# Patient Record
Sex: FEMALE | Race: WHITE | NOT HISPANIC OR LATINO | Employment: OTHER | URBAN - METROPOLITAN AREA
[De-identification: names, ages, dates, MRNs, and addresses within clinical notes are randomized per-mention and may not be internally consistent; named-entity substitution may affect disease eponyms.]

---

## 2017-01-11 ENCOUNTER — HOSPITAL ENCOUNTER (OUTPATIENT)
Dept: RADIOLOGY | Facility: HOSPITAL | Age: 77
Discharge: HOME | End: 2017-01-11
Attending: FAMILY MEDICINE
Payer: MEDICARE

## 2017-01-11 ENCOUNTER — OFFICE VISIT (OUTPATIENT)
Dept: INTERNAL MEDICINE | Facility: CLINIC | Age: 77
End: 2017-01-11
Payer: MEDICARE

## 2017-01-11 VITALS
OXYGEN SATURATION: 91 % | SYSTOLIC BLOOD PRESSURE: 148 MMHG | BODY MASS INDEX: 32.26 KG/M2 | HEART RATE: 100 BPM | DIASTOLIC BLOOD PRESSURE: 88 MMHG | HEIGHT: 64 IN | WEIGHT: 188.94 LBS | TEMPERATURE: 101 F

## 2017-01-11 DIAGNOSIS — R05.9 COUGH: ICD-10-CM

## 2017-01-11 DIAGNOSIS — R05.9 COUGH: Primary | ICD-10-CM

## 2017-01-11 DIAGNOSIS — R50.9 FEVER AND CHILLS: ICD-10-CM

## 2017-01-11 PROCEDURE — 71020 XR CHEST PA AND LATERAL: CPT | Mod: 26,,, | Performed by: RADIOLOGY

## 2017-01-11 PROCEDURE — 1157F ADVNC CARE PLAN IN RCRD: CPT | Mod: S$GLB,,, | Performed by: FAMILY MEDICINE

## 2017-01-11 PROCEDURE — 99213 OFFICE O/P EST LOW 20 MIN: CPT | Mod: S$GLB,,, | Performed by: FAMILY MEDICINE

## 2017-01-11 PROCEDURE — 1160F RVW MEDS BY RX/DR IN RCRD: CPT | Mod: S$GLB,,, | Performed by: FAMILY MEDICINE

## 2017-01-11 PROCEDURE — 1159F MED LIST DOCD IN RCRD: CPT | Mod: S$GLB,,, | Performed by: FAMILY MEDICINE

## 2017-01-11 PROCEDURE — 3079F DIAST BP 80-89 MM HG: CPT | Mod: S$GLB,,, | Performed by: FAMILY MEDICINE

## 2017-01-11 PROCEDURE — 3077F SYST BP >= 140 MM HG: CPT | Mod: S$GLB,,, | Performed by: FAMILY MEDICINE

## 2017-01-11 PROCEDURE — 1125F AMNT PAIN NOTED PAIN PRSNT: CPT | Mod: S$GLB,,, | Performed by: FAMILY MEDICINE

## 2017-01-11 PROCEDURE — 99999 PR PBB SHADOW E&M-EST. PATIENT-LVL III: CPT | Mod: PBBFAC,,, | Performed by: FAMILY MEDICINE

## 2017-01-11 PROCEDURE — 71020 XR CHEST PA AND LATERAL: CPT | Mod: TC,PO

## 2017-01-11 RX ORDER — AZITHROMYCIN 250 MG/1
250 TABLET, FILM COATED ORAL DAILY
Qty: 6 TABLET | Refills: 0 | Status: SHIPPED | OUTPATIENT
Start: 2017-01-11 | End: 2017-01-16

## 2017-01-11 RX ORDER — BENZONATATE 100 MG/1
100 CAPSULE ORAL 3 TIMES DAILY PRN
Qty: 30 CAPSULE | Refills: 1 | Status: SHIPPED | OUTPATIENT
Start: 2017-01-11 | End: 2017-03-14 | Stop reason: SDUPTHER

## 2017-01-11 RX ORDER — AMOXICILLIN AND CLAVULANATE POTASSIUM 875; 125 MG/1; MG/1
1 TABLET, FILM COATED ORAL EVERY 12 HOURS
Qty: 14 TABLET | Refills: 0 | Status: SHIPPED | OUTPATIENT
Start: 2017-01-11 | End: 2017-01-18

## 2017-01-11 NOTE — PROGRESS NOTES
Chief Complaint:    Chief Complaint   Patient presents with    Cough     fever/body chills       History of Present Illness:    Patient presents today with a 4-5 day history of fevers chills bodyaches she's also had some congestion cough postnasal drip.  She denies any shortness of breath or wheezing.    ROS:  Review of Systems   Constitutional: Positive for chills and fever. Negative for activity change, fatigue and unexpected weight change.   HENT: Positive for congestion. Negative for ear discharge, ear pain, hearing loss, postnasal drip and rhinorrhea.    Eyes: Negative for pain and visual disturbance.   Respiratory: Positive for cough. Negative for chest tightness and shortness of breath.    Cardiovascular: Negative for chest pain and palpitations.   Gastrointestinal: Negative for abdominal pain, diarrhea and vomiting.   Endocrine: Negative for heat intolerance.   Genitourinary: Negative for dysuria, flank pain, frequency and hematuria.   Musculoskeletal: Negative for back pain, gait problem and neck pain.   Skin: Negative for color change and rash.   Neurological: Negative for dizziness, tremors, seizures, numbness and headaches.   Psychiatric/Behavioral: Negative for agitation, hallucinations, self-injury, sleep disturbance and suicidal ideas. The patient is not nervous/anxious.        Past Medical History   Diagnosis Date    Diabetes mellitus, type 2 2006     BS doesn't check 06/15/2016    GERD (gastroesophageal reflux disease)     Hyperlipidemia     Hypertension     Hypothyroidism        Social History:  Social History     Social History    Marital status:      Spouse name: N/A    Number of children: N/A    Years of education: N/A     Social History Main Topics    Smoking status: Former Smoker     Packs/day: 1.00     Years: 25.00    Smokeless tobacco: None    Alcohol use No    Drug use: None    Sexual activity: Not Asked     Other Topics Concern    None     Social History Narrative  "      Family History:   family history includes COPD in her sister; Cancer (age of onset: 46) in her mother; Diabetes in her father; Heart disease in her father and mother; Hypertension in her father.    Health Maintenance   Topic Date Due    Zoster Vaccine  08/10/2000    Influenza Vaccine  08/01/2016    Foot Exam  02/02/2017    Hemoglobin A1c  05/17/2017    Eye Exam  06/15/2017    Lipid Panel  11/17/2017    DEXA SCAN  05/18/2019    Colonoscopy  08/23/2021    TETANUS VACCINE  02/02/2026    Pneumococcal (65+)  Completed       Physical Exam:    Vital Signs  Temp: (!) 101.1 °F (38.4 °C)  Temp src: Tympanic  Pulse: 100  SpO2: (!) 91 %  BP: (!) 148/88  Pain Score:   8 (body aches)  Pain Loc: Back  Height and Weight  Height: 5' 4" (162.6 cm)  Weight: 85.7 kg (188 lb 15 oz)  BSA (Calculated - sq m): 1.97 sq meters  BMI (Calculated): 32.5  Weight in (lb) to have BMI = 25: 145.3]    Body mass index is 32.43 kg/(m^2).    Physical Exam   Constitutional: She is oriented to person, place, and time. She appears well-developed.   HENT:   Right Ear: External ear normal.   Left Ear: External ear normal.   Mouth/Throat: Oropharynx is clear and moist.   Eyes: Conjunctivae are normal. Pupils are equal, round, and reactive to light.   Neck: Normal range of motion. Neck supple.   Cardiovascular: Normal rate, regular rhythm and normal heart sounds.    No murmur heard.  Pulmonary/Chest: Effort normal and breath sounds normal. No respiratory distress. She has no wheezes. She has no rales. She exhibits no tenderness.   Abdominal: Soft. She exhibits no distension and no mass. There is no tenderness. There is no guarding.   Musculoskeletal: She exhibits no edema or tenderness.   Lymphadenopathy:     She has no cervical adenopathy.   Neurological: She is alert and oriented to person, place, and time. She has normal reflexes.   Skin: Skin is warm and dry.   Psychiatric: She has a normal mood and affect. Her behavior is normal. Judgment " and thought content normal.       Lab Results   Component Value Date    CHOL 136 11/17/2016    CHOL 152 05/02/2016    CHOL 135 01/12/2016    TRIG 235 (H) 11/17/2016    TRIG 271 (H) 05/02/2016    TRIG 299 (H) 01/12/2016    HDL 35 (L) 11/17/2016    HDL 42 05/02/2016    HDL 31 (L) 01/12/2016    TOTALCHOLEST 3.9 11/17/2016    TOTALCHOLEST 3.6 05/02/2016    TOTALCHOLEST 4.4 01/12/2016    NONHDLCHOL 101 11/17/2016    NONHDLCHOL 110 05/02/2016    NONHDLCHOL 104 01/12/2016       Lab Results   Component Value Date    HGBA1C 6.8 (H) 11/17/2016       Assessment:      ICD-10-CM ICD-9-CM   1. Cough R05 786.2   2. Fever and chills R50.9 780.60         Plan:  1.  We will recheck patient's pulse ox was 94 he was not in any respiratory distress.  Chest x-ray did not show any acute changes.  Initially we debated treating patient outpatient versus sending to the hospital, finally she was started on Z-Saqib and Augmentin with instructions to go to the ED should she get any worse.  Orders Placed This Encounter   Procedures    X-Ray Chest PA And Lateral       Current Outpatient Prescriptions   Medication Sig Dispense Refill    allopurinol (ZYLOPRIM) 300 MG tablet Take 1 tablet (300 mg total) by mouth once daily. 90 tablet 3    aspirin (ECOTRIN) 81 MG EC tablet Take 81 mg by mouth once daily.      CMPD diclofenac 2%- amitriptyline 2%- lidocaine 2%- prilocaine 2% in Lipoderm ActiveMax bid 100 g 6    ferrous sulfate 325 mg (65 mg iron) CpSR Take 1 tablet by mouth 2 times daily 2 hours after meal. 60 capsule 6    furosemide (LASIX) 40 MG tablet Take 40 mg by mouth once daily.      gabapentin (NEURONTIN) 100 MG capsule Take 1 capsule (100 mg total) by mouth 3 (three) times daily. 270 capsule 3    glipiZIDE (GLUCOTROL) 5 MG tablet Take 1 tablet (5 mg total) by mouth 2 (two) times daily before meals. 180 tablet 3    glyBURIDE (DIABETA) 5 MG tablet Take 5 mg by mouth once daily.      levothyroxine (SYNTHROID) 75 MCG tablet Take 1 tablet  (75 mcg total) by mouth once daily. 90 tablet 3    losartan (COZAAR) 100 MG tablet Take 1 tablet (100 mg total) by mouth once daily. 90 tablet 3    lovastatin (MEVACOR) 40 MG tablet TAKE 1 TABLET (40 MG TOTAL) BY MOUTH EVERY EVENING. 30 tablet 6    metformin (GLUCOPHAGE) 1000 MG tablet Take 1 tablet (1,000 mg total) by mouth 2 (two) times daily. 180 tablet 3    metoprolol succinate (TOPROL-XL) 50 MG 24 hr tablet Take 50 mg by mouth once daily.      pantoprazole (PROTONIX) 40 MG tablet TAKE 1 TABLET (40 MG TOTAL) BY MOUTH ONCE DAILY. 30 tablet 2    potassium chloride SA (K-DUR,KLOR-CON) 10 MEQ tablet Take once daily 90 tablet 3    pramipexole (MIRAPEX) 0.25 MG tablet Take 1 tablet (0.25 mg total) by mouth every evening. 90 tablet 3    sodium,potassium,mag sulfates (SUPREP BOWEL PREP KIT) 17.5-3.13-1.6 gram SolR Take 1 kit by mouth as directed. 1 Bottle 0    trazodone (DESYREL) 50 MG tablet Take 1 tablet (50 mg total) by mouth every evening. 30 tablet 11    amoxicillin-clavulanate 875-125mg (AUGMENTIN) 875-125 mg per tablet Take 1 tablet by mouth every 12 (twelve) hours. 14 tablet 0    azithromycin (ZITHROMAX Z-SAURABH) 250 MG tablet Take 1 tablet (250 mg total) by mouth once daily. Take 2 tabs on day 1 thereafter one tab daily by mouth for 4 days. 6 tablet 0    benzonatate (TESSALON) 100 MG capsule Take 1 capsule (100 mg total) by mouth 3 (three) times daily as needed for Cough. 30 capsule 1    benzonatate (TESSALON) 100 MG capsule Take 1 capsule (100 mg total) by mouth 3 (three) times daily as needed for Cough. 30 capsule 1    metoprolol tartrate (LOPRESSOR) 50 MG tablet 1 BY MOUTH DAILY  3    penicillin v potassium (VEETID) 500 MG tablet TAKE 4 TABLETS BY MOUTH 1 HOUR PRIOR TO APPT  0    triamcinolone acetonide 0.5% (KENALOG) 0.5 % Crea Apply topically 2 (two) times daily. 1 Tube 1     No current facility-administered medications for this visit.        There are no discontinued medications.    No  Follow-up on file.      Dr Angely Roberts MD    Disclaimer: This note is prepared using voice recognition system and as such is likely to have errors and is not proof read.

## 2017-01-12 ENCOUNTER — CLINICAL SUPPORT (OUTPATIENT)
Dept: INTERNAL MEDICINE | Facility: CLINIC | Age: 77
End: 2017-01-12
Payer: MEDICARE

## 2017-01-12 DIAGNOSIS — J11.1 FLU SYNDROME: Primary | ICD-10-CM

## 2017-01-12 PROCEDURE — 87804 INFLUENZA ASSAY W/OPTIC: CPT | Mod: QW,S$GLB,, | Performed by: FAMILY MEDICINE

## 2017-01-13 LAB
CTP QC/QA: YES
FLUAV AG NPH QL: NEGATIVE
FLUBV AG NPH QL: NEGATIVE

## 2017-02-01 RX ORDER — GABAPENTIN 100 MG/1
CAPSULE ORAL
Qty: 270 CAPSULE | Refills: 3 | Status: SHIPPED | OUTPATIENT
Start: 2017-02-01 | End: 2018-02-07 | Stop reason: SDUPTHER

## 2017-02-08 DIAGNOSIS — G25.81 RESTLESS LEGS SYNDROME (RLS): Chronic | ICD-10-CM

## 2017-02-08 RX ORDER — PRAMIPEXOLE DIHYDROCHLORIDE 0.25 MG/1
TABLET ORAL
Qty: 90 TABLET | Refills: 3 | Status: SHIPPED | OUTPATIENT
Start: 2017-02-08 | End: 2018-11-19

## 2017-03-01 RX ORDER — LOSARTAN POTASSIUM 100 MG/1
TABLET ORAL
Qty: 90 TABLET | Refills: 3 | Status: SHIPPED | OUTPATIENT
Start: 2017-03-01 | End: 2018-01-08

## 2017-03-01 RX ORDER — METFORMIN HYDROCHLORIDE 1000 MG/1
TABLET ORAL
Qty: 180 TABLET | Refills: 3 | Status: SHIPPED | OUTPATIENT
Start: 2017-03-01 | End: 2017-08-30 | Stop reason: SDUPTHER

## 2017-03-01 RX ORDER — ALLOPURINOL 300 MG/1
TABLET ORAL
Qty: 90 TABLET | Refills: 3 | Status: SHIPPED | OUTPATIENT
Start: 2017-03-01 | End: 2017-08-30 | Stop reason: SDUPTHER

## 2017-03-07 ENCOUNTER — LAB VISIT (OUTPATIENT)
Dept: LAB | Facility: HOSPITAL | Age: 77
End: 2017-03-07
Attending: FAMILY MEDICINE
Payer: MEDICARE

## 2017-03-07 DIAGNOSIS — E11.42 DIABETIC POLYNEUROPATHY ASSOCIATED WITH TYPE 2 DIABETES MELLITUS: ICD-10-CM

## 2017-03-07 DIAGNOSIS — I10 ESSENTIAL HYPERTENSION: ICD-10-CM

## 2017-03-07 LAB
ALBUMIN SERPL BCP-MCNC: 4 G/DL
ALP SERPL-CCNC: 63 U/L
ALT SERPL W/O P-5'-P-CCNC: 12 U/L
ANION GAP SERPL CALC-SCNC: 12 MMOL/L
AST SERPL-CCNC: 16 U/L
BASOPHILS # BLD AUTO: 0.04 K/UL
BASOPHILS NFR BLD: 0.6 %
BILIRUB SERPL-MCNC: 0.4 MG/DL
BUN SERPL-MCNC: 18 MG/DL
CALCIUM SERPL-MCNC: 9.7 MG/DL
CHLORIDE SERPL-SCNC: 104 MMOL/L
CHOLEST/HDLC SERPL: 3.5 {RATIO}
CO2 SERPL-SCNC: 27 MMOL/L
CREAT SERPL-MCNC: 1 MG/DL
DIFFERENTIAL METHOD: ABNORMAL
EOSINOPHIL # BLD AUTO: 0.3 K/UL
EOSINOPHIL NFR BLD: 4.3 %
ERYTHROCYTE [DISTWIDTH] IN BLOOD BY AUTOMATED COUNT: 15.5 %
EST. GFR  (AFRICAN AMERICAN): >60 ML/MIN/1.73 M^2
EST. GFR  (NON AFRICAN AMERICAN): 54.9 ML/MIN/1.73 M^2
GLUCOSE SERPL-MCNC: 120 MG/DL
HCT VFR BLD AUTO: 34.8 %
HDL/CHOLESTEROL RATIO: 28.9 %
HDLC SERPL-MCNC: 142 MG/DL
HDLC SERPL-MCNC: 41 MG/DL
HGB BLD-MCNC: 10.6 G/DL
LDLC SERPL CALC-MCNC: 57.8 MG/DL
LYMPHOCYTES # BLD AUTO: 2 K/UL
LYMPHOCYTES NFR BLD: 30.9 %
MCH RBC QN AUTO: 23.8 PG
MCHC RBC AUTO-ENTMCNC: 30.5 %
MCV RBC AUTO: 78 FL
MONOCYTES # BLD AUTO: 0.6 K/UL
MONOCYTES NFR BLD: 9.1 %
NEUTROPHILS # BLD AUTO: 3.6 K/UL
NEUTROPHILS NFR BLD: 54.8 %
NONHDLC SERPL-MCNC: 101 MG/DL
PLATELET # BLD AUTO: 230 K/UL
PMV BLD AUTO: 9.8 FL
POTASSIUM SERPL-SCNC: 3.9 MMOL/L
PROT SERPL-MCNC: 7.2 G/DL
RBC # BLD AUTO: 4.46 M/UL
SODIUM SERPL-SCNC: 143 MMOL/L
TRIGL SERPL-MCNC: 216 MG/DL
WBC # BLD AUTO: 6.48 K/UL

## 2017-03-07 PROCEDURE — 36415 COLL VENOUS BLD VENIPUNCTURE: CPT | Mod: PO

## 2017-03-07 PROCEDURE — 83036 HEMOGLOBIN GLYCOSYLATED A1C: CPT

## 2017-03-07 PROCEDURE — 80053 COMPREHEN METABOLIC PANEL: CPT

## 2017-03-07 PROCEDURE — 80061 LIPID PANEL: CPT

## 2017-03-07 PROCEDURE — 85025 COMPLETE CBC W/AUTO DIFF WBC: CPT

## 2017-03-08 LAB
ESTIMATED AVG GLUCOSE: 137 MG/DL
HBA1C MFR BLD HPLC: 6.4 %

## 2017-03-14 ENCOUNTER — OFFICE VISIT (OUTPATIENT)
Dept: INTERNAL MEDICINE | Facility: CLINIC | Age: 77
End: 2017-03-14
Payer: MEDICARE

## 2017-03-14 VITALS
WEIGHT: 189.13 LBS | HEART RATE: 81 BPM | SYSTOLIC BLOOD PRESSURE: 130 MMHG | DIASTOLIC BLOOD PRESSURE: 62 MMHG | HEIGHT: 64 IN | BODY MASS INDEX: 32.29 KG/M2 | RESPIRATION RATE: 16 BRPM | OXYGEN SATURATION: 94 % | TEMPERATURE: 97 F

## 2017-03-14 DIAGNOSIS — R19.5 OCCULT BLOOD POSITIVE STOOL: ICD-10-CM

## 2017-03-14 DIAGNOSIS — E11.42 DIABETIC POLYNEUROPATHY ASSOCIATED WITH TYPE 2 DIABETES MELLITUS: Primary | ICD-10-CM

## 2017-03-14 DIAGNOSIS — E03.4 HYPOTHYROIDISM DUE TO ACQUIRED ATROPHY OF THYROID: ICD-10-CM

## 2017-03-14 DIAGNOSIS — I10 ESSENTIAL HYPERTENSION: ICD-10-CM

## 2017-03-14 DIAGNOSIS — D50.0 IRON DEFICIENCY ANEMIA DUE TO CHRONIC BLOOD LOSS: ICD-10-CM

## 2017-03-14 PROCEDURE — 1160F RVW MEDS BY RX/DR IN RCRD: CPT | Mod: S$GLB,,, | Performed by: FAMILY MEDICINE

## 2017-03-14 PROCEDURE — 1157F ADVNC CARE PLAN IN RCRD: CPT | Mod: S$GLB,,, | Performed by: FAMILY MEDICINE

## 2017-03-14 PROCEDURE — 1159F MED LIST DOCD IN RCRD: CPT | Mod: S$GLB,,, | Performed by: FAMILY MEDICINE

## 2017-03-14 PROCEDURE — 99499 UNLISTED E&M SERVICE: CPT | Mod: S$PBB,,, | Performed by: FAMILY MEDICINE

## 2017-03-14 PROCEDURE — 1126F AMNT PAIN NOTED NONE PRSNT: CPT | Mod: S$GLB,,, | Performed by: FAMILY MEDICINE

## 2017-03-14 PROCEDURE — 3078F DIAST BP <80 MM HG: CPT | Mod: S$GLB,,, | Performed by: FAMILY MEDICINE

## 2017-03-14 PROCEDURE — 3075F SYST BP GE 130 - 139MM HG: CPT | Mod: S$GLB,,, | Performed by: FAMILY MEDICINE

## 2017-03-14 PROCEDURE — 99214 OFFICE O/P EST MOD 30 MIN: CPT | Mod: S$GLB,,, | Performed by: FAMILY MEDICINE

## 2017-03-14 PROCEDURE — 99999 PR PBB SHADOW E&M-EST. PATIENT-LVL III: CPT | Mod: PBBFAC,,, | Performed by: FAMILY MEDICINE

## 2017-03-14 NOTE — PROGRESS NOTES
Chief Complaint:    Chief Complaint   Patient presents with    Follow-up       History of Present Illness:  She presents today for three-month follow-up.  She's doing okay A1c is down to 6.4 blood pressures normal lipids chemistries are stable.    Deficiency anemia due to chronic blood loss she was sent to gastroenterology had a colonoscopy done she's not taking iron there is a drop in her H&H it appears that she's probably continuing to have chronic blood loss no further workup for appointments were made with gastroenterology.    She does complain of some cold feet especially at night.      ROS:  Review of Systems   Constitutional: Negative for activity change, chills, fatigue, fever and unexpected weight change.   HENT: Negative for congestion, ear discharge, ear pain, hearing loss, postnasal drip and rhinorrhea.    Eyes: Negative for pain and visual disturbance.   Respiratory: Negative for cough, chest tightness and shortness of breath.    Cardiovascular: Negative for chest pain and palpitations.   Gastrointestinal: Negative for abdominal pain, diarrhea and vomiting.   Endocrine: Negative for heat intolerance.   Genitourinary: Negative for dysuria, flank pain, frequency and hematuria.   Musculoskeletal: Negative for back pain, gait problem and neck pain.   Skin: Negative for color change and rash.   Neurological: Negative for dizziness, tremors, seizures, numbness and headaches.   Psychiatric/Behavioral: Negative for agitation, hallucinations, self-injury, sleep disturbance and suicidal ideas. The patient is not nervous/anxious.        Past Medical History:   Diagnosis Date    Diabetes mellitus, type 2 2006    BS doesn't check 06/15/2016    GERD (gastroesophageal reflux disease)     Hyperlipidemia     Hypertension     Hypothyroidism        Social History:  Social History     Social History    Marital status:      Spouse name: N/A    Number of children: N/A    Years of education: N/A     Social  "History Main Topics    Smoking status: Former Smoker     Packs/day: 1.00     Years: 25.00    Smokeless tobacco: None    Alcohol use No    Drug use: None    Sexual activity: Not Asked     Other Topics Concern    None     Social History Narrative       Family History:   family history includes COPD in her sister; Cancer (age of onset: 46) in her mother; Diabetes in her father; Heart disease in her father and mother; Hypertension in her father.    Health Maintenance   Topic Date Due    Zoster Vaccine  08/10/2000    Influenza Vaccine  08/01/2016    Eye Exam  06/15/2017    Hemoglobin A1c  09/07/2017    Lipid Panel  03/07/2018    Foot Exam  03/14/2018    DEXA SCAN  05/18/2019    Colonoscopy  08/23/2021    TETANUS VACCINE  02/02/2026    Pneumococcal (65+)  Completed       Physical Exam:    Vital Signs  Temp: 97 °F (36.1 °C)  Temp src: Tympanic  Pulse: 81  Resp: 16  SpO2: (!) 94 %  BP: 130/62  BP Location: Right arm  BP Method: Manual  Patient Position: Sitting  Pain Score: 0-No pain  Height and Weight  Height: 5' 4" (162.6 cm)  Weight: 85.8 kg (189 lb 2.5 oz)  BSA (Calculated - sq m): 1.97 sq meters  BMI (Calculated): 32.5  Weight in (lb) to have BMI = 25: 145.3]    Body mass index is 32.47 kg/(m^2).    Physical Exam   Constitutional: She is oriented to person, place, and time. She appears well-developed.   HENT:   Mouth/Throat: Oropharynx is clear and moist.   Eyes: Conjunctivae are normal. Pupils are equal, round, and reactive to light.   Neck: Normal range of motion. Neck supple.   Cardiovascular: Normal rate, regular rhythm and normal heart sounds.    No murmur heard.  Pulses:       Dorsalis pedis pulses are 3+ on the right side, and 3+ on the left side.        Posterior tibial pulses are 3+ on the right side, and 3+ on the left side.   Pulmonary/Chest: Effort normal and breath sounds normal. No respiratory distress. She has no wheezes. She has no rales. She exhibits no tenderness.   Abdominal: Soft. " She exhibits no distension and no mass. There is no tenderness. There is no guarding.   Musculoskeletal: She exhibits no edema or tenderness.   Feet:   Right Foot:   Protective Sensation: 10 sites tested. 10 sites sensed.   Left Foot:   Protective Sensation: 10 sites tested. 10 sites sensed.   Lymphadenopathy:     She has no cervical adenopathy.   Neurological: She is alert and oriented to person, place, and time. She has normal reflexes.   Skin: Skin is warm and dry.   Psychiatric: She has a normal mood and affect. Her behavior is normal. Judgment and thought content normal.       Lab Results   Component Value Date    CHOL 142 03/07/2017    CHOL 136 11/17/2016    CHOL 152 05/02/2016    TRIG 216 (H) 03/07/2017    TRIG 235 (H) 11/17/2016    TRIG 271 (H) 05/02/2016    HDL 41 03/07/2017    HDL 35 (L) 11/17/2016    HDL 42 05/02/2016    TOTALCHOLEST 3.5 03/07/2017    TOTALCHOLEST 3.9 11/17/2016    TOTALCHOLEST 3.6 05/02/2016    NONHDLCHOL 101 03/07/2017    NONHDLCHOL 101 11/17/2016    NONHDLCHOL 110 05/02/2016       Lab Results   Component Value Date    HGBA1C 6.4 (H) 03/07/2017       Assessment:      ICD-10-CM ICD-9-CM   1. Diabetic polyneuropathy associated with type 2 diabetes mellitus E11.42 250.60     357.2   2. Hypothyroidism due to acquired atrophy of thyroid E03.4 244.8     246.8   3. Essential hypertension I10 401.9   4. Iron deficiency anemia due to chronic blood loss D50.0 280.0   5. Occult blood positive stool R19.5 792.1         Plan:  1.  Iron deficiency anemia due to chronic blood loss and heme positive stool she will be referred back to gastroenterology for continued workup possibly EGD and/or capsule endoscopy.  We will add ferrous sulfate in the interim.  Side effects discussed with the patient.  2.  Diabetes type 2 stable  3.  Hypothyroid and therapeutic  4.  Hypertension stable  5.  Cold feet, patient does not have much subcutaneous fat on her feet and legs thereby creating the subjective sensation of  coldness although her peripheral pulses are excellent.  Do recommend walking program.  Follow-up 3 months.  Orders Placed This Encounter   Procedures    Hemoglobin A1c    Lipid panel    Comprehensive metabolic panel    CBC auto differential    TSH    Ambulatory referral to Gastroenterology       Current Outpatient Prescriptions   Medication Sig Dispense Refill    allopurinol (ZYLOPRIM) 300 MG tablet TAKE 1 TABLET (300 MG TOTAL) BY MOUTH ONCE DAILY. 90 tablet 3    aspirin (ECOTRIN) 81 MG EC tablet Take 81 mg by mouth once daily.      CMPD diclofenac 2%- amitriptyline 2%- lidocaine 2%- prilocaine 2% in Lipoderm ActiveMax bid 100 g 6    furosemide (LASIX) 40 MG tablet Take 40 mg by mouth once daily.      gabapentin (NEURONTIN) 100 MG capsule TAKE 1 CAPSULE (100 MG TOTAL) BY MOUTH 3 (THREE) TIMES DAILY. 270 capsule 3    glyBURIDE (DIABETA) 5 MG tablet Take 5 mg by mouth once daily.      levothyroxine (SYNTHROID) 75 MCG tablet Take 1 tablet (75 mcg total) by mouth once daily. 90 tablet 3    losartan (COZAAR) 100 MG tablet TAKE 1 TABLET (100 MG TOTAL) BY MOUTH ONCE DAILY. 90 tablet 3    lovastatin (MEVACOR) 40 MG tablet TAKE 1 TABLET (40 MG TOTAL) BY MOUTH EVERY EVENING. 30 tablet 6    metformin (GLUCOPHAGE) 1000 MG tablet TAKE 1 TABLET (1,000 MG TOTAL) BY MOUTH 2 (TWO) TIMES DAILY. 180 tablet 3    metoprolol tartrate (LOPRESSOR) 50 MG tablet 1 BY MOUTH DAILY  3    pantoprazole (PROTONIX) 40 MG tablet TAKE 1 TABLET (40 MG TOTAL) BY MOUTH ONCE DAILY. 30 tablet 2    penicillin v potassium (VEETID) 500 MG tablet TAKE 4 TABLETS BY MOUTH 1 HOUR PRIOR TO APPT  0    potassium chloride SA (K-DUR,KLOR-CON) 10 MEQ tablet Take once daily 90 tablet 3    pramipexole (MIRAPEX) 0.25 MG tablet TAKE 1 TABLET (0.25 MG TOTAL) BY MOUTH EVERY EVENING. 90 tablet 3    trazodone (DESYREL) 50 MG tablet Take 1 tablet (50 mg total) by mouth every evening. 30 tablet 11    ferrous sulfate 325 mg (65 mg iron) CpSR Take 1 tablet  by mouth 2 times daily 2 hours after meal. 60 capsule 6    glipiZIDE (GLUCOTROL) 5 MG tablet Take 1 tablet (5 mg total) by mouth 2 (two) times daily before meals. 180 tablet 3    triamcinolone acetonide 0.5% (KENALOG) 0.5 % Crea Apply topically 2 (two) times daily. 1 Tube 1     No current facility-administered medications for this visit.        Medications Discontinued During This Encounter   Medication Reason    benzonatate (TESSALON) 100 MG capsule Therapy completed    benzonatate (TESSALON) 100 MG capsule Duplicate Order    ferrous sulfate 325 mg (65 mg iron) CpSR Patient no longer taking    metoprolol succinate (TOPROL-XL) 50 MG 24 hr tablet Duplicate Order    sodium,potassium,mag sulfates (SUPREP BOWEL PREP KIT) 17.5-3.13-1.6 gram SolR Therapy completed       Return in about 3 months (around 6/14/2017).      Dr Angely Roberts MD    Disclaimer: This note is prepared using voice recognition system and as such is likely to have errors and is not proof read.

## 2017-03-14 NOTE — MR AVS SNAPSHOT
[place] - Internal Medicine  [address] [facility] [address]  Phone: [phone]                  Jasmine Velásquez   3/14/2017 10:20 AM   Office Visit    Description:  Female : 1940   Provider:  Angely Roberts MD   Department:  [facility]           Reason for Visit     Follow-up           Diagnoses this Visit        Comments    Diabetic polyneuropathy associated with type 2 diabetes mellitus    -  Primary     Hypothyroidism due to acquired atrophy of thyroid         Essential hypertension         Iron deficiency anemia due to chronic blood loss         Occult blood positive stool                To Do List           Future Appointments        Provider Department Dept Phone    2017 9:00 AM LABORATORY, [place] [facility] [phone]    2017 10:00 AM Gerardo Lauren OD [facility] [phone]    2017 10:20 AM Angely Roberts MD [facility] [phone]      Goals (5 Years of Data)     None      Follow-Up and Disposition     Return in about 3 months (around 2017).    Follow-up and Disposition History       These Medications        Disp Refills Start End    ferrous sulfate 325 mg (65 mg iron) CpSR 60 capsule 6 3/14/2017     Take 1 tablet by mouth 2 times daily 2 hours after meal. - Oral    Pharmacy: [facility]/pharmacy #[id number] [facility] [id number] [place] [address] #: [phone]         [facility]     [facility] -  Assistance  Registered nurses in the [facility] provide clinical advisement, health education, appointment booking, and other advisory services.  Call for this free service at [phone].             Medications           Message regarding Medications     Verify the changes and/or additions to your medication regime listed below are the same as discussed with your clinician today.  If any of these changes or additions are incorrect,  please notify your healthcare provider.        STOP taking these medications     benzonatate (TESSALON) 100 MG capsule Take 1 capsule (100 mg total) by mouth 3 (three) times daily as needed for Cough.    benzonatate (TESSALON) 100 MG capsule Take 1 capsule (100 mg total) by mouth 3 (three) times daily as needed for Cough.    metoprolol succinate (TOPROL-XL) 50 MG 24 hr tablet Take 50 mg by mouth once daily.    sodium,potassium,mag sulfates (SUPREP BOWEL PREP KIT) 17.5-3.13-1.6 gram SolR Take 1 kit by mouth as directed.           Verify that the below list of medications is an accurate representation of the medications you are currently taking.  If none reported, the list may be blank. If incorrect, please contact your healthcare provider. Carry this list with you in case of emergency.           Current Medications     allopurinol (ZYLOPRIM) 300 MG tablet TAKE 1 TABLET (300 MG TOTAL) BY MOUTH ONCE DAILY.    aspirin (ECOTRIN) 81 MG EC tablet Take 81 mg by mouth once daily.    CMPD diclofenac 2%- amitriptyline 2%- lidocaine 2%- prilocaine 2% in Lipoderm ActiveMax bid    ferrous sulfate 325 mg (65 mg iron) CpSR Take 1 tablet by mouth 2 times daily 2 hours after meal.    furosemide (LASIX) 40 MG tablet Take 40 mg by mouth once daily.    gabapentin (NEURONTIN) 100 MG capsule TAKE 1 CAPSULE (100 MG TOTAL) BY MOUTH 3 (THREE) TIMES DAILY.    glipiZIDE (GLUCOTROL) 5 MG tablet Take 1 tablet (5 mg total) by mouth 2 (two) times daily before meals.    glyBURIDE (DIABETA) 5 MG tablet Take 5 mg by mouth once daily.    levothyroxine (SYNTHROID) 75 MCG tablet Take 1 tablet (75 mcg total) by mouth once daily.    losartan (COZAAR) 100 MG tablet TAKE 1 TABLET (100 MG TOTAL) BY MOUTH ONCE DAILY.    lovastatin (MEVACOR) 40 MG tablet TAKE 1 TABLET (40 MG TOTAL) BY MOUTH EVERY EVENING.    metformin (GLUCOPHAGE) 1000 MG tablet TAKE 1 TABLET (1,000 MG TOTAL) BY MOUTH 2 (TWO) TIMES DAILY.    metoprolol tartrate (LOPRESSOR) 50 MG tablet 1 BY  "MOUTH DAILY    pantoprazole (PROTONIX) 40 MG tablet TAKE 1 TABLET (40 MG TOTAL) BY MOUTH ONCE DAILY.    penicillin v potassium (VEETID) 500 MG tablet TAKE 4 TABLETS BY MOUTH 1 HOUR PRIOR TO APPT    potassium chloride SA (K-DUR,KLOR-CON) 10 MEQ tablet Take once daily    pramipexole (MIRAPEX) 0.25 MG tablet TAKE 1 TABLET (0.25 MG TOTAL) BY MOUTH EVERY EVENING.    trazodone (DESYREL) 50 MG tablet Take 1 tablet (50 mg total) by mouth every evening.    triamcinolone acetonide 0.5% (KENALOG) 0.5 % Crea Apply topically 2 (two) times daily.           Clinical Reference Information           Your Vitals Were     BP Pulse Temp Resp Height Weight    130/62 (BP Location: Right arm, Patient Position: Sitting, BP Method: Manual) 81 97 °F (36.1 °C) (Tympanic) 16 5' 4" (1.626 m) 85.8 kg (189 lb 2.5 oz)    SpO2 BMI             94% 32.47 kg/m2         Blood Pressure          Most Recent Value    BP  130/62      Allergies as of 3/14/2017     Ace Inhibitors    Codeine      Immunizations Administered on Date of Encounter - 3/14/2017     None      Orders Placed During Today's Visit      Normal Orders This Visit    Ambulatory referral to Gastroenterology     Future Labs/Procedures Expected by Expires    CBC auto differential  6/12/2017 (Approximate) 9/10/2017    Comprehensive metabolic panel  6/14/2017 (Approximate) 6/12/2018    Hemoglobin A1c  6/14/2017 (Approximate) 5/13/2018    Lipid panel  6/14/2017 (Approximate) 5/13/2018    TSH  6/14/2017 (Approximate) 5/13/2018      [facility] Sign-Up     Activating your [facility] account is as easy as 1-2-3!     1) Visit [email or web address], select Sign Up Now, enter this activation code and your date of birth, then select Next.  [email or web address]  Expires: 4/28/2017 11:32 AM      2) Create a username and password to use when you visit [facility] in the future and select a security question in case you lose your password and select Next.    3) Enter your e-mail address and click Sign Up!    Additional " Information  If you have questions, please e-mail [email or web address] or call [phone] to talk to our [facility] staff. Remember, [facility] is NOT to be used for urgent needs. For medical emergencies, dial 911.         Language Assistance Services     ATTENTION: Language assistance services are available, free of charge. Please call [phone].      ATENCIÓN: Si habla shunañol, tiene a guallpa disposición servicios gratuitos de asistencia lingüística. Llame al [phone].     [facility] Ý: N?u b?n nói Ti?ng Vi?t, có các d?ch v? h? tr? ngôn ng? mi?n phí dành cho b?n. G?i s? [phone].         [facility] complies with applicable Federal civil rights laws and does not discriminate on the basis of race, color, national origin, age, disability, or sex.

## 2017-03-28 RX ORDER — PANTOPRAZOLE SODIUM 40 MG/1
TABLET, DELAYED RELEASE ORAL
Qty: 30 TABLET | Refills: 2 | Status: SHIPPED | OUTPATIENT
Start: 2017-03-28 | End: 2017-05-08 | Stop reason: SDUPTHER

## 2017-03-29 ENCOUNTER — TELEPHONE (OUTPATIENT)
Dept: INTERNAL MEDICINE | Facility: CLINIC | Age: 77
End: 2017-03-29

## 2017-03-29 RX ORDER — PANTOPRAZOLE SODIUM 40 MG/1
TABLET, DELAYED RELEASE ORAL
Qty: 30 TABLET | Refills: 1 | Status: SHIPPED | OUTPATIENT
Start: 2017-03-29 | End: 2017-05-08 | Stop reason: SDUPTHER

## 2017-03-29 NOTE — TELEPHONE ENCOUNTER
----- Message from Kayleen Park sent at 3/29/2017 11:46 AM CDT -----  Contact: Daughter  States blood work needs to be faxed to [facility] at [phone], pt can be reached at [phone]///[id number]

## 2017-03-30 RX ORDER — LEVOTHYROXINE SODIUM 75 UG/1
TABLET ORAL
Qty: 90 TABLET | Refills: 3 | Status: SHIPPED | OUTPATIENT
Start: 2017-03-30 | End: 2017-06-29 | Stop reason: SDUPTHER

## 2017-04-03 RX ORDER — GLIPIZIDE 5 MG/1
TABLET ORAL
Qty: 180 TABLET | Refills: 2 | Status: SHIPPED | OUTPATIENT
Start: 2017-04-03 | End: 2018-11-19

## 2017-04-24 RX ORDER — LOVASTATIN 40 MG/1
TABLET ORAL
Qty: 30 TABLET | Refills: 6 | Status: SHIPPED | OUTPATIENT
Start: 2017-04-24 | End: 2020-08-07 | Stop reason: SDUPTHER

## 2017-04-28 ENCOUNTER — INITIAL CONSULT (OUTPATIENT)
Dept: GASTROENTEROLOGY | Facility: CLINIC | Age: 77
End: 2017-04-28
Payer: MEDICARE

## 2017-04-28 VITALS
HEIGHT: 64 IN | SYSTOLIC BLOOD PRESSURE: 110 MMHG | BODY MASS INDEX: 30.75 KG/M2 | HEART RATE: 70 BPM | DIASTOLIC BLOOD PRESSURE: 60 MMHG | WEIGHT: 180.13 LBS

## 2017-04-28 DIAGNOSIS — D50.0 IRON DEFICIENCY ANEMIA DUE TO CHRONIC BLOOD LOSS: Primary | ICD-10-CM

## 2017-04-28 PROCEDURE — 1125F AMNT PAIN NOTED PAIN PRSNT: CPT | Mod: S$GLB,,, | Performed by: INTERNAL MEDICINE

## 2017-04-28 PROCEDURE — 3078F DIAST BP <80 MM HG: CPT | Mod: S$GLB,,, | Performed by: INTERNAL MEDICINE

## 2017-04-28 PROCEDURE — 99999 PR PBB SHADOW E&M-EST. PATIENT-LVL III: CPT | Mod: PBBFAC,,, | Performed by: INTERNAL MEDICINE

## 2017-04-28 PROCEDURE — 1157F ADVNC CARE PLAN IN RCRD: CPT | Mod: 8P,S$GLB,, | Performed by: INTERNAL MEDICINE

## 2017-04-28 PROCEDURE — 99214 OFFICE O/P EST MOD 30 MIN: CPT | Mod: S$GLB,,, | Performed by: INTERNAL MEDICINE

## 2017-04-28 PROCEDURE — 1160F RVW MEDS BY RX/DR IN RCRD: CPT | Mod: S$GLB,,, | Performed by: INTERNAL MEDICINE

## 2017-04-28 PROCEDURE — 3074F SYST BP LT 130 MM HG: CPT | Mod: S$GLB,,, | Performed by: INTERNAL MEDICINE

## 2017-04-28 PROCEDURE — 1159F MED LIST DOCD IN RCRD: CPT | Mod: S$GLB,,, | Performed by: INTERNAL MEDICINE

## 2017-04-28 RX ORDER — POLYETHYLENE GLYCOL 3350, SODIUM SULFATE ANHYDROUS, SODIUM BICARBONATE, SODIUM CHLORIDE, POTASSIUM CHLORIDE 236; 22.74; 6.74; 5.86; 2.97 G/4L; G/4L; G/4L; G/4L; G/4L
4 POWDER, FOR SOLUTION ORAL ONCE
Qty: 4000 ML | Refills: 0 | Status: SHIPPED | OUTPATIENT
Start: 2017-04-28 | End: 2017-04-28

## 2017-04-28 RX ORDER — METHOCARBAMOL 750 MG/1
1 TABLET, FILM COATED ORAL EVERY 8 HOURS PRN
Refills: 3 | COMMUNITY
Start: 2017-03-29 | End: 2017-09-28

## 2017-04-28 NOTE — PROGRESS NOTES
Subjective:    PCP: Angely Roberts MD    Referring physician: Dr. Angely Roberts      Patient ID: Jasmine Velásquez is a 76 y.o. female.    Chief Complaint: Anemia      HPI   Jasmine Velásquez is a 76 y.o. /White female here today for iron deficiency anemia    Anemia  Patient presents for evaluation of anemia. Anemia was found by routine CBC. It has been present for a few months. Associated signs & symptoms: none.  Denies abdominal pain, nausea, vomiting, heartburn, dysphagia, odynophagia, jaundice, hematochezia, melena, hematemesis, constipation or diarrhea.    Had been taking aleve previously but lately she has been taking Tylenol .  Has been on iron for one month.     Last colonoscopy 8/2016 with one small polyp. Repeat was advised in 5 yrs. She developed anemia since  Mother had colon cancer at age 62      Past Medical History:   Diagnosis Date    Diabetes mellitus, type 2 2006    BS doesn't check 06/15/2016    GERD (gastroesophageal reflux disease)     Hyperlipidemia     Hypertension     Hypothyroidism        Past Surgical History:   Procedure Laterality Date    APPENDECTOMY      BACK SURGERY      [facility]ARACT EXTRACTION [facility]     [facility]    COLONOSCOPY N/A 8/23/2016    Procedure: COLONOSCOPY;  Surgeon: Marcel Jacques MD;  Location: [facility];  Service: Endoscopy;  Laterality: N/A;    PIG VALVE      TONSILLECTOMY         Family History   Problem Relation Age of Onset    Cancer Mother 46     colon    Heart disease Mother     Heart disease Father     Hypertension Father     Diabetes Father     COPD Sister        Social History     Social History    Marital status:      Spouse name: N/A    Number of children: N/A    Years of education: N/A     Social History Main Topics    Smoking status: Former Smoker     Packs/day: 1.00     Years: 25.00    Smokeless tobacco: None    Alcohol use No    Drug use: None    Sexual activity: Not Asked      Other Topics Concern    None     Social History Narrative       Review of patient's allergies indicates:   Allergen Reactions    Ace inhibitors Other (See Comments)     COUGH    Codeine        Current Outpatient Prescriptions   Medication Sig Dispense Refill    allopurinol (ZYLOPRIM) 300 MG tablet TAKE 1 TABLET (300 MG TOTAL) BY MOUTH ONCE DAILY. 90 tablet 3    aspirin (ECOTRIN) 81 MG EC tablet Take 81 mg by mouth once daily.      CMPD diclofenac 2%- amitriptyline 2%- lidocaine 2%- prilocaine 2% in Lipoderm ActiveMax bid 100 g 6    ferrous sulfate 325 mg (65 mg iron) CpSR Take 1 tablet by mouth 2 times daily 2 hours after meal. 60 capsule 6    furosemide (LASIX) 40 MG tablet Take 40 mg by mouth once daily.      gabapentin (NEURONTIN) 100 MG capsule TAKE 1 CAPSULE (100 MG TOTAL) BY MOUTH 3 (THREE) TIMES DAILY. 270 capsule 3    glipiZIDE (GLUCOTROL) 5 MG tablet TAKE 1 TABLET (5 MG TOTAL) BY MOUTH 2 (TWO) TIMES DAILY BEFORE MEALS. 180 tablet 2    glyBURIDE (DIABETA) 5 MG tablet Take 5 mg by mouth once daily.      levothyroxine (SYNTHROID) 75 MCG tablet TAKE 1 TABLET (75 MCG TOTAL) BY MOUTH ONCE DAILY. 90 tablet 3    losartan (COZAAR) 100 MG tablet TAKE 1 TABLET (100 MG TOTAL) BY MOUTH ONCE DAILY. 90 tablet 3    lovastatin (MEVACOR) 40 MG tablet TAKE 1 TABLET (40 MG TOTAL) BY MOUTH EVERY EVENING. 30 tablet 6    metformin (GLUCOPHAGE) 1000 MG tablet TAKE 1 TABLET (1,000 MG TOTAL) BY MOUTH 2 (TWO) TIMES DAILY. 180 tablet 3    methocarbamol (ROBAXIN) 750 MG Tab Take 1 tablet by mouth every 8 (eight) hours as needed.  3    metoprolol tartrate (LOPRESSOR) 50 MG tablet 1 BY MOUTH DAILY  3    pantoprazole (PROTONIX) 40 MG tablet TAKE 1 TABLET (40 MG TOTAL) BY MOUTH ONCE DAILY. 30 tablet 2    penicillin v potassium (VEETID) 500 MG tablet TAKE 4 TABLETS BY MOUTH 1 HOUR PRIOR TO APPT  0    potassium chloride SA (K-DUR,KLOR-CON) 10 MEQ tablet Take once daily 90 tablet 3    trazodone (DESYREL) 50 MG tablet  "Take 1 tablet (50 mg total) by mouth every evening. 30 tablet 11    pantoprazole (PROTONIX) 40 MG tablet TAKE 1 TABLET (40 MG TOTAL) BY MOUTH ONCE DAILY. 30 tablet 1    polyethylene glycol (GOLYTELY,NULYTELY) 236-22.74-6.74 -5.86 gram suspension Take 4,000 mLs (4 L total) by mouth once. 4000 mL 0    pramipexole (MIRAPEX) 0.25 MG tablet TAKE 1 TABLET (0.25 MG TOTAL) BY MOUTH EVERY EVENING. 90 tablet 3    triamcinolone acetonide 0.5% (KENALOG) 0.5 % Crea Apply topically 2 (two) times daily. 1 Tube 1     No current facility-administered medications for this visit.        Review of Systems   Constitutional: Negative for activity change, appetite change, chills, diaphoresis, fatigue and fever.   HENT: Negative for congestion, ear pain, facial swelling, mouth sores, nosebleeds, rhinorrhea, sore throat and voice change.    Eyes: Negative for photophobia, pain, discharge, redness and visual disturbance.   Respiratory: Negative for apnea, cough, choking, chest tightness, shortness of breath and wheezing.    Cardiovascular: Negative for chest pain, palpitations and leg swelling.   Gastrointestinal:        As per HPI   Genitourinary: Negative for decreased urine volume, difficulty urinating, dysuria, frequency and hematuria.   Musculoskeletal: Positive for arthralgias and back pain. Negative for myalgias, neck pain and neck stiffness.   Skin: Negative for pallor and rash.   Neurological: Positive for numbness. Negative for tremors, seizures, syncope, weakness and headaches.   Hematological: Negative for adenopathy. Bruises/bleeds easily.   Psychiatric/Behavioral: Negative for behavioral problems, confusion, hallucinations and sleep disturbance. The patient is not nervous/anxious.        Objective:   /60  Pulse 70  Ht 5' 4" (1.626 m)  Wt 81.7 kg (180 lb 1.9 oz)  BMI 30.92 kg/m2  Wt Readings from Last 1 Encounters:   04/28/17 1045 81.7 kg (180 lb 1.9 oz)       Physical Exam   Constitutional: She is oriented to " person, place, and time. She appears well-developed and well-nourished. No distress.   HENT:   Head: Normocephalic and atraumatic.   Nose: Nose normal.   Mouth/Throat: Oropharynx is clear and moist.   Eyes: EOM are normal. Pupils are equal, round, and reactive to light. Right eye exhibits no discharge. Left eye exhibits no discharge. No scleral icterus.   Neck: Normal range of motion. Neck supple. No tracheal deviation present.   Cardiovascular: Normal rate, regular rhythm, normal heart sounds and intact distal pulses.  Exam reveals no gallop and no friction rub.    No murmur heard.  Pulmonary/Chest: Effort normal and breath sounds normal. No stridor. No respiratory distress. She has no wheezes. She has no rales. She exhibits no tenderness.   Abdominal: Soft. Bowel sounds are normal. She exhibits no distension and no mass. There is no tenderness. There is no rebound and no guarding. No hernia.   Musculoskeletal: Normal range of motion. She exhibits no edema or tenderness.   Lymphadenopathy:     She has no cervical adenopathy.   Neurological: She is alert and oriented to person, place, and time. She has normal reflexes.   Skin: Skin is warm and dry. She is not diaphoretic.   Psychiatric: She has a normal mood and affect. Her behavior is normal. Judgment and thought content normal.       Lab Results   Component Value Date    WBC 6.48 03/07/2017    HGB 10.6 (L) 03/07/2017    HCT 34.8 (L) 03/07/2017    MCV 78 (L) 03/07/2017     03/07/2017       Chemistry        Component Value Date/Time     03/07/2017 0824    K 3.9 03/07/2017 0824     03/07/2017 0824    CO2 27 03/07/2017 0824    BUN 18 03/07/2017 0824    CREATININE 1.0 03/07/2017 0824     (H) 03/07/2017 0824        Component Value Date/Time    CALCIUM 9.7 03/07/2017 0824    ALKPHOS 63 03/07/2017 0824    AST 16 03/07/2017 0824    ALT 12 03/07/2017 0824    BILITOT 0.4 03/07/2017 0824          No results found for: APTT  No results found for: INR,  PROTIME    No components found for: [id number]    Lab Results   Component Value Date    TSH 1.901 11/17/2016     Lab Results   Component Value Date    HGBA1C 6.4 (H) 03/07/2017       No results found for: AMYLASE  No results found for: LIPASE    No results found for: HAV, HEPAIGM, HEPBIGM, HEPBCAB, HBEAG, HEPCAB  No results found for: PPD    Lab Results   Component Value Date    OCCULTBLOOD Positive (A) 02/08/2016    OCCULTBLOOD Negative 02/08/2016    OCCULTBLOOD Negative 02/08/2016       Lab Results   Component Value Date    IRON 38 02/02/2016    IRON 38 02/02/2016    TIBC 524 (H) 02/02/2016    FERRITIN 17 (L) 02/02/2016       Assessment:      1. Iron deficiency anemia due to chronic blood loss         Plan:     Iron deficiency anemia due to chronic blood loss  -     Case request GI: COLONOSCOPY, ESOPHAGOGASTRODUODENOSCOPY (EGD)  -     polyethylene glycol (GOLYTELY,NULYTELY) 236-22.74-6.74 -5.86 gram suspension; Take 4,000 mLs (4 L total) by mouth once.  Dispense: 4000 mL; Refill: 0    If negative will obtain capsule endoscopy.  Continue iron supplements    Return if symptoms worsen or fail to improve.      Jayy Rosa MD

## 2017-04-28 NOTE — LETTER
May 2, 2017      Angely Roberts MD  [address] [address] [address]           [facility]  [address] [place] [address]  Phone: [phone]  Fax: [phone]          Patient: Jasmine Velásquez   MR Number: [id number]   YOB: 1940   Date of Visit: 4/28/2017       Dear Dr. Angely Roberts:    Thank you for referring Jasmine Velásquez to me for evaluation. Attached you will find relevant portions of my assessment and plan of care.    If you have questions, please do not hesitate to call me. I look forward to following Jasmine Velásquez along with you.    Sincerely,    Jayy Rosa MD    Enclosure  CC:  No Recipients    If you would like to receive this communication electronically, please contact [email or web address] or [phone] to request more information on [address] Link access.    For providers and/or their staff who would like to refer a patient to [facility], please contact us through our one-stop-shop provider referral line, [facility], at [phone].    If you feel you have received this communication in error or would no longer like to receive these types of communications, please e-mail [email or web address]

## 2017-04-28 NOTE — MR AVS SNAPSHOT
[facility]  [address] [facility] [address]  Phone: [phone]  Fax: [phone]                  Jasmine Velásquez   2017 10:40 AM   Initial consult    Description:  Female : 1940   Provider:  Jayy Rosa MD   Department:  [facility]           Reason for Visit     Anemia           Diagnoses this Visit        Comments    Iron deficiency anemia due to chronic blood loss    -  Primary            To Do List           Future Appointments        Provider Department Dept Phone    2017 9:00 AM LABORATORY, [place] [facility] [phone]    2017 10:00 AM Gerardo Lauren OD [facility] [phone]    2017 10:20 AM Angely Roberts MD [facility] [phone]      Goals (5 Years of Data)     None       These Medications        Disp Refills Start End    polyethylene glycol (GOLYTELY,NULYTELY) 236-22.74-6.74 -5.86 gram suspension 4000 mL 0 2017    Take 4,000 mLs (4 L total) by mouth once. - Oral    Pharmacy: [phone] [address] #: [phone]         [facility]     [facility] Nurse Care Line - 24/7 Assistance  Unless otherwise directed by your provider, please contact [facility], our nurse care line that is available for 24/7 assistance.     Registered nurses in the [facility] provide: appointment scheduling, clinical advisement, health education, and other advisory services.  Call: [phone] (toll free)               Medications           Message regarding Medications     Verify the changes and/or additions to your medication regime listed below are the same as discussed with your clinician today.  If any of these changes or additions are incorrect, please notify your healthcare provider.        START taking these NEW medications        Refills    polyethylene glycol  (GOLYTELY,NULYTELY) 236-22.74-6.74 -5.86 gram suspension 0    Sig: Take 4,000 mLs (4 L total) by mouth once.    Class: Normal    Route: Oral           Verify that the below list of medications is an accurate representation of the medications you are currently taking.  If none reported, the list may be blank. If incorrect, please contact your healthcare provider. Carry this list with you in case of emergency.           Current Medications     allopurinol (ZYLOPRIM) 300 MG tablet TAKE 1 TABLET (300 MG TOTAL) BY MOUTH ONCE DAILY.    aspirin (ECOTRIN) 81 MG EC tablet Take 81 mg by mouth once daily.    CMPD diclofenac 2%- amitriptyline 2%- lidocaine 2%- prilocaine 2% in Lipoderm ActiveMax bid    ferrous sulfate 325 mg (65 mg iron) CpSR Take 1 tablet by mouth 2 times daily 2 hours after meal.    furosemide (LASIX) 40 MG tablet Take 40 mg by mouth once daily.    gabapentin (NEURONTIN) 100 MG capsule TAKE 1 CAPSULE (100 MG TOTAL) BY MOUTH 3 (THREE) TIMES DAILY.    glipiZIDE (GLUCOTROL) 5 MG tablet TAKE 1 TABLET (5 MG TOTAL) BY MOUTH 2 (TWO) TIMES DAILY BEFORE MEALS.    glyBURIDE (DIABETA) 5 MG tablet Take 5 mg by mouth once daily.    levothyroxine (SYNTHROID) 75 MCG tablet TAKE 1 TABLET (75 MCG TOTAL) BY MOUTH ONCE DAILY.    losartan (COZAAR) 100 MG tablet TAKE 1 TABLET (100 MG TOTAL) BY MOUTH ONCE DAILY.    lovastatin (MEVACOR) 40 MG tablet TAKE 1 TABLET (40 MG TOTAL) BY MOUTH EVERY EVENING.    metformin (GLUCOPHAGE) 1000 MG tablet TAKE 1 TABLET (1,000 MG TOTAL) BY MOUTH 2 (TWO) TIMES DAILY.    methocarbamol (ROBAXIN) 750 MG Tab Take 1 tablet by mouth every 8 (eight) hours as needed.    metoprolol tartrate (LOPRESSOR) 50 MG tablet 1 BY MOUTH DAILY    pantoprazole (PROTONIX) 40 MG tablet TAKE 1 TABLET (40 MG TOTAL) BY MOUTH ONCE DAILY.    penicillin v potassium (VEETID) 500 MG tablet TAKE 4 TABLETS BY MOUTH 1 HOUR PRIOR TO APPT    potassium chloride SA (K-DUR,KLOR-CON) 10 MEQ tablet Take once daily    trazodone (DESYREL) 50  "MG tablet Take 1 tablet (50 mg total) by mouth every evening.    pantoprazole (PROTONIX) 40 MG tablet TAKE 1 TABLET (40 MG TOTAL) BY MOUTH ONCE DAILY.    polyethylene glycol (GOLYTELY,NULYTELY) 236-22.74-6.74 -5.86 gram suspension Take 4,000 mLs (4 L total) by mouth once.    pramipexole (MIRAPEX) 0.25 MG tablet TAKE 1 TABLET (0.25 MG TOTAL) BY MOUTH EVERY EVENING.    triamcinolone acetonide 0.5% (KENALOG) 0.5 % Crea Apply topically 2 (two) times daily.           Clinical Reference Information           Your Vitals Were     BP Pulse Height Weight BMI    110/60 70 5' 4" (1.626 m) 81.7 kg (180 lb 1.9 oz) 30.92 kg/m2      Blood Pressure          Most Recent Value    BP  110/60      Allergies as of 4/28/2017     Ace Inhibitors    Codeine      Immunizations Administered on Date of Encounter - 4/28/2017     None      Orders Placed During Today's Visit      Normal Orders This Visit    Case request GI: COLONOSCOPY, ESOPHAGOGASTRODUODENOSCOPY (EGD)       [facility] Sign-Up     Activating your [facility] account is as easy as 1-2-3!     1) Visit [email or web address], select Sign Up Now, enter this activation code and your date of birth, then select Next.  [email or web address]  Expires: 6/12/2017 11:33 AM      2) Create a username and password to use when you visit [facility] in the future and select a security question in case you lose your password and select Next.    3) Enter your e-mail address and click Sign Up!    Additional Information  If you have questions, please e-mail [email or web address] or call [phone] to talk to our [facility] staff. Remember, [facility] is NOT to be used for urgent needs. For medical emergencies, dial 911.         Language Assistance Services     ATTENTION: Language assistance services are available, free of charge. Please call [phone].      ATENCIÓN: Si habla español, tiene a guallpa disposición servicios gratuitos de asistencia lingüística. Llame al [phone].     TAMANNA Ý: N?u b?n nói Ti?ng Vi?t, " có các d?ch v? h? tr? ngôn ng? mi?n phí dành cho b?n. G?i s? [phone].         [facility] complies with applicable Federal civil rights laws and does not discriminate on the basis of race, color, national origin, age, disability, or sex.

## 2017-05-08 RX ORDER — PANTOPRAZOLE SODIUM 40 MG/1
40 TABLET, DELAYED RELEASE ORAL DAILY
Qty: 30 TABLET | Refills: 6 | Status: SHIPPED | OUTPATIENT
Start: 2017-05-08 | End: 2018-01-08 | Stop reason: ALTCHOICE

## 2017-05-18 ENCOUNTER — OFFICE VISIT (OUTPATIENT)
Dept: INTERNAL MEDICINE | Facility: CLINIC | Age: 77
End: 2017-05-18
Payer: MEDICARE

## 2017-05-18 VITALS
DIASTOLIC BLOOD PRESSURE: 80 MMHG | OXYGEN SATURATION: 95 % | BODY MASS INDEX: 31.5 KG/M2 | TEMPERATURE: 97 F | WEIGHT: 184.5 LBS | HEIGHT: 64 IN | SYSTOLIC BLOOD PRESSURE: 130 MMHG | HEART RATE: 79 BPM

## 2017-05-18 DIAGNOSIS — R31.9 HEMATURIA: Primary | ICD-10-CM

## 2017-05-18 LAB
BACTERIA #/AREA URNS AUTO: ABNORMAL /HPF
BILIRUB UR QL STRIP: NEGATIVE
CLARITY UR REFRACT.AUTO: ABNORMAL
COLOR UR AUTO: ABNORMAL
GLUCOSE UR QL STRIP: NEGATIVE
HGB UR QL STRIP: ABNORMAL
HYALINE CASTS UR QL AUTO: 2 /LPF
KETONES UR QL STRIP: NEGATIVE
LEUKOCYTE ESTERASE UR QL STRIP: ABNORMAL
MICROSCOPIC COMMENT: ABNORMAL
NITRITE UR QL STRIP: POSITIVE
PH UR STRIP: 5 [PH] (ref 5–8)
PROT UR QL STRIP: NEGATIVE
RBC #/AREA URNS AUTO: 19 /HPF (ref 0–4)
SP GR UR STRIP: 1.01 (ref 1–1.03)
SQUAMOUS #/AREA URNS AUTO: 1 /HPF
URN SPEC COLLECT METH UR: ABNORMAL
UROBILINOGEN UR STRIP-ACNC: ABNORMAL EU/DL
WBC #/AREA URNS AUTO: >100 /HPF (ref 0–5)
WBC CLUMPS UR QL AUTO: ABNORMAL

## 2017-05-18 PROCEDURE — 99999 PR PBB SHADOW E&M-EST. PATIENT-LVL III: CPT | Mod: PBBFAC,,, | Performed by: FAMILY MEDICINE

## 2017-05-18 PROCEDURE — 1159F MED LIST DOCD IN RCRD: CPT | Mod: S$GLB,,, | Performed by: FAMILY MEDICINE

## 2017-05-18 PROCEDURE — 3075F SYST BP GE 130 - 139MM HG: CPT | Mod: S$GLB,,, | Performed by: FAMILY MEDICINE

## 2017-05-18 PROCEDURE — 99213 OFFICE O/P EST LOW 20 MIN: CPT | Mod: S$GLB,,, | Performed by: FAMILY MEDICINE

## 2017-05-18 PROCEDURE — 87086 URINE CULTURE/COLONY COUNT: CPT

## 2017-05-18 PROCEDURE — 81001 URINALYSIS AUTO W/SCOPE: CPT

## 2017-05-18 PROCEDURE — 1160F RVW MEDS BY RX/DR IN RCRD: CPT | Mod: S$GLB,,, | Performed by: FAMILY MEDICINE

## 2017-05-18 PROCEDURE — 3079F DIAST BP 80-89 MM HG: CPT | Mod: S$GLB,,, | Performed by: FAMILY MEDICINE

## 2017-05-18 PROCEDURE — 1125F AMNT PAIN NOTED PAIN PRSNT: CPT | Mod: S$GLB,,, | Performed by: FAMILY MEDICINE

## 2017-05-18 RX ORDER — PHENAZOPYRIDINE HYDROCHLORIDE 100 MG/1
100 TABLET, FILM COATED ORAL 3 TIMES DAILY PRN
Qty: 10 TABLET | Refills: 0 | Status: SHIPPED | OUTPATIENT
Start: 2017-05-18 | End: 2017-05-28

## 2017-05-18 RX ORDER — SULFAMETHOXAZOLE AND TRIMETHOPRIM 800; 160 MG/1; MG/1
1 TABLET ORAL 2 TIMES DAILY
Qty: 10 TABLET | Refills: 0 | Status: ON HOLD | OUTPATIENT
Start: 2017-05-18 | End: 2017-05-27 | Stop reason: HOSPADM

## 2017-05-18 NOTE — PROGRESS NOTES
"  Chief Complaint:    Chief Complaint   Patient presents with    Hematuria       History of Present Illness:    Presents today complaining of some burning sensation in the urine and did see some blood.  This all happened today no fever no flank pain or any other symptoms.    ROS:  Review of Systems   Genitourinary: Positive for dysuria and hematuria. Negative for enuresis, flank pain, vaginal bleeding, vaginal discharge and vaginal pain.       Past Medical History:   Diagnosis Date    Diabetes mellitus, type 2 2006    BS doesn't check 06/15/2016    GERD (gastroesophageal reflux disease)     Hyperlipidemia     Hypertension     Hypothyroidism        Social History:  Social History     Social History    Marital status:      Spouse name: N/A    Number of children: N/A    Years of education: N/A     Social History Main Topics    Smoking status: Former Smoker     Packs/day: 1.00     Years: 25.00    Smokeless tobacco: None    Alcohol use No    Drug use: None    Sexual activity: Not Asked     Other Topics Concern    None     Social History Narrative    None       Family History:   family history includes COPD in her sister; Cancer (age of onset: 46) in her mother; Diabetes in her father; Heart disease in her father and mother; Hypertension in her father.    Health Maintenance   Topic Date Due    Zoster Vaccine  08/10/2000    Eye Exam  06/15/2017    Influenza Vaccine  08/01/2017    Hemoglobin A1c  09/07/2017    Lipid Panel  03/07/2018    Foot Exam  03/14/2018    DEXA SCAN  05/18/2019    Colonoscopy  08/23/2021    TETANUS VACCINE  02/02/2026    Pneumococcal (65+)  Completed       Physical Exam:    Vital Signs  Temp: 97 °F (36.1 °C)  Temp src: Tympanic  Pulse: 79  SpO2: 95 %  BP: 130/80  BP Location: Left arm  BP Method: Manual  Patient Position: Sitting  Pain Score:   7  Pain Loc: Back  Height and Weight  Height: 5' 4" (162.6 cm)  Weight: 83.7 kg (184 lb 8.4 oz)  BSA (Calculated - sq m): 1.94 " sq meters  BMI (Calculated): 31.7  Weight in (lb) to have BMI = 25: 145.3]    Body mass index is 31.67 kg/(m^2).    Physical Exam   Constitutional: She appears well-developed.   Cardiovascular: Normal rate.    Pulmonary/Chest: Effort normal and breath sounds normal.   Abdominal: Soft.       Lab Results   Component Value Date    CHOL 142 03/07/2017    CHOL 136 11/17/2016    CHOL 152 05/02/2016    TRIG 216 (H) 03/07/2017    TRIG 235 (H) 11/17/2016    TRIG 271 (H) 05/02/2016    HDL 41 03/07/2017    HDL 35 (L) 11/17/2016    HDL 42 05/02/2016    TOTALCHOLEST 3.5 03/07/2017    TOTALCHOLEST 3.9 11/17/2016    TOTALCHOLEST 3.6 05/02/2016    NONHDLCHOL 101 03/07/2017    NONHDLCHOL 101 11/17/2016    NONHDLCHOL 110 05/02/2016       Lab Results   Component Value Date    HGBA1C 6.4 (H) 03/07/2017       Assessment:      ICD-10-CM ICD-9-CM   1. Hematuria R31.9 599.70         Plan:    Check a urinalysis and culture.  Treat with Bactrim and if the hematuria persists will need a full workup.    Orders Placed This Encounter   Procedures    Urine culture    URINALYSIS       Current Outpatient Prescriptions   Medication Sig Dispense Refill    allopurinol (ZYLOPRIM) 300 MG tablet TAKE 1 TABLET (300 MG TOTAL) BY MOUTH ONCE DAILY. 90 tablet 3    aspirin (ECOTRIN) 81 MG EC tablet Take 81 mg by mouth once daily.      CMPD diclofenac 2%- amitriptyline 2%- lidocaine 2%- prilocaine 2% in Lipoderm ActiveMax bid 100 g 6    ferrous sulfate 325 mg (65 mg iron) CpSR Take 1 tablet by mouth 2 times daily 2 hours after meal. 60 capsule 6    furosemide (LASIX) 40 MG tablet Take 40 mg by mouth once daily.      gabapentin (NEURONTIN) 100 MG capsule TAKE 1 CAPSULE (100 MG TOTAL) BY MOUTH 3 (THREE) TIMES DAILY. 270 capsule 3    glipiZIDE (GLUCOTROL) 5 MG tablet TAKE 1 TABLET (5 MG TOTAL) BY MOUTH 2 (TWO) TIMES DAILY BEFORE MEALS. 180 tablet 2    glyBURIDE (DIABETA) 5 MG tablet Take 5 mg by mouth once daily.      levothyroxine (SYNTHROID) 75 MCG  tablet TAKE 1 TABLET (75 MCG TOTAL) BY MOUTH ONCE DAILY. 90 tablet 3    losartan (COZAAR) 100 MG tablet TAKE 1 TABLET (100 MG TOTAL) BY MOUTH ONCE DAILY. 90 tablet 3    lovastatin (MEVACOR) 40 MG tablet TAKE 1 TABLET (40 MG TOTAL) BY MOUTH EVERY EVENING. 30 tablet 6    metformin (GLUCOPHAGE) 1000 MG tablet TAKE 1 TABLET (1,000 MG TOTAL) BY MOUTH 2 (TWO) TIMES DAILY. 180 tablet 3    methocarbamol (ROBAXIN) 750 MG Tab Take 1 tablet by mouth every 8 (eight) hours as needed.  3    metoprolol tartrate (LOPRESSOR) 50 MG tablet 1 BY MOUTH DAILY  3    pantoprazole (PROTONIX) 40 MG tablet Take 1 tablet (40 mg total) by mouth once daily. 30 tablet 6    penicillin v potassium (VEETID) 500 MG tablet TAKE 4 TABLETS BY MOUTH 1 HOUR PRIOR TO APPT  0    potassium chloride SA (K-DUR,KLOR-CON) 10 MEQ tablet Take once daily 90 tablet 3    pramipexole (MIRAPEX) 0.25 MG tablet TAKE 1 TABLET (0.25 MG TOTAL) BY MOUTH EVERY EVENING. 90 tablet 3    trazodone (DESYREL) 50 MG tablet Take 1 tablet (50 mg total) by mouth every evening. 30 tablet 11    phenazopyridine (PYRIDIUM) 100 MG tablet Take 1 tablet (100 mg total) by mouth 3 (three) times daily as needed for Pain. 10 tablet 0    sulfamethoxazole-trimethoprim 800-160mg (BACTRIM DS) 800-160 mg Tab Take 1 tablet by mouth 2 (two) times daily. 10 tablet 0    triamcinolone acetonide 0.5% (KENALOG) 0.5 % Crea Apply topically 2 (two) times daily. 1 Tube 1     No current facility-administered medications for this visit.        There are no discontinued medications.    No Follow-up on file.      Dr Angely Roberts MD    Disclaimer: This note is prepared using voice recognition system and as such is likely to have errors and is not proof read.

## 2017-05-18 NOTE — MR AVS SNAPSHOT
[facility]  [address] [facility] [address]  Phone: [phone]                  Jasmine Velásquez   2017 3:00 PM   Office Visit    Description:  Female : 1940   Provider:  Angely Roberts MD   Department:  Central - Internal Medicine           Reason for Visit     Hematuria           Diagnoses this Visit        Comments    Hematuria    -  Primary            To Do List           Future Appointments        Provider Department Dept Phone    2017 9:00 AM LABORATORY, [place] [facility] [phone]    2017 10:00 AM [facility] Op[facility] [phone]    2017 10:20 AM Angely Roberts MD [facility] [phone]      Your Future Surgeries/Procedures     May 24, 2017   Surgery with Jayy Rosa MD   [facility])    [address]  [facility] [address] [phone]              Goals (5 Years of Data)     None       These Medications        Disp Refills Start End    sulfamethoxazole-trimethoprim 800-160mg (BACTRIM DS) 800-160 mg Tab 10 tablet 0 2017     Take 1 tablet by mouth 2 (two) times daily. - Oral    Ph[facility]: [phone] [id number] [facility] Ph #: [phone]       phenazopyridine (PYRIDIUM) 100 MG tablet 10 tablet 0 2017    Take 1 tablet (100 mg total) by mouth 3 (three) times daily as needed for Pain. - Oral    Ph[facility]: [phone] [facility] [address] [facility] Ph #: [phone]         [facility] On Call Nurse Care Line -  Assistance  Unless otherwise directed by your provider, please contact [facility], our nurse care line that is available for  assistance.     Registered nurses in the [facility] provide: appointment scheduling, clinical advisement, health education, and other advisory services.  Call:  [phone] (toll free)               Medications           Message regarding Medications     Verify the changes and/or additions to your medication regime listed below are the same as discussed with your clinician today.  If any of these changes or additions are incorrect, please notify your healthcare provider.        START taking these NEW medications        Refills    sulfamethoxazole-trimethoprim 800-160mg (BACTRIM DS) 800-160 mg Tab 0    Sig: Take 1 tablet by mouth 2 (two) times daily.    Class: Normal    Route: Oral    phenazopyridine (PYRIDIUM) 100 MG tablet 0    Sig: Take 1 tablet (100 mg total) by mouth 3 (three) times daily as needed for Pain.    Class: Normal    Route: Oral           Verify that the below list of medications is an accurate representation of the medications you are currently taking.  If none reported, the list may be blank. If incorrect, please contact your healthcare provider. Carry this list with you in case of emergency.           Current Medications     allopurinol (ZYLOPRIM) 300 MG tablet TAKE 1 TABLET (300 MG TOTAL) BY MOUTH ONCE DAILY.    aspirin (ECOTRIN) 81 MG EC tablet Take 81 mg by mouth once daily.    CMPD diclofenac 2%- amitriptyline 2%- lidocaine 2%- prilocaine 2% in Lipoderm ActiveMax bid    ferrous sulfate 325 mg (65 mg iron) CpSR Take 1 tablet by mouth 2 times daily 2 hours after meal.    furosemide (LASIX) 40 MG tablet Take 40 mg by mouth once daily.    gabapentin (NEURONTIN) 100 MG capsule TAKE 1 CAPSULE (100 MG TOTAL) BY MOUTH 3 (THREE) TIMES DAILY.    glipiZIDE (GLUCOTROL) 5 MG tablet TAKE 1 TABLET (5 MG TOTAL) BY MOUTH 2 (TWO) TIMES DAILY BEFORE MEALS.    glyBURIDE (DIABETA) 5 MG tablet Take 5 mg by mouth once daily.    levothyroxine (SYNTHROID) 75 MCG tablet TAKE 1 TABLET (75 MCG TOTAL) BY MOUTH ONCE DAILY.    losartan (COZAAR) 100 MG tablet TAKE 1 TABLET (100 MG TOTAL) BY MOUTH ONCE DAILY.    lovastatin (MEVACOR) 40 MG tablet TAKE 1 TABLET (40 MG TOTAL) BY MOUTH  "EVERY EVENING.    metformin (GLUCOPHAGE) 1000 MG tablet TAKE 1 TABLET (1,000 MG TOTAL) BY MOUTH 2 (TWO) TIMES DAILY.    methocarbamol (ROBAXIN) 750 MG Tab Take 1 tablet by mouth every 8 (eight) hours as needed.    metoprolol tartrate (LOPRESSOR) 50 MG tablet 1 BY MOUTH DAILY    pantoprazole (PROTONIX) 40 MG tablet Take 1 tablet (40 mg total) by mouth once daily.    penicillin v potassium (VEETID) 500 MG tablet TAKE 4 TABLETS BY MOUTH 1 HOUR PRIOR TO APPT    potassium chloride SA (K-DUR,KLOR-CON) 10 MEQ tablet Take once daily    pramipexole (MIRAPEX) 0.25 MG tablet TAKE 1 TABLET (0.25 MG TOTAL) BY MOUTH EVERY EVENING.    trazodone (DESYREL) 50 MG tablet Take 1 tablet (50 mg total) by mouth every evening.    phenazopyridine (PYRIDIUM) 100 MG tablet Take 1 tablet (100 mg total) by mouth 3 (three) times daily as needed for Pain.    sulfamethoxazole-trimethoprim 800-160mg (BACTRIM DS) 800-160 mg Tab Take 1 tablet by mouth 2 (two) times daily.    triamcinolone acetonide 0.5% (KENALOG) 0.5 % Crea Apply topically 2 (two) times daily.           Clinical Reference Information           Your Vitals Were     BP Pulse Temp Height Weight SpO2    130/80 (BP Location: Left arm, Patient Position: Sitting, BP Method: Manual) 79 97 °F (36.1 °C) (Tympanic) 5' 4" (1.626 m) 83.7 kg (184 lb 8.4 oz) 95%    BMI                31.67 kg/m2          Blood Pressure          Most Recent Value    BP  130/80      Allergies as of 5/18/2017     Ace Inhibitors    Codeine      Immunizations Administered on Date of Encounter - 5/18/2017     None      Orders Placed During Today's Visit      Normal Orders This Visit    URINALYSIS     Urine culture       [facility] Sign-Up     Activating your [facility] account is as easy as 1-2-3!     1) Visit [email or web address], select Sign Up Now, enter this activation code and your date of birth, then select Next.  [email or web address]  Expires: 6/12/2017 11:33 AM      2) Create a username and password to use when you visit " [facility] in the future and select a security question in case you lose your password and select Next.    3) Enter your e-mail address and click Sign Up!    Additional Information  If you have questions, please e-mail [email or web address] or call [phone] to talk to our [facility] staff. Remember, [facility] is NOT to be used for urgent needs. For medical emergencies, dial 911.         Language Assistance Services     ATTENTION: Language assistance services are available, free of charge. Please call [phone].      ATENCIÓN: Si habla español, tiene a guallpa disposición servicios gratuitos de asistencia lingüística. Llame al [phone].     CHÚ Ý: N?u b?n nói Ti?ng Vi?t, có các d?ch v? h? tr? ngôn ng? mi?n phí dành cho b?n. G?i s? [phone].         [facility] complies with applicable Federal civil rights laws and does not discriminate on the basis of race, color, national origin, age, disability, or sex.

## 2017-05-19 LAB — BACTERIA UR CULT: NORMAL

## 2017-05-20 DIAGNOSIS — R31.9 HEMATURIA: Primary | ICD-10-CM

## 2017-05-24 ENCOUNTER — ANESTHESIA EVENT (OUTPATIENT)
Dept: ENDOSCOPY | Facility: HOSPITAL | Age: 77
DRG: 921 | End: 2017-05-24
Payer: MEDICARE

## 2017-05-24 ENCOUNTER — ANESTHESIA (OUTPATIENT)
Dept: ENDOSCOPY | Facility: HOSPITAL | Age: 77
DRG: 921 | End: 2017-05-24
Payer: MEDICARE

## 2017-05-24 VITALS — RESPIRATION RATE: 12 BRPM

## 2017-05-24 PROBLEM — D50.9 IRON DEFICIENCY ANEMIA: Status: ACTIVE | Noted: 2017-05-24

## 2017-05-24 PROCEDURE — 0D5K8ZZ DESTRUCTION OF ASCENDING COLON, VIA NATURAL OR ARTIFICIAL OPENING ENDOSCOPIC: ICD-10-PCS | Performed by: INTERNAL MEDICINE

## 2017-05-24 RX ORDER — SODIUM CHLORIDE, SODIUM LACTATE, POTASSIUM CHLORIDE, CALCIUM CHLORIDE 600; 310; 30; 20 MG/100ML; MG/100ML; MG/100ML; MG/100ML
INJECTION, SOLUTION INTRAVENOUS CONTINUOUS PRN
Status: DISCONTINUED | OUTPATIENT
Start: 2017-05-24 | End: 2017-05-24

## 2017-05-24 RX ORDER — PROPOFOL 10 MG/ML
INJECTION, EMULSION INTRAVENOUS
Status: DISCONTINUED | OUTPATIENT
Start: 2017-05-24 | End: 2017-05-24

## 2017-05-24 RX ORDER — LIDOCAINE HCL/PF 100 MG/5ML
SYRINGE (ML) INTRAVENOUS
Status: DISCONTINUED | OUTPATIENT
Start: 2017-05-24 | End: 2017-05-24

## 2017-05-24 RX ADMIN — PROPOFOL 30 MG: 10 INJECTION, EMULSION INTRAVENOUS at 01:05

## 2017-05-24 RX ADMIN — LIDOCAINE HYDROCHLORIDE 50 MG: 20 INJECTION, SOLUTION INTRAVENOUS at 01:05

## 2017-05-24 RX ADMIN — SODIUM CHLORIDE, SODIUM LACTATE, POTASSIUM CHLORIDE, AND CALCIUM CHLORIDE: 600; 310; 30; 20 INJECTION, SOLUTION INTRAVENOUS at 01:05

## 2017-05-24 RX ADMIN — PROPOFOL 120 MG: 10 INJECTION, EMULSION INTRAVENOUS at 01:05

## 2017-05-24 NOTE — ANESTHESIA POSTPROCEDURE EVALUATION
"Anesthesia Post Evaluation    Patient: Jasmine Velásquez    Procedure(s) Performed: Procedure(s) (LRB):  COLONOSCOPY (N/A)  ESOPHAGOGASTRODUODENOSCOPY (EGD) (N/A)    Final Anesthesia Type: MAC  Patient location during evaluation: PACU  Patient participation: Yes- Able to Participate  Level of consciousness: awake and alert  Post-procedure vital signs: reviewed and stable  Pain management: adequate  Airway patency: patent  PONV status at discharge: No PONV  Anesthetic complications: no      Cardiovascular status: blood pressure returned to baseline  Respiratory status: unassisted, spontaneous ventilation and room air  Hydration status: euvolemic  Follow-up not needed.        Visit Vitals  BP (!) 105/48 (BP Location: Left arm, Patient Position: Lying, BP Method: Manual)   Pulse 62   Temp 36.6 °C (97.9 °F) (Oral)   Resp 16   Ht 5' 4" (1.626 m)   SpO2 97%   Breastfeeding? No       Pain/Micah Score: Micah Score: 9 (5/24/2017  2:08 PM)      "

## 2017-05-24 NOTE — TRANSFER OF CARE
"Anesthesia Transfer of Care Note    Patient: Jasmine Velásquez    Procedure(s) Performed: Procedure(s) (LRB):  COLONOSCOPY (N/A)  ESOPHAGOGASTRODUODENOSCOPY (EGD) (N/A)    Patient location: PACU    Anesthesia Type: MAC    Transport from OR: Transported from OR on room air with adequate spontaneous ventilation    Post pain: adequate analgesia    Post assessment: no apparent anesthetic complications    Post vital signs: stable    Level of consciousness: sedated    Complications: none    Transfer of care protocol was followed      Last vitals:   Visit Vitals  BP (!) 105/48 (BP Location: Left arm, Patient Position: Lying, BP Method: Manual)   Pulse 62   Temp 36.6 °C (97.9 °F) (Oral)   Resp 16   Ht 5' 4" (1.626 m)   SpO2 97%   Breastfeeding? No     "

## 2017-05-24 NOTE — ANESTHESIA RELEASE NOTE
"Anesthesia Release from PACU Note    Patient: Jasmine Velásquez    Procedure(s) Performed: Procedure(s) (LRB):  COLONOSCOPY (N/A)  ESOPHAGOGASTRODUODENOSCOPY (EGD) (N/A)    Anesthesia type: MAC    Post pain: Adequate analgesia    Post assessment: no apparent anesthetic complications, tolerated procedure well and no evidence of recall    Last Vitals:   Visit Vitals  BP (!) 105/48 (BP Location: Left arm, Patient Position: Lying, BP Method: Manual)   Pulse 62   Temp 36.6 °C (97.9 °F) (Oral)   Resp 16   Ht 5' 4" (1.626 m)   SpO2 97%   Breastfeeding? No       Post vital signs: stable    Level of consciousness: sedated    Nausea/Vomiting: no nausea/no vomiting    Complications: none    Airway Patency: patent    Respiratory: unassisted, spontaneous ventilation, room air    Cardiovascular: stable    Hydration: euvolemic  "

## 2017-05-24 NOTE — ANESTHESIA PREPROCEDURE EVALUATION
05/24/2017  Jasmine Velásquez is a 76 y.o., female.    Anesthesia Evaluation    I have reviewed the Patient Summary Reports.    I have reviewed the Nursing Notes.   I have reviewed the Medications.     Review of Systems  Anesthesia Hx:  No problems with previous Anesthesia    Social:  Former Smoker, No Alcohol Use    Hematology/Oncology:         -- Anemia: --  Cancer in past history:  Oncology Comments: Uterine cancer in the past.     EENT/Dental:   chronic allergic rhinitis   Cardiovascular:   Exercise tolerance: good Hypertension, well controlled hyperlipidemia Valve replacement in 2013.  Cardiologist last seen 6 months ago  With no changes in medication.  Carotid surgery in 2017.   Renal/:  Renal/ Normal     Hepatic/GI:   Bowel Prep. GERD, well controlled 0730 last drink of fluid.   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   Endocrine:   Diabetes, well controlled, type 2 Hypothyroidism    Dermatological:  Skin Normal    Psych:  Psychiatric Normal           Physical Exam  General:  Well nourished, Obesity    Airway/Jaw/Neck:  Airway Findings: Mallampati: III                Anesthesia Plan  Type of Anesthesia, risks & benefits discussed:  Anesthesia Type:  MAC  Patient's Preference:   Intra-op Monitoring Plan:   Intra-op Monitoring Plan Comments:   Post Op Pain Control Plan:   Post Op Pain Control Plan Comments:   Induction:   IV  Beta Blocker:  Patient is not currently on a Beta-Blocker (No further documentation required).       Informed Consent: Patient understands risks and agrees with Anesthesia plan.  Questions answered. Anesthesia consent signed with patient.  ASA Score: 3     Day of Surgery Review of History & Physical: I have interviewed and examined the patient. I have reviewed the patient's H&P dated: 05/24/17.  There are no significant changes.  H&P update referred to the surgeon.         Ready For Surgery From Anesthesia Perspective.

## 2017-05-25 ENCOUNTER — TELEPHONE (OUTPATIENT)
Dept: ENDOSCOPY | Facility: HOSPITAL | Age: 77
End: 2017-05-25

## 2017-05-25 ENCOUNTER — TELEPHONE (OUTPATIENT)
Dept: INTERNAL MEDICINE | Facility: CLINIC | Age: 77
End: 2017-05-25

## 2017-05-25 ENCOUNTER — HOSPITAL ENCOUNTER (INPATIENT)
Facility: HOSPITAL | Age: 77
LOS: 1 days | Discharge: HOME | DRG: 921 | End: 2017-05-27
Attending: EMERGENCY MEDICINE | Admitting: INTERNAL MEDICINE
Payer: MEDICARE

## 2017-05-25 DIAGNOSIS — R10.84 GENERALIZED ABDOMINAL PAIN: ICD-10-CM

## 2017-05-25 DIAGNOSIS — Z95.3 H/O AORTIC VALVE REPLACEMENT WITH PORCINE VALVE: ICD-10-CM

## 2017-05-25 DIAGNOSIS — K52.9 ENTERITIS: ICD-10-CM

## 2017-05-25 DIAGNOSIS — T70.29XA: ICD-10-CM

## 2017-05-25 DIAGNOSIS — E03.4 HYPOTHYROIDISM DUE TO ACQUIRED ATROPHY OF THYROID: ICD-10-CM

## 2017-05-25 DIAGNOSIS — R10.31 ABDOMINAL PAIN, RLQ (RIGHT LOWER QUADRANT): ICD-10-CM

## 2017-05-25 DIAGNOSIS — E11.42 DIABETIC POLYNEUROPATHY ASSOCIATED WITH TYPE 2 DIABETES MELLITUS: ICD-10-CM

## 2017-05-25 DIAGNOSIS — K21.9 GASTROESOPHAGEAL REFLUX DISEASE, ESOPHAGITIS PRESENCE NOT SPECIFIED: ICD-10-CM

## 2017-05-25 DIAGNOSIS — I10 ESSENTIAL HYPERTENSION: Primary | ICD-10-CM

## 2017-05-25 LAB
ALBUMIN SERPL BCP-MCNC: 4 G/DL
ALP SERPL-CCNC: 64 U/L
ALT SERPL W/O P-5'-P-CCNC: 17 U/L
ANION GAP SERPL CALC-SCNC: 12 MMOL/L
AST SERPL-CCNC: 21 U/L
BACTERIA #/AREA URNS HPF: ABNORMAL /HPF
BASOPHILS # BLD AUTO: 0.03 K/UL
BASOPHILS NFR BLD: 0.2 %
BILIRUB SERPL-MCNC: 1 MG/DL
BILIRUB UR QL STRIP: ABNORMAL
BUN SERPL-MCNC: 13 MG/DL
CALCIUM SERPL-MCNC: 10.7 MG/DL
CHLORIDE SERPL-SCNC: 99 MMOL/L
CLARITY UR: CLEAR
CO2 SERPL-SCNC: 27 MMOL/L
COLOR UR: YELLOW
CREAT SERPL-MCNC: 1.4 MG/DL
DIFFERENTIAL METHOD: ABNORMAL
EOSINOPHIL # BLD AUTO: 0 K/UL
EOSINOPHIL NFR BLD: 0.1 %
ERYTHROCYTE [DISTWIDTH] IN BLOOD BY AUTOMATED COUNT: 17.8 %
EST. GFR  (AFRICAN AMERICAN): 42 ML/MIN/1.73 M^2
EST. GFR  (NON AFRICAN AMERICAN): 37 ML/MIN/1.73 M^2
GLUCOSE SERPL-MCNC: 116 MG/DL
GLUCOSE UR QL STRIP: NEGATIVE
HCT VFR BLD AUTO: 41.4 %
HGB BLD-MCNC: 13.7 G/DL
HGB UR QL STRIP: NEGATIVE
KETONES UR QL STRIP: ABNORMAL
LEUKOCYTE ESTERASE UR QL STRIP: ABNORMAL
LIPASE SERPL-CCNC: 43 U/L
LYMPHOCYTES # BLD AUTO: 2.7 K/UL
LYMPHOCYTES NFR BLD: 14.8 %
MCH RBC QN AUTO: 27.8 PG
MCHC RBC AUTO-ENTMCNC: 33.1 %
MCV RBC AUTO: 84 FL
MICROSCOPIC COMMENT: ABNORMAL
MONOCYTES # BLD AUTO: 0.9 K/UL
MONOCYTES NFR BLD: 5.1 %
NEUTROPHILS # BLD AUTO: 14.4 K/UL
NEUTROPHILS NFR BLD: 79.8 %
NITRITE UR QL STRIP: NEGATIVE
PH UR STRIP: 5 [PH] (ref 5–8)
PLATELET # BLD AUTO: 159 K/UL
PMV BLD AUTO: 9.6 FL
POTASSIUM SERPL-SCNC: 3.7 MMOL/L
PROT SERPL-MCNC: 7.7 G/DL
PROT UR QL STRIP: ABNORMAL
RBC # BLD AUTO: 4.92 M/UL
SODIUM SERPL-SCNC: 138 MMOL/L
SP GR UR STRIP: 1.02 (ref 1–1.03)
URN SPEC COLLECT METH UR: ABNORMAL
UROBILINOGEN UR STRIP-ACNC: NEGATIVE EU/DL
WBC # BLD AUTO: 17.98 K/UL
WBC #/AREA URNS HPF: 15 /HPF (ref 0–5)
WBC CLUMPS URNS QL MICRO: ABNORMAL

## 2017-05-25 PROCEDURE — 80053 COMPREHEN METABOLIC PANEL: CPT

## 2017-05-25 PROCEDURE — 81000 URINALYSIS NONAUTO W/SCOPE: CPT

## 2017-05-25 PROCEDURE — 85025 COMPLETE CBC W/AUTO DIFF WBC: CPT

## 2017-05-25 PROCEDURE — 96366 THER/PROPH/DIAG IV INF ADDON: CPT

## 2017-05-25 PROCEDURE — 96375 TX/PRO/DX INJ NEW DRUG ADDON: CPT

## 2017-05-25 PROCEDURE — 99285 EMERGENCY DEPT VISIT HI MDM: CPT | Mod: 25

## 2017-05-25 PROCEDURE — 96365 THER/PROPH/DIAG IV INF INIT: CPT

## 2017-05-25 PROCEDURE — 96361 HYDRATE IV INFUSION ADD-ON: CPT

## 2017-05-25 PROCEDURE — 63600175 PHARM REV CODE 636 W HCPCS: Performed by: EMERGENCY MEDICINE

## 2017-05-25 PROCEDURE — 96367 TX/PROPH/DG ADDL SEQ IV INF: CPT

## 2017-05-25 PROCEDURE — 25000003 PHARM REV CODE 250: Performed by: EMERGENCY MEDICINE

## 2017-05-25 PROCEDURE — 83690 ASSAY OF LIPASE: CPT

## 2017-05-25 PROCEDURE — 87040 BLOOD CULTURE FOR BACTERIA: CPT

## 2017-05-25 RX ORDER — CIPROFLOXACIN 2 MG/ML
400 INJECTION, SOLUTION INTRAVENOUS
Status: DISCONTINUED | OUTPATIENT
Start: 2017-05-26 | End: 2017-05-27 | Stop reason: HOSPADM

## 2017-05-25 RX ORDER — METRONIDAZOLE 500 MG/100ML
500 INJECTION, SOLUTION INTRAVENOUS
Status: DISCONTINUED | OUTPATIENT
Start: 2017-05-26 | End: 2017-05-27 | Stop reason: HOSPADM

## 2017-05-25 RX ORDER — MORPHINE SULFATE 4 MG/ML
4 INJECTION, SOLUTION INTRAMUSCULAR; INTRAVENOUS
Status: COMPLETED | OUTPATIENT
Start: 2017-05-25 | End: 2017-05-25

## 2017-05-25 RX ADMIN — METRONIDAZOLE 500 MG: 500 INJECTION, SOLUTION INTRAVENOUS at 11:05

## 2017-05-25 RX ADMIN — SODIUM CHLORIDE 1000 ML: 0.9 INJECTION, SOLUTION INTRAVENOUS at 09:05

## 2017-05-25 RX ADMIN — MORPHINE SULFATE 4 MG: 4 INJECTION, SOLUTION INTRAMUSCULAR; INTRAVENOUS at 09:05

## 2017-05-25 NOTE — TELEPHONE ENCOUNTER
"During post op phone call pt stated "she had 9/10 pain through the night with 102 fever.  The fever has subsided and pain getting better just abdomen is sore."  Dr Rosa notified.  I called pt back to instruct her that Dr Rosa advises her to go to the emergency room with such severe pain.  Pt states "since she is feeling a little better today she will wait.  If the pain and fever still occurring by tomorrow she will visit the ER."  "

## 2017-05-25 NOTE — TELEPHONE ENCOUNTER
----- Message from Janette Bautista sent at 5/25/2017  8:05 AM CDT -----  Contact: patient  Returning your call. Please call patient ASAP @ [phone]. Thanks, nichole

## 2017-05-26 LAB
POCT GLUCOSE: 114 MG/DL (ref 70–110)
POCT GLUCOSE: 116 MG/DL (ref 70–110)

## 2017-05-26 PROCEDURE — 11000001 HC ACUTE MED/SURG PRIVATE ROOM

## 2017-05-26 PROCEDURE — 99222 1ST HOSP IP/OBS MODERATE 55: CPT | Mod: ,,, | Performed by: INTERNAL MEDICINE

## 2017-05-26 PROCEDURE — 83036 HEMOGLOBIN GLYCOSYLATED A1C: CPT

## 2017-05-26 PROCEDURE — 25000003 PHARM REV CODE 250: Performed by: INTERNAL MEDICINE

## 2017-05-26 PROCEDURE — 25000003 PHARM REV CODE 250: Performed by: EMERGENCY MEDICINE

## 2017-05-26 PROCEDURE — 96366 THER/PROPH/DIAG IV INF ADDON: CPT

## 2017-05-26 PROCEDURE — 96361 HYDRATE IV INFUSION ADD-ON: CPT

## 2017-05-26 PROCEDURE — 63600175 PHARM REV CODE 636 W HCPCS: Performed by: INTERNAL MEDICINE

## 2017-05-26 PROCEDURE — 96375 TX/PRO/DX INJ NEW DRUG ADDON: CPT

## 2017-05-26 PROCEDURE — 63600175 PHARM REV CODE 636 W HCPCS: Performed by: EMERGENCY MEDICINE

## 2017-05-26 PROCEDURE — 96365 THER/PROPH/DIAG IV INF INIT: CPT

## 2017-05-26 PROCEDURE — 96367 TX/PROPH/DG ADDL SEQ IV INF: CPT

## 2017-05-26 RX ORDER — GLUCAGON 1 MG
1 KIT INJECTION
Status: DISCONTINUED | OUTPATIENT
Start: 2017-05-26 | End: 2017-05-27 | Stop reason: HOSPADM

## 2017-05-26 RX ORDER — ONDANSETRON 2 MG/ML
4 INJECTION INTRAMUSCULAR; INTRAVENOUS EVERY 8 HOURS PRN
Status: DISCONTINUED | OUTPATIENT
Start: 2017-05-26 | End: 2017-05-27 | Stop reason: HOSPADM

## 2017-05-26 RX ORDER — TRAZODONE HYDROCHLORIDE 50 MG/1
50 TABLET ORAL NIGHTLY
Status: DISCONTINUED | OUTPATIENT
Start: 2017-05-26 | End: 2017-05-27 | Stop reason: HOSPADM

## 2017-05-26 RX ORDER — HYDRALAZINE HYDROCHLORIDE 20 MG/ML
10 INJECTION INTRAMUSCULAR; INTRAVENOUS EVERY 8 HOURS PRN
Status: DISCONTINUED | OUTPATIENT
Start: 2017-05-26 | End: 2017-05-27 | Stop reason: HOSPADM

## 2017-05-26 RX ORDER — IPRATROPIUM BROMIDE AND ALBUTEROL SULFATE 2.5; .5 MG/3ML; MG/3ML
3 SOLUTION RESPIRATORY (INHALATION) EVERY 4 HOURS PRN
Status: DISCONTINUED | OUTPATIENT
Start: 2017-05-26 | End: 2017-05-27 | Stop reason: HOSPADM

## 2017-05-26 RX ORDER — GUAIFENESIN 100 MG/5ML
200 SOLUTION ORAL EVERY 4 HOURS PRN
Status: DISCONTINUED | OUTPATIENT
Start: 2017-05-26 | End: 2017-05-27 | Stop reason: HOSPADM

## 2017-05-26 RX ORDER — SODIUM CHLORIDE 9 MG/ML
INJECTION, SOLUTION INTRAVENOUS CONTINUOUS
Status: DISCONTINUED | OUTPATIENT
Start: 2017-05-26 | End: 2017-05-27 | Stop reason: HOSPADM

## 2017-05-26 RX ORDER — ACETAMINOPHEN 325 MG/1
650 TABLET ORAL EVERY 6 HOURS PRN
Status: DISCONTINUED | OUTPATIENT
Start: 2017-05-26 | End: 2017-05-27 | Stop reason: HOSPADM

## 2017-05-26 RX ORDER — PANTOPRAZOLE SODIUM 40 MG/1
40 TABLET, DELAYED RELEASE ORAL DAILY
Status: DISCONTINUED | OUTPATIENT
Start: 2017-05-26 | End: 2017-05-27 | Stop reason: HOSPADM

## 2017-05-26 RX ORDER — ALLOPURINOL 300 MG/1
300 TABLET ORAL DAILY
Status: DISCONTINUED | OUTPATIENT
Start: 2017-05-26 | End: 2017-05-27 | Stop reason: HOSPADM

## 2017-05-26 RX ORDER — INSULIN ASPART 100 [IU]/ML
1-10 INJECTION, SOLUTION INTRAVENOUS; SUBCUTANEOUS EVERY 6 HOURS PRN
Status: DISCONTINUED | OUTPATIENT
Start: 2017-05-26 | End: 2017-05-27 | Stop reason: HOSPADM

## 2017-05-26 RX ORDER — TRAZODONE HYDROCHLORIDE 50 MG/1
50 TABLET ORAL NIGHTLY
Status: DISCONTINUED | OUTPATIENT
Start: 2017-05-26 | End: 2017-05-26

## 2017-05-26 RX ORDER — METHOCARBAMOL 750 MG/1
750 TABLET, FILM COATED ORAL EVERY 8 HOURS PRN
Status: DISCONTINUED | OUTPATIENT
Start: 2017-05-26 | End: 2017-05-27 | Stop reason: HOSPADM

## 2017-05-26 RX ORDER — MORPHINE SULFATE 4 MG/ML
4 INJECTION, SOLUTION INTRAMUSCULAR; INTRAVENOUS EVERY 4 HOURS PRN
Status: DISCONTINUED | OUTPATIENT
Start: 2017-05-26 | End: 2017-05-27 | Stop reason: HOSPADM

## 2017-05-26 RX ORDER — DIPHENHYDRAMINE HCL 25 MG
25 CAPSULE ORAL EVERY 6 HOURS PRN
Status: DISCONTINUED | OUTPATIENT
Start: 2017-05-26 | End: 2017-05-27 | Stop reason: HOSPADM

## 2017-05-26 RX ORDER — LOSARTAN POTASSIUM 50 MG/1
100 TABLET ORAL DAILY
Status: DISCONTINUED | OUTPATIENT
Start: 2017-05-26 | End: 2017-05-27 | Stop reason: HOSPADM

## 2017-05-26 RX ORDER — ASPIRIN 81 MG/1
81 TABLET ORAL DAILY
Status: DISCONTINUED | OUTPATIENT
Start: 2017-05-26 | End: 2017-05-27 | Stop reason: HOSPADM

## 2017-05-26 RX ORDER — METOPROLOL TARTRATE 50 MG/1
50 TABLET ORAL 2 TIMES DAILY
Status: DISCONTINUED | OUTPATIENT
Start: 2017-05-26 | End: 2017-05-27 | Stop reason: HOSPADM

## 2017-05-26 RX ORDER — GABAPENTIN 100 MG/1
100 CAPSULE ORAL 3 TIMES DAILY
Status: DISCONTINUED | OUTPATIENT
Start: 2017-05-26 | End: 2017-05-27 | Stop reason: HOSPADM

## 2017-05-26 RX ADMIN — ACETAMINOPHEN 650 MG: 325 TABLET ORAL at 06:05

## 2017-05-26 RX ADMIN — TRAZODONE HYDROCHLORIDE 50 MG: 50 TABLET ORAL at 09:05

## 2017-05-26 RX ADMIN — GABAPENTIN 100 MG: 100 CAPSULE ORAL at 09:05

## 2017-05-26 RX ADMIN — GABAPENTIN 100 MG: 100 CAPSULE ORAL at 06:05

## 2017-05-26 RX ADMIN — METRONIDAZOLE 500 MG: 500 INJECTION, SOLUTION INTRAVENOUS at 05:05

## 2017-05-26 RX ADMIN — METOPROLOL TARTRATE 50 MG: 50 TABLET ORAL at 10:05

## 2017-05-26 RX ADMIN — ASPIRIN 81 MG: 81 TABLET, COATED ORAL at 10:05

## 2017-05-26 RX ADMIN — CIPROFLOXACIN 400 MG: 2 INJECTION, SOLUTION INTRAVENOUS at 12:05

## 2017-05-26 RX ADMIN — SODIUM CHLORIDE: 0.9 INJECTION, SOLUTION INTRAVENOUS at 01:05

## 2017-05-26 RX ADMIN — GABAPENTIN 100 MG: 100 CAPSULE ORAL at 02:05

## 2017-05-26 RX ADMIN — PANTOPRAZOLE SODIUM 40 MG: 40 TABLET, DELAYED RELEASE ORAL at 10:05

## 2017-05-26 RX ADMIN — ALLOPURINOL 300 MG: 300 TABLET ORAL at 10:05

## 2017-05-26 RX ADMIN — METRONIDAZOLE 500 MG: 500 INJECTION, SOLUTION INTRAVENOUS at 09:05

## 2017-05-26 RX ADMIN — ONDANSETRON 4 MG: 2 INJECTION INTRAMUSCULAR; INTRAVENOUS at 02:05

## 2017-05-26 RX ADMIN — MORPHINE SULFATE 4 MG: 4 INJECTION, SOLUTION INTRAMUSCULAR; INTRAVENOUS at 02:05

## 2017-05-26 RX ADMIN — SODIUM CHLORIDE: 0.9 INJECTION, SOLUTION INTRAVENOUS at 10:05

## 2017-05-26 RX ADMIN — LOSARTAN POTASSIUM 100 MG: 50 TABLET, FILM COATED ORAL at 10:05

## 2017-05-26 RX ADMIN — METOPROLOL TARTRATE 50 MG: 50 TABLET ORAL at 09:05

## 2017-05-26 RX ADMIN — CIPROFLOXACIN 400 MG: 2 INJECTION, SOLUTION INTRAVENOUS at 01:05

## 2017-05-26 RX ADMIN — LEVOTHYROXINE SODIUM 75 MCG: 25 TABLET ORAL at 06:05

## 2017-05-26 NOTE — HOSPITAL COURSE
5/26/17 - Patient admitted to hospital s/p procedure for microperforation. Patient with improving symptoms today, on IV abx now afebrile. GI evaluated patient recommending abx and trending of both fever curve and WBC's. 5/27/17- The patients symptoms improved. Pt passing gas and tolerating clear liquids. The patients diet was advanced to a regular diet which the patient tolerated. A repeat CT ABD was performed today which showed improvement. The case was discussed with Dr. Jacques who felt the patient was stable for discharge. The patient was seen and examined today and deemed stable for discharge. The patient is being discharged home on a coarse of cipro and flagyl to complete a total 7 day coarse. The patient will follow up with her PCP and with GI.

## 2017-05-26 NOTE — H&P
"[facility] Hospital Medicine  History & Physical    Patient Name: Jasmine Velásquez  MRN: [id number]  Admission Date: 5/25/2017  Attending Physician: Alexis Amador MD  Primary Care Provider: Angely Roberts MD         Patient information was obtained from patient and ER records.     Subjective:     Principal Problem:Generalized abdominal pain    Chief Complaint:   Chief Complaint   Patient presents with    Abdominal Pain     Pt reports having colonoscopy yx, reports periumbilical pain and fever. Reports taking "two tylenol" PTA. Denies N/V/D        HPI: Ms. Velásquez is 76 yrs old female with hypertension, hyperlipidemia, hypothyroidism, T2DM, iron deficiency anaemia. Had a colonoscopy/ EGD by Dr. 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 CT abd shows cecal micro perforation . Labs show WBC 18,000 with left shift. Admitted for IV abx, received Cipro and Flagyl.    Past Medical History:   Diagnosis Date    Diabetes mellitus, type 2 2006    BS doesn't check 06/15/2016    GERD (gastroesophageal reflux disease)     Hyperlipidemia     Hypertension     Hypothyroidism        Past Surgical History:   Procedure Laterality Date    APPENDECTOMY      BACK SURGERY      CATARACT EXTRACTION Bilateral     [facility]    COLONOSCOPY N/A 8/23/2016    Procedure: COLONOSCOPY;  Surgeon: Marcel Jacques MD;  Location: [facility];  Service: Endoscopy;  Laterality: N/A;    COLONOSCOPY N/A 5/24/2017    Procedure: COLONOSCOPY;  Surgeon: Jayy Rosa MD;  Location: [facility];  Service: Endoscopy;  Laterality: N/A;    PIG VALVE      TONSILLECTOMY         Review of patient's allergies indicates:   Allergen Reactions    Ace inhibitors Other (See Comments)     COUGH    Codeine        No current facility-administered medications on file prior to encounter.      Current Outpatient Prescriptions " on File Prior to Encounter   Medication Sig    allopurinol (ZYLOPRIM) 300 MG tablet TAKE 1 TABLET (300 MG TOTAL) BY MOUTH ONCE DAILY.    aspirin (ECOTRIN) 81 MG EC tablet Take 81 mg by mouth once daily.    CMPD diclofenac 2%- amitriptyline 2%- lidocaine 2%- prilocaine 2% in Lipoderm ActiveMax bid    ferrous sulfate 325 mg (65 mg iron) CpSR Take 1 tablet by mouth 2 times daily 2 hours after meal.    furosemide (LASIX) 40 MG tablet Take 40 mg by mouth once daily.    gabapentin (NEURONTIN) 100 MG capsule TAKE 1 CAPSULE (100 MG TOTAL) BY MOUTH 3 (THREE) TIMES DAILY.    glipiZIDE (GLUCOTROL) 5 MG tablet TAKE 1 TABLET (5 MG TOTAL) BY MOUTH 2 (TWO) TIMES DAILY BEFORE MEALS.    glyBURIDE (DIABETA) 5 MG tablet Take 5 mg by mouth once daily.    levothyroxine (SYNTHROID) 75 MCG tablet TAKE 1 TABLET (75 MCG TOTAL) BY MOUTH ONCE DAILY.    losartan (COZAAR) 100 MG tablet TAKE 1 TABLET (100 MG TOTAL) BY MOUTH ONCE DAILY.    lovastatin (MEVACOR) 40 MG tablet TAKE 1 TABLET (40 MG TOTAL) BY MOUTH EVERY EVENING.    metformin (GLUCOPHAGE) 1000 MG tablet TAKE 1 TABLET (1,000 MG TOTAL) BY MOUTH 2 (TWO) TIMES DAILY.    methocarbamol (ROBAXIN) 750 MG Tab Take 1 tablet by mouth every 8 (eight) hours as needed.    metoprolol tartrate (LOPRESSOR) 50 MG tablet 1 BY MOUTH DAILY    pantoprazole (PROTONIX) 40 MG tablet Take 1 tablet (40 mg total) by mouth once daily.    penicillin v potassium (VEETID) 500 MG tablet TAKE 4 TABLETS BY MOUTH 1 HOUR PRIOR TO APPT    phenazopyridine (PYRIDIUM) 100 MG tablet Take 1 tablet (100 mg total) by mouth 3 (three) times daily as needed for Pain.    potassium chloride SA (K-DUR,KLOR-CON) 10 MEQ tablet Take once daily    pramipexole (MIRAPEX) 0.25 MG tablet TAKE 1 TABLET (0.25 MG TOTAL) BY MOUTH EVERY EVENING.    sulfamethoxazole-trimethoprim 800-160mg (BACTRIM DS) 800-160 mg Tab Take 1 tablet by mouth 2 (two) times daily.    trazodone (DESYREL) 50 MG tablet Take 1 tablet (50 mg total) by  mouth every evening.    triamcinolone acetonide 0.5% (KENALOG) 0.5 % Crea Apply topically 2 (two) times daily.     Family History     Problem Relation (Age of Onset)    COPD Sister    Cancer Mother (46)    Diabetes Father    Heart disease Mother, Father    Hypertension Father        Social History Main Topics    Smoking status: Former Smoker     Packs/day: 1.00     Years: 25.00     Quit date: 5/24/2002    Smokeless tobacco: Not on file    Alcohol use No    Drug use: Unknown    Sexual activity: Not on file     Review of Systems   Constitutional: Positive for fever. Negative for chills, diaphoresis and fatigue.   HENT: Negative.  Negative for congestion, nosebleeds and sinus pressure.    Eyes: Negative.  Negative for photophobia, redness and visual disturbance.   Respiratory: Negative.  Negative for cough, chest tightness, shortness of breath and wheezing.    Cardiovascular: Negative.  Negative for chest pain, palpitations and leg swelling.   Gastrointestinal: Positive for abdominal pain. Negative for diarrhea, nausea and vomiting.   Endocrine: Negative.  Negative for polydipsia, polyphagia and polyuria.   Genitourinary: Negative.  Negative for dysuria, flank pain, frequency and urgency.   Musculoskeletal: Negative.  Negative for back pain, joint swelling and neck stiffness.   Skin: Negative.  Negative for color change, pallor and rash.   Allergic/Immunologic: Negative.  Negative for environmental allergies, food allergies and immunocompromised state.   Neurological: Negative.  Negative for dizziness, syncope, speech difficulty, numbness and headaches.   Hematological: Negative.  Negative for adenopathy. Does not bruise/bleed easily.   Psychiatric/Behavioral: Negative.  Negative for confusion, decreased concentration and hallucinations. The patient is not nervous/anxious.    All other systems reviewed and are negative.    Objective:     Vital Signs (Most Recent):  Temp: 98 °F (36.7 °C) (05/25/17 2003)  Pulse: 80  (05/26/17 0109)  Resp: 16 (05/26/17 0109)  BP: 120/65 (05/26/17 0109)  SpO2: 100 % (05/26/17 0109) Vital Signs (24h Range):  Temp:  [98 °F (36.7 °C)] 98 °F (36.7 °C)  Pulse:  [78-80] 80  Resp:  [16-20] 16  SpO2:  [96 %-100 %] 100 %  BP: (120-126)/(65-70) 120/65     Weight: 79.4 kg (175 lb)  Body mass index is 30.04 kg/m².    Physical Exam   Constitutional: She is oriented to person, place, and time. She appears well-developed and well-nourished. No distress.   HENT:   Head: Normocephalic and atraumatic.   Eyes: Conjunctivae and EOM are normal. No scleral icterus.   Neck: Normal range of motion. Neck supple. No JVD present. No tracheal deviation present. No thyromegaly present.   Cardiovascular: Normal rate, regular rhythm, normal heart sounds and intact distal pulses.    No murmur heard.  Pulmonary/Chest: Effort normal and breath sounds normal. No respiratory distress. She has no wheezes. She has no rales. She exhibits no tenderness.   Abdominal: Soft. Bowel sounds are normal. She exhibits no distension. There is tenderness (lower).   Musculoskeletal: Normal range of motion. She exhibits no edema or tenderness.   Lymphadenopathy:     She has no cervical adenopathy.   Neurological: She is alert and oriented to person, place, and time. No cranial nerve deficit. She exhibits normal muscle tone. Coordination normal.   Skin: Skin is warm and dry. She is not diaphoretic. No erythema.   Psychiatric: She has a normal mood and affect. Her behavior is normal. Judgment and thought content normal.   Nursing note and vitals reviewed.       Significant Labs:   A1C:   Recent Labs  Lab 03/07/17  0824   HGBA1C 6.4*     ABGs: No results for input(s): PH, PCO2, HCO3, POCSATURATED, BE in the last 48 hours.  Bilirubin:   Recent Labs  Lab 05/25/17 2123   BILITOT 1.0     Blood Culture: No results for input(s): LABBLOO in the last 48 hours.  BMP:   Recent Labs  Lab 05/25/17 2123   *      K 3.7   CL 99   CO2 27   BUN 13    CREATININE 1.4   CALCIUM 10.7*     CBC:   Recent Labs  Lab 05/25/17 2123   WBC 17.98*   HGB 13.7   HCT 41.4        CMP:   Recent Labs  Lab 05/25/17 2123      K 3.7   CL 99   CO2 27   *   BUN 13   CREATININE 1.4   CALCIUM 10.7*   PROT 7.7   ALBUMIN 4.0   BILITOT 1.0   ALKPHOS 64   AST 21   ALT 17   ANIONGAP 12   EGFRNONAA 37*     Cardiac Markers: No results for input(s): CKMB, MYOGLOBIN, BNP, TROPISTAT in the last 48 hours.  Coagulation: No results for input(s): INR, APTT in the last 48 hours.    Invalid input(s): PT  Lactic Acid: No results for input(s): LACTATE in the last 48 hours.  Lipase:   Recent Labs  Lab 05/25/17 2123   LIPASE 43     Lipid Panel: No results for input(s): CHOL, HDL, LDLCALC, TRIG, CHOLHDL in the last 48 hours.  Pathology Results  (Last 10 years)               05/25/17 2123 (A) CBC W/ AUTO DIFFERENTIAL (Abnormal) Final result    03/07/17 0824 (A) CBC auto differential (Abnormal) Final result    11/17/16 0700 (A) CBC auto differential (Abnormal) Final result    08/09/16 0813 (A) CBC auto differential (Abnormal) Final result    05/02/16 0808 (A) CBC auto differential (Abnormal) Final result    01/12/16 1557 (A) CBC auto differential (Abnormal) Final result        POCT Glucose:   Recent Labs  Lab 05/24/17  1116   POCTGLUCOSE 90     Prealbumin: No results for input(s): PREALBUMIN in the last 48 hours.  Respiratory Culture: No results for input(s): GSRESP, RESPIRATORYC in the last 48 hours.  Troponin: No results for input(s): TROPONINI in the last 48 hours.  TSH: No results for input(s): TSH in the last 4320 hours.  Urine Culture: No results for input(s): LABURIN in the last 48 hours.  Urine Studies:   Recent Labs  Lab 05/25/17 2123   COLORU Yellow   APPEARANCEUA Clear   PHUR 5.0   SPECGRAV 1.025   PROTEINUA Trace*   GLUCUA Negative   KETONESU Trace*   BILIRUBINUA 1+*   OCCULTUA Negative   NITRITE Negative   UROBILINOGEN Negative   LEUKOCYTESUR 1+*   WBCUA 15*   BACTERIA  Few*     All pertinent labs within the past 24 hours have been reviewed.    Significant Imaging: I have reviewed and interpreted all pertinent imaging results/findings within the past 24 hours.     Imaging Results          CT Abdomen Pelvis  Without Contrast (Final result)  Result time 05/25/17 22:55:11   Procedure changed from CT Abdomen Pelvis With Contrast     Final result by Richar Foley MD (05/25/17 22:55:11)                 Impression:     1.  There is a pattern of streaky inflammatory change around the cecum and along its medial wall there a few tiny bubbles of gas present which may lie within the wall or may represent minute areas of microperforation.  The patient has a history of colonoscopy the day before.  Is there history of biopsy of the cecal region?  No abscess is demonstrated and more superiorly within the abdomen no free air is seen anteriorly.      All CT scans at this facility use dose modulation, iterative reconstruction and/or weight based dosing when appropriate to reduce radiation dose to as low as reasonably achievable.       Electronically signed by: RICHAR FOLEY MD  Date:     05/25/17  Time:    22:55              Narrative:    CT ABDOMEN PELVIS WITHOUT CONTRAST,     Date:  05/25/17 22:35:47    Technique: Standard noncontrast CT scan of the abdomen and pelvis.Sagittal and coronal images were generated.    History:  generalized abd pain.  colonoscopy yesterday,     Findings:  The lung bases are clear.     The liver is enlarged measuring 18.6 cm in length.  No focal abnormality is demonstrated.  The gallbladder is rather distended.  No wall thickening or inflammatory change or stones are demonstrated.  The gallbladder measures 9.3 cm in length.  No dilatation of intrahepatic biliary tree is present.  The spleen, pancreas, and adrenal glands are normal in appearance.    The right kidney appears free of mass, calculus, or obstruction in the right ureter is normal in appearance.  The left  kidney appears free of acute obstruction is a small 0.9 cm in its midpole.  The left ureter is normal in appearance.    Within the pelvis, the uterus contains multiple calcifications but appears normally configured.  The bladder is normal in appearance.  No suspicious mass or fluid collection seen in the pelvis.  There is diverticulosis of the colon.  Fluid levels are seen within the transverse colon and there is a pattern of streaky change around the cecum.    On image 55, series 2 there are multiple, tiny bubbles of gas along the medial wall of the cecum which may be within the cecal wall or external to it.  These lie just above the ileocecal valve.  Streaky change in adjacent fat in this region is present as well.  The appendix is not identified.  The small bowel appears free of obstructive change.  There is a fat filled periumbilical hernia.  It measures 1.2 cm.  The stomach is normal in appearance.  Chronic spurring and degenerative change of the spine is present.  This is greatest at the L5-S1 level.                             X-Ray Abdomen Flat And Erect (Final result)  Result time 05/25/17 21:12:26    Final result by Richar Foley MD (05/25/17 21:12:26)                 Impression:     No acute findings on abdominal series.      Electronically signed by: RICHAR FOLEY MD  Date:     05/25/17  Time:    21:12              Narrative:    Two-view abdominal x-ray, 05/25/17 20:59:14    History: Unspecified Abdominal Pain    Two views of the abdomen.  There is moderate gas and stool present throughout the colon.  No abnormally distended loops of bowel are demonstrated.. No obstruction, ileus or free air.  A scoliosis of the lumbar spine convex towards the left with extensive multilevel chronic spurring.                                Assessment/Plan:     * Generalized abdominal pain    - Secondary to microperforation of asc. Colon S/P EGD/ Colonoscopy.  - Continue IV CIPRO and Flagyl.  - Pain medications as  needed.          Diabetic polyneuropathy associated with type 2 diabetes mellitus    - Resume home medications.  - ISS            Essential hypertension    - Resume home medications          Hypothyroidism due to acquired atrophy of thyroid    - Resume synthroid            VTE Risk Mitigation         Ordered     Medium Risk of VTE  Once      05/26/17 0106     Place sequential compression device  Until discontinued      05/26/17 0106     Place ELIUD hose  Until discontinued      05/26/17 0106     Reason for No Pharmacological VTE Prophylaxis  Once      05/26/17 0106        Alexis Amador MD  Department of Hospital Medicine   [facility]

## 2017-05-26 NOTE — SUBJECTIVE & OBJECTIVE
Past Medical History:   Diagnosis Date    Diabetes mellitus, type 2 2006    BS doesn't check 06/15/2016    GERD (gastroesophageal reflux disease)     Hyperlipidemia     Hypertension     Hypothyroidism        Past Surgical History:   Procedure Laterality Date    APPENDECTOMY      BACK SURGERY      CATARACT EXTRACTION Bilateral     [facility]    COLONOSCOPY N/A 8/23/2016    Procedure: COLONOSCOPY;  Surgeon: Marcel Jacques MD;  Location: [facility] ENDO;  Service: Endoscopy;  Laterality: N/A;    COLONOSCOPY N/A 5/24/2017    Procedure: COLONOSCOPY;  Surgeon: Jayy Rosa MD;  Location: [facility] ENDO;  Service: Endoscopy;  Laterality: N/A;    PIG VALVE      TONSILLECTOMY         Review of patient's allergies indicates:   Allergen Reactions    Ace inhibitors Other (See Comments)     COUGH    Codeine        No current facility-administered medications on file prior to encounter.      Current Outpatient Prescriptions on File Prior to Encounter   Medication Sig    allopurinol (ZYLOPRIM) 300 MG tablet TAKE 1 TABLET (300 MG TOTAL) BY MOUTH ONCE DAILY.    aspirin (ECOTRIN) 81 MG EC tablet Take 81 mg by mouth once daily.    CMPD diclofenac 2%- amitriptyline 2%- lidocaine 2%- prilocaine 2% in Lipoderm ActiveMax bid    ferrous sulfate 325 mg (65 mg iron) CpSR Take 1 tablet by mouth 2 times daily 2 hours after meal.    furosemide (LASIX) 40 MG tablet Take 40 mg by mouth once daily.    gabapentin (NEURONTIN) 100 MG capsule TAKE 1 CAPSULE (100 MG TOTAL) BY MOUTH 3 (THREE) TIMES DAILY.    glipiZIDE (GLUCOTROL) 5 MG tablet TAKE 1 TABLET (5 MG TOTAL) BY MOUTH 2 (TWO) TIMES DAILY BEFORE MEALS.    glyBURIDE (DIABETA) 5 MG tablet Take 5 mg by mouth once daily.    levothyroxine (SYNTHROID) 75 MCG tablet TAKE 1 TABLET (75 MCG TOTAL) BY MOUTH ONCE DAILY.    losartan (COZAAR) 100 MG tablet TAKE 1 TABLET (100 MG TOTAL) BY MOUTH ONCE DAILY.    lovastatin (MEVACOR) 40 MG tablet TAKE 1 TABLET (40 MG TOTAL) BY  MOUTH EVERY EVENING.    metformin (GLUCOPHAGE) 1000 MG tablet TAKE 1 TABLET (1,000 MG TOTAL) BY MOUTH 2 (TWO) TIMES DAILY.    methocarbamol (ROBAXIN) 750 MG Tab Take 1 tablet by mouth every 8 (eight) hours as needed.    metoprolol tartrate (LOPRESSOR) 50 MG tablet 1 BY MOUTH DAILY    pantoprazole (PROTONIX) 40 MG tablet Take 1 tablet (40 mg total) by mouth once daily.    penicillin v potassium (VEETID) 500 MG tablet TAKE 4 TABLETS BY MOUTH 1 HOUR PRIOR TO APPT    phenazopyridine (PYRIDIUM) 100 MG tablet Take 1 tablet (100 mg total) by mouth 3 (three) times daily as needed for Pain.    potassium chloride SA (K-DUR,KLOR-CON) 10 MEQ tablet Take once daily    pramipexole (MIRAPEX) 0.25 MG tablet TAKE 1 TABLET (0.25 MG TOTAL) BY MOUTH EVERY EVENING.    sulfamethoxazole-trimethoprim 800-160mg (BACTRIM DS) 800-160 mg Tab Take 1 tablet by mouth 2 (two) times daily.    trazodone (DESYREL) 50 MG tablet Take 1 tablet (50 mg total) by mouth every evening.    triamcinolone acetonide 0.5% (KENALOG) 0.5 % Crea Apply topically 2 (two) times daily.     Family History     Problem Relation (Age of Onset)    COPD Sister    Cancer Mother (46)    Diabetes Father    Heart disease Mother, Father    Hypertension Father        Social History Main Topics    Smoking status: Former Smoker     Packs/day: 1.00     Years: 25.00     Quit date: 5/24/2002    Smokeless tobacco: Not on file    Alcohol use No    Drug use: Unknown    Sexual activity: Not on file     Review of Systems   Constitutional: Positive for fever. Negative for chills, diaphoresis and fatigue.   HENT: Negative.  Negative for congestion, nosebleeds and sinus pressure.    Eyes: Negative.  Negative for photophobia, redness and visual disturbance.   Respiratory: Negative.  Negative for cough, chest tightness, shortness of breath and wheezing.    Cardiovascular: Negative.  Negative for chest pain, palpitations and leg swelling.   Gastrointestinal: Positive for abdominal  pain. Negative for diarrhea, nausea and vomiting.   Endocrine: Negative.  Negative for polydipsia, polyphagia and polyuria.   Genitourinary: Negative.  Negative for dysuria, flank pain, frequency and urgency.   Musculoskeletal: Negative.  Negative for back pain, joint swelling and neck stiffness.   Skin: Negative.  Negative for color change, pallor and rash.   Allergic/Immunologic: Negative.  Negative for environmental allergies, food allergies and immunocompromised state.   Neurological: Negative.  Negative for dizziness, syncope, speech difficulty, numbness and headaches.   Hematological: Negative.  Negative for adenopathy. Does not bruise/bleed easily.   Psychiatric/Behavioral: Negative.  Negative for confusion, decreased concentration and hallucinations. The patient is not nervous/anxious.    All other systems reviewed and are negative.    Objective:     Vital Signs (Most Recent):  Temp: 98 °F (36.7 °C) (05/25/17 2003)  Pulse: 80 (05/26/17 0109)  Resp: 16 (05/26/17 0109)  BP: 120/65 (05/26/17 0109)  SpO2: 100 % (05/26/17 0109) Vital Signs (24h Range):  Temp:  [98 °F (36.7 °C)] 98 °F (36.7 °C)  Pulse:  [78-80] 80  Resp:  [16-20] 16  SpO2:  [96 %-100 %] 100 %  BP: (120-126)/(65-70) 120/65     Weight: 79.4 kg (175 lb)  Body mass index is 30.04 kg/m².    Physical Exam   Constitutional: She is oriented to person, place, and time. She appears well-developed and well-nourished. No distress.   HENT:   Head: Normocephalic and atraumatic.   Eyes: Conjunctivae and EOM are normal. No scleral icterus.   Neck: Normal range of motion. Neck supple. No JVD present. No tracheal deviation present. No thyromegaly present.   Cardiovascular: Normal rate, regular rhythm, normal heart sounds and intact distal pulses.    No murmur heard.  Pulmonary/Chest: Effort normal and breath sounds normal. No respiratory distress. She has no wheezes. She has no rales. She exhibits no tenderness.   Abdominal: Soft. Bowel sounds are normal. She  exhibits no distension. There is tenderness (lower).   Musculoskeletal: Normal range of motion. She exhibits no edema or tenderness.   Lymphadenopathy:     She has no cervical adenopathy.   Neurological: She is alert and oriented to person, place, and time. No cranial nerve deficit. She exhibits normal muscle tone. Coordination normal.   Skin: Skin is warm and dry. She is not diaphoretic. No erythema.   Psychiatric: She has a normal mood and affect. Her behavior is normal. Judgment and thought content normal.   Nursing note and vitals reviewed.       Significant Labs:   A1C:   Recent Labs  Lab 03/07/17 0824   HGBA1C 6.4*     ABGs: No results for input(s): PH, PCO2, HCO3, POCSATURATED, BE in the last 48 hours.  Bilirubin:   Recent Labs  Lab 05/25/17 2123   BILITOT 1.0     Blood Culture: No results for input(s): LABBLOO in the last 48 hours.  BMP:   Recent Labs  Lab 05/25/17 2123   *      K 3.7   CL 99   CO2 27   BUN 13   CREATININE 1.4   CALCIUM 10.7*     CBC:   Recent Labs  Lab 05/25/17 2123   WBC 17.98*   HGB 13.7   HCT 41.4        CMP:   Recent Labs  Lab 05/25/17 2123      K 3.7   CL 99   CO2 27   *   BUN 13   CREATININE 1.4   CALCIUM 10.7*   PROT 7.7   ALBUMIN 4.0   BILITOT 1.0   ALKPHOS 64   AST 21   ALT 17   ANIONGAP 12   EGFRNONAA 37*     Cardiac Markers: No results for input(s): CKMB, MYOGLOBIN, BNP, TROPISTAT in the last 48 hours.  Coagulation: No results for input(s): INR, APTT in the last 48 hours.    Invalid input(s): PT  Lactic Acid: No results for input(s): LACTATE in the last 48 hours.  Lipase:   Recent Labs  Lab 05/25/17 2123   LIPASE 43     Lipid Panel: No results for input(s): CHOL, HDL, LDLCALC, TRIG, CHOLHDL in the last 48 hours.  Pathology Results  (Last 10 years)               05/25/17 2123 (A) CBC W/ AUTO DIFFERENTIAL (Abnormal) Final result    03/07/17 0824 (A) CBC auto differential (Abnormal) Final result    11/17/16 0700 (A) CBC auto differential  (Abnormal) Final result    08/09/16 0813 (A) CBC auto differential (Abnormal) Final result    05/02/16 0808 (A) CBC auto differential (Abnormal) Final result    01/12/16 1557 (A) CBC auto differential (Abnormal) Final result        POCT Glucose:   Recent Labs  Lab 05/24/17  1116   POCTGLUCOSE 90     Prealbumin: No results for input(s): PREALBUMIN in the last 48 hours.  Respiratory Culture: No results for input(s): GSRESP, RESPIRATORYC in the last 48 hours.  Troponin: No results for input(s): TROPONINI in the last 48 hours.  TSH: No results for input(s): TSH in the last 4320 hours.  Urine Culture: No results for input(s): LABURIN in the last 48 hours.  Urine Studies:   Recent Labs  Lab 05/25/17 2123   COLORU Yellow   APPEARANCEUA Clear   PHUR 5.0   SPECGRAV 1.025   PROTEINUA Trace*   GLUCUA Negative   KETONESU Trace*   BILIRUBINUA 1+*   OCCULTUA Negative   NITRITE Negative   UROBILINOGEN Negative   LEUKOCYTESUR 1+*   WBCUA 15*   BACTERIA Few*     All pertinent labs within the past 24 hours have been reviewed.    Significant Imaging: I have reviewed and interpreted all pertinent imaging results/findings within the past 24 hours.     Imaging Results          CT Abdomen Pelvis  Without Contrast (Final result)  Result time 05/25/17 22:55:11   Procedure changed from CT Abdomen Pelvis With Contrast     Final result by Gera Hdz MD (05/25/17 22:55:11)                 Impression:     1.  There is a pattern of streaky inflammatory change around the cecum and along its medial wall there a few tiny bubbles of gas present which may lie within the wall or may represent minute areas of microperforation.  The patient has a history of colonoscopy the day before.  Is there history of biopsy of the cecal region?  No abscess is demonstrated and more superiorly within the abdomen no free air is seen anteriorly.      All CT scans at this facility use dose modulation, iterative reconstruction and/or weight based dosing when  appropriate to reduce radiation dose to as low as reasonably achievable.       Electronically signed by: RICHAR FOLEY MD  Date:     05/25/17  Time:    22:55              Narrative:    CT ABDOMEN PELVIS WITHOUT CONTRAST,     Date:  05/25/17 22:35:47    Technique: Standard noncontrast CT scan of the abdomen and pelvis.Sagittal and coronal images were generated.    History:  generalized abd pain.  colonoscopy yesterday,     Findings:  The lung bases are clear.     The liver is enlarged measuring 18.6 cm in length.  No focal abnormality is demonstrated.  The gallbladder is rather distended.  No wall thickening or inflammatory change or stones are demonstrated.  The gallbladder measures 9.3 cm in length.  No dilatation of intrahepatic biliary tree is present.  The spleen, pancreas, and adrenal glands are normal in appearance.    The right kidney appears free of mass, calculus, or obstruction in the right ureter is normal in appearance.  The left kidney appears free of acute obstruction is a small 0.9 cm in its midpole.  The left ureter is normal in appearance.    Within the pelvis, the uterus contains multiple calcifications but appears normally configured.  The bladder is normal in appearance.  No suspicious mass or fluid collection seen in the pelvis.  There is diverticulosis of the colon.  Fluid levels are seen within the transverse colon and there is a pattern of streaky change around the cecum.    On image 55, series 2 there are multiple, tiny bubbles of gas along the medial wall of the cecum which may be within the cecal wall or external to it.  These lie just above the ileocecal valve.  Streaky change in adjacent fat in this region is present as well.  The appendix is not identified.  The small bowel appears free of obstructive change.  There is a fat filled periumbilical hernia.  It measures 1.2 cm.  The stomach is normal in appearance.  Chronic spurring and degenerative change of the spine is present.  This is  greatest at the L5-S1 level.                             X-Ray Abdomen Flat And Erect (Final result)  Result time 05/25/17 21:12:26    Final result by Richar Foley MD (05/25/17 21:12:26)                 Impression:     No acute findings on abdominal series.      Electronically signed by: RICHAR FOLEY MD  Date:     05/25/17  Time:    21:12              Narrative:    Two-view abdominal x-ray, 05/25/17 20:59:14    History: Unspecified Abdominal Pain    Two views of the abdomen.  There is moderate gas and stool present throughout the colon.  No abnormally distended loops of bowel are demonstrated.. No obstruction, ileus or free air.  A scoliosis of the lumbar spine convex towards the left with extensive multilevel chronic spurring.

## 2017-05-26 NOTE — ED NOTES
Pt awake, alert and oriented,  at bedside. Side rails up x 1, bed in low position, call bell in reach.

## 2017-05-26 NOTE — ED NOTES
Pt found lying quietly in bed watching TV, alert and oriented x 3, c/o abdominal pain, rates as 3/10, soft, tender to touch, or when pt laughs or moves, normal BS, skin warm and dry, respirations even non-labored, BBS-CTA, denies shortness of breath, noted 02 sat changes from 93 up to 97%. Pt breathing shallow due to abd pain. Pulses, motion and sensation intact all ext. IV 20 ga to left forearm infusing NS per pump at 125 ml/hr. Pt gown, non-skid socks in place. Side rails up x 1, bed in low position, call bell in reach. Pt waiting for bed assignment.

## 2017-05-26 NOTE — SUBJECTIVE & OBJECTIVE
Interval History: Improving. No events Afebrile    Review of Systems   Constitutional: Positive for fatigue. Negative for unexpected weight change.   HENT: Negative.  Negative for trouble swallowing.    Eyes: Negative.    Respiratory: Negative.  Negative for cough, shortness of breath and wheezing.    Cardiovascular: Negative.  Negative for chest pain.   Gastrointestinal: Negative.  Negative for abdominal distention, abdominal pain, blood in stool and vomiting.   Endocrine: Negative.    Genitourinary: Negative.    Musculoskeletal: Negative.    Skin: Negative.    Allergic/Immunologic: Negative.    Neurological: Positive for weakness. Negative for dizziness.   Hematological: Negative.    Psychiatric/Behavioral: Negative.    All other systems reviewed and are negative.    Objective:     Vital Signs (Most Recent):  Temp: 98.7 °F (37.1 °C) (05/26/17 1627)  Pulse: 85 (05/26/17 1627)  Resp: 17 (05/26/17 1627)  BP: 136/70 (05/26/17 1627)  SpO2: 96 % (05/26/17 1627) Vital Signs (24h Range):  Temp:  [97.9 °F (36.6 °C)-101 °F (38.3 °C)] 98.7 °F (37.1 °C)  Pulse:  [76-89] 85  Resp:  [13-20] 17  SpO2:  [95 %-100 %] 96 %  BP: (100-146)/(47-71) 136/70     Weight: 79.4 kg (175 lb)  Body mass index is 30.04 kg/m².    Intake/Output Summary (Last 24 hours) at 05/26/17 1833  Last data filed at 05/26/17 1200   Gross per 24 hour   Intake                0 ml   Output                0 ml   Net                0 ml      Physical Exam   Constitutional: She is oriented to person, place, and time. She appears well-developed and well-nourished.   HENT:   Head: Normocephalic and atraumatic.   Eyes: EOM are normal. Pupils are equal, round, and reactive to light.   Neck: Normal range of motion. Neck supple. No JVD present.   Cardiovascular: Normal rate, regular rhythm, normal heart sounds and intact distal pulses.    No murmur heard.  Pulmonary/Chest: Effort normal and breath sounds normal. No respiratory distress. She has no wheezes.   Abdominal:  Soft. Bowel sounds are normal. She exhibits no distension.   Musculoskeletal: Normal range of motion. She exhibits no edema or tenderness.   Neurological: She is alert and oriented to person, place, and time. She has normal reflexes. No cranial nerve deficit.   Skin: Skin is warm and dry. No erythema.   Psychiatric: She has a normal mood and affect. Her behavior is normal. Judgment and thought content normal.   Nursing note and vitals reviewed.      Significant Labs:   BMP:   Recent Labs  Lab 05/25/17 2123   *      K 3.7   CL 99   CO2 27   BUN 13   CREATININE 1.4   CALCIUM 10.7*     CBC:   Recent Labs  Lab 05/25/17 2123   WBC 17.98*   HGB 13.7   HCT 41.4        CMP:   Recent Labs  Lab 05/25/17 2123      K 3.7   CL 99   CO2 27   *   BUN 13   CREATININE 1.4   CALCIUM 10.7*   PROT 7.7   ALBUMIN 4.0   BILITOT 1.0   ALKPHOS 64   AST 21   ALT 17   ANIONGAP 12   EGFRNONAA 37*     Cardiac Markers: No results for input(s): CKMB, MYOGLOBIN, BNP, TROPISTAT in the last 48 hours.  Lactic Acid: No results for input(s): LACTATE in the last 48 hours.  Urine Studies:   Recent Labs  Lab 05/25/17 2123   COLORU Yellow   APPEARANCEUA Clear   PHUR 5.0   SPECGRAV 1.025   PROTEINUA Trace*   GLUCUA Negative   KETONESU Trace*   BILIRUBINUA 1+*   OCCULTUA Negative   NITRITE Negative   UROBILINOGEN Negative   LEUKOCYTESUR 1+*   WBCUA 15*   BACTERIA Few*     All pertinent labs within the past 24 hours have been reviewed.    Significant Imaging: I have reviewed all pertinent imaging results/findings within the past 24 hours.   Imaging Results          X-Ray Abdomen Flat And Erect (Final result)  Result time 05/26/17 17:15:33    Final result by Gera Hdz MD (05/26/17 17:15:33)                 Impression:     There is a nonspecific pattern some gaseous distention of colon, but no fixed area of obstruction is identified.  Fluid levels within colon and some minimally gas distended loops of small bowel  are present raising concern for ileus.  Clinical correlation is recommended.      Electronically signed by: RICHAR FOLEY MD  Date:     05/26/17  Time:    17:15              Narrative:    Two-view abdominal x-ray, 05/26/17 16:37:40    History:    Abdominal tenderness    Two views of the abdomen.  No free air is demonstrated within the peritoneal cavity.  There is a pattern of some gaseous distention of loops of colon which measure up to 6.1 cm in the right upper quadrant.  There is also a pattern mildly gas-distended loops of small bowel in the midabdomen measuring up to 3.0 cm.  No abnormal mass is demonstrated.  Gas and stool is seen throughout the colon extending to level of rectosigmoid.    Chronic spurring and degenerative change of the lumbar spine is present..                             CT Abdomen Pelvis  Without Contrast (Final result)  Result time 05/25/17 22:55:11   Procedure changed from CT Abdomen Pelvis With Contrast     Final result by Richar Foley MD (05/25/17 22:55:11)                 Impression:     1.  There is a pattern of streaky inflammatory change around the cecum and along its medial wall there a few tiny bubbles of gas present which may lie within the wall or may represent minute areas of microperforation.  The patient has a history of colonoscopy the day before.  Is there history of biopsy of the cecal region?  No abscess is demonstrated and more superiorly within the abdomen no free air is seen anteriorly.      All CT scans at this facility use dose modulation, iterative reconstruction and/or weight based dosing when appropriate to reduce radiation dose to as low as reasonably achievable.       Electronically signed by: RICHAR FOLEY MD  Date:     05/25/17  Time:    22:55              Narrative:    CT ABDOMEN PELVIS WITHOUT CONTRAST,     Date:  05/25/17 22:35:47    Technique: Standard noncontrast CT scan of the abdomen and pelvis.Sagittal and coronal images were  generated.    History:  generalized abd pain.  colonoscopy yesterday,     Findings:  The lung bases are clear.     The liver is enlarged measuring 18.6 cm in length.  No focal abnormality is demonstrated.  The gallbladder is rather distended.  No wall thickening or inflammatory change or stones are demonstrated.  The gallbladder measures 9.3 cm in length.  No dilatation of intrahepatic biliary tree is present.  The spleen, pancreas, and adrenal glands are normal in appearance.    The right kidney appears free of mass, calculus, or obstruction in the right ureter is normal in appearance.  The left kidney appears free of acute obstruction is a small 0.9 cm in its midpole.  The left ureter is normal in appearance.    Within the pelvis, the uterus contains multiple calcifications but appears normally configured.  The bladder is normal in appearance.  No suspicious mass or fluid collection seen in the pelvis.  There is diverticulosis of the colon.  Fluid levels are seen within the transverse colon and there is a pattern of streaky change around the cecum.    On image 55, series 2 there are multiple, tiny bubbles of gas along the medial wall of the cecum which may be within the cecal wall or external to it.  These lie just above the ileocecal valve.  Streaky change in adjacent fat in this region is present as well.  The appendix is not identified.  The small bowel appears free of obstructive change.  There is a fat filled periumbilical hernia.  It measures 1.2 cm.  The stomach is normal in appearance.  Chronic spurring and degenerative change of the spine is present.  This is greatest at the L5-S1 level.                             X-Ray Abdomen Flat And Erect (Final result)  Result time 05/25/17 21:12:26    Final result by Richar Foley MD (05/25/17 21:12:26)                 Impression:     No acute findings on abdominal series.      Electronically signed by: RICHAR FOLEY MD  Date:     05/25/17  Time:    21:12               Narrative:    Two-view abdominal x-ray, 05/25/17 20:59:14    History: Unspecified Abdominal Pain    Two views of the abdomen.  There is moderate gas and stool present throughout the colon.  No abnormally distended loops of bowel are demonstrated.. No obstruction, ileus or free air.  A scoliosis of the lumbar spine convex towards the left with extensive multilevel chronic spurring.

## 2017-05-26 NOTE — ASSESSMENT & PLAN NOTE
- Secondary to microperforation of asc. Colon S/P EGD/ Colonoscopy.  - Continue IV CIPRO and Flagyl.  - Pain medications as needed.  - CLD  - GI on Consult

## 2017-05-26 NOTE — HPI
Patient is a 76 yr old  female who presented to the ER under the advise of Dr. Rosa. She had an EGD and colonoscopy performed yesterday as an outpatient. Endoscopies performed without compromise. Once a home, she developed intense abdominal pain. She was then advised to report to the ER. WBC elevated at 17.98. CT scan Abdomen Pelvis showed a pattern of streaky inflammatory change around the cecum and along its medial wall there a few tiny bubbles of gas present which may lie within the wall or may represent minute areas of microperforation. Patient is feeling much better today than yesterday. She does still complain of abdominal pain around the periumbilical region but is much improved from yesterday. She only experiences the pain when deep breathing or moving around. The expresses wishes to be discharged home. She has not had any BM since bowel prep for colonoscopy. She denies any nausea or vomiting.

## 2017-05-26 NOTE — PLAN OF CARE
Problem: Patient Care Overview  Goal: Plan of Care Review  Outcome: Ongoing (interventions implemented as appropriate)  Pt remain free from falls and injury this shift personal belonging within reach bed lock call light within reach. Denies pain. No nausea or vomit noted. poc reviewed verbalize understanding. Pt on IV fluids and IV antibiotics as ordered. Spouse at bedside. Will continue to monitor. 12 hr chart check complete.

## 2017-05-26 NOTE — HPI
Ms. Velásquez is 76 yrs old female with hypertension, hyperlipidemia, hypothyroidism, T2DM, iron deficiency anaemia. Had a colonoscopy/ EGD by Dr. 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 CT abd shows cecal micro perforation . Labs show WBC 18,000 with left shift. Admitted for IV abx, received Cipro and Flagyl.

## 2017-05-26 NOTE — ASSESSMENT & PLAN NOTE
- Secondary to microperforation of asc. Colon S/P EGD/ Colonoscopy.  - Continue IV CIPRO and Flagyl.  - Pain medications as needed.

## 2017-05-26 NOTE — PROGRESS NOTES
[facility] Hospital Medicine  Progress Note    Patient Name: Jasmine Velásquez  MRN: [id number]  Patient Class: IP- Inpatient   Admission Date: 5/25/2017  Length of Stay: 0 days  Attending Physician: Ronald Luna MD  Primary Care Provider: Angely Roberts MD        Subjective:     Principal Problem:Generalized abdominal pain    HPI:  Ms. Velásquez is 76 yrs old female with hypertension, hyperlipidemia, hypothyroidism, T2DM, iron deficiency anaemia. Had a colonoscopy/ EGD by Dr. 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 CT abd shows cecal micro perforation . Labs show WBC 18,000 with left shift. Admitted for IV abx, received Cipro and Flagyl.    Hospital Course:  5/26/17 - Patient admitted to hospital s/p procedure for microperforation. Patient with improving symptoms today, on IV abx now afebrile. GI evaluated patient recommending abx and trending of both fever curve and WBC's.     Interval History: Improving. No events Afebrile    Review of Systems   Constitutional: Positive for fatigue. Negative for unexpected weight change.   HENT: Negative.  Negative for trouble swallowing.    Eyes: Negative.    Respiratory: Negative.  Negative for cough, shortness of breath and wheezing.    Cardiovascular: Negative.  Negative for chest pain.   Gastrointestinal: Negative.  Negative for abdominal distention, abdominal pain, blood in stool and vomiting.   Endocrine: Negative.    Genitourinary: Negative.    Musculoskeletal: Negative.    Skin: Negative.    Allergic/Immunologic: Negative.    Neurological: Positive for weakness. Negative for dizziness.   Hematological: Negative.    Psychiatric/Behavioral: Negative.    All other systems reviewed and are negative.    Objective:     Vital Signs (Most Recent):  Temp: 98.7 °F (37.1 °C) (05/26/17 1627)  Pulse: 85 (05/26/17 1627)  Resp: 17 (05/26/17 1627)  BP: 136/70  (05/26/17 1627)  SpO2: 96 % (05/26/17 1627) Vital Signs (24h Range):  Temp:  [97.9 °F (36.6 °C)-101 °F (38.3 °C)] 98.7 °F (37.1 °C)  Pulse:  [76-89] 85  Resp:  [13-20] 17  SpO2:  [95 %-100 %] 96 %  BP: (100-146)/(47-71) 136/70     Weight: 79.4 kg (175 lb)  Body mass index is 30.04 kg/m².    Intake/Output Summary (Last 24 hours) at 05/26/17 1833  Last data filed at 05/26/17 1200   Gross per 24 hour   Intake                0 ml   Output                0 ml   Net                0 ml      Physical Exam   Constitutional: She is oriented to person, place, and time. She appears well-developed and well-nourished.   HENT:   Head: Normocephalic and atraumatic.   Eyes: EOM are normal. Pupils are equal, round, and reactive to light.   Neck: Normal range of motion. Neck supple. No JVD present.   Cardiovascular: Normal rate, regular rhythm, normal heart sounds and intact distal pulses.    No murmur heard.  Pulmonary/Chest: Effort normal and breath sounds normal. No respiratory distress. She has no wheezes.   Abdominal: Soft. Bowel sounds are normal. She exhibits no distension.   Musculoskeletal: Normal range of motion. She exhibits no edema or tenderness.   Neurological: She is alert and oriented to person, place, and time. She has normal reflexes. No cranial nerve deficit.   Skin: Skin is warm and dry. No erythema.   Psychiatric: She has a normal mood and affect. Her behavior is normal. Judgment and thought content normal.   Nursing note and vitals reviewed.      Significant Labs:   BMP:   Recent Labs  Lab 05/25/17 2123   *      K 3.7   CL 99   CO2 27   BUN 13   CREATININE 1.4   CALCIUM 10.7*     CBC:   Recent Labs  Lab 05/25/17 2123   WBC 17.98*   HGB 13.7   HCT 41.4        CMP:   Recent Labs  Lab 05/25/17 2123      K 3.7   CL 99   CO2 27   *   BUN 13   CREATININE 1.4   CALCIUM 10.7*   PROT 7.7   ALBUMIN 4.0   BILITOT 1.0   ALKPHOS 64   AST 21   ALT 17   ANIONGAP 12   EGFRNONAA 37*      Cardiac Markers: No results for input(s): CKMB, MYOGLOBIN, BNP, TROPISTAT in the last 48 hours.  Lactic Acid: No results for input(s): LACTATE in the last 48 hours.  Urine Studies:   Recent Labs  Lab 05/25/17 2123   COLORU Yellow   APPEARANCEUA Clear   PHUR 5.0   SPECGRAV 1.025   PROTEINUA Trace*   GLUCUA Negative   KETONESU Trace*   BILIRUBINUA 1+*   OCCULTUA Negative   NITRITE Negative   UROBILINOGEN Negative   LEUKOCYTESUR 1+*   WBCUA 15*   BACTERIA Few*     All pertinent labs within the past 24 hours have been reviewed.    Significant Imaging: I have reviewed all pertinent imaging results/findings within the past 24 hours.   Imaging Results          X-Ray Abdomen Flat And Erect (Final result)  Result time 05/26/17 17:15:33    Final result by Richar Foley MD (05/26/17 17:15:33)                 Impression:     There is a nonspecific pattern some gaseous distention of colon, but no fixed area of obstruction is identified.  Fluid levels within colon and some minimally gas distended loops of small bowel are present raising concern for ileus.  Clinical correlation is recommended.      Electronically signed by: RICHAR FOLEY MD  Date:     05/26/17  Time:    17:15              Narrative:    Two-view abdominal x-ray, 05/26/17 16:37:40    History:    Abdominal tenderness    Two views of the abdomen.  No free air is demonstrated within the peritoneal cavity.  There is a pattern of some gaseous distention of loops of colon which measure up to 6.1 cm in the right upper quadrant.  There is also a pattern mildly gas-distended loops of small bowel in the midabdomen measuring up to 3.0 cm.  No abnormal mass is demonstrated.  Gas and stool is seen throughout the colon extending to level of rectosigmoid.    Chronic spurring and degenerative change of the lumbar spine is present..                             CT Abdomen Pelvis  Without Contrast (Final result)  Result time 05/25/17 22:55:11   Procedure changed from CT  Abdomen Pelvis With Contrast     Final result by Richar Foley MD (05/25/17 22:55:11)                 Impression:     1.  There is a pattern of streaky inflammatory change around the cecum and along its medial wall there a few tiny bubbles of gas present which may lie within the wall or may represent minute areas of microperforation.  The patient has a history of colonoscopy the day before.  Is there history of biopsy of the cecal region?  No abscess is demonstrated and more superiorly within the abdomen no free air is seen anteriorly.      All CT scans at this facility use dose modulation, iterative reconstruction and/or weight based dosing when appropriate to reduce radiation dose to as low as reasonably achievable.       Electronically signed by: RICHAR FOLEY MD  Date:     05/25/17  Time:    22:55              Narrative:    CT ABDOMEN PELVIS WITHOUT CONTRAST,     Date:  05/25/17 22:35:47    Technique: Standard noncontrast CT scan of the abdomen and pelvis.Sagittal and coronal images were generated.    History:  generalized abd pain.  colonoscopy yesterday,     Findings:  The lung bases are clear.     The liver is enlarged measuring 18.6 cm in length.  No focal abnormality is demonstrated.  The gallbladder is rather distended.  No wall thickening or inflammatory change or stones are demonstrated.  The gallbladder measures 9.3 cm in length.  No dilatation of intrahepatic biliary tree is present.  The spleen, pancreas, and adrenal glands are normal in appearance.    The right kidney appears free of mass, calculus, or obstruction in the right ureter is normal in appearance.  The left kidney appears free of acute obstruction is a small 0.9 cm in its midpole.  The left ureter is normal in appearance.    Within the pelvis, the uterus contains multiple calcifications but appears normally configured.  The bladder is normal in appearance.  No suspicious mass or fluid collection seen in the pelvis.  There is  diverticulosis of the colon.  Fluid levels are seen within the transverse colon and there is a pattern of streaky change around the cecum.    On image 55, series 2 there are multiple, tiny bubbles of gas along the medial wall of the cecum which may be within the cecal wall or external to it.  These lie just above the ileocecal valve.  Streaky change in adjacent fat in this region is present as well.  The appendix is not identified.  The small bowel appears free of obstructive change.  There is a fat filled periumbilical hernia.  It measures 1.2 cm.  The stomach is normal in appearance.  Chronic spurring and degenerative change of the spine is present.  This is greatest at the L5-S1 level.                             X-Ray Abdomen Flat And Erect (Final result)  Result time 05/25/17 21:12:26    Final result by Richar Foley MD (05/25/17 21:12:26)                 Impression:     No acute findings on abdominal series.      Electronically signed by: RICHAR FOLEY MD  Date:     05/25/17  Time:    21:12              Narrative:    Two-view abdominal x-ray, 05/25/17 20:59:14    History: Unspecified Abdominal Pain    Two views of the abdomen.  There is moderate gas and stool present throughout the colon.  No abnormally distended loops of bowel are demonstrated.. No obstruction, ileus or free air.  A scoliosis of the lumbar spine convex towards the left with extensive multilevel chronic spurring.                                Assessment/Plan:      Hypothyroidism due to acquired atrophy of thyroid    - Resume synthroid          Essential hypertension    - Resume home medications          Diabetic polyneuropathy associated with type 2 diabetes mellitus    - Resume home medications.  - ISS            * Generalized abdominal pain    - Secondary to microperforation of asc. Colon S/P EGD/ Colonoscopy.  - Continue IV CIPRO and Flagyl.  - Pain medications as needed.  - CLD  - GI on Consult            VTE Risk Mitigation          Ordered     Place sequential compression device  Until discontinued      05/26/17 0213     Medium Risk of VTE  Once      05/26/17 0106     Place sequential compression device  Until discontinued      05/26/17 0106     Place ELIUD hose  Until discontinued      05/26/17 0106     Reason for No Pharmacological VTE Prophylaxis  Once      05/26/17 0106          Ronald Luna MD  Department of Hospital Medicine   [facility]

## 2017-05-26 NOTE — SUBJECTIVE & OBJECTIVE
Past Medical History:   Diagnosis Date    Diabetes mellitus, type 2 2006    BS doesn't check 06/15/2016    GERD (gastroesophageal reflux disease)     Hyperlipidemia     Hypertension     Hypothyroidism        Past Surgical History:   Procedure Laterality Date    APPENDECTOMY      BACK SURGERY      CATARACT EXTRACTION Bilateral     [facility]    COLONOSCOPY N/A 8/23/2016    Procedure: COLONOSCOPY;  Surgeon: Marcel Jacques MD;  Location: [facility] ENDO;  Service: Endoscopy;  Laterality: N/A;    COLONOSCOPY N/A 5/24/2017    Procedure: COLONOSCOPY;  Surgeon: Jayy Rosa MD;  Location: [facility] ENDO;  Service: Endoscopy;  Laterality: N/A;    PIG VALVE      TONSILLECTOMY         Review of patient's allergies indicates:   Allergen Reactions    Ace inhibitors Other (See Comments)     COUGH    Codeine      Family History     Problem Relation (Age of Onset)    COPD Sister    Cancer Mother (46)    Diabetes Father    Heart disease Mother, Father    Hypertension Father        Social History Main Topics    Smoking status: Former Smoker     Packs/day: 1.00     Years: 25.00     Quit date: 5/24/2002    Smokeless tobacco: Not on file    Alcohol use No    Drug use: Unknown    Sexual activity: Not on file     Review of Systems   Constitutional: Negative for activity change and appetite change.   HENT: Negative for sore throat and trouble swallowing.    Eyes: Negative for pain and discharge.   Respiratory: Negative for cough and chest tightness.    Cardiovascular: Negative for chest pain and palpitations.   Gastrointestinal: Positive for abdominal distention and abdominal pain. Negative for anal bleeding, blood in stool, constipation, diarrhea, nausea, rectal pain and vomiting.   Genitourinary: Negative for difficulty urinating, dysuria, frequency and hematuria.   Skin: Negative for color change and rash.   Neurological: Negative for speech difficulty, weakness and headaches.   Psychiatric/Behavioral:  Negative for confusion and sleep disturbance.     Objective:     Vital Signs (Most Recent):  Temp: 98.8 °F (37.1 °C) (05/26/17 1159)  Pulse: 83 (05/26/17 1159)  Resp: 16 (05/26/17 1159)  BP: 132/62 (05/26/17 1159)  SpO2: 95 % (05/26/17 1159) Vital Signs (24h Range):  Temp:  [98 °F (36.7 °C)-101 °F (38.3 °C)] 98.8 °F (37.1 °C)  Pulse:  [76-89] 83  Resp:  [13-20] 16  SpO2:  [95 %-100 %] 95 %  BP: (100-146)/(47-71) 132/62     Weight: 79.4 kg (175 lb) (05/25/17 2003)  Body mass index is 30.04 kg/m².      Intake/Output Summary (Last 24 hours) at 05/26/17 1311  Last data filed at 05/26/17 1200   Gross per 24 hour   Intake                0 ml   Output                0 ml   Net                0 ml       Lines/Drains/Airways     Peripheral Intravenous Line                 Peripheral IV - Single Lumen 05/25/17 2134 Left Forearm less than 1 day                Physical Exam   Constitutional: She is oriented to person, place, and time. She appears well-developed and well-nourished. No distress.   HENT:   Head: Normocephalic.   Eyes: Pupils are equal, round, and reactive to light.   Cardiovascular: Normal rate, regular rhythm and normal heart sounds.    No murmur heard.  Pulmonary/Chest: Effort normal and breath sounds normal. No respiratory distress. She has no wheezes.   Abdominal: Soft. Bowel sounds are normal. She exhibits no distension. There is tenderness in the periumbilical area.   Neurological: She is alert and oriented to person, place, and time. No cranial nerve deficit.   Skin: Skin is dry. No rash noted.   Psychiatric: She has a normal mood and affect. Her behavior is normal. Thought content normal.       Significant Labs:  CBC:   Recent Labs  Lab 05/25/17 2123   WBC 17.98*   HGB 13.7   HCT 41.4        CMP:   Recent Labs  Lab 05/25/17 2123   *   CALCIUM 10.7*   ALBUMIN 4.0   PROT 7.7      K 3.7   CO2 27   CL 99   BUN 13   CREATININE 1.4   ALKPHOS 64   ALT 17   AST 21   BILITOT 1.0        Significant Imaging:  Imaging results within the past 24 hours have been reviewed.

## 2017-05-26 NOTE — ED PROVIDER NOTES
"SCRIBE #1 NOTE: I, Lanny Walls, am scribing for, and in the presence of, Franco Mendoza Jr., MD. I have scribed the entire note.      History      Chief Complaint   Patient presents with    Abdominal Pain     Pt reports having colonoscopy yx, reports periumbilical pain and fever. Reports taking "two tylenol" PTA. Denies N/V/D       Review of patient's allergies indicates:   Allergen Reactions    Ace inhibitors Other (See Comments)     COUGH    Codeine         HPI   HPI    5/25/2017, 9:00 PM   History obtained from the patient      History of Present Illness: Jasmine Velásquez is a 76 y.o. female patient who presents to the Emergency Department for diffuse abd pain which onset gradually yesterday. Pt reports that she had a colonoscopy and upper endoscopy yesterday without any complications. Symptoms are constant and moderate in severity. Pt reports pain to be exacerbated by movement.  Associated sxs include fever, diaphoresis, and palpitations. Patient denies any chills, constipation, hematochezia, dysuria, hematuria, urinary frequency,n/v/d, and all other sxs at this time. Prior Tx includes 2 Tylenol. No further complaints or concerns at this time.         Arrival mode: Personal vehicle      PCP: Angely Roberts MD       Past Medical History:  Past Medical History:   Diagnosis Date    Diabetes mellitus, type 2 2006    BS doesn't check 06/15/2016    GERD (gastroesophageal reflux disease)     Hyperlipidemia     Hypertension     Hypothyroidism        Past Surgical History:  Past Surgical History:   Procedure Laterality Date    APPENDECTOMY      BACK SURGERY      CATARACT EXTRACTION Bilateral     [facility]    COLONOSCOPY N/A 8/23/2016    Procedure: COLONOSCOPY;  Surgeon: Marcel Jacques MD;  Location: [facility];  Service: Endoscopy;  Laterality: N/A;    COLONOSCOPY N/A 5/24/2017    Procedure: COLONOSCOPY;  Surgeon: Jayy Rosa MD;  Location: [facility];  Service: Endoscopy;  " Laterality: N/A;    PIG VALVE      TONSILLECTOMY           Family History:  Family History   Problem Relation Age of Onset    Cancer Mother 46     colon    Heart disease Mother     Heart disease Father     Hypertension Father     Diabetes Father     COPD Sister        Social History:  Social History     Social History Main Topics    Smoking status: Former Smoker     Packs/day: 1.00     Years: 25.00     Quit date: 5/24/2002    Smokeless tobacco: Unknown    Alcohol use No    Drug use: Unknown    Sexual activity: Unknown       ROS   Review of Systems   Constitutional: Positive for diaphoresis and fever. Negative for chills.   HENT: Negative for sore throat.    Respiratory: Negative for shortness of breath.    Cardiovascular: Positive for palpitations. Negative for chest pain.   Gastrointestinal: Positive for abdominal pain. Negative for diarrhea, nausea and vomiting.   Genitourinary: Negative for dysuria, frequency and hematuria.        - hematochezia   Musculoskeletal: Negative for back pain.   Skin: Negative for rash.   Neurological: Negative for weakness.   Hematological: Does not bruise/bleed easily.       Physical Exam      Initial Vitals [05/25/17 2003]   BP Pulse Resp Temp SpO2   126/70 78 20 98 °F (36.7 °C) 96 %      Physical Exam  Nursing Notes and Vital Signs Reviewed.  Constitutional: Patient is in no apparent distress. Well-developed and well-nourished.  Head: Atraumatic. Normocephalic.  Eyes: PERRL. EOM intact. Conjunctivae are not pale. No scleral icterus.  ENT: Mucous membranes are moist. Oropharynx is clear and symmetric.    Neck: Supple. Full ROM. No lymphadenopathy.  Cardiovascular: Regular rate. Regular rhythm. No murmurs, rubs, or gallops. Distal pulses are 2+ and symmetric.  Pulmonary/Chest: No respiratory distress. Clear to auscultation bilaterally. No wheezing, rales, or rhonchi.  Abdominal: Soft and non-distended.  Generalized tenderness.  No rebound, guarding, or rigidity. Good  bowel sounds.  Genitourinary: NoCVA tenderness  Musculoskeletal: Moves all extremities. No obvious deformities. No edema. No calf tenderness.  Skin: Warm and dry.  Neurological:  Alert, awake, and appropriate.  Normal speech.  No acute focal neurological deficits are appreciated.  Psychiatric: Normal affect. Good eye contact. Appropriate in content.    ED Course    Procedures  ED Vital Signs:  Vitals:    05/26/17 0805 05/26/17 0819 05/26/17 0822 05/26/17 0829   BP:   (!) 115/47    Pulse: 84  85 80   Resp: 18 19 13   Temp:  99.6 °F (37.6 °C)     TempSrc:  Oral     SpO2: 95%  97% 99%   Weight:       Height:        05/26/17 0852 05/26/17 1000 05/26/17 1159 05/26/17 1627   BP: (!) 146/71 (!) 146/71 132/62 136/70   Pulse: 85 85 83 85   Resp: 18 16 17   Temp: 97.9 °F (36.6 °C)  98.8 °F (37.1 °C) 98.7 °F (37.1 °C)   TempSrc: Oral  Oral Oral   SpO2: 95%  95% 96%   Weight:       Height:        05/26/17 1904 05/26/17 1907 05/26/17 2302 05/27/17 0337   BP: 130/75  (!) 161/68 (!) 99/52   Pulse: 92  89 83   Resp: 18 18 18   Temp: 100.3 °F (37.9 °C)  98.7 °F (37.1 °C) 98.4 °F (36.9 °C)   TempSrc: Oral  Oral Oral   SpO2: (!) 87% 95% 95% 95%   Weight:       Height:        05/27/17 0839 05/27/17 1157 05/27/17 1630   BP: (!) 144/68 (!) 166/76 (!) 100/44   Pulse: 73 80 64   Resp: 18 18 18   Temp: 98.3 °F (36.8 °C) 98.7 °F (37.1 °C) 97.5 °F (36.4 °C)   TempSrc: Oral Axillary Oral   SpO2: 98% 98% 96%   Weight:      Height:          Abnormal Lab Results:  Labs Reviewed   CBC W/ AUTO DIFFERENTIAL - Abnormal; Notable for the following:        Result Value    WBC 17.98 (*)     RDW 17.8 (*)     Gran # 14.4 (*)     Gran% 79.8 (*)     Lymph% 14.8 (*)     All other components within normal limits   COMPREHENSIVE METABOLIC PANEL - Abnormal; Notable for the following:     Glucose 116 (*)     Calcium 10.7 (*)     eGFR if  42 (*)     eGFR if non  37 (*)     All other components within normal limits   URINALYSIS -  Abnormal; Notable for the following:     Protein, UA Trace (*)     Ketones, UA Trace (*)     Bilirubin (UA) 1+ (*)     Leukocytes, UA 1+ (*)     All other components within normal limits   URINALYSIS MICROSCOPIC - Abnormal; Notable for the following:     WBC, UA 15 (*)     WBC Clumps, UA Occasional (*)     Bacteria, UA Few (*)     All other components within normal limits   POCT GLUCOSE - Abnormal; Notable for the following:     POCT Glucose 116 (*)     All other components within normal limits   LIPASE        All Lab Results:  Results for orders placed or performed during the hospital encounter of 05/25/17   Blood culture   Result Value Ref Range    Blood Culture, Routine No growth after 5 days.    Blood culture   Result Value Ref Range    Blood Culture, Routine No growth after 5 days.    CBC W/ AUTO DIFFERENTIAL   Result Value Ref Range    WBC 17.98 (H) 3.90 - 12.70 K/uL    RBC 4.92 4.00 - 5.40 M/uL    Hemoglobin 13.7 12.0 - 16.0 g/dL    Hematocrit 41.4 37.0 - 48.5 %    MCV 84 82 - 98 fL    MCH 27.8 27.0 - 31.0 pg    MCHC 33.1 32.0 - 36.0 %    RDW 17.8 (H) 11.5 - 14.5 %    Platelets 159 150 - 350 K/uL    MPV 9.6 9.2 - 12.9 fL    Gran # 14.4 (H) 1.8 - 7.7 K/uL    Lymph # 2.7 1.0 - 4.8 K/uL    Mono # 0.9 0.3 - 1.0 K/uL    Eos # 0.0 0.0 - 0.5 K/uL    Baso # 0.03 0.00 - 0.20 K/uL    Gran% 79.8 (H) 38.0 - 73.0 %    Lymph% 14.8 (L) 18.0 - 48.0 %    Mono% 5.1 4.0 - 15.0 %    Eosinophil% 0.1 0.0 - 8.0 %    Basophil% 0.2 0.0 - 1.9 %    Differential Method Automated    Comp. Metabolic Panel   Result Value Ref Range    Sodium 138 136 - 145 mmol/L    Potassium 3.7 3.5 - 5.1 mmol/L    Chloride 99 95 - 110 mmol/L    CO2 27 23 - 29 mmol/L    Glucose 116 (H) 70 - 110 mg/dL    BUN, Bld 13 8 - 23 mg/dL    Creatinine 1.4 0.5 - 1.4 mg/dL    Calcium 10.7 (H) 8.7 - 10.5 mg/dL    Total Protein 7.7 6.0 - 8.4 g/dL    Albumin 4.0 3.5 - 5.2 g/dL    Total Bilirubin 1.0 0.1 - 1.0 mg/dL    Alkaline Phosphatase 64 55 - 135 U/L    AST 21 10 - 40  U/L    ALT 17 10 - 44 U/L    Anion Gap 12 8 - 16 mmol/L    eGFR if African American 42 (A) >60 mL/min/1.73 m^2    eGFR if non African American 37 (A) >60 mL/min/1.73 m^2   Lipase   Result Value Ref Range    Lipase 43 4 - 60 U/L   Urinalysis - Clean Catch   Result Value Ref Range    Specimen UA Urine, Clean Catch     Color, UA Yellow Yellow, Straw, Rashmi    Appearance, UA Clear Clear    pH, UA 5.0 5.0 - 8.0    Specific Gravity, UA 1.025 1.005 - 1.030    Protein, UA Trace (A) Negative    Glucose, UA Negative Negative    Ketones, UA Trace (A) Negative    Bilirubin (UA) 1+ (A) Negative    Occult Blood UA Negative Negative    Nitrite, UA Negative Negative    Urobilinogen, UA Negative <2.0 EU/dL    Leukocytes, UA 1+ (A) Negative   Urinalysis Microscopic   Result Value Ref Range    WBC, UA 15 (H) 0 - 5 /hpf    WBC Clumps, UA Occasional (A) None-Rare    Bacteria, UA Few (A) None-Occ /hpf    Microscopic Comment SEE COMMENT    Hemoglobin A1c if not done in past 6 weeks   Result Value Ref Range    Hemoglobin A1C 5.9 4.5 - 6.2 %    Estimated Avg Glucose 123 68 - 131 mg/dL   CBC auto differential   Result Value Ref Range    WBC 9.49 3.90 - 12.70 K/uL    RBC 4.04 4.00 - 5.40 M/uL    Hemoglobin 11.0 (L) 12.0 - 16.0 g/dL    Hematocrit 35.1 (L) 37.0 - 48.5 %    MCV 87 82 - 98 fL    MCH 27.2 27.0 - 31.0 pg    MCHC 31.3 (L) 32.0 - 36.0 %    RDW 17.7 (H) 11.5 - 14.5 %    Platelets 130 (L) 150 - 350 K/uL    MPV 9.3 9.2 - 12.9 fL    Gran # 7.2 1.8 - 7.7 K/uL    Lymph # 1.4 1.0 - 4.8 K/uL    Mono # 0.6 0.3 - 1.0 K/uL    Eos # 0.3 0.0 - 0.5 K/uL    Baso # 0.02 0.00 - 0.20 K/uL    Gran% 75.6 (H) 38.0 - 73.0 %    Lymph% 14.8 (L) 18.0 - 48.0 %    Mono% 6.0 4.0 - 15.0 %    Eosinophil% 3.4 0.0 - 8.0 %    Basophil% 0.2 0.0 - 1.9 %    Differential Method Automated    Basic metabolic panel   Result Value Ref Range    Sodium 139 136 - 145 mmol/L    Potassium 3.8 3.5 - 5.1 mmol/L    Chloride 108 95 - 110 mmol/L    CO2 22 (L) 23 - 29 mmol/L     Glucose 113 (H) 70 - 110 mg/dL    BUN, Bld 11 8 - 23 mg/dL    Creatinine 0.9 0.5 - 1.4 mg/dL    Calcium 9.2 8.7 - 10.5 mg/dL    Anion Gap 9 8 - 16 mmol/L    eGFR if African American >60 >60 mL/min/1.73 m^2    eGFR if non African American >60 >60 mL/min/1.73 m^2   Magnesium   Result Value Ref Range    Magnesium 1.0 (L) 1.6 - 2.6 mg/dL   Phosphorus   Result Value Ref Range    Phosphorus 2.2 (L) 2.7 - 4.5 mg/dL   POCT glucose   Result Value Ref Range    POCT Glucose 116 (H) 70 - 110 mg/dL   POCT glucose   Result Value Ref Range    POCT Glucose 114 (H) 70 - 110 mg/dL   POCT glucose   Result Value Ref Range    POCT Glucose 175 (H) 70 - 110 mg/dL   POCT glucose   Result Value Ref Range    POCT Glucose 104 70 - 110 mg/dL   POCT glucose   Result Value Ref Range    POCT Glucose 130 (H) 70 - 110 mg/dL   POCT glucose   Result Value Ref Range    POCT Glucose 128 (H) 70 - 110 mg/dL   POCT glucose   Result Value Ref Range    POCT Glucose 140 (H) 70 - 110 mg/dL         Imaging Results:  Imaging Results          CT Abdomen Pelvis With Contrast (Final result)  Result time 05/27/17 13:14:51    Final result by Kenrick Richter MD (05/27/17 13:14:51)                 Impression:      Stable CT compared to 5/25/17 with similar findings associated with the cecum.         All CT scans at this facility use dose modulation, iterative reconstruction and/or weight based dosing when appropriate to reduce radiation dose to as low as reasonably achievable.       Electronically signed by: KENRICK RICHTER MD  Date:     05/27/17  Time:    13:14              Narrative:    Exam: CT ABDOMEN PELVIS WITH CONTRAST,    Date:  05/27/17 11:26:41    History: Follow up of right colon inflammation after colonoscopy, history of angiodysplasia of the right colon demonstrated at colonoscopy    Comparison:  5/25/17 CT    Findings: No abnormality of the liver, spleen, pancreas, gallbladder, adrenals, or kidneys is seen. There is a small hiatal hernia. There is  diverticulosis. Again demonstrated is thickening of the medial wall of the cecum and pericecal fat stranding. There is thickening of the wall of the terminal ileum which likely is secondary inflammation. The colon is well filled with contrast and there is no contrast extravasation. Again there is demonstrated a small amount of air in the medial wall of the cecum and at least one tiny air bubble which may be in the adjacent mesentery, possibly microperforation. This is unchanged. No remote free air is seen in the abdomen or pelvis. Small amount of free fluid in in the pelvis. The uterus and adnexal structures are unremarkable.                             X-Ray Abdomen Flat And Erect (Final result)  Result time 05/26/17 17:15:33    Final result by Richar Foley MD (05/26/17 17:15:33)                 Impression:     There is a nonspecific pattern some gaseous distention of colon, but no fixed area of obstruction is identified.  Fluid levels within colon and some minimally gas distended loops of small bowel are present raising concern for ileus.  Clinical correlation is recommended.      Electronically signed by: RICHAR FOLEY MD  Date:     05/26/17  Time:    17:15              Narrative:    Two-view abdominal x-ray, 05/26/17 16:37:40    History:    Abdominal tenderness    Two views of the abdomen.  No free air is demonstrated within the peritoneal cavity.  There is a pattern of some gaseous distention of loops of colon which measure up to 6.1 cm in the right upper quadrant.  There is also a pattern mildly gas-distended loops of small bowel in the midabdomen measuring up to 3.0 cm.  No abnormal mass is demonstrated.  Gas and stool is seen throughout the colon extending to level of rectosigmoid.    Chronic spurring and degenerative change of the lumbar spine is present..                             CT Abdomen Pelvis  Without Contrast (Final result)  Result time 05/25/17 22:55:11   Procedure changed from CT Abdomen  Pelvis With Contrast     Final result by Richar Foley MD (05/25/17 22:55:11)                 Impression:     1.  There is a pattern of streaky inflammatory change around the cecum and along its medial wall there a few tiny bubbles of gas present which may lie within the wall or may represent minute areas of microperforation.  The patient has a history of colonoscopy the day before.  Is there history of biopsy of the cecal region?  No abscess is demonstrated and more superiorly within the abdomen no free air is seen anteriorly.      All CT scans at this facility use dose modulation, iterative reconstruction and/or weight based dosing when appropriate to reduce radiation dose to as low as reasonably achievable.       Electronically signed by: RICHAR FOLEY MD  Date:     05/25/17  Time:    22:55              Narrative:    CT ABDOMEN PELVIS WITHOUT CONTRAST,     Date:  05/25/17 22:35:47    Technique: Standard noncontrast CT scan of the abdomen and pelvis.Sagittal and coronal images were generated.    History:  generalized abd pain.  colonoscopy yesterday,     Findings:  The lung bases are clear.     The liver is enlarged measuring 18.6 cm in length.  No focal abnormality is demonstrated.  The gallbladder is rather distended.  No wall thickening or inflammatory change or stones are demonstrated.  The gallbladder measures 9.3 cm in length.  No dilatation of intrahepatic biliary tree is present.  The spleen, pancreas, and adrenal glands are normal in appearance.    The right kidney appears free of mass, calculus, or obstruction in the right ureter is normal in appearance.  The left kidney appears free of acute obstruction is a small 0.9 cm in its midpole.  The left ureter is normal in appearance.    Within the pelvis, the uterus contains multiple calcifications but appears normally configured.  The bladder is normal in appearance.  No suspicious mass or fluid collection seen in the pelvis.  There is diverticulosis  of the colon.  Fluid levels are seen within the transverse colon and there is a pattern of streaky change around the cecum.    On image 55, series 2 there are multiple, tiny bubbles of gas along the medial wall of the cecum which may be within the cecal wall or external to it.  These lie just above the ileocecal valve.  Streaky change in adjacent fat in this region is present as well.  The appendix is not identified.  The small bowel appears free of obstructive change.  There is a fat filled periumbilical hernia.  It measures 1.2 cm.  The stomach is normal in appearance.  Chronic spurring and degenerative change of the spine is present.  This is greatest at the L5-S1 level.                             X-Ray Abdomen Flat And Erect (Final result)  Result time 05/25/17 21:12:26    Final result by Richar Foley MD (05/25/17 21:12:26)                 Impression:     No acute findings on abdominal series.      Electronically signed by: RICHAR FOLEY MD  Date:     05/25/17  Time:    21:12              Narrative:    Two-view abdominal x-ray, 05/25/17 20:59:14    History: Unspecified Abdominal Pain    Two views of the abdomen.  There is moderate gas and stool present throughout the colon.  No abnormally distended loops of bowel are demonstrated.. No obstruction, ileus or free air.  A scoliosis of the lumbar spine convex towards the left with extensive multilevel chronic spurring.                                      The Emergency Provider reviewed the vital signs and test results, which are outlined above.    ED Discussion   11:24 PM: Dr. Mendoza discussed the pt's case with   (GI)  who recommends that pt be admitted for observation over night with NPO and ABx.      11:52 PM: Discussed case with Dr. Amador ([facility]). Dr. Amador agrees with current care and management of pt and accepts admission.   Admitting Service: Hospital medicine   Admitting Physician: Dr. Amador  Admit to: Med Surg    11:55  PM: Re-evaluated pt. I have discussed test results, shared treatment plan, and the need for admission with patient and family at bedside. Pt and family express understanding at this time and agree with all information. All questions answered. Pt and family have no further questions or concerns at this time. Pt is ready for admit.      ED Medication(s):  Medications   morphine injection 4 mg (4 mg Intravenous Given 5/25/17 2134)   sodium chloride 0.9% bolus 1,000 mL (0 mLs Intravenous Stopped 5/25/17 2254)   omnipaque oral solution 50 mL (50 mLs Oral Given 5/27/17 0842)   omnipaque 350 iohexol 75 mL (75 mLs Intravenous Given 5/27/17 1144)         Follow-up Information     Angely Roberts MD. Schedule an appointment as soon as possible for a visit in 3 days.    Specialty:  Family Medicine  Contact information:  [address] [address] [address] [phone]             Marissa Snow NP. Schedule an appointment as soon as possible for a visit in 4 weeks.    Specialty:  Gastroenterology  Contact information:  [address] [facility] [address] [phone]                     Medical Decision Making    Medical Decision Making:   Clinical Tests:   Lab Tests: Ordered and Reviewed  Radiological Study: Ordered and Reviewed           Scribe Attestation:   Scribe #1: I performed the above scribed service and the documentation accurately describes the services I performed. I attest to the accuracy of the note.    Attending:   Physician Attestation Statement for Scribe #1: I, Franco Mendoza Jr., MD, personally performed the services described in this documentation, as scribed by Lanny Walls, in my presence, and it is both accurate and complete.          Clinical Impression       ICD-10-CM ICD-9-CM   1. Essential hypertension I10 401.9   2. Enteritis K52.9 558.9   3. Generalized abdominal pain R10.84 789.07   4. Diabetic polyneuropathy associated with type 2 diabetes mellitus E11.42 250.60     357.2    5. Hypothyroidism due to acquired atrophy of thyroid E03.4 244.8     246.8   6. Abdominal pain, RLQ (right lower quadrant) R10.31 789.03   7. Gastrointestinal barotrauma  993.8   8. H/O aortic valve replacement with porcine valve Z95.3 V42.2   9. Gastroesophageal reflux disease, esophagitis presence not specified K21.9 530.81       Disposition:   Disposition: Admitted  Condition: Roz Mendoza Jr., MD  06/01/17 0846

## 2017-05-26 NOTE — PLAN OF CARE
Met with pt and spouse to complete d/c planning assessment. Pt does not anticipate and d/c needs at this time.     05/26/17 4991   Discharge Assessment   Assessment Type Discharge Planning Assessment   Confirmed/corrected address and phone number on facesheet? Yes   Assessment information obtained from? Patient;Caregiver   Prior to hospitilization cognitive status: Alert/Oriented   Prior to hospitalization functional status: Independent   Current cognitive status: Alert/Oriented   Current Functional Status: Independent   Lives With spouse   Able to Return to Prior Arrangements yes   Is patient able to care for self after discharge? Yes   How many people do you have in your home that can help with your care after discharge? 1   Who are your caregiver(s) and their phone number(s)? Spouse, Chivo [phone]   Readmission Within The Last 30 Days no previous admission in last 30 days   Patient currently being followed by outpatient case management? No   Patient currently receives home health services? No   Does the patient currently use HME? No   Patient currently receives private duty nursing? No   Equipment Currently Used at Home none   Do you have any problems affording any of your prescribed medications? No   Is the patient taking medications as prescribed? yes   Do you have any financial concerns preventing you from receiving the healthcare you need? No   Does the patient have transportation to healthcare appointments? Yes   Transportation Available car   Discharge Plan A Home with family   Patient/Family In Agreement With Plan yes

## 2017-05-26 NOTE — ASSESSMENT & PLAN NOTE
Patient with complaints of generalized abdominal pain after having an EGD and colonoscopy yesterday. Patient reports significant improvement in abdominal pain today. Treatment of a single non-bleeding colonic angiodysplastic lesion with argon plasma coagulation is likely what is being picked up as abnormal on CT scan. She has an elevated WBC and is currently being treated with IV Cipro and Flagyl. She is currently NPO.

## 2017-05-26 NOTE — CONSULTS
[facility] -   Gastroenterology  Consult Note    Patient Name: Jasmine Velásquez  MRN: [id number]  Admission Date: 5/25/2017  Hospital Length of Stay: 0 days  Code Status: Full Code   Attending Provider: Ronald Luna MD   Consulting Provider: Marissa Ivory NP  Primary Care Physician: Angely Roberts MD  Principal Problem:Generalized abdominal pain    Inpatient consult to Gastroenterology  Consult performed by: MARISSA IVORY  Consult ordered by: CASIE RHODES        Subjective:     HPI:  Patient is a 76 yr old  female who presented to the ER under the advise of Dr. Rosa. She had an EGD and colonoscopy performed yesterday as an outpatient. Endoscopies performed without compromise. Once a home, she developed intense abdominal pain. She was then advised to report to the ER. WBC elevated at 17.98. CT scan Abdomen Pelvis showed a pattern of streaky inflammatory change around the cecum and along its medial wall there a few tiny bubbles of gas present which may lie within the wall or may represent minute areas of microperforation. Patient is feeling much better today than yesterday. She does still complain of abdominal pain around the periumbilical region but is much improved from yesterday. She only experiences the pain when deep breathing or moving around. The expresses wishes to be discharged home. She has not had any BM since bowel prep for colonoscopy. She denies any nausea or vomiting.     Past Medical History:   Diagnosis Date    Diabetes mellitus, type 2 2006    BS doesn't check 06/15/2016    GERD (gastroesophageal reflux disease)     Hyperlipidemia     Hypertension     Hypothyroidism        Past Surgical History:   Procedure Laterality Date    APPENDECTOMY      BACK SURGERY      CATARACT EXTRACTION Bilateral     [facility]    COLONOSCOPY N/A 8/23/2016    Procedure: COLONOSCOPY;  Surgeon: Marcel Jacques MD;  Location: [facility];  Service:  Endoscopy;  Laterality: N/A;    COLONOSCOPY N/A 5/24/2017    Procedure: COLONOSCOPY;  Surgeon: Jayy Rosa MD;  Location: [facility];  Service: Endoscopy;  Laterality: N/A;    PIG VALVE      TONSILLECTOMY         Review of patient's allergies indicates:   Allergen Reactions    Ace inhibitors Other (See Comments)     COUGH    Codeine      Family History     Problem Relation (Age of Onset)    COPD Sister    Cancer Mother (46)    Diabetes Father    Heart disease Mother, Father    Hypertension Father        Social History Main Topics    Smoking status: Former Smoker     Packs/day: 1.00     Years: 25.00     Quit date: 5/24/2002    Smokeless tobacco: Not on file    Alcohol use No    Drug use: Unknown    Sexual activity: Not on file     Review of Systems   Constitutional: Negative for activity change and appetite change.   HENT: Negative for sore throat and trouble swallowing.    Eyes: Negative for pain and discharge.   Respiratory: Negative for cough and chest tightness.    Cardiovascular: Negative for chest pain and palpitations.   Gastrointestinal: Positive for abdominal distention and abdominal pain. Negative for anal bleeding, blood in stool, constipation, diarrhea, nausea, rectal pain and vomiting.   Genitourinary: Negative for difficulty urinating, dysuria, frequency and hematuria.   Skin: Negative for color change and rash.   Neurological: Negative for speech difficulty, weakness and headaches.   Psychiatric/Behavioral: Negative for confusion and sleep disturbance.     Objective:     Vital Signs (Most Recent):  Temp: 98.8 °F (37.1 °C) (05/26/17 1159)  Pulse: 83 (05/26/17 1159)  Resp: 16 (05/26/17 1159)  BP: 132/62 (05/26/17 1159)  SpO2: 95 % (05/26/17 1159) Vital Signs (24h Range):  Temp:  [98 °F (36.7 °C)-101 °F (38.3 °C)] 98.8 °F (37.1 °C)  Pulse:  [76-89] 83  Resp:  [13-20] 16  SpO2:  [95 %-100 %] 95 %  BP: (100-146)/(47-71) 132/62     Weight: 79.4 kg (175 lb) (05/25/17 2003)  Body mass index  is 30.04 kg/m².      Intake/Output Summary (Last 24 hours) at 05/26/17 1311  Last data filed at 05/26/17 1200   Gross per 24 hour   Intake                0 ml   Output                0 ml   Net                0 ml       Lines/Drains/Airways     Peripheral Intravenous Line                 Peripheral IV - Single Lumen 05/25/17 2134 Left Forearm less than 1 day                Physical Exam   Constitutional: She is oriented to person, place, and time. She appears well-developed and well-nourished. No distress.   HENT:   Head: Normocephalic.   Eyes: Pupils are equal, round, and reactive to light.   Cardiovascular: Normal rate, regular rhythm and normal heart sounds.    No murmur heard.  Pulmonary/Chest: Effort normal and breath sounds normal. No respiratory distress. She has no wheezes.   Abdominal: Soft. Bowel sounds are normal. She exhibits no distension. There is tenderness in the periumbilical area.   Neurological: She is alert and oriented to person, place, and time. No cranial nerve deficit.   Skin: Skin is dry. No rash noted.   Psychiatric: She has a normal mood and affect. Her behavior is normal. Thought content normal.       Significant Labs:  CBC:   Recent Labs  Lab 05/25/17 2123   WBC 17.98*   HGB 13.7   HCT 41.4        CMP:   Recent Labs  Lab 05/25/17 2123   *   CALCIUM 10.7*   ALBUMIN 4.0   PROT 7.7      K 3.7   CO2 27   CL 99   BUN 13   CREATININE 1.4   ALKPHOS 64   ALT 17   AST 21   BILITOT 1.0       Significant Imaging:  Imaging results within the past 24 hours have been reviewed.    Assessment/Plan:     * Generalized abdominal pain    Patient with complaints of generalized abdominal pain after having an EGD and colonoscopy yesterday. Patient reports significant improvement in abdominal pain today. Treatment of a single non-bleeding colonic angiodysplastic lesion with argon plasma coagulation is likely what is being picked up as abnormal on CT scan. She has an elevated WBC and is  currently being treated with IV Cipro and Flagyl. She is currently NPO.         Essential hypertension    Managed per             Thank you for your consult. I will follow-up with patient. Please contact us if you have any additional questions.    Marissa Snow NP  Gastroenterology  [facility]

## 2017-05-27 VITALS
DIASTOLIC BLOOD PRESSURE: 44 MMHG | RESPIRATION RATE: 18 BRPM | OXYGEN SATURATION: 96 % | TEMPERATURE: 98 F | HEIGHT: 64 IN | BODY MASS INDEX: 29.88 KG/M2 | HEART RATE: 64 BPM | SYSTOLIC BLOOD PRESSURE: 100 MMHG | WEIGHT: 175 LBS

## 2017-05-27 PROBLEM — R10.31 ABDOMINAL PAIN, RLQ (RIGHT LOWER QUADRANT): Status: ACTIVE | Noted: 2017-05-27

## 2017-05-27 PROBLEM — R10.31 ABDOMINAL PAIN, RLQ (RIGHT LOWER QUADRANT): Status: RESOLVED | Noted: 2017-05-27 | Resolved: 2017-05-27

## 2017-05-27 PROBLEM — R10.84 GENERALIZED ABDOMINAL PAIN: Status: RESOLVED | Noted: 2017-05-25 | Resolved: 2017-05-27

## 2017-05-27 PROBLEM — T70.29XA: Status: RESOLVED | Noted: 2017-05-27 | Resolved: 2017-05-27

## 2017-05-27 PROBLEM — T70.29XA: Status: ACTIVE | Noted: 2017-05-27

## 2017-05-27 LAB
ANION GAP SERPL CALC-SCNC: 9 MMOL/L
BASOPHILS # BLD AUTO: 0.02 K/UL
BASOPHILS NFR BLD: 0.2 %
BUN SERPL-MCNC: 11 MG/DL
CALCIUM SERPL-MCNC: 9.2 MG/DL
CHLORIDE SERPL-SCNC: 108 MMOL/L
CO2 SERPL-SCNC: 22 MMOL/L
CREAT SERPL-MCNC: 0.9 MG/DL
DIFFERENTIAL METHOD: ABNORMAL
EOSINOPHIL # BLD AUTO: 0.3 K/UL
EOSINOPHIL NFR BLD: 3.4 %
ERYTHROCYTE [DISTWIDTH] IN BLOOD BY AUTOMATED COUNT: 17.7 %
EST. GFR  (AFRICAN AMERICAN): >60 ML/MIN/1.73 M^2
EST. GFR  (NON AFRICAN AMERICAN): >60 ML/MIN/1.73 M^2
ESTIMATED AVG GLUCOSE: 123 MG/DL
GLUCOSE SERPL-MCNC: 113 MG/DL
HBA1C MFR BLD HPLC: 5.9 %
HCT VFR BLD AUTO: 35.1 %
HGB BLD-MCNC: 11 G/DL
LYMPHOCYTES # BLD AUTO: 1.4 K/UL
LYMPHOCYTES NFR BLD: 14.8 %
MAGNESIUM SERPL-MCNC: 1 MG/DL
MCH RBC QN AUTO: 27.2 PG
MCHC RBC AUTO-ENTMCNC: 31.3 %
MCV RBC AUTO: 87 FL
MONOCYTES # BLD AUTO: 0.6 K/UL
MONOCYTES NFR BLD: 6 %
NEUTROPHILS # BLD AUTO: 7.2 K/UL
NEUTROPHILS NFR BLD: 75.6 %
PHOSPHATE SERPL-MCNC: 2.2 MG/DL
PLATELET # BLD AUTO: 130 K/UL
PMV BLD AUTO: 9.3 FL
POCT GLUCOSE: 104 MG/DL (ref 70–110)
POCT GLUCOSE: 128 MG/DL (ref 70–110)
POCT GLUCOSE: 130 MG/DL (ref 70–110)
POCT GLUCOSE: 140 MG/DL (ref 70–110)
POCT GLUCOSE: 175 MG/DL (ref 70–110)
POTASSIUM SERPL-SCNC: 3.8 MMOL/L
RBC # BLD AUTO: 4.04 M/UL
SODIUM SERPL-SCNC: 139 MMOL/L
WBC # BLD AUTO: 9.49 K/UL

## 2017-05-27 PROCEDURE — 36415 COLL VENOUS BLD VENIPUNCTURE: CPT

## 2017-05-27 PROCEDURE — 25500020 PHARM REV CODE 255: Performed by: INTERNAL MEDICINE

## 2017-05-27 PROCEDURE — 63600175 PHARM REV CODE 636 W HCPCS: Performed by: EMERGENCY MEDICINE

## 2017-05-27 PROCEDURE — 83735 ASSAY OF MAGNESIUM: CPT

## 2017-05-27 PROCEDURE — 84100 ASSAY OF PHOSPHORUS: CPT

## 2017-05-27 PROCEDURE — 99232 SBSQ HOSP IP/OBS MODERATE 35: CPT | Mod: ,,, | Performed by: INTERNAL MEDICINE

## 2017-05-27 PROCEDURE — 25000003 PHARM REV CODE 250: Performed by: EMERGENCY MEDICINE

## 2017-05-27 PROCEDURE — 25000003 PHARM REV CODE 250: Performed by: INTERNAL MEDICINE

## 2017-05-27 PROCEDURE — 80048 BASIC METABOLIC PNL TOTAL CA: CPT

## 2017-05-27 PROCEDURE — 85025 COMPLETE CBC W/AUTO DIFF WBC: CPT

## 2017-05-27 RX ORDER — CIPROFLOXACIN 500 MG/1
500 TABLET ORAL 2 TIMES DAILY
Qty: 10 TABLET | Refills: 0 | Status: SHIPPED | OUTPATIENT
Start: 2017-05-27 | End: 2017-06-01

## 2017-05-27 RX ORDER — METRONIDAZOLE 500 MG/1
500 TABLET ORAL 3 TIMES DAILY
Qty: 15 TABLET | Refills: 0 | Status: SHIPPED | OUTPATIENT
Start: 2017-05-27 | End: 2017-06-01

## 2017-05-27 RX ADMIN — METRONIDAZOLE 500 MG: 500 INJECTION, SOLUTION INTRAVENOUS at 04:05

## 2017-05-27 RX ADMIN — METOPROLOL TARTRATE 50 MG: 50 TABLET ORAL at 11:05

## 2017-05-27 RX ADMIN — ALLOPURINOL 300 MG: 300 TABLET ORAL at 11:05

## 2017-05-27 RX ADMIN — IOHEXOL 50 ML: 350 INJECTION, SOLUTION INTRAVENOUS at 08:05

## 2017-05-27 RX ADMIN — LOSARTAN POTASSIUM 100 MG: 50 TABLET, FILM COATED ORAL at 11:05

## 2017-05-27 RX ADMIN — CIPROFLOXACIN 400 MG: 2 INJECTION, SOLUTION INTRAVENOUS at 12:05

## 2017-05-27 RX ADMIN — ASPIRIN 81 MG: 81 TABLET, COATED ORAL at 11:05

## 2017-05-27 RX ADMIN — METRONIDAZOLE 500 MG: 500 INJECTION, SOLUTION INTRAVENOUS at 08:05

## 2017-05-27 RX ADMIN — PANTOPRAZOLE SODIUM 40 MG: 40 TABLET, DELAYED RELEASE ORAL at 11:05

## 2017-05-27 RX ADMIN — IOHEXOL 75 ML: 350 INJECTION, SOLUTION INTRAVENOUS at 11:05

## 2017-05-27 RX ADMIN — GABAPENTIN 100 MG: 100 CAPSULE ORAL at 02:05

## 2017-05-27 RX ADMIN — CIPROFLOXACIN 400 MG: 2 INJECTION, SOLUTION INTRAVENOUS at 11:05

## 2017-05-27 RX ADMIN — GABAPENTIN 100 MG: 100 CAPSULE ORAL at 06:05

## 2017-05-27 RX ADMIN — METRONIDAZOLE 500 MG: 500 INJECTION, SOLUTION INTRAVENOUS at 12:05

## 2017-05-27 RX ADMIN — LEVOTHYROXINE SODIUM 75 MCG: 25 TABLET ORAL at 06:05

## 2017-05-27 NOTE — NURSING
Staff for Marissa Snow NP messaged, to schedule follow up appt in 4 weeks.    Discharge instructions given to pt and spouse, questions answered, both verbalized understanding.

## 2017-05-27 NOTE — PLAN OF CARE
Problem: Patient Care Overview  Goal: Plan of Care Review  Remains free from harm, no c/o pain, no acute distress noted. 24 hour chart check complete.

## 2017-05-27 NOTE — PLAN OF CARE
Problem: Patient Care Overview  Goal: Plan of Care Review  Outcome: Ongoing (interventions implemented as appropriate)  Fall precautions maintained, pt free from injuries/fall, repositions self in bed, ambulates in room. Denies pain, nausea, vomiting. Advanced to regular diet, will dc if can tolerate. IVF and abx given as prescribed. POC and meds reviewed w/ pt, pt verbalizes understanding. Chart check done. Will cont to monitor.

## 2017-05-27 NOTE — ASSESSMENT & PLAN NOTE
Pt with abd pain, fever after colonoscopy with tx of 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 CT on admission showed some air in the wall of the right colon which can occur with use of APC to treat angioectasias. Clinically she is doing well without fevers, chills. She is tolerating a diet. My only concern is that she is still very tender to palpation in the right lower quadrant with rebound tenderness. KUB yesterday did not show any free air. I recommend that we repeat the CT scan this AM. If things are improving or resolved she can go home. If not improving or worsening will need to consult surgery.

## 2017-05-27 NOTE — DISCHARGE SUMMARY
[facility] Hospital Medicine  Discharge Summary      Patient Name: Jasmine Velásquez  MRN: [id number]  Admission Date: 5/25/2017  Hospital Length of Stay: 1 days  Discharge Date and Time:  05/27/2017 6:35 PM  Attending Physician: Ronald Luna MD   Discharging Provider: Trever Bae NP  Primary Care Provider: Angely Roberts MD      HPI:   Ms. Velásquez is 76 yrs old female with hypertension, hyperlipidemia, hypothyroidism, T2DM, iron deficiency anaemia. Had a colonoscopy/ EGD by Dr. 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 CT abd shows cecal micro perforation . Labs show WBC 18,000 with left shift. Admitted for IV abx, received Cipro and Flagyl.    * No surgery found *      Indwelling Lines/Drains at time of discharge:   Lines/Drains/Airways          No matching active lines, drains, or airways        Hospital Course:   5/26/17 - Patient admitted to hospital s/p procedure for microperforation. Patient with improving symptoms today, on IV abx now afebrile. GI evaluated patient recommending abx and trending of both fever curve and WBC's. 5/27/17- The patients symptoms improved. Pt passing gas and tolerating clear liquids. The patients diet was advanced to a regular diet which the patient tolerated. A repeat CT ABD was performed today which showed improvement. The case was discussed with Dr. Jacques who felt the patient was stable for discharge. The patient was seen and examined today and deemed stable for discharge. The patient is being discharged home on a coarse of cipro and flagyl to complete a total 7 day coarse. The patient will follow up with her PCP and with GI.      Consults:   Consults         Status Ordering Provider     Inpatient consult to Gastroenterology  Once     Provider:  Jayy Rosa MD    Completed RILEY GOMEZ JR          Significant Diagnostic Studies:    Imaging Results          CT Abdomen Pelvis With Contrast (Final result)  Result time 05/27/17 13:14:51    Final result by Kenrick Richter MD (05/27/17 13:14:51)                 Impression:      Stable CT compared to 5/25/17 with similar findings associated with the cecum.         All CT scans at this facility use dose modulation, iterative reconstruction and/or weight based dosing when appropriate to reduce radiation dose to as low as reasonably achievable.       Electronically signed by: KENRICK RICHTER MD  Date:     05/27/17  Time:    13:14              Narrative:    Exam: CT ABDOMEN PELVIS WITH CONTRAST,    Date:  05/27/17 11:26:41    History: Follow up of right colon inflammation after colonoscopy, history of angiodysplasia of the right colon demonstrated at colonoscopy    Comparison:  5/25/17 CT    Findings: No abnormality of the liver, spleen, pancreas, gallbladder, adrenals, or kidneys is seen. There is a small hiatal hernia. There is diverticulosis. Again demonstrated is thickening of the medial wall of the cecum and pericecal fat stranding. There is thickening of the wall of the terminal ileum which likely is secondary inflammation. The colon is well filled with contrast and there is no contrast extravasation. Again there is demonstrated a small amount of air in the medial wall of the cecum and at least one tiny air bubble which may be in the adjacent mesentery, possibly microperforation. This is unchanged. No remote free air is seen in the abdomen or pelvis. Small amount of free fluid in in the pelvis. The uterus and adnexal structures are unremarkable.                             X-Ray Abdomen Flat And Erect (Final result)  Result time 05/26/17 17:15:33    Final result by Gera Hdz MD (05/26/17 17:15:33)                 Impression:     There is a nonspecific pattern some gaseous distention of colon, but no fixed area of obstruction is identified.  Fluid levels within colon and some minimally  gas distended loops of small bowel are present raising concern for ileus.  Clinical correlation is recommended.      Electronically signed by: RICHAR FOLEY MD  Date:     05/26/17  Time:    17:15              Narrative:    Two-view abdominal x-ray, 05/26/17 16:37:40    History:    Abdominal tenderness    Two views of the abdomen.  No free air is demonstrated within the peritoneal cavity.  There is a pattern of some gaseous distention of loops of colon which measure up to 6.1 cm in the right upper quadrant.  There is also a pattern mildly gas-distended loops of small bowel in the midabdomen measuring up to 3.0 cm.  No abnormal mass is demonstrated.  Gas and stool is seen throughout the colon extending to level of rectosigmoid.    Chronic spurring and degenerative change of the lumbar spine is present..                             CT Abdomen Pelvis  Without Contrast (Final result)  Result time 05/25/17 22:55:11   Procedure changed from CT Abdomen Pelvis With Contrast     Final result by Richar Foley MD (05/25/17 22:55:11)                 Impression:     1.  There is a pattern of streaky inflammatory change around the cecum and along its medial wall there a few tiny bubbles of gas present which may lie within the wall or may represent minute areas of microperforation.  The patient has a history of colonoscopy the day before.  Is there history of biopsy of the cecal region?  No abscess is demonstrated and more superiorly within the abdomen no free air is seen anteriorly.      All CT scans at this facility use dose modulation, iterative reconstruction and/or weight based dosing when appropriate to reduce radiation dose to as low as reasonably achievable.       Electronically signed by: RICHAR FOLEY MD  Date:     05/25/17  Time:    22:55              Narrative:    CT ABDOMEN PELVIS WITHOUT CONTRAST,     Date:  05/25/17 22:35:47    Technique: Standard noncontrast CT scan of the abdomen and pelvis.Sagittal and  coronal images were generated.    History:  generalized abd pain.  colonoscopy yesterday,     Findings:  The lung bases are clear.     The liver is enlarged measuring 18.6 cm in length.  No focal abnormality is demonstrated.  The gallbladder is rather distended.  No wall thickening or inflammatory change or stones are demonstrated.  The gallbladder measures 9.3 cm in length.  No dilatation of intrahepatic biliary tree is present.  The spleen, pancreas, and adrenal glands are normal in appearance.    The right kidney appears free of mass, calculus, or obstruction in the right ureter is normal in appearance.  The left kidney appears free of acute obstruction is a small 0.9 cm in its midpole.  The left ureter is normal in appearance.    Within the pelvis, the uterus contains multiple calcifications but appears normally configured.  The bladder is normal in appearance.  No suspicious mass or fluid collection seen in the pelvis.  There is diverticulosis of the colon.  Fluid levels are seen within the transverse colon and there is a pattern of streaky change around the cecum.    On image 55, series 2 there are multiple, tiny bubbles of gas along the medial wall of the cecum which may be within the cecal wall or external to it.  These lie just above the ileocecal valve.  Streaky change in adjacent fat in this region is present as well.  The appendix is not identified.  The small bowel appears free of obstructive change.  There is a fat filled periumbilical hernia.  It measures 1.2 cm.  The stomach is normal in appearance.  Chronic spurring and degenerative change of the spine is present.  This is greatest at the L5-S1 level.                             X-Ray Abdomen Flat And Erect (Final result)  Result time 05/25/17 21:12:26    Final result by Richar Foley MD (05/25/17 21:12:26)                 Impression:     No acute findings on abdominal series.      Electronically signed by: RICHAR FOLEY MD  Date:      05/25/17  Time:    21:12              Narrative:    Two-view abdominal x-ray, 05/25/17 20:59:14    History: Unspecified Abdominal Pain    Two views of the abdomen.  There is moderate gas and stool present throughout the colon.  No abnormally distended loops of bowel are demonstrated.. No obstruction, ileus or free air.  A scoliosis of the lumbar spine convex towards the left with extensive multilevel chronic spurring.                                 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  Service: Hospital Medicine      Discharged Condition: stable    Disposition: Home or Self Care    Follow Up:  Follow-up Information     Angely Roberts MD. Schedule an appointment as soon as possible for a visit in 3 days.    Specialty:  Family Medicine  Contact information:  [address] [address] [address] [phone]             Marissa Snow NP. Schedule an appointment as soon as possible for a visit in 4 weeks.    Specialty:  Gastroenterology  Contact information:  [address] [facility] [address] [phone]                 Patient Instructions:     Diet general       Medications:  Reconciled Home Medications:   Current Discharge Medication List      START taking these medications    Details    ciprofloxacin HCl (CIPRO) 500 MG tablet Take 1 tablet (500 mg total) by mouth 2 (two) times daily.  Qty: 10 tablet, Refills: 0      metronidazole (FLAGYL) 500 MG tablet Take 1 tablet (500 mg total) by mouth 3 (three) times daily.  Qty: 15 tablet, Refills: 0         CONTINUE these medications which have NOT CHANGED    Details   allopurinol (ZYLOPRIM) 300 MG tablet TAKE 1 TABLET (300 MG TOTAL) BY MOUTH ONCE DAILY.  Qty: 90 tablet, Refills: 3      aspirin (ECOTRIN) 81 MG EC tablet Take 81 mg by mouth once daily.      CMPD diclofenac 2%- amitriptyline 2%- lidocaine 2%- prilocaine 2% in Lipoderm ActiveMax bid  Qty: 100 g, Refills: 6      ferrous sulfate 325 mg (65 mg iron) CpSR Take 1 tablet by mouth 2 times daily 2 hours after meal.  Qty: 60 capsule, Refills: 6      furosemide (LASIX) 40 MG tablet Take 40 mg by mouth once daily.      gabapentin (NEURONTIN) 100 MG capsule TAKE 1 CAPSULE (100 MG TOTAL) BY MOUTH 3 (THREE) TIMES DAILY.  Qty: 270 capsule, Refills: 3    Comments: PLEASE REFILL      glipiZIDE (GLUCOTROL) 5 MG tablet TAKE 1 TABLET (5 MG TOTAL) BY MOUTH 2 (TWO) TIMES DAILY BEFORE MEALS.  Qty: 180 tablet, Refills: 2      glyBURIDE (DIABETA) 5 MG tablet Take 5 mg by mouth once daily.      levothyroxine (SYNTHROID) 75 MCG tablet TAKE 1 TABLET (75 MCG TOTAL) BY MOUTH ONCE DAILY.  Qty: 90 tablet, Refills: 3      losartan (COZAAR) 100 MG tablet TAKE 1 TABLET (100 MG TOTAL) BY MOUTH ONCE DAILY.  Qty: 90 tablet, Refills: 3      lovastatin (MEVACOR) 40 MG tablet TAKE 1 TABLET (40 MG TOTAL) BY MOUTH EVERY EVENING.  Qty: 30 tablet, Refills: 6      metformin (GLUCOPHAGE) 1000 MG tablet TAKE 1 TABLET (1,000 MG TOTAL) BY MOUTH 2 (TWO) TIMES DAILY.  Qty: 180 tablet, Refills: 3      methocarbamol (ROBAXIN) 750 MG Tab Take 1 tablet by mouth every 8 (eight) hours as needed.  Refills: 3      metoprolol tartrate (LOPRESSOR) 50 MG tablet 1 BY MOUTH DAILY  Refills: 3      pantoprazole (PROTONIX) 40 MG tablet Take 1 tablet (40 mg  total) by mouth once daily.  Qty: 30 tablet, Refills: 6      phenazopyridine (PYRIDIUM) 100 MG tablet Take 1 tablet (100 mg total) by mouth 3 (three) times daily as needed for Pain.  Qty: 10 tablet, Refills: 0      potassium chloride SA (K-DUR,KLOR-CON) 10 MEQ tablet Take once daily  Qty: 90 tablet, Refills: 3    Comments: TAKE FOR POTASSIUM SUPPLEMENT      pramipexole (MIRAPEX) 0.25 MG tablet TAKE 1 TABLET (0.25 MG TOTAL) BY MOUTH EVERY EVENING.  Qty: 90 tablet, Refills: 3    Associated Diagnoses: Restless legs syndrome (RLS)      trazodone (DESYREL) 50 MG tablet Take 1 tablet (50 mg total) by mouth every evening.  Qty: 30 tablet, Refills: 11      triamcinolone acetonide 0.5% (KENALOG) 0.5 % Crea Apply topically 2 (two) times daily.  Qty: 1 Tube, Refills: 1         STOP taking these medications       penicillin v potassium (VEETID) 500 MG tablet Comments:   Reason for Stopping:         sulfamethoxazole-trimethoprim 800-160mg (BACTRIM DS) 800-160 mg Tab Comments:   Reason for Stopping:             Time spent on the discharge of patient: > 30 minutes    Trever Bae NP  Department of Hospital Medicine  [facility]

## 2017-05-27 NOTE — SUBJECTIVE & OBJECTIVE
Subjective:     Interval History: She reports feeling better this AM. Passed a lot of gas overnight. No fevers or chills. Tolerating clear liquid diet. No new complaints today    Review of Systems   Constitutional: Negative for chills, fatigue, fever and unexpected weight change.   HENT: Negative for sore throat and trouble swallowing.    Eyes: Negative for pain, redness and visual disturbance.   Respiratory: Negative for cough, chest tightness and shortness of breath.    Cardiovascular: Negative for chest pain, palpitations and leg swelling.   Gastrointestinal: Positive for abdominal distention. Negative for abdominal pain, blood in stool, constipation, diarrhea, nausea and vomiting.   Genitourinary: Negative for difficulty urinating, dysuria and hematuria.   Musculoskeletal: Negative for arthralgias, back pain and joint swelling.   Skin: Negative for color change, pallor and rash.   Neurological: Negative for dizziness, seizures, light-headedness and headaches.   Hematological: Negative for adenopathy.   Psychiatric/Behavioral: The patient is not nervous/anxious.      Objective:     Vital Signs (Most Recent):  Temp: 98.4 °F (36.9 °C) (05/27/17 0337)  Pulse: 83 (05/27/17 0337)  Resp: 18 (05/27/17 0337)  BP: (!) 99/52 (05/27/17 0337)  SpO2: 95 % (05/27/17 0337) Vital Signs (24h Range):  Temp:  [97.9 °F (36.6 °C)-100.3 °F (37.9 °C)] 98.4 °F (36.9 °C)  Pulse:  [80-92] 83  Resp:  [13-19] 18  SpO2:  [87 %-99 %] 95 %  BP: ()/(47-75) 99/52     Weight: 79.4 kg (175 lb) (05/25/17 2003)  Body mass index is 30.04 kg/m².      Intake/Output Summary (Last 24 hours) at 05/27/17 0752  Last data filed at 05/27/17 0500   Gross per 24 hour   Intake             3280 ml   Output                0 ml   Net             3280 ml       Lines/Drains/Airways     Peripheral Intravenous Line                 Peripheral IV - Single Lumen 05/25/17 2134 Left Forearm 1 day                Physical Exam   Constitutional: She is oriented to  person, place, and time. She appears well-developed and well-nourished.   Eyes: No scleral icterus.   Cardiovascular: Normal rate, regular rhythm and normal heart sounds.    No murmur heard.  Pulmonary/Chest: Effort normal and breath sounds normal. She has no wheezes. She has no rales.   Abdominal: Soft. Bowel sounds are normal. She exhibits distension. There is no hepatosplenomegaly. There is tenderness. There is rebound.   Musculoskeletal: She exhibits no edema.   Neurological: She is alert and oriented to person, place, and time.   Psychiatric: She has a normal mood and affect. Her behavior is normal.       Significant Labs:  CBC:   Recent Labs  Lab 05/25/17 2123 05/27/17  0712   WBC 17.98* 9.49   HGB 13.7 11.0*   HCT 41.4 35.1*    130*     BMP:   Recent Labs  Lab 05/25/17 2123   *      K 3.7   CL 99   CO2 27   BUN 13   CREATININE 1.4   CALCIUM 10.7*         Significant Imaging:  Abdominal Film: I have reviewed all results within the past 24 hours and my personal findings are:  Large amount of gas in the colon, no free air noted.     Scheduled Meds:   allopurinol  300 mg Oral Daily    aspirin  81 mg Oral Daily    ciprofloxacin (CIPRO)400mg/200ml D5W IVPB  400 mg Intravenous Q12H    gabapentin  100 mg Oral TID    levothyroxine  75 mcg Oral Before breakfast    losartan  100 mg Oral Daily    metoprolol tartrate  50 mg Oral BID    metronidazole  500 mg Intravenous Q8H    pantoprazole  40 mg Oral Daily    trazodone  50 mg Oral QHS     Continuous Infusions:   sodium chloride 0.9% 125 mL/hr at 05/26/17 1700     PRN Meds:.acetaminophen, albuterol-ipratropium 2.5mg-0.5mg/3mL, dextrose 50%, diphenhydrAMINE, glucagon (human recombinant), guaifenesin 100 mg/5 ml, hydrALAZINE, insulin aspart, methocarbamol, morphine, ondansetron

## 2017-05-27 NOTE — PROGRESS NOTES
[facility] -   Gastroenterology  Progress Note    Patient Name: Jasmine Velásquez  MRN: [id number]  Admission Date: 5/25/2017  Hospital Length of Stay: 1 days  Code Status: Full Code   Attending Provider: Ronald Luna MD  Consulting Provider: Marcel Jacques MD  Primary Care Physician: Angely Roberts MD  Principal Problem: Generalized abdominal pain      Subjective:     Interval History: She reports feeling better this AM. Passed a lot of gas overnight. No fevers or chills. Tolerating clear liquid diet. No new complaints today    Review of Systems   Constitutional: Negative for chills, fatigue, fever and unexpected weight change.   HENT: Negative for sore throat and trouble swallowing.    Eyes: Negative for pain, redness and visual disturbance.   Respiratory: Negative for cough, chest tightness and shortness of breath.    Cardiovascular: Negative for chest pain, palpitations and leg swelling.   Gastrointestinal: Positive for abdominal distention. Negative for abdominal pain, blood in stool, constipation, diarrhea, nausea and vomiting.   Genitourinary: Negative for difficulty urinating, dysuria and hematuria.   Musculoskeletal: Negative for arthralgias, back pain and joint swelling.   Skin: Negative for color change, pallor and rash.   Neurological: Negative for dizziness, seizures, light-headedness and headaches.   Hematological: Negative for adenopathy.   Psychiatric/Behavioral: The patient is not nervous/anxious.      Objective:     Vital Signs (Most Recent):  Temp: 98.4 °F (36.9 °C) (05/27/17 0337)  Pulse: 83 (05/27/17 0337)  Resp: 18 (05/27/17 0337)  BP: (!) 99/52 (05/27/17 0337)  SpO2: 95 % (05/27/17 0337) Vital Signs (24h Range):  Temp:  [97.9 °F (36.6 °C)-100.3 °F (37.9 °C)] 98.4 °F (36.9 °C)  Pulse:  [80-92] 83  Resp:  [13-19] 18  SpO2:  [87 %-99 %] 95 %  BP: ()/(47-75) 99/52     Weight: 79.4 kg (175 lb) (05/25/17 2003)  Body mass index is 30.04 kg/m².      Intake/Output  Summary (Last 24 hours) at 05/27/17 0752  Last data filed at 05/27/17 0500   Gross per 24 hour   Intake             3280 ml   Output                0 ml   Net             3280 ml       Lines/Drains/Airways     Peripheral Intravenous Line                 Peripheral IV - Single Lumen 05/25/17 2134 Left Forearm 1 day                Physical Exam   Constitutional: She is oriented to person, place, and time. She appears well-developed and well-nourished.   Eyes: No scleral icterus.   Cardiovascular: Normal rate, regular rhythm and normal heart sounds.    No murmur heard.  Pulmonary/Chest: Effort normal and breath sounds normal. She has no wheezes. She has no rales.   Abdominal: Soft. Bowel sounds are normal. She exhibits distension. There is no hepatosplenomegaly. There is tenderness. There is rebound.   Musculoskeletal: She exhibits no edema.   Neurological: She is alert and oriented to person, place, and time.   Psychiatric: She has a normal mood and affect. Her behavior is normal.       Significant Labs:  CBC:   Recent Labs  Lab 05/25/17 2123 05/27/17  0712   WBC 17.98* 9.49   HGB 13.7 11.0*   HCT 41.4 35.1*    130*     BMP:   Recent Labs  Lab 05/25/17 2123   *      K 3.7   CL 99   CO2 27   BUN 13   CREATININE 1.4   CALCIUM 10.7*         Significant Imaging:  Abdominal Film: I have reviewed all results within the past 24 hours and my personal findings are:  Large amount of gas in the colon, no free air noted.     Scheduled Meds:   allopurinol  300 mg Oral Daily    aspirin  81 mg Oral Daily    ciprofloxacin (CIPRO)400mg/200ml D5W IVPB  400 mg Intravenous Q12H    gabapentin  100 mg Oral TID    levothyroxine  75 mcg Oral Before breakfast    losartan  100 mg Oral Daily    metoprolol tartrate  50 mg Oral BID    metronidazole  500 mg Intravenous Q8H    pantoprazole  40 mg Oral Daily    trazodone  50 mg Oral QHS     Continuous Infusions:   sodium chloride 0.9% 125 mL/hr at 05/26/17 1700      PRN Meds:.acetaminophen, albuterol-ipratropium 2.5mg-0.5mg/3mL, dextrose 50%, diphenhydrAMINE, glucagon (human recombinant), guaifenesin 100 mg/5 ml, hydrALAZINE, insulin aspart, methocarbamol, morphine, ondansetron      Assessment/Plan:     Abdominal pain, RLQ (right lower quadrant)    Pt with abd pain, fever after colonoscopy with tx of 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 CT on admission showed some air in the wall of the right colon which can occur with use of APC to treat angioectasias. Clinically she is doing well without fevers, chills. She is tolerating a diet. My only concern is that she is still very tender to palpation in the right lower quadrant with rebound tenderness. KUB yesterday did not show any free air. I recommend that we repeat the CT scan this AM. If things are improving or resolved she can go home. If not improving or worsening will need to consult surgery.          Gastrointestinal barotrauma    See above.            Thank you for your consult. I will follow-up with patient. Please contact us if you have any additional questions.    Marcel Jacques MD  Gastroenterology  [facility] -     Discussed CT with radiologist. No detrimental changes noted. Good views of the cecum and right colon obtained. No extravasation of contrast, free air, abscess. Some thickening of the TI (likely due to right colon inflammation). Will advance diet. If she tolerates this can go home on Cipro/Flagyl to complete 7 days of tx. Informed pt that if she develops fevers/worsening abd pain, anything else concerning needs to come back to ED.

## 2017-05-29 ENCOUNTER — TELEPHONE (OUTPATIENT)
Dept: GASTROENTEROLOGY | Facility: CLINIC | Age: 77
End: 2017-05-29

## 2017-05-29 NOTE — TELEPHONE ENCOUNTER
----- Message from Sonia Bauer RN sent at 5/27/2017  5:18 PM CDT -----  Pt needs follow up appt in 4 weeks, unable to schedule for Marissa Snow NP. Please call pt. Thank you

## 2017-05-30 ENCOUNTER — TELEPHONE (OUTPATIENT)
Dept: INTERNAL MEDICINE | Facility: CLINIC | Age: 77
End: 2017-05-30

## 2017-05-30 NOTE — TELEPHONE ENCOUNTER
Patient was recently in the hospital and had test done including a CT Abdomen. Do you still want the CT urogram and urology consult?

## 2017-05-30 NOTE — TELEPHONE ENCOUNTER
----- Message from Janette Bautista sent at 5/30/2017  8:53 AM CDT -----  Contact: patient  Returning your call. Patient states she was in the hospital. Went in on last Thursday. Please call patient today ASAP @ [phone]. Thanks, nichole

## 2017-05-31 LAB
BACTERIA BLD CULT: NORMAL
BACTERIA BLD CULT: NORMAL

## 2017-06-02 ENCOUNTER — TELEPHONE (OUTPATIENT)
Dept: GASTROENTEROLOGY | Facility: CLINIC | Age: 77
End: 2017-06-02

## 2017-06-02 NOTE — TELEPHONE ENCOUNTER
Called patient to follow up on how she was doing.   She has near resolution of abdominal pain with normal bowel pattern now. No fever, vomiting, melena or hematochezia.    Informed her of normal gastric biopsies as well.

## 2017-06-08 NOTE — TELEPHONE ENCOUNTER
Spoke with patient. Appointments scheduled. Patients spouse stated that he had to call Dr. Summers for test results and would ask for a sooner appointment.

## 2017-06-10 ENCOUNTER — HOSPITAL ENCOUNTER (OUTPATIENT)
Dept: RADIOLOGY | Facility: HOSPITAL | Age: 77
Discharge: HOME | End: 2017-06-10
Attending: FAMILY MEDICINE
Payer: MEDICARE

## 2017-06-10 DIAGNOSIS — R31.9 HEMATURIA: ICD-10-CM

## 2017-06-10 PROCEDURE — 74178 CT ABD&PLV WO CNTR FLWD CNTR: CPT | Mod: TC

## 2017-06-10 PROCEDURE — 25500020 PHARM REV CODE 255: Performed by: FAMILY MEDICINE

## 2017-06-10 RX ADMIN — IOHEXOL 75 ML: 350 INJECTION, SOLUTION INTRAVENOUS at 09:06

## 2017-06-14 ENCOUNTER — LAB VISIT (OUTPATIENT)
Dept: LAB | Facility: HOSPITAL | Age: 77
End: 2017-06-14
Attending: FAMILY MEDICINE
Payer: MEDICARE

## 2017-06-14 DIAGNOSIS — E03.4 HYPOTHYROIDISM DUE TO ACQUIRED ATROPHY OF THYROID: ICD-10-CM

## 2017-06-14 DIAGNOSIS — E11.42 DIABETIC POLYNEUROPATHY ASSOCIATED WITH TYPE 2 DIABETES MELLITUS: ICD-10-CM

## 2017-06-14 DIAGNOSIS — D50.0 IRON DEFICIENCY ANEMIA DUE TO CHRONIC BLOOD LOSS: ICD-10-CM

## 2017-06-14 LAB
ALBUMIN SERPL BCP-MCNC: 3.7 G/DL
ALP SERPL-CCNC: 61 U/L
ALT SERPL W/O P-5'-P-CCNC: 11 U/L
ANION GAP SERPL CALC-SCNC: 14 MMOL/L
AST SERPL-CCNC: 19 U/L
BASOPHILS # BLD AUTO: 0.03 K/UL
BASOPHILS NFR BLD: 0.6 %
BILIRUB SERPL-MCNC: 0.4 MG/DL
BUN SERPL-MCNC: 17 MG/DL
CALCIUM SERPL-MCNC: 9.8 MG/DL
CHLORIDE SERPL-SCNC: 104 MMOL/L
CHOLEST/HDLC SERPL: 3.8 {RATIO}
CO2 SERPL-SCNC: 25 MMOL/L
CREAT SERPL-MCNC: 1 MG/DL
DIFFERENTIAL METHOD: ABNORMAL
EOSINOPHIL # BLD AUTO: 0.2 K/UL
EOSINOPHIL NFR BLD: 3.7 %
ERYTHROCYTE [DISTWIDTH] IN BLOOD BY AUTOMATED COUNT: 16.2 %
EST. GFR  (AFRICAN AMERICAN): >60 ML/MIN/1.73 M^2
EST. GFR  (NON AFRICAN AMERICAN): 54.9 ML/MIN/1.73 M^2
GLUCOSE SERPL-MCNC: 144 MG/DL
HCT VFR BLD AUTO: 41.3 %
HDL/CHOLESTEROL RATIO: 26.6 %
HDLC SERPL-MCNC: 143 MG/DL
HDLC SERPL-MCNC: 38 MG/DL
HGB BLD-MCNC: 12.9 G/DL
LDLC SERPL CALC-MCNC: 62.8 MG/DL
LYMPHOCYTES # BLD AUTO: 1.4 K/UL
LYMPHOCYTES NFR BLD: 26.8 %
MCH RBC QN AUTO: 27.2 PG
MCHC RBC AUTO-ENTMCNC: 31.2 %
MCV RBC AUTO: 87 FL
MONOCYTES # BLD AUTO: 0.4 K/UL
MONOCYTES NFR BLD: 7.1 %
NEUTROPHILS # BLD AUTO: 3.2 K/UL
NEUTROPHILS NFR BLD: 61.8 %
NONHDLC SERPL-MCNC: 105 MG/DL
PLATELET # BLD AUTO: 188 K/UL
PMV BLD AUTO: 9.7 FL
POTASSIUM SERPL-SCNC: 3.5 MMOL/L
PROT SERPL-MCNC: 6.9 G/DL
RBC # BLD AUTO: 4.75 M/UL
SODIUM SERPL-SCNC: 143 MMOL/L
TRIGL SERPL-MCNC: 211 MG/DL
TSH SERPL DL<=0.005 MIU/L-ACNC: 1.91 UIU/ML
WBC # BLD AUTO: 5.19 K/UL

## 2017-06-14 PROCEDURE — 36415 COLL VENOUS BLD VENIPUNCTURE: CPT | Mod: PO

## 2017-06-14 PROCEDURE — 80061 LIPID PANEL: CPT

## 2017-06-14 PROCEDURE — 80053 COMPREHEN METABOLIC PANEL: CPT

## 2017-06-14 PROCEDURE — 83036 HEMOGLOBIN GLYCOSYLATED A1C: CPT

## 2017-06-14 PROCEDURE — 85025 COMPLETE CBC W/AUTO DIFF WBC: CPT

## 2017-06-14 PROCEDURE — 84443 ASSAY THYROID STIM HORMONE: CPT

## 2017-06-15 LAB
ESTIMATED AVG GLUCOSE: 134 MG/DL
HBA1C MFR BLD HPLC: 6.3 %

## 2017-06-16 ENCOUNTER — OFFICE VISIT (OUTPATIENT)
Dept: OPHTHALMOLOGY | Facility: CLINIC | Age: 77
End: 2017-06-16
Payer: MEDICARE

## 2017-06-16 DIAGNOSIS — Z96.1 PSEUDOPHAKIA OF BOTH EYES: ICD-10-CM

## 2017-06-16 DIAGNOSIS — H52.7 REFRACTIVE ERROR: ICD-10-CM

## 2017-06-16 DIAGNOSIS — E11.9 DIABETES MELLITUS TYPE 2 WITHOUT RETINOPATHY: Primary | ICD-10-CM

## 2017-06-16 PROCEDURE — 92015 DETERMINE REFRACTIVE STATE: CPT | Mod: S$GLB,,, | Performed by: OPTOMETRIST

## 2017-06-16 PROCEDURE — 92014 COMPRE OPH EXAM EST PT 1/>: CPT | Mod: S$GLB,,, | Performed by: OPTOMETRIST

## 2017-06-16 PROCEDURE — 99999 PR PBB SHADOW E&M-EST. PATIENT-LVL II: CPT | Mod: PBBFAC,,, | Performed by: OPTOMETRIST

## 2017-06-16 PROCEDURE — 99499 UNLISTED E&M SERVICE: CPT | Mod: S$PBB,,, | Performed by: OPTOMETRIST

## 2017-06-16 RX ORDER — POLYETHYLENE GLYCOL-3350, SODIUM CHLORIDE, POTASSIUM CHLORIDE AND SODIUM BICARBONATE 420; 11.2; 5.72; 1.48 G/438.4G; G/438.4G; G/438.4G; G/438.4G
POWDER, FOR SOLUTION ORAL
COMMUNITY
Start: 2017-04-28 | End: 2017-09-27

## 2017-06-16 RX ORDER — AMOXICILLIN 500 MG
2 CAPSULE ORAL DAILY
COMMUNITY

## 2017-06-16 RX ORDER — NICOTINE POLACRILEX 2 MG
1 GUM BUCCAL DAILY
COMMUNITY

## 2017-06-16 NOTE — PROGRESS NOTES
HPI     Diabetic Eye Exam    Additional comments: Latest blood sugar reading was 116 about 2 weeks   ago. Normally lower than that. Was higher due to medication.            Comments   Pt states no change since last appt. No pain or discomfort. Pt states that   she does see better with her glasses on.        Last edited by Adria Barrett, Patient Care Assistant on 6/16/2017 10:11   AM. (History)            Assessment /Plan     For exam results, see Encounter Report.    Diabetes mellitus type 2 without retinopathy    Refractive error    Pseudophakia of both eyes      No Background Diabetic Retinopathy  Peripheral microaneurysms OU    Stable IOL OU    Dispense Final Rx for glasses.  RTC 1 year

## 2017-06-22 ENCOUNTER — OFFICE VISIT (OUTPATIENT)
Dept: FAMILY MEDICINE | Facility: CLINIC | Age: 77
End: 2017-06-22
Payer: MEDICARE

## 2017-06-22 VITALS
OXYGEN SATURATION: 96 % | BODY MASS INDEX: 30.53 KG/M2 | DIASTOLIC BLOOD PRESSURE: 70 MMHG | HEIGHT: 64 IN | WEIGHT: 178.81 LBS | TEMPERATURE: 96 F | HEART RATE: 71 BPM | SYSTOLIC BLOOD PRESSURE: 150 MMHG

## 2017-06-22 DIAGNOSIS — I87.2 VENOUS INSUFFICIENCY OF LEFT LOWER EXTREMITY: ICD-10-CM

## 2017-06-22 DIAGNOSIS — I10 ESSENTIAL HYPERTENSION: Primary | ICD-10-CM

## 2017-06-22 DIAGNOSIS — R19.02 LEFT UPPER QUADRANT ABDOMINAL MASS: ICD-10-CM

## 2017-06-22 DIAGNOSIS — K21.9 GASTROESOPHAGEAL REFLUX DISEASE, ESOPHAGITIS PRESENCE NOT SPECIFIED: ICD-10-CM

## 2017-06-22 DIAGNOSIS — G25.81 RESTLESS LEGS SYNDROME (RLS): Chronic | ICD-10-CM

## 2017-06-22 DIAGNOSIS — E11.42 DIABETIC POLYNEUROPATHY ASSOCIATED WITH TYPE 2 DIABETES MELLITUS: ICD-10-CM

## 2017-06-22 PROCEDURE — 99499 UNLISTED E&M SERVICE: CPT | Mod: S$PBB,,, | Performed by: FAMILY MEDICINE

## 2017-06-22 PROCEDURE — 1159F MED LIST DOCD IN RCRD: CPT | Mod: S$GLB,,, | Performed by: FAMILY MEDICINE

## 2017-06-22 PROCEDURE — 99999 PR PBB SHADOW E&M-EST. PATIENT-LVL III: CPT | Mod: PBBFAC,,, | Performed by: FAMILY MEDICINE

## 2017-06-22 PROCEDURE — 1126F AMNT PAIN NOTED NONE PRSNT: CPT | Mod: S$GLB,,, | Performed by: FAMILY MEDICINE

## 2017-06-22 PROCEDURE — 99214 OFFICE O/P EST MOD 30 MIN: CPT | Mod: S$GLB,,, | Performed by: FAMILY MEDICINE

## 2017-06-22 RX ORDER — AMLODIPINE BESYLATE 5 MG/1
5 TABLET ORAL DAILY
Qty: 30 TABLET | Refills: 11 | Status: SHIPPED | OUTPATIENT
Start: 2017-06-22 | End: 2018-01-08 | Stop reason: SDUPTHER

## 2017-06-22 NOTE — PROGRESS NOTES
Chief Complaint:    Chief Complaint   Patient presents with    Follow-up       History of Present Illness:    She is here for follow-up.  Doing well recently had an episode of colon perforation which healed up uneventfully.  Urologist soon.  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  Blood pressure diabetes stable  Lipids and chemistries.    ROS:  Review of Systems   Constitutional: Negative for activity change, chills, fatigue, fever and unexpected weight change.   HENT: Negative for congestion, ear discharge, ear pain, hearing loss, postnasal drip and rhinorrhea.    Eyes: Negative for pain and visual disturbance.   Respiratory: Negative for cough, chest tightness and shortness of breath.    Cardiovascular: Negative for chest pain and palpitations.   Gastrointestinal: Negative for abdominal pain, diarrhea and vomiting.   Endocrine: Negative for heat intolerance.   Genitourinary: Negative for dysuria, flank pain, frequency and hematuria.   Musculoskeletal: Negative for back pain, gait problem and neck pain.   Skin: Negative for color change and rash.   Neurological: Negative for dizziness, tremors, seizures, numbness and headaches.   Psychiatric/Behavioral: Negative for agitation, hallucinations, self-injury, sleep disturbance and suicidal ideas. The patient is not nervous/anxious.        Past Medical History:   Diagnosis Date    Diabetes mellitus, type 2 2006    BS doesn't check 06/15/2016    GERD (gastroesophageal reflux disease)     Hyperlipidemia     Hypertension     Hypothyroidism        Social History:  Social History     Social History    Marital status:      Spouse name: N/A    Number of children: N/A    Years of education: N/A     Social History Main Topics    Smoking status: Former Smoker     Packs/day: 1.00     Years: 25.00     Quit date: 5/24/2002    Smokeless tobacco: Never Used    Alcohol use No    Drug use: Unknown    Sexual activity:  "Not Asked     Other Topics Concern    None     Social History Narrative    None       Family History:   family history includes COPD in her sister; Cancer (age of onset: 46) in her mother; Diabetes in her father; Heart disease in her father and mother; Hypertension in her father.    Health Maintenance   Topic Date Due    Zoster Vaccine  08/10/2000    Influenza Vaccine  08/01/2017    Hemoglobin A1c  12/14/2017    Foot Exam  03/14/2018    Lipid Panel  06/14/2018    Eye Exam  06/16/2018    DEXA SCAN  05/18/2019    Colonoscopy  05/24/2022    TETANUS VACCINE  02/02/2026    Pneumococcal (65+)  Completed       Physical Exam:    Vital Signs  Temp: 96.1 °F (35.6 °C)  Temp src: Tympanic  Pulse: 71  SpO2: 96 %  BP: (!) 150/70  BP Location: Left arm  BP Method: Manual  Patient Position: Sitting  Pain Score: 0-No pain  Height and Weight  Height: 5' 4" (162.6 cm)  Weight: 81.1 kg (178 lb 12.7 oz)  BSA (Calculated - sq m): 1.91 sq meters  BMI (Calculated): 30.8  Weight in (lb) to have BMI = 25: 145.3]    Body mass index is 30.69 kg/m².    Physical Exam   Constitutional: She is oriented to person, place, and time. She appears well-developed.   HENT:   Mouth/Throat: Oropharynx is clear and moist.   Eyes: Conjunctivae are normal. Pupils are equal, round, and reactive to light.   Neck: Normal range of motion. Neck supple.   Cardiovascular: Normal rate, regular rhythm and normal heart sounds.    No murmur heard.  Pulmonary/Chest: Effort normal and breath sounds normal. No respiratory distress. She has no wheezes. She has no rales. She exhibits no tenderness.   Abdominal: Soft. She exhibits no distension and no mass. There is no tenderness. There is no guarding.   Musculoskeletal: She exhibits no edema or tenderness.   Lymphadenopathy:     She has no cervical adenopathy.   Neurological: She is alert and oriented to person, place, and time. She has normal reflexes.   Skin: Skin is warm and dry.   Psychiatric: She has a normal " mood and affect. Her behavior is normal. Judgment and thought content normal.       Lab Results   Component Value Date    CHOL 143 06/14/2017    CHOL 142 03/07/2017    CHOL 136 11/17/2016    TRIG 211 (H) 06/14/2017    TRIG 216 (H) 03/07/2017    TRIG 235 (H) 11/17/2016    HDL 38 (L) 06/14/2017    HDL 41 03/07/2017    HDL 35 (L) 11/17/2016    TOTALCHOLEST 3.8 06/14/2017    TOTALCHOLEST 3.5 03/07/2017    TOTALCHOLEST 3.9 11/17/2016    NONHDLCHOL 105 06/14/2017    NONHDLCHOL 101 03/07/2017    NONHDLCHOL 101 11/17/2016       Lab Results   Component Value Date    HGBA1C 6.3 (H) 06/14/2017       Assessment:      ICD-10-CM ICD-9-CM   1. Essential hypertension I10 401.9   2. Diabetic polyneuropathy associated with type 2 diabetes mellitus E11.42 250.60     357.2   3. Gastroesophageal reflux disease, esophagitis presence not specified K21.9 530.81   4. Restless legs syndrome (RLS) G25.81 333.94   5. Venous insufficiency of left lower extremity I87.2 459.81   6. Left upper quadrant abdominal mass R19.02 789.32         Plan:  1.  Hypertension poor control she will bring back blood pressure readings.  2.  Left upper quadrant abdominal mass as seen on the CAT scan the mass is actually shrunk from 10-8 mm on the most recent CAT scan will repeat the CAT scan in about 6 weeks etiology still remains unknown.  3.  Restless leg stable  4.  GERD stable  5.  Diabetes type 2 stable    Orders Placed This Encounter   Procedures    CT Abdomen With Contrast    Comprehensive metabolic panel    Hemoglobin A1c    Lipid panel    Microalbumin/creatinine urine ratio       Current Outpatient Prescriptions   Medication Sig Dispense Refill    allopurinol (ZYLOPRIM) 300 MG tablet TAKE 1 TABLET (300 MG TOTAL) BY MOUTH ONCE DAILY. 90 tablet 3    aspirin (ECOTRIN) 81 MG EC tablet Take 81 mg by mouth once daily.      biotin 1 mg Cap Take by mouth.      CMPD diclofenac 2%- amitriptyline 2%- lidocaine 2%- prilocaine 2% in Lipoderm ActiveMax bid 100  g 6    ferrous sulfate 325 mg (65 mg iron) CpSR Take 1 tablet by mouth 2 times daily 2 hours after meal. 60 capsule 6    fish oil-omega-3 fatty acids 300-1,000 mg capsule Take 2 g by mouth once daily.      furosemide (LASIX) 40 MG tablet Take 40 mg by mouth once daily.      gabapentin (NEURONTIN) 100 MG capsule TAKE 1 CAPSULE (100 MG TOTAL) BY MOUTH 3 (THREE) TIMES DAILY. 270 capsule 3    GAVILYTE-N 420 gram SolR       glipiZIDE (GLUCOTROL) 5 MG tablet TAKE 1 TABLET (5 MG TOTAL) BY MOUTH 2 (TWO) TIMES DAILY BEFORE MEALS. 180 tablet 2    glyBURIDE (DIABETA) 5 MG tablet Take 5 mg by mouth once daily.      levothyroxine (SYNTHROID) 75 MCG tablet TAKE 1 TABLET (75 MCG TOTAL) BY MOUTH ONCE DAILY. 90 tablet 3    losartan (COZAAR) 100 MG tablet TAKE 1 TABLET (100 MG TOTAL) BY MOUTH ONCE DAILY. 90 tablet 3    lovastatin (MEVACOR) 40 MG tablet TAKE 1 TABLET (40 MG TOTAL) BY MOUTH EVERY EVENING. 30 tablet 6    metformin (GLUCOPHAGE) 1000 MG tablet TAKE 1 TABLET (1,000 MG TOTAL) BY MOUTH 2 (TWO) TIMES DAILY. 180 tablet 3    methocarbamol (ROBAXIN) 750 MG Tab Take 1 tablet by mouth every 8 (eight) hours as needed.  3    metoprolol tartrate (LOPRESSOR) 50 MG tablet 1 BY MOUTH DAILY  3    pantoprazole (PROTONIX) 40 MG tablet Take 1 tablet (40 mg total) by mouth once daily. 30 tablet 6    potassium chloride SA (K-DUR,KLOR-CON) 10 MEQ tablet Take once daily 90 tablet 3    pramipexole (MIRAPEX) 0.25 MG tablet TAKE 1 TABLET (0.25 MG TOTAL) BY MOUTH EVERY EVENING. 90 tablet 3    trazodone (DESYREL) 50 MG tablet Take 1 tablet (50 mg total) by mouth every evening. 30 tablet 11    amlodipine (NORVASC) 5 MG tablet Take 1 tablet (5 mg total) by mouth once daily. 30 tablet 11    triamcinolone acetonide 0.5% (KENALOG) 0.5 % Crea Apply topically 2 (two) times daily. 1 Tube 1     No current facility-administered medications for this visit.        There are no discontinued medications.    Return in about 1 week (around  6/29/2017).      Dr Angely Roberts MD    Disclaimer: This note is prepared using voice recognition system and as such is likely to have errors and is not proof read.

## 2017-06-29 ENCOUNTER — OFFICE VISIT (OUTPATIENT)
Dept: FAMILY MEDICINE | Facility: CLINIC | Age: 77
End: 2017-06-29
Payer: MEDICARE

## 2017-06-29 VITALS
OXYGEN SATURATION: 97 % | BODY MASS INDEX: 30.35 KG/M2 | SYSTOLIC BLOOD PRESSURE: 124 MMHG | HEART RATE: 82 BPM | HEIGHT: 64 IN | DIASTOLIC BLOOD PRESSURE: 68 MMHG | WEIGHT: 177.81 LBS | TEMPERATURE: 96 F

## 2017-06-29 DIAGNOSIS — R14.0 ABDOMINAL BLOATING: ICD-10-CM

## 2017-06-29 DIAGNOSIS — I10 ESSENTIAL HYPERTENSION: Primary | ICD-10-CM

## 2017-06-29 PROCEDURE — 1159F MED LIST DOCD IN RCRD: CPT | Mod: S$GLB,,, | Performed by: FAMILY MEDICINE

## 2017-06-29 PROCEDURE — 1126F AMNT PAIN NOTED NONE PRSNT: CPT | Mod: S$GLB,,, | Performed by: FAMILY MEDICINE

## 2017-06-29 PROCEDURE — 99999 PR PBB SHADOW E&M-EST. PATIENT-LVL IV: CPT | Mod: PBBFAC,,, | Performed by: FAMILY MEDICINE

## 2017-06-29 PROCEDURE — 99214 OFFICE O/P EST MOD 30 MIN: CPT | Mod: S$GLB,,, | Performed by: FAMILY MEDICINE

## 2017-06-29 PROCEDURE — 99499 UNLISTED E&M SERVICE: CPT | Mod: S$PBB,,, | Performed by: FAMILY MEDICINE

## 2017-06-29 RX ORDER — LEVOTHYROXINE SODIUM 75 UG/1
75 TABLET ORAL
Qty: 90 TABLET | Refills: 3 | Status: SHIPPED | OUTPATIENT
Start: 2017-06-29 | End: 2018-06-18 | Stop reason: SDUPTHER

## 2017-06-29 NOTE — PROGRESS NOTES
Chief Complaint:    Chief Complaint   Patient presents with    Follow-up       History of Present Illness:    She is here for follow-up of blood pressure.  Blood pressure is running good today no side effects from medication.  She also complains of some abdominal distention and some pain after eating denies any nausea vomiting diarrhea.  She has been in the hospital for colonic perforation and had been on antibiotics.  She has an upcoming appointment with Dr. Aguilar in the next 4 days.    ROS:  Review of Systems   Constitutional: Negative for activity change, chills, fatigue, fever and unexpected weight change.   HENT: Negative for congestion, ear discharge, ear pain, hearing loss, postnasal drip and rhinorrhea.    Eyes: Negative for pain and visual disturbance.   Respiratory: Negative for cough, chest tightness and shortness of breath.    Cardiovascular: Negative for chest pain and palpitations.   Gastrointestinal: Positive for abdominal distention. Negative for abdominal pain, diarrhea and vomiting.   Endocrine: Negative for heat intolerance.   Genitourinary: Negative for dysuria, flank pain, frequency and hematuria.   Musculoskeletal: Negative for back pain, gait problem and neck pain.   Skin: Negative for color change and rash.   Neurological: Negative for dizziness, tremors, seizures, numbness and headaches.   Psychiatric/Behavioral: Negative for agitation, hallucinations, self-injury, sleep disturbance and suicidal ideas. The patient is not nervous/anxious.        Past Medical History:   Diagnosis Date    Diabetes mellitus, type 2 2006    BS doesn't check 06/15/2016    GERD (gastroesophageal reflux disease)     Hyperlipidemia     Hypertension     Hypothyroidism        Social History:  Social History     Social History    Marital status:      Spouse name: N/A    Number of children: N/A    Years of education: N/A     Social History Main Topics    Smoking status: Former Smoker     Packs/day:  "1.00     Years: 25.00     Quit date: 5/24/2002    Smokeless tobacco: Never Used    Alcohol use No    Drug use: Unknown    Sexual activity: Not Asked     Other Topics Concern    None     Social History Narrative    None       Family History:   family history includes COPD in her sister; Cancer (age of onset: 46) in her mother; Diabetes in her father; Heart disease in her father and mother; Hypertension in her father.    Health Maintenance   Topic Date Due    Zoster Vaccine  08/10/2000    Influenza Vaccine  08/01/2017    Hemoglobin A1c  12/14/2017    Foot Exam  03/14/2018    Lipid Panel  06/14/2018    Eye Exam  06/16/2018    DEXA SCAN  05/18/2019    Colonoscopy  05/24/2022    TETANUS VACCINE  02/02/2026    Pneumococcal (65+)  Completed       Physical Exam:    Vital Signs  Temp: 96.2 °F (35.7 °C)  Temp src: Tympanic  Pulse: 82  SpO2: 97 %  BP: 124/68  BP Location: Left arm  BP Method: Manual  Patient Position: Sitting  Pain Score: 0-No pain  Height and Weight  Height: 5' 4" (162.6 cm)  Weight: 80.7 kg (177 lb 12.8 oz)  BSA (Calculated - sq m): 1.91 sq meters  BMI (Calculated): 30.6  Weight in (lb) to have BMI = 25: 145.3]    Body mass index is 30.52 kg/m².    Physical Exam   Constitutional: She is oriented to person, place, and time. She appears well-developed.   HENT:   Mouth/Throat: Oropharynx is clear and moist.   Eyes: Conjunctivae are normal. Pupils are equal, round, and reactive to light.   Neck: Normal range of motion. Neck supple.   Cardiovascular: Normal rate, regular rhythm and normal heart sounds.    No murmur heard.  Pulmonary/Chest: Effort normal and breath sounds normal. No respiratory distress. She has no wheezes. She has no rales. She exhibits no tenderness.   Abdominal: Soft. She exhibits no distension and no mass. There is no tenderness. There is no guarding.   Musculoskeletal: She exhibits no edema or tenderness.   Lymphadenopathy:     She has no cervical adenopathy.   Neurological: " She is alert and oriented to person, place, and time. She has normal reflexes.   Skin: Skin is warm and dry.   Psychiatric: She has a normal mood and affect. Her behavior is normal. Judgment and thought content normal.       Lab Results   Component Value Date    CHOL 143 06/14/2017    CHOL 142 03/07/2017    CHOL 136 11/17/2016    TRIG 211 (H) 06/14/2017    TRIG 216 (H) 03/07/2017    TRIG 235 (H) 11/17/2016    HDL 38 (L) 06/14/2017    HDL 41 03/07/2017    HDL 35 (L) 11/17/2016    TOTALCHOLEST 3.8 06/14/2017    TOTALCHOLEST 3.5 03/07/2017    TOTALCHOLEST 3.9 11/17/2016    NONHDLCHOL 105 06/14/2017    NONHDLCHOL 101 03/07/2017    NONHDLCHOL 101 11/17/2016       Lab Results   Component Value Date    HGBA1C 6.3 (H) 06/14/2017       Assessment:      ICD-10-CM ICD-9-CM   1. Essential hypertension I10 401.9   2. Abdominal bloating R14.0 787.3         Plan:  1.  Hypertension good control  2.  Abdominal bloating and pain they start taking some probiotics, exam is unremarkable she will discuss this with Dr. Begum  3.  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  No orders of the defined types were placed in this encounter.      Current Outpatient Prescriptions   Medication Sig Dispense Refill    allopurinol (ZYLOPRIM) 300 MG tablet TAKE 1 TABLET (300 MG TOTAL) BY MOUTH ONCE DAILY. 90 tablet 3    amlodipine (NORVASC) 5 MG tablet Take 1 tablet (5 mg total) by mouth once daily. 30 tablet 11    aspirin (ECOTRIN) 81 MG EC tablet Take 81 mg by mouth once daily.      biotin 1 mg Cap Take by mouth.      CMPD diclofenac 2%- amitriptyline 2%- lidocaine 2%- prilocaine 2% in Lipoderm ActiveMax bid 100 g 6    ferrous sulfate 325 mg (65 mg iron) CpSR Take 1 tablet by mouth 2 times daily 2 hours after meal. 60 capsule 6    fish oil-omega-3 fatty acids 300-1,000 mg capsule Take 2 g by mouth once daily.      furosemide (LASIX) 40 MG tablet Take 40 mg by mouth once daily.      gabapentin  (NEURONTIN) 100 MG capsule TAKE 1 CAPSULE (100 MG TOTAL) BY MOUTH 3 (THREE) TIMES DAILY. 270 capsule 3    GAVILYTE-N 420 gram SolR       glipiZIDE (GLUCOTROL) 5 MG tablet TAKE 1 TABLET (5 MG TOTAL) BY MOUTH 2 (TWO) TIMES DAILY BEFORE MEALS. 180 tablet 2    glyBURIDE (DIABETA) 5 MG tablet Take 5 mg by mouth once daily.      levothyroxine (SYNTHROID) 75 MCG tablet Take 1 tablet (75 mcg total) by mouth before breakfast. 90 tablet 3    losartan (COZAAR) 100 MG tablet TAKE 1 TABLET (100 MG TOTAL) BY MOUTH ONCE DAILY. 90 tablet 3    lovastatin (MEVACOR) 40 MG tablet TAKE 1 TABLET (40 MG TOTAL) BY MOUTH EVERY EVENING. 30 tablet 6    metformin (GLUCOPHAGE) 1000 MG tablet TAKE 1 TABLET (1,000 MG TOTAL) BY MOUTH 2 (TWO) TIMES DAILY. 180 tablet 3    methocarbamol (ROBAXIN) 750 MG Tab Take 1 tablet by mouth every 8 (eight) hours as needed.  3    metoprolol tartrate (LOPRESSOR) 50 MG tablet 1 BY MOUTH DAILY  3    pantoprazole (PROTONIX) 40 MG tablet Take 1 tablet (40 mg total) by mouth once daily. 30 tablet 6    potassium chloride SA (K-DUR,KLOR-CON) 10 MEQ tablet Take once daily 90 tablet 3    pramipexole (MIRAPEX) 0.25 MG tablet TAKE 1 TABLET (0.25 MG TOTAL) BY MOUTH EVERY EVENING. 90 tablet 3    trazodone (DESYREL) 50 MG tablet Take 1 tablet (50 mg total) by mouth every evening. 30 tablet 11    triamcinolone acetonide 0.5% (KENALOG) 0.5 % Crea Apply topically 2 (two) times daily. 1 Tube 1     No current facility-administered medications for this visit.        Medications Discontinued During This Encounter   Medication Reason    levothyroxine (SYNTHROID) 75 MCG tablet Reorder       No Follow-up on file.      Dr Angely Roberts MD    Disclaimer: This note is prepared using voice recognition system and as such is likely to have errors and is not proof read.

## 2017-07-03 ENCOUNTER — LAB VISIT (OUTPATIENT)
Dept: LAB | Facility: HOSPITAL | Age: 77
End: 2017-07-03
Attending: INTERNAL MEDICINE
Payer: MEDICARE

## 2017-07-03 ENCOUNTER — OFFICE VISIT (OUTPATIENT)
Dept: GASTROENTEROLOGY | Facility: CLINIC | Age: 77
End: 2017-07-03
Payer: MEDICARE

## 2017-07-03 VITALS
HEART RATE: 88 BPM | BODY MASS INDEX: 30.22 KG/M2 | HEIGHT: 64 IN | SYSTOLIC BLOOD PRESSURE: 122 MMHG | WEIGHT: 177 LBS | DIASTOLIC BLOOD PRESSURE: 70 MMHG

## 2017-07-03 DIAGNOSIS — D62 ACUTE POST-HEMORRHAGIC ANEMIA: Primary | ICD-10-CM

## 2017-07-03 DIAGNOSIS — R14.0 ABDOMINAL BLOATING: ICD-10-CM

## 2017-07-03 DIAGNOSIS — D50.0 IRON DEFICIENCY ANEMIA DUE TO CHRONIC BLOOD LOSS: ICD-10-CM

## 2017-07-03 DIAGNOSIS — K55.20 ANGIODYSPLASIA OF COLON: ICD-10-CM

## 2017-07-03 DIAGNOSIS — D62 ACUTE POST-HEMORRHAGIC ANEMIA: ICD-10-CM

## 2017-07-03 DIAGNOSIS — R14.3 FLATULENCE: ICD-10-CM

## 2017-07-03 LAB
FERRITIN SERPL-MCNC: 38 NG/ML
IRON SERPL-MCNC: 60 UG/DL
SATURATED IRON: 14 %
TOTAL IRON BINDING CAPACITY: 419 UG/DL
TRANSFERRIN SERPL-MCNC: 283 MG/DL

## 2017-07-03 PROCEDURE — 99999 PR PBB SHADOW E&M-EST. PATIENT-LVL II: CPT | Mod: PBBFAC,,, | Performed by: INTERNAL MEDICINE

## 2017-07-03 PROCEDURE — 36415 COLL VENOUS BLD VENIPUNCTURE: CPT

## 2017-07-03 PROCEDURE — 99213 OFFICE O/P EST LOW 20 MIN: CPT | Mod: S$GLB,,, | Performed by: INTERNAL MEDICINE

## 2017-07-03 PROCEDURE — 1159F MED LIST DOCD IN RCRD: CPT | Mod: S$GLB,,, | Performed by: INTERNAL MEDICINE

## 2017-07-03 PROCEDURE — 83540 ASSAY OF IRON: CPT

## 2017-07-03 PROCEDURE — 1126F AMNT PAIN NOTED NONE PRSNT: CPT | Mod: S$GLB,,, | Performed by: INTERNAL MEDICINE

## 2017-07-03 PROCEDURE — 82728 ASSAY OF FERRITIN: CPT

## 2017-07-03 NOTE — PROGRESS NOTES
Subjective:       Patient ID: Jasmine Velásquez is a 76 y.o. female.    Chief Complaint: Hospital Follow Up and Bloated    This patient has a history of colon polyps removed on colonoscopy in August of last year. She was brought back to have a repeat colonoscopy in May because of decrease in H&H. She had her colonoscopy May 24th and angiodysplasia found. She had argon coagulation of these lesions and suffered a near perforation. She has recovered well. Hgb also has rebounded on oral iron with level up to 12.9 g. Sh admits to not taking her iron but once/day instead of BID.    She now has complaint of abdominal bloating. She ate red beans yesterday and she knows she has an obese abdomen. The former is likely from gas. She is having no abdominal pain or nausea and vomiting. There is no anorexia or weight loss. There is no BRBPR  Or true discernible melena as she is on iron. There is no change in bowel habits but takes a laxative from time to time for constipation.       Review of Systems   Constitutional: Negative for activity change, appetite change, chills, diaphoresis, fatigue, fever and unexpected weight change.   HENT: Negative for congestion, ear discharge, ear pain, hearing loss, nosebleeds, postnasal drip and tinnitus.    Eyes: Negative for photophobia and visual disturbance.   Respiratory: Negative for apnea, cough, choking, chest tightness, shortness of breath and wheezing.    Cardiovascular: Negative for chest pain, palpitations and leg swelling.   Gastrointestinal: Negative for abdominal distention, abdominal pain, anal bleeding, blood in stool, constipation, diarrhea, nausea, rectal pain and vomiting.   Genitourinary: Negative for difficulty urinating, dyspareunia, dysuria, flank pain, frequency, hematuria, menstrual problem, pelvic pain, urgency, vaginal bleeding and vaginal discharge.   Musculoskeletal: Positive for back pain, gait problem and joint swelling. Negative for arthralgias,  myalgias and neck stiffness.   Skin: Negative for pallor and rash.   Neurological: Negative for dizziness, tremors, seizures, syncope, speech difficulty, weakness, numbness and headaches.   Hematological: Negative for adenopathy.   Psychiatric/Behavioral: Negative for agitation, confusion, hallucinations, sleep disturbance and suicidal ideas.       Objective:      Physical Exam   Constitutional: She is oriented to person, place, and time. She appears well-developed and well-nourished.   HENT:   Head: Normocephalic and atraumatic.   Bilateral turbinate congestion   Eyes: Conjunctivae and EOM are normal. Pupils are equal, round, and reactive to light. Right eye exhibits no discharge. Left eye exhibits no discharge. No scleral icterus.   Neck: Normal range of motion. Neck supple. No JVD present. No thyromegaly present.   Cardiovascular: Normal rate, regular rhythm and intact distal pulses.  Exam reveals no gallop and no friction rub.    Murmur heard.  Pulmonary/Chest: Effort normal and breath sounds normal. No respiratory distress. She has no wheezes. She has no rales. She exhibits no tenderness.   Abdominal: Soft. Bowel sounds are normal. She exhibits no distension and no mass. There is no tenderness. There is no rebound and no guarding.   full rounded liver edge   Musculoskeletal: Normal range of motion. She exhibits no edema.   Lymphadenopathy:     She has no cervical adenopathy.   Neurological: She is alert and oriented to person, place, and time. She has normal reflexes. She exhibits normal muscle tone. Coordination normal.   Skin: Skin is warm and dry. No rash noted. No erythema. No pallor.   Psychiatric: She has a normal mood and affect. Her behavior is normal. Judgment and thought content normal.   Vitals reviewed.      Assessment:    Abdominal Bloating   Flatus   Post hemorrhagic anemia           Improved on iron   Constipation           Possibly exaggerated by iron therapy   Colonic Angiodysplasia  No  diagnosis found.    Plan:     Repeat iron studies.  If repleted, may be able to hold and avoid side effects.  If improved hemoglobin is at the expense of depleting iron stores, will need continued and intervention.  May have to consider iron infusion if iron studies are low and patient has problems with tolerance.

## 2017-07-19 RX ORDER — TRAZODONE HYDROCHLORIDE 50 MG/1
TABLET ORAL
Qty: 30 TABLET | Refills: 0 | Status: SHIPPED | OUTPATIENT
Start: 2017-07-19 | End: 2017-08-18 | Stop reason: SDUPTHER

## 2017-07-27 ENCOUNTER — OFFICE VISIT (OUTPATIENT)
Dept: UROLOGY | Facility: CLINIC | Age: 77
End: 2017-07-27
Payer: MEDICARE

## 2017-07-27 VITALS
BODY MASS INDEX: 30.35 KG/M2 | HEIGHT: 64 IN | SYSTOLIC BLOOD PRESSURE: 138 MMHG | HEART RATE: 72 BPM | DIASTOLIC BLOOD PRESSURE: 62 MMHG | WEIGHT: 177.81 LBS

## 2017-07-27 DIAGNOSIS — R31.9 HEMATURIA: Primary | ICD-10-CM

## 2017-07-27 LAB
AMORPH CRY UR QL COMP ASSIST: ABNORMAL
BACTERIA #/AREA URNS AUTO: ABNORMAL /HPF
GRAN CASTS UR QL COMP ASSIST: 1 /LPF
HYALINE CASTS UR QL AUTO: 7 /LPF
MICROSCOPIC COMMENT: ABNORMAL
NON-SQ EPI CELLS #/AREA URNS AUTO: <1 /HPF
RBC #/AREA URNS AUTO: 2 /HPF (ref 0–4)
SQUAMOUS #/AREA URNS AUTO: 1 /HPF
WBC #/AREA URNS AUTO: 2 /HPF (ref 0–5)

## 2017-07-27 PROCEDURE — 81001 URINALYSIS AUTO W/SCOPE: CPT

## 2017-07-27 PROCEDURE — 1126F AMNT PAIN NOTED NONE PRSNT: CPT | Mod: S$GLB,,, | Performed by: UROLOGY

## 2017-07-27 PROCEDURE — 99204 OFFICE O/P NEW MOD 45 MIN: CPT | Mod: S$GLB,,, | Performed by: UROLOGY

## 2017-07-27 PROCEDURE — 87086 URINE CULTURE/COLONY COUNT: CPT

## 2017-07-27 PROCEDURE — 1159F MED LIST DOCD IN RCRD: CPT | Mod: S$GLB,,, | Performed by: UROLOGY

## 2017-07-27 PROCEDURE — 99999 PR PBB SHADOW E&M-EST. PATIENT-LVL II: CPT | Mod: PBBFAC,,, | Performed by: UROLOGY

## 2017-07-27 NOTE — LETTER
July 27, 2017      Angely Roberts MD  [address] [address] [address]           [facility]  [id number] [place] [id number]  Phone: [phone]  Fax: [phone]          Patient: Jasmine Velásquez   MR Number: [id number]   YOB: 1940   Date of Visit: 7/27/2017       Dear Dr. Angely Roberts:    Thank you for referring Jasmine Velásquez to me for evaluation. Attached you will find relevant portions of my assessment and plan of care.    If you have questions, please do not hesitate to call me. I look forward to following Jasmine Velásquez along with you.    Sincerely,    Ronald Summers IV, MD    Enclosure  CC:  No Recipients    If you would like to receive this communication electronically, please contact [email or web address] or [phone] to request more information on [employer] Link access.    For providers and/or their staff who would like to refer a patient to [facility], please contact us through our one-stop-shop provider referral line, [facility], at [phone].    If you feel you have received this communication in error or would no longer like to receive these types of communications, please e-mail [email or web address]

## 2017-07-27 NOTE — PROGRESS NOTES
Chief Complaint: Microhematuria    HPI:   7/27/17: 75 yo woman referred for microhematuria.  Had a PCNL on the left side with Dr. Leblanc many years ago no stones no recent CT.  Had a workup with upper/lower GI with a perforated bowel and associated complications/gangrene over last two months feeling a lot better now.  No sig abd/pelvic pain and no exac/rel factors.  No gross hematuria.  No current urolithiasis.  No urinary bother.      Allergies:  Ace inhibitors and Codeine    Medications: has a current medication list which includes the following prescription(s): allopurinol, amlodipine, aspirin, biotin, diclofenac sodium, ferrous sulfate, fish oil-omega-3 fatty acids, furosemide, gabapentin, gavilyte-n, glipizide, glyburide, levothyroxine, losartan, lovastatin, metformin, methocarbamol, metoprolol tartrate, pantoprazole, potassium chloride sa, pramipexole, trazodone, and triamcinolone acetonide 0.5%.    Review of Systems:  General: No fever, chills, fatigability, or weight loss.  Skin: No rashes, itching, or changes in color or texture of skin.  Chest: Denies HALL, cyanosis, wheezing, cough, and sputum production.  Abdomen: Appetite fine. No weight loss. Denies diarrhea, abdominal pain, hematemesis, or blood in stool.  Musculoskeletal: No joint stiffness or swelling. Denies back pain.  : As above.  All other review of systems negative.    PMH:   has a past medical history of Diabetes mellitus, type 2 (2006); GERD (gastroesophageal reflux disease); Hyperlipidemia; Hypertension; and Hypothyroidism.    PSH:   has a past surgical history that includes Back surgery; PIG VALVE; Appendectomy; Tonsillectomy; Cataract extraction (Bilateral); Colonoscopy (N/A, 8/23/2016); and Colonoscopy (N/A, 5/24/2017).    FamHx: family history includes COPD in her sister; Cancer (age of onset: 46) in her mother; Diabetes in her father; Heart disease in her father and mother; Hypertension in her father.    SocHx:  reports that she quit  smoking about 15 years ago. She has a 25.00 pack-year smoking history. She has never used smokeless tobacco. She reports that she does not drink alcohol. Her drug history is not on file.     Physical Exam:  Vitals:   Vitals:    07/27/17 1556   BP: 138/62   Pulse: 72     General: A&Ox3. No apparent distress. No deformities.  Neck: No masses. Normal thyroid.  Lungs: normal inspiration. No use of accessory muscles.  Heart: normal pulse. No arrhythmias.  Abdomen: Soft. NT. ND. No masses. No hernias. No hepatosplenomegaly.  Lymphatic: Neck and groin nodes negative.  Skin: The skin is warm and dry. No jaundice.  Ext: No c/c/e.  : External genitalia normal.     Labs/Studies: Urinalysis performed in clinic, summary: UA normal exc +blood    Impression/Plan:   1. Cath UA/UCx today and in 1 mo; if hematuria confirmed will get IVP/cysto.

## 2017-07-28 ENCOUNTER — TELEPHONE (OUTPATIENT)
Dept: UROLOGY | Facility: CLINIC | Age: 77
End: 2017-07-28

## 2017-07-28 LAB — BACTERIA UR CULT: NO GROWTH

## 2017-07-31 ENCOUNTER — TELEPHONE (OUTPATIENT)
Dept: UROLOGY | Facility: CLINIC | Age: 77
End: 2017-07-31

## 2017-08-18 RX ORDER — TRAZODONE HYDROCHLORIDE 50 MG/1
50 TABLET ORAL NIGHTLY
Qty: 30 TABLET | Refills: 5 | Status: SHIPPED | OUTPATIENT
Start: 2017-08-18 | End: 2018-01-08

## 2017-08-18 NOTE — TELEPHONE ENCOUNTER
----- Message from Flora Fang sent at 8/18/2017  9:29 AM CDT -----  Contact: Pt  Pt states that she needs a refill on her TRAZODONE and states that the pharmacist has sent over 3 request and there has been no response. Pt states that she has been up and she can't sleep without the medication. Please give pt a call at ..[phone] (home) and send refill over to the:          [id number] - [place] - [id number] [facility] [address]  Phone: [phone] Fax: [phone]

## 2017-08-29 ENCOUNTER — CLINICAL SUPPORT (OUTPATIENT)
Dept: UROLOGY | Facility: CLINIC | Age: 77
End: 2017-08-29
Payer: MEDICARE

## 2017-08-29 VITALS — BODY MASS INDEX: 30.38 KG/M2 | WEIGHT: 177 LBS

## 2017-08-29 DIAGNOSIS — N39.0 URINARY TRACT INFECTION WITHOUT HEMATURIA, SITE UNSPECIFIED: Primary | ICD-10-CM

## 2017-08-29 LAB
HYALINE CASTS UR QL AUTO: 1 /LPF
MICROSCOPIC COMMENT: NORMAL
WBC #/AREA URNS AUTO: 1 /HPF (ref 0–5)
WBC CLUMPS UR QL AUTO: NORMAL

## 2017-08-29 PROCEDURE — 87086 URINE CULTURE/COLONY COUNT: CPT

## 2017-08-29 PROCEDURE — 81001 URINALYSIS AUTO W/SCOPE: CPT

## 2017-08-29 PROCEDURE — 99999 PR PBB SHADOW E&M-EST. PATIENT-LVL III: CPT | Mod: PBBFAC,,,

## 2017-08-29 NOTE — PROGRESS NOTES
..Using sterile technique, patient was catheterized per orders of Dr. Summers due to c/o UTI.  Urine collected and sent to lab.

## 2017-08-30 LAB — BACTERIA UR CULT: NO GROWTH

## 2017-08-30 RX ORDER — METFORMIN HYDROCHLORIDE 1000 MG/1
TABLET ORAL
Qty: 180 TABLET | Refills: 3 | Status: SHIPPED | OUTPATIENT
Start: 2017-08-30 | End: 2018-10-10 | Stop reason: SDUPTHER

## 2017-08-30 RX ORDER — ALLOPURINOL 300 MG/1
TABLET ORAL
Qty: 90 TABLET | Refills: 3 | Status: SHIPPED | OUTPATIENT
Start: 2017-08-30 | End: 2018-09-17 | Stop reason: SDUPTHER

## 2017-08-30 NOTE — TELEPHONE ENCOUNTER
----- Message from Maris Orta sent at 8/30/2017  9:04 AM CDT -----  1. What is the name of the medication you are requesting? Metformin HCL  2. What is the dose? 1000 mgs  3. How do you take the medication? Orally, topically, etc? orally  4. How often do you take this medication? Twice a day  5. Do you need a 30 day or 90 day supply? 90 day  6. How many refills are you requesting? Left up to provider  7. What is your preferred pharmacy and location of the pharmacy? [facility] [phone]  8. Who can we contact with further questions? Patient [phone]    1. What is the name of the medication you are requesting? Allopurinol  2. What is the dose? 300 mgs  3. How do you take the medication? Orally, topically, etc? orally  4. How often do you take this medication? Once a day  5. Do you need a 30 day or 90 day supply? 90 day  6. How many refills are you requesting? Left up to provider  7. What is your preferred pharmacy and location of the pharmacy? [phone]  8. Who can we contact with further questions? Patient [phone]                                                                             long

## 2017-08-31 ENCOUNTER — TELEPHONE (OUTPATIENT)
Dept: UROLOGY | Facility: CLINIC | Age: 77
End: 2017-08-31

## 2017-09-11 ENCOUNTER — HOSPITAL ENCOUNTER (OUTPATIENT)
Dept: RADIOLOGY | Facility: HOSPITAL | Age: 77
Discharge: HOME | End: 2017-09-11
Attending: FAMILY MEDICINE
Payer: MEDICARE

## 2017-09-11 DIAGNOSIS — R19.02 LEFT UPPER QUADRANT ABDOMINAL MASS: Primary | ICD-10-CM

## 2017-09-11 DIAGNOSIS — R19.02 LEFT UPPER QUADRANT ABDOMINAL MASS: ICD-10-CM

## 2017-09-11 PROCEDURE — 25500020 PHARM REV CODE 255: Performed by: FAMILY MEDICINE

## 2017-09-11 PROCEDURE — 74177 CT ABD & PELVIS W/CONTRAST: CPT | Mod: TC

## 2017-09-11 RX ADMIN — IOHEXOL 30 ML: 350 INJECTION, SOLUTION INTRAVENOUS at 09:09

## 2017-09-11 RX ADMIN — IOHEXOL 75 ML: 350 INJECTION, SOLUTION INTRAVENOUS at 11:09

## 2017-09-14 ENCOUNTER — TELEPHONE (OUTPATIENT)
Dept: FAMILY MEDICINE | Facility: CLINIC | Age: 77
End: 2017-09-14

## 2017-09-14 DIAGNOSIS — K63.9 SMALL BOWEL LESION: Primary | ICD-10-CM

## 2017-09-14 NOTE — TELEPHONE ENCOUNTER
Pt is calling for CT scan results , she had it Monday, we had ordered it back in June , she is just now having the test

## 2017-09-14 NOTE — TELEPHONE ENCOUNTER
----- Message from Mandie Juarez sent at 9/14/2017  9:41 AM CDT -----  Contact: Patient  Patient called to speak with the nurse; she stated she had a CT scan on Monday for a mass behind her spleen and she was told she would get a call on Monday afternoon for the results and as of today she hasn't received a call.    She can be contacted at [phone].    Thanks,  Mandie

## 2017-09-14 NOTE — TELEPHONE ENCOUNTER
Please tell her that I have gotten the results and have to look up a few things and will call her back tomorrow

## 2017-09-19 NOTE — TELEPHONE ENCOUNTER
----- Message from Garrett Patel sent at 9/19/2017  9:02 AM CDT -----  Contact: Pt  Pt request call from nurse to get results of test done on 09-11-17, please contact pt at [phone], pt also states nurse was suppose to call to make her an apt with a specialist and she has not heard back yet

## 2017-09-21 ENCOUNTER — LAB VISIT (OUTPATIENT)
Dept: LAB | Facility: HOSPITAL | Age: 77
End: 2017-09-21
Attending: FAMILY MEDICINE
Payer: MEDICARE

## 2017-09-21 DIAGNOSIS — I10 ESSENTIAL HYPERTENSION: ICD-10-CM

## 2017-09-21 DIAGNOSIS — E11.42 DIABETIC POLYNEUROPATHY ASSOCIATED WITH TYPE 2 DIABETES MELLITUS: ICD-10-CM

## 2017-09-21 LAB
ALBUMIN SERPL BCP-MCNC: 3.7 G/DL
ALP SERPL-CCNC: 68 U/L
ALT SERPL W/O P-5'-P-CCNC: 12 U/L
ANION GAP SERPL CALC-SCNC: 15 MMOL/L
AST SERPL-CCNC: 17 U/L
BILIRUB SERPL-MCNC: 0.5 MG/DL
BUN SERPL-MCNC: 24 MG/DL
CALCIUM SERPL-MCNC: 9.3 MG/DL
CHLORIDE SERPL-SCNC: 103 MMOL/L
CHOLEST SERPL-MCNC: 142 MG/DL
CHOLEST/HDLC SERPL: 3.6 {RATIO}
CO2 SERPL-SCNC: 25 MMOL/L
CREAT SERPL-MCNC: 1.1 MG/DL
EST. GFR  (AFRICAN AMERICAN): 56 ML/MIN/1.73 M^2
EST. GFR  (NON AFRICAN AMERICAN): 48.5 ML/MIN/1.73 M^2
GLUCOSE SERPL-MCNC: 118 MG/DL
HDLC SERPL-MCNC: 39 MG/DL
HDLC SERPL: 27.5 %
LDLC SERPL CALC-MCNC: 57.4 MG/DL
NONHDLC SERPL-MCNC: 103 MG/DL
POTASSIUM SERPL-SCNC: 4 MMOL/L
PROT SERPL-MCNC: 6.8 G/DL
SODIUM SERPL-SCNC: 143 MMOL/L
TRIGL SERPL-MCNC: 228 MG/DL

## 2017-09-21 PROCEDURE — 80053 COMPREHEN METABOLIC PANEL: CPT

## 2017-09-21 PROCEDURE — 80061 LIPID PANEL: CPT

## 2017-09-21 PROCEDURE — 36415 COLL VENOUS BLD VENIPUNCTURE: CPT | Mod: PO

## 2017-09-21 PROCEDURE — 83036 HEMOGLOBIN GLYCOSYLATED A1C: CPT

## 2017-09-22 LAB
ESTIMATED AVG GLUCOSE: 123 MG/DL
HBA1C MFR BLD HPLC: 5.9 %

## 2017-09-27 ENCOUNTER — OFFICE VISIT (OUTPATIENT)
Dept: GASTROENTEROLOGY | Facility: CLINIC | Age: 77
End: 2017-09-27
Payer: MEDICARE

## 2017-09-27 VITALS
BODY MASS INDEX: 29.57 KG/M2 | DIASTOLIC BLOOD PRESSURE: 102 MMHG | WEIGHT: 177.5 LBS | HEIGHT: 65 IN | HEART RATE: 88 BPM | SYSTOLIC BLOOD PRESSURE: 148 MMHG

## 2017-09-27 DIAGNOSIS — R93.5 ABNORMAL CT OF THE ABDOMEN: Primary | ICD-10-CM

## 2017-09-27 PROCEDURE — 3080F DIAST BP >= 90 MM HG: CPT | Mod: S$GLB,,, | Performed by: INTERNAL MEDICINE

## 2017-09-27 PROCEDURE — 99999 PR PBB SHADOW E&M-EST. PATIENT-LVL III: CPT | Mod: PBBFAC,,, | Performed by: INTERNAL MEDICINE

## 2017-09-27 PROCEDURE — 3008F BODY MASS INDEX DOCD: CPT | Mod: S$GLB,,, | Performed by: INTERNAL MEDICINE

## 2017-09-27 PROCEDURE — 3077F SYST BP >= 140 MM HG: CPT | Mod: S$GLB,,, | Performed by: INTERNAL MEDICINE

## 2017-09-27 PROCEDURE — 1159F MED LIST DOCD IN RCRD: CPT | Mod: S$GLB,,, | Performed by: INTERNAL MEDICINE

## 2017-09-27 PROCEDURE — 99214 OFFICE O/P EST MOD 30 MIN: CPT | Mod: S$GLB,,, | Performed by: INTERNAL MEDICINE

## 2017-09-27 PROCEDURE — 1126F AMNT PAIN NOTED NONE PRSNT: CPT | Mod: S$GLB,,, | Performed by: INTERNAL MEDICINE

## 2017-09-27 RX ORDER — SODIUM, POTASSIUM,MAG SULFATES 17.5-3.13G
1 SOLUTION, RECONSTITUTED, ORAL ORAL ONCE
Qty: 1 BOTTLE | Refills: 0 | Status: SHIPPED | OUTPATIENT
Start: 2017-09-27 | End: 2017-09-27

## 2017-09-27 NOTE — LETTER
September 27, 2017      Angely Roberts MD  [address] [address] [address]           [facility]  [address] [facility]  [place] [address]  Phone: [phone]  Fax: [phone]          Patient: Jasmine Velásquez   MR Number: [id number]   YOB: 1940   Date of Visit: 9/27/2017       Dear Dr. Angely Roberts:    Thank you for referring Jasmine Velásquez to me for evaluation. Attached you will find relevant portions of my assessment and plan of care.    If you have questions, please do not hesitate to call me. I look forward to following Jasmine Velásquez along with you.    Sincerely,    Marcel Jacques MD    Enclosure  CC:  No Recipients    If you would like to receive this communication electronically, please contact [email or web address] or [phone] to request more information on [employer] Link access.    For providers and/or their staff who would like to refer a patient to [facility], please contact us through our one-stop-shop provider referral line, [facility], at [phone].    If you feel you have received this communication in error or would no longer like to receive these types of communications, please e-mail [email or web address]

## 2017-09-27 NOTE — PROGRESS NOTES
Clinic Follow Up:  [facility] Follow Up Note    Reason for Follow Up:  The encounter diagnosis was Abnormal CT of the abdomen.    PCP: Angely Roberts   [id number] [address] / [facility] [address]    HPI:  This is a 77 y.o. female here for follow up of the above issues.  She underwent a colonoscopy in May because of iron deficiency anemia.  She was found to have a large cecal angioectasia that was treated with argon plasma coagulation.  After the procedure she developed significant cecal inflammation and required hospital admission.  This resolved with a few days of antibiotics and bowel rest.  Her pain improved and she has had no further issues with abdominal pain since that time.  She has some chronic constipation issues but drinks apple juice every day and this keeps her stools soft.  She denies any nausea, vomiting, unintentional weight loss.  Her primary care physician did a CT scan of abdomen and pelvis recently to follow-up on a small lesion that was noted near the spleen in May.  The splenic lesion was not found and the inflammatory changes adjacent to the cecum have resolved but she was found to have thickening of the colon concerning for a possible mass lesion in the distal ascending colon.  Her only complaint is of some left inguinal pain.  This only occurs with walking.     Review of Systems:  CONSTITUTIONAL: Denies unintentional weight change,  fatigue, fevers, chills, night sweats.  CARDIOVASCULAR: Denies chest pain, shortness of breath, orthopnea and edema.  RESPIRATORY: Denies cough, hemoptysis, dyspnea, and wheezing.  GI: See HPI.  : Denies dysuria and hematuria  MUSCULOSKELETAL: Positive for hip and back pain.    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    APPENDECTOMY      BACK SURGERY      CAROTID ENDARTERECTOMY Left 2016    CATARACT EXTRACTION Bilateral     [facility]    COLONOSCOPY N/A 8/23/2016    Procedure: COLONOSCOPY;  Surgeon: Marcel Jacques MD;  Location: [facility] ENDO;  Service: Endoscopy;  Laterality: N/A;    COLONOSCOPY N/A 5/24/2017    Procedure: COLONOSCOPY;  Surgeon: Jayy Rosa MD;  Location: [facility] ENDO;  Service: Endoscopy;  Laterality: N/A;    TONSILLECTOMY         Family History:   Family History   Problem Relation Age of Onset    Cancer Mother 46     colon    Heart disease Mother     Heart disease Father     Hypertension Father     Diabetes Father     COPD Sister        Social History:   Social History   Substance Use Topics    Smoking status: Former Smoker     Packs/day: 1.00     Years: 25.00     Quit date: 5/24/2002    Smokeless tobacco: Never Used    Alcohol use No       Allergies: Reviewed    Home Medications:  Medication List with Changes/Refills   New Medications    SODIUM,POTASSIUM,MAG SULFATES (SUPREP BOWEL PREP KIT) 17.5-3.13-1.6 GRAM SOLR    Take 1 kit by mouth once.   Current Medications    ALLOPURINOL (ZYLOPRIM) 300 MG TABLET    TAKE 1 TABLET (300 MG TOTAL) BY MOUTH ONCE DAILY.    AMLODIPINE (NORVASC) 5 MG TABLET    Take 1 tablet (5 mg total) by mouth once daily.    ASPIRIN (ECOTRIN) 81 MG EC TABLET    Take 81 mg by mouth once daily.    BIOTIN 1 MG CAP    Take by mouth.    CMPD DICLOFENAC 2%- AMITRIPTYLINE 2%- LIDOCAINE 2%- PRILOCAINE 2% IN LIPODERM ACTIVEMAX    bid    FISH OIL-OMEGA-3 FATTY ACIDS 300-1,000 MG CAPSULE    Take 2 g by mouth once daily.    FUROSEMIDE (LASIX) 40 MG TABLET    Take 40 mg by mouth once daily.    GABAPENTIN (NEURONTIN) 100 MG CAPSULE    TAKE 1 CAPSULE (100 MG TOTAL) BY MOUTH 3 (THREE) TIMES DAILY.    GLIPIZIDE (GLUCOTROL) 5 MG TABLET    TAKE 1 TABLET (5  "MG TOTAL) BY MOUTH 2 (TWO) TIMES DAILY BEFORE MEALS.    GLYBURIDE (DIABETA) 5 MG TABLET    Take 5 mg by mouth once daily.    LEVOTHYROXINE (SYNTHROID) 75 MCG TABLET    Take 1 tablet (75 mcg total) by mouth before breakfast.    LOSARTAN (COZAAR) 100 MG TABLET    TAKE 1 TABLET (100 MG TOTAL) BY MOUTH ONCE DAILY.    LOVASTATIN (MEVACOR) 40 MG TABLET    TAKE 1 TABLET (40 MG TOTAL) BY MOUTH EVERY EVENING.    METFORMIN (GLUCOPHAGE) 1000 MG TABLET    TAKE 1 TABLET (1,000 MG TOTAL) BY MOUTH 2 (TWO) TIMES DAILY.    METHOCARBAMOL (ROBAXIN) 750 MG TAB    Take 1 tablet by mouth every 8 (eight) hours as needed.    METOPROLOL TARTRATE (LOPRESSOR) 50 MG TABLET    1 BY MOUTH DAILY    PANTOPRAZOLE (PROTONIX) 40 MG TABLET    Take 1 tablet (40 mg total) by mouth once daily.    POTASSIUM CHLORIDE SA (K-DUR,KLOR-CON) 10 MEQ TABLET    Take once daily    PRAMIPEXOLE (MIRAPEX) 0.25 MG TABLET    TAKE 1 TABLET (0.25 MG TOTAL) BY MOUTH EVERY EVENING.    TRAZODONE (DESYREL) 50 MG TABLET    Take 1 tablet (50 mg total) by mouth every evening.    TRIAMCINOLONE ACETONIDE 0.5% (KENALOG) 0.5 % CREA    Apply topically 2 (two) times daily.   Discontinued Medications    FERROUS SULFATE 325 MG (65 MG IRON) CPSR    Take 1 tablet by mouth 2 times daily 2 hours after meal.    GAVILYTE-N 420 GRAM SOLR           Physical Exam:  Vital Signs:  BP (!) 148/102   Pulse 88   Ht 5' 4.8" (1.646 m)   Wt 80.5 kg (177 lb 7.5 oz)   BMI 29.72 kg/m²   Body mass index is 29.72 kg/m².      GENERAL: No acute distress, Alert and oriented x 4  EYES: Anicteric, no pallor noted.  ENT: Oropharynx is clear  NECK: Supple, no masses, no thyromegally.  CHEST: Equal breath sounds bilaterally, no wheezing.  CARDIOVASCULAR: Regular rate and rhythm. Murmurs, rubs and gallops absent.  ABDOMEN: soft, non-tender, non-distended, normal bowel sounds.  EXTREMITIES: No clubbing, cyanosis or edema.      Labs: Pertinent labs reviewed.     Endoscopy:  Iron deficiency anemia has " resolved.    CRC Screening: Previous report reviewed.    Assessment:  1. Abnormal CT of the abdomen        Recommendations:  1.  Abnormal CT scan of the abdomen: The radiologist describes an apple core lesion in the distal ascending colon.  Given her recent colonoscopy I doubt that this is of any clinical significance and more likely represents a spasm of the colon but given the potential severity of such a condition and the association with possible malignancy I did recommend we repeat her colonoscopy to reassess this area.  If the colonoscopy is unrevealing then no further evaluation will be necessary.    Return to Clinic:    Follow up based on the above evaluation.

## 2017-09-28 ENCOUNTER — OFFICE VISIT (OUTPATIENT)
Dept: FAMILY MEDICINE | Facility: CLINIC | Age: 77
End: 2017-09-28
Payer: MEDICARE

## 2017-09-28 VITALS
BODY MASS INDEX: 30.09 KG/M2 | HEIGHT: 64 IN | HEART RATE: 83 BPM | OXYGEN SATURATION: 95 % | SYSTOLIC BLOOD PRESSURE: 100 MMHG | WEIGHT: 176.25 LBS | TEMPERATURE: 99 F | DIASTOLIC BLOOD PRESSURE: 62 MMHG

## 2017-09-28 DIAGNOSIS — G25.81 RESTLESS LEGS SYNDROME (RLS): Chronic | ICD-10-CM

## 2017-09-28 DIAGNOSIS — E03.4 HYPOTHYROIDISM DUE TO ACQUIRED ATROPHY OF THYROID: ICD-10-CM

## 2017-09-28 DIAGNOSIS — D50.9 IRON DEFICIENCY ANEMIA, UNSPECIFIED IRON DEFICIENCY ANEMIA TYPE: ICD-10-CM

## 2017-09-28 DIAGNOSIS — I10 ESSENTIAL HYPERTENSION: ICD-10-CM

## 2017-09-28 DIAGNOSIS — E11.42 DIABETIC POLYNEUROPATHY ASSOCIATED WITH TYPE 2 DIABETES MELLITUS: Primary | ICD-10-CM

## 2017-09-28 PROCEDURE — 1159F MED LIST DOCD IN RCRD: CPT | Mod: S$GLB,,, | Performed by: FAMILY MEDICINE

## 2017-09-28 PROCEDURE — 99214 OFFICE O/P EST MOD 30 MIN: CPT | Mod: S$GLB,,, | Performed by: FAMILY MEDICINE

## 2017-09-28 PROCEDURE — 90662 IIV NO PRSV INCREASED AG IM: CPT | Mod: S$GLB,,, | Performed by: FAMILY MEDICINE

## 2017-09-28 PROCEDURE — 3078F DIAST BP <80 MM HG: CPT | Mod: S$GLB,,, | Performed by: FAMILY MEDICINE

## 2017-09-28 PROCEDURE — 99499 UNLISTED E&M SERVICE: CPT | Mod: S$PBB,,, | Performed by: FAMILY MEDICINE

## 2017-09-28 PROCEDURE — G0008 ADMIN INFLUENZA VIRUS VAC: HCPCS | Mod: S$GLB,,, | Performed by: FAMILY MEDICINE

## 2017-09-28 PROCEDURE — 3074F SYST BP LT 130 MM HG: CPT | Mod: S$GLB,,, | Performed by: FAMILY MEDICINE

## 2017-09-28 PROCEDURE — 1125F AMNT PAIN NOTED PAIN PRSNT: CPT | Mod: S$GLB,,, | Performed by: FAMILY MEDICINE

## 2017-09-28 PROCEDURE — 99999 PR PBB SHADOW E&M-EST. PATIENT-LVL III: CPT | Mod: PBBFAC,,, | Performed by: FAMILY MEDICINE

## 2017-09-28 PROCEDURE — 3008F BODY MASS INDEX DOCD: CPT | Mod: S$GLB,,, | Performed by: FAMILY MEDICINE

## 2017-09-28 NOTE — PROGRESS NOTES
Chief Complaint:    Chief Complaint   Patient presents with    Follow-up       History of Present Illness:  Patient presents today for follow-up of chronic medical problems,  She has chronic back pain which radiates to the left posterior thigh and she has some weakness on there.  She has seen on spinal surgeon had MRI done was told that she's not a surgical candidate.  She was found to have some colon thickening on recent CAT scan she has a colonoscopy set up with Dr. Jacques  Diabetes has improved some A1c is down 5.7 blood pressures normal lipids are at goal.  She's had some wine deficiency anemia due to GI loss which has been corrected.      ROS:  Review of Systems   Constitutional: Negative for activity change, chills, fatigue, fever and unexpected weight change.   HENT: Negative for congestion, ear discharge, ear pain, hearing loss, postnasal drip and rhinorrhea.    Eyes: Negative for pain and visual disturbance.   Respiratory: Negative for cough, chest tightness and shortness of breath.    Cardiovascular: Negative for chest pain and palpitations.   Gastrointestinal: Negative for abdominal pain, diarrhea and vomiting.   Endocrine: Negative for heat intolerance.   Genitourinary: Negative for dysuria, flank pain, frequency and hematuria.   Musculoskeletal: Positive for back pain. Negative for gait problem and neck pain.   Skin: Negative for color change and rash.   Neurological: Negative for dizziness, tremors, seizures, numbness and headaches.   Psychiatric/Behavioral: Negative for agitation, hallucinations, self-injury, sleep disturbance and suicidal ideas. The patient is not nervous/anxious.        Past Medical History:   Diagnosis Date    Angiodysplasia of cecum     Diabetes mellitus, type 2 2006    BS doesn't check 06/15/2016    GERD (gastroesophageal reflux disease)     Hyperlipidemia     Hypertension     Hypothyroidism        Social History:  Social History     Social History    Marital status:  "     Spouse name: N/A    Number of children: N/A    Years of education: N/A     Social History Main Topics    Smoking status: Former Smoker     Packs/day: 1.00     Years: 25.00     Quit date: 5/24/2002    Smokeless tobacco: Never Used    Alcohol use No    Drug use: No    Sexual activity: Not Asked     Other Topics Concern    None     Social History Narrative    None       Family History:   family history includes COPD in her sister; Cancer (age of onset: 46) in her mother; Diabetes in her father; Heart disease in her father and mother; Hypertension in her father.    Health Maintenance   Topic Date Due    Zoster Vaccine  08/10/2000    Influenza Vaccine  08/01/2017    Foot Exam  03/14/2018    Hemoglobin A1c  03/21/2018    Eye Exam  06/16/2018    Lipid Panel  09/21/2018    DEXA SCAN  05/18/2019    Colonoscopy  05/24/2022    TETANUS VACCINE  02/02/2026    Pneumococcal (65+)  Completed       Physical Exam:    Vital Signs  Temp: 98.5 °F (36.9 °C)  Temp src: Tympanic  Pulse: 83  SpO2: 95 %  BP: 100/62  BP Location: Left arm  Patient Position: Sitting  Pain Score:   4  Pain Loc: Back  Height and Weight  Height: 5' 4" (162.6 cm)  Weight: 79.9 kg (176 lb 4.1 oz)  BSA (Calculated - sq m): 1.9 sq meters  BMI (Calculated): 30.3  Weight in (lb) to have BMI = 25: 145.3]    Body mass index is 30.25 kg/m².    Physical Exam   Constitutional: She is oriented to person, place, and time. She appears well-developed.   HENT:   Mouth/Throat: Oropharynx is clear and moist.   Eyes: Conjunctivae are normal. Pupils are equal, round, and reactive to light.   Neck: Normal range of motion. Neck supple.   Cardiovascular: Normal rate, regular rhythm and normal heart sounds.    No murmur heard.  Pulmonary/Chest: Effort normal and breath sounds normal. No respiratory distress. She has no wheezes. She has no rales. She exhibits no tenderness.   Abdominal: Soft. She exhibits no distension and no mass. There is no tenderness. " There is no guarding.   Musculoskeletal: She exhibits no edema or tenderness.        Lumbar back: She exhibits no tenderness.   Straight leg raising test is negative bilaterally   Lymphadenopathy:     She has no cervical adenopathy.   Neurological: She is alert and oriented to person, place, and time. She has normal reflexes.   Skin: Skin is warm and dry.   Psychiatric: She has a normal mood and affect. Her behavior is normal. Judgment and thought content normal.       Lab Results   Component Value Date    CHOL 142 09/21/2017    CHOL 143 06/14/2017    CHOL 142 03/07/2017    TRIG 228 (H) 09/21/2017    TRIG 211 (H) 06/14/2017    TRIG 216 (H) 03/07/2017    HDL 39 (L) 09/21/2017    HDL 38 (L) 06/14/2017    HDL 41 03/07/2017    TOTALCHOLEST 3.6 09/21/2017    TOTALCHOLEST 3.8 06/14/2017    TOTALCHOLEST 3.5 03/07/2017    NONHDLCHOL 103 09/21/2017    NONHDLCHOL 105 06/14/2017    NONHDLCHOL 101 03/07/2017       Lab Results   Component Value Date    HGBA1C 5.9 (H) 09/21/2017       Assessment:      ICD-10-CM ICD-9-CM   1. Diabetic polyneuropathy associated with type 2 diabetes mellitus E11.42 250.60     357.2   2. Essential hypertension I10 401.9   3. Hypothyroidism due to acquired atrophy of thyroid E03.4 244.8     246.8   4. Iron deficiency anemia, unspecified iron deficiency anemia type D50.9 280.9   5. Restless legs syndrome (RLS) G25.81 333.94         Plan:  Keep up appointment for colonoscopy with Dr. Jacques  Chronic back pain is stable managed conservatively  Diabetes is stable  Continue current medications  She will receive influenza vaccination discussed getting zoster vaccine local pharmacy  Follow-up 3 months  Orders Placed This Encounter   Procedures    Influenza - High Dose (65+) (PF) (IM)    Lipid panel    Microalbumin/creatinine urine ratio    Comprehensive metabolic panel    Hemoglobin A1c    TSH    CBC auto differential       Current Outpatient Prescriptions   Medication Sig Dispense Refill     allopurinol (ZYLOPRIM) 300 MG tablet TAKE 1 TABLET (300 MG TOTAL) BY MOUTH ONCE DAILY. 90 tablet 3    amlodipine (NORVASC) 5 MG tablet Take 1 tablet (5 mg total) by mouth once daily. 30 tablet 11    aspirin (ECOTRIN) 81 MG EC tablet Take 81 mg by mouth once daily.      biotin 1 mg Cap Take by mouth.      fish oil-omega-3 fatty acids 300-1,000 mg capsule Take 2 g by mouth once daily.      furosemide (LASIX) 40 MG tablet Take 40 mg by mouth once daily.      gabapentin (NEURONTIN) 100 MG capsule TAKE 1 CAPSULE (100 MG TOTAL) BY MOUTH 3 (THREE) TIMES DAILY. 270 capsule 3    glipiZIDE (GLUCOTROL) 5 MG tablet TAKE 1 TABLET (5 MG TOTAL) BY MOUTH 2 (TWO) TIMES DAILY BEFORE MEALS. 180 tablet 2    glyBURIDE (DIABETA) 5 MG tablet Take 5 mg by mouth once daily.      levothyroxine (SYNTHROID) 75 MCG tablet Take 1 tablet (75 mcg total) by mouth before breakfast. 90 tablet 3    losartan (COZAAR) 100 MG tablet TAKE 1 TABLET (100 MG TOTAL) BY MOUTH ONCE DAILY. 90 tablet 3    lovastatin (MEVACOR) 40 MG tablet TAKE 1 TABLET (40 MG TOTAL) BY MOUTH EVERY EVENING. 30 tablet 6    metformin (GLUCOPHAGE) 1000 MG tablet TAKE 1 TABLET (1,000 MG TOTAL) BY MOUTH 2 (TWO) TIMES DAILY. 180 tablet 3    metoprolol tartrate (LOPRESSOR) 50 MG tablet 1 BY MOUTH DAILY  3    pantoprazole (PROTONIX) 40 MG tablet Take 1 tablet (40 mg total) by mouth once daily. 30 tablet 6    potassium chloride SA (K-DUR,KLOR-CON) 10 MEQ tablet Take once daily 90 tablet 3    pramipexole (MIRAPEX) 0.25 MG tablet TAKE 1 TABLET (0.25 MG TOTAL) BY MOUTH EVERY EVENING. 90 tablet 3    trazodone (DESYREL) 50 MG tablet Take 1 tablet (50 mg total) by mouth every evening. 30 tablet 5     No current facility-administered medications for this visit.        Medications Discontinued During This Encounter   Medication Reason    CMPD diclofenac 2%- amitriptyline 2%- lidocaine 2%- prilocaine 2% in Lipoderm ActiveMax Patient no longer taking    methocarbamol (ROBAXIN) 750  MG Tab Patient no longer taking    triamcinolone acetonide 0.5% (KENALOG) 0.5 % Crea Patient no longer taking       Return in about 3 months (around 12/28/2017).      Dr Angely Roberts MD    Disclaimer: This note is prepared using voice recognition system and as such is likely to have errors and is not proof read.

## 2017-09-28 NOTE — PROGRESS NOTES
Pt given influenza vaccine IM left deltoid. Pt advised to wait 15 minutes to observe for adverse reaction. Pt tolerated well.

## 2017-10-12 ENCOUNTER — ANESTHESIA (OUTPATIENT)
Dept: ENDOSCOPY | Facility: HOSPITAL | Age: 77
End: 2017-10-12
Payer: MEDICARE

## 2017-10-12 ENCOUNTER — SURGERY (OUTPATIENT)
Age: 77
End: 2017-10-12

## 2017-10-12 ENCOUNTER — HOSPITAL ENCOUNTER (OUTPATIENT)
Facility: HOSPITAL | Age: 77
Discharge: HOME | End: 2017-10-12
Attending: INTERNAL MEDICINE | Admitting: INTERNAL MEDICINE
Payer: MEDICARE

## 2017-10-12 ENCOUNTER — ANESTHESIA EVENT (OUTPATIENT)
Dept: ENDOSCOPY | Facility: HOSPITAL | Age: 77
End: 2017-10-12
Payer: MEDICARE

## 2017-10-12 DIAGNOSIS — R93.5 ABNORMAL CT OF THE ABDOMEN: ICD-10-CM

## 2017-10-12 LAB — POCT GLUCOSE: 135 MG/DL (ref 70–110)

## 2017-10-12 PROCEDURE — 63600175 PHARM REV CODE 636 W HCPCS: Performed by: NURSE ANESTHETIST, CERTIFIED REGISTERED

## 2017-10-12 PROCEDURE — 45378 DIAGNOSTIC COLONOSCOPY: CPT | Mod: ,,, | Performed by: INTERNAL MEDICINE

## 2017-10-12 PROCEDURE — 37000008 HC ANESTHESIA 1ST 15 MINUTES: Performed by: INTERNAL MEDICINE

## 2017-10-12 PROCEDURE — 37000009 HC ANESTHESIA EA ADD 15 MINS: Performed by: INTERNAL MEDICINE

## 2017-10-12 PROCEDURE — 25000003 PHARM REV CODE 250: Performed by: INTERNAL MEDICINE

## 2017-10-12 PROCEDURE — 45378 DIAGNOSTIC COLONOSCOPY: CPT | Performed by: INTERNAL MEDICINE

## 2017-10-12 RX ORDER — SODIUM CHLORIDE, SODIUM LACTATE, POTASSIUM CHLORIDE, CALCIUM CHLORIDE 600; 310; 30; 20 MG/100ML; MG/100ML; MG/100ML; MG/100ML
INJECTION, SOLUTION INTRAVENOUS CONTINUOUS
Status: DISCONTINUED | OUTPATIENT
Start: 2017-10-12 | End: 2017-10-12 | Stop reason: HOSPADM

## 2017-10-12 RX ORDER — PROPOFOL 10 MG/ML
VIAL (ML) INTRAVENOUS
Status: DISCONTINUED | OUTPATIENT
Start: 2017-10-12 | End: 2017-10-12

## 2017-10-12 RX ORDER — LIDOCAINE HCL/PF 100 MG/5ML
SYRINGE (ML) INTRAVENOUS
Status: DISCONTINUED | OUTPATIENT
Start: 2017-10-12 | End: 2017-10-12

## 2017-10-12 RX ADMIN — SODIUM CHLORIDE, SODIUM LACTATE, POTASSIUM CHLORIDE, AND CALCIUM CHLORIDE: 600; 310; 30; 20 INJECTION, SOLUTION INTRAVENOUS at 10:10

## 2017-10-12 RX ADMIN — PROPOFOL 40 MG: 10 INJECTION, EMULSION INTRAVENOUS at 10:10

## 2017-10-12 RX ADMIN — PROPOFOL 80 MG: 10 INJECTION, EMULSION INTRAVENOUS at 10:10

## 2017-10-12 RX ADMIN — LIDOCAINE HYDROCHLORIDE 80 ML: 20 INJECTION, SOLUTION INTRAVENOUS at 10:10

## 2017-10-12 NOTE — PLAN OF CARE
Problem: Fall Risk (Adult)  Goal: Absence of Falls  Patient will demonstrate the desired outcomes by discharge/transition of care.  Outcome: Outcome(s) achieved Date Met: 10/12/17  Pt denies c/o discomfort, dc instructions reviewed, criteria met, iv dc'd tolerated well no bleeding noted, dc'd to [facility] via wc with fmly

## 2017-10-12 NOTE — TRANSFER OF CARE
"Anesthesia Transfer of Care Note    Patient: Jasmine Velásquez    Procedure(s) Performed: Procedure(s) (LRB):  COLONOSCOPY (N/A)    Patient location: GI    Anesthesia Type: MAC    Transport from OR: Transported from OR on room air with adequate spontaneous ventilation    Post pain: adequate analgesia    Post assessment: no apparent anesthetic complications    Post vital signs: stable    Level of consciousness: awake, alert and oriented    Nausea/Vomiting: no nausea/vomiting    Complications: none    Transfer of care protocol was followed      Last vitals:   Visit Vitals  BP (!) 108/50 (Patient Position: Lying)   Pulse (!) 59   Temp 36.8 °C (98.3 °F) (Oral)   Resp 20   Ht 5' 4" (1.626 m)   Wt 77.1 kg (170 lb)   SpO2 (!) 94%   Breastfeeding? No   BMI 29.18 kg/m²     "

## 2017-10-12 NOTE — ANESTHESIA POSTPROCEDURE EVALUATION
"Anesthesia Post Evaluation    Patient: Jasmine Velásquez    Procedure(s) Performed: Procedure(s) (LRB):  COLONOSCOPY (N/A)    Final Anesthesia Type: MAC  Patient location during evaluation: GI PACU  Patient participation: Yes- Able to Participate  Level of consciousness: awake and alert  Post-procedure vital signs: reviewed and stable  Pain management: adequate  Airway patency: patent  PONV status at discharge: No PONV  Anesthetic complications: no      Cardiovascular status: blood pressure returned to baseline  Respiratory status: unassisted, spontaneous ventilation and room air  Hydration status: euvolemic  Follow-up not needed.        Visit Vitals  BP (!) 108/50 (Patient Position: Lying)   Pulse (!) 59   Temp 36.8 °C (98.3 °F) (Oral)   Resp 20   Ht 5' 4" (1.626 m)   Wt 77.1 kg (170 lb)   SpO2 (!) 94%   Breastfeeding? No   BMI 29.18 kg/m²       Pain/Micah Score: Pain Assessment Performed: Yes (10/12/2017  9:57 AM)  Presence of Pain: denies (10/12/2017  9:57 AM)      "

## 2017-10-12 NOTE — ANESTHESIA RELEASE NOTE
"Anesthesia Release from PACU Note    Patient: Jasmine Velásquez    Procedure(s) Performed: Procedure(s) (LRB):  COLONOSCOPY (N/A)    Anesthesia type: MAC    Post pain: Adequate analgesia    Post assessment: no apparent anesthetic complications and tolerated procedure well    Last Vitals:   Visit Vitals  BP (!) 108/50 (Patient Position: Lying)   Pulse (!) 59   Temp 36.8 °C (98.3 °F) (Oral)   Resp 20   Ht 5' 4" (1.626 m)   Wt 77.1 kg (170 lb)   SpO2 (!) 94%   Breastfeeding? No   BMI 29.18 kg/m²       Post vital signs: stable    Level of consciousness: awake, alert  and oriented    Nausea/Vomiting: no nausea/no vomiting    Complications: none    Airway Patency: patent    Respiratory: unassisted, spontaneous ventilation, room air    Cardiovascular: stable and blood pressure at baseline    Hydration: euvolemic  "

## 2017-10-12 NOTE — H&P (VIEW-ONLY)
Clinic Follow Up:  [facility] Follow Up Note    Reason for Follow Up:  The encounter diagnosis was Abnormal CT of the abdomen.    PCP: Angely Roberts   [address] [facility] [address] / [facility] [address]    HPI:  This is a 77 y.o. female here for follow up of the above issues.  She underwent a colonoscopy in May because of iron deficiency anemia.  She was found to have a large cecal angioectasia that was treated with argon plasma coagulation.  After the procedure she developed significant cecal inflammation and required hospital admission.  This resolved with a few days of antibiotics and bowel rest.  Her pain improved and she has had no further issues with abdominal pain since that time.  She has some chronic constipation issues but drinks apple juice every day and this keeps her stools soft.  She denies any nausea, vomiting, unintentional weight loss.  Her primary care physician did a CT scan of abdomen and pelvis recently to follow-up on a small lesion that was noted near the spleen in May.  The splenic lesion was not found and the inflammatory changes adjacent to the cecum have resolved but she was found to have thickening of the colon concerning for a possible mass lesion in the distal ascending colon.  Her only complaint is of some left inguinal pain.  This only occurs with walking.     Review of Systems:  CONSTITUTIONAL: Denies unintentional weight change,  fatigue, fevers, chills, night sweats.  CARDIOVASCULAR: Denies chest pain, shortness of breath, orthopnea and edema.  RESPIRATORY: Denies cough, hemoptysis, dyspnea, and wheezing.  GI: See HPI.  : Denies dysuria and hematuria  MUSCULOSKELETAL: Positive for hip and back pain.    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    APPENDECTOMY      BACK SURGERY      CAROTID ENDARTERECTOMY Left 2016    CATARACT EXTRACTION Bilateral     [facility]    COLONOSCOPY N/A 8/23/2016    Procedure: COLONOSCOPY;  Surgeon: Marcel Jacques MD;  Location: [facility] ENDO;  Service: Endoscopy;  Laterality: N/A;    COLONOSCOPY N/A 5/24/2017    Procedure: COLONOSCOPY;  Surgeon: Jayy Rosa MD;  Location: [facility] ENDO;  Service: Endoscopy;  Laterality: N/A;    TONSILLECTOMY         Family History:   Family History   Problem Relation Age of Onset    Cancer Mother 46     colon    Heart disease Mother     Heart disease Father     Hypertension Father     Diabetes Father     COPD Sister        Social History:   Social History   Substance Use Topics    Smoking status: Former Smoker     Packs/day: 1.00     Years: 25.00     Quit date: 5/24/2002    Smokeless tobacco: Never Used    Alcohol use No       Allergies: Reviewed    Home Medications:  Medication List with Changes/Refills   New Medications    SODIUM,POTASSIUM,MAG SULFATES (SUPREP BOWEL PREP KIT) 17.5-3.13-1.6 GRAM SOLR    Take 1 kit by mouth once.   Current Medications    ALLOPURINOL (ZYLOPRIM) 300 MG TABLET    TAKE 1 TABLET (300 MG TOTAL) BY MOUTH ONCE DAILY.    AMLODIPINE (NORVASC) 5 MG TABLET    Take 1 tablet (5 mg total) by mouth once daily.    ASPIRIN (ECOTRIN) 81 MG EC TABLET    Take 81 mg by mouth once daily.    BIOTIN 1 MG CAP    Take by mouth.    CMPD DICLOFENAC 2%- AMITRIPTYLINE 2%- LIDOCAINE 2%- PRILOCAINE 2% IN LIPODERM ACTIVEMAX    bid    FISH OIL-OMEGA-3 FATTY ACIDS 300-1,000 MG CAPSULE    Take 2 g by mouth once daily.    FUROSEMIDE (LASIX) 40 MG TABLET    Take 40 mg by mouth once daily.    GABAPENTIN (NEURONTIN) 100 MG CAPSULE    TAKE 1 CAPSULE (100 MG TOTAL) BY MOUTH 3 (THREE) TIMES DAILY.    GLIPIZIDE (GLUCOTROL) 5 MG TABLET    TAKE 1 TABLET (5  "MG TOTAL) BY MOUTH 2 (TWO) TIMES DAILY BEFORE MEALS.    GLYBURIDE (DIABETA) 5 MG TABLET    Take 5 mg by mouth once daily.    LEVOTHYROXINE (SYNTHROID) 75 MCG TABLET    Take 1 tablet (75 mcg total) by mouth before breakfast.    LOSARTAN (COZAAR) 100 MG TABLET    TAKE 1 TABLET (100 MG TOTAL) BY MOUTH ONCE DAILY.    LOVASTATIN (MEVACOR) 40 MG TABLET    TAKE 1 TABLET (40 MG TOTAL) BY MOUTH EVERY EVENING.    METFORMIN (GLUCOPHAGE) 1000 MG TABLET    TAKE 1 TABLET (1,000 MG TOTAL) BY MOUTH 2 (TWO) TIMES DAILY.    METHOCARBAMOL (ROBAXIN) 750 MG TAB    Take 1 tablet by mouth every 8 (eight) hours as needed.    METOPROLOL TARTRATE (LOPRESSOR) 50 MG TABLET    1 BY MOUTH DAILY    PANTOPRAZOLE (PROTONIX) 40 MG TABLET    Take 1 tablet (40 mg total) by mouth once daily.    POTASSIUM CHLORIDE SA (K-DUR,KLOR-CON) 10 MEQ TABLET    Take once daily    PRAMIPEXOLE (MIRAPEX) 0.25 MG TABLET    TAKE 1 TABLET (0.25 MG TOTAL) BY MOUTH EVERY EVENING.    TRAZODONE (DESYREL) 50 MG TABLET    Take 1 tablet (50 mg total) by mouth every evening.    TRIAMCINOLONE ACETONIDE 0.5% (KENALOG) 0.5 % CREA    Apply topically 2 (two) times daily.   Discontinued Medications    FERROUS SULFATE 325 MG (65 MG IRON) CPSR    Take 1 tablet by mouth 2 times daily 2 hours after meal.    GAVILYTE-N 420 GRAM SOLR           Physical Exam:  Vital Signs:  BP (!) 148/102   Pulse 88   Ht 5' 4.8" (1.646 m)   Wt 80.5 kg (177 lb 7.5 oz)   BMI 29.72 kg/m²   Body mass index is 29.72 kg/m².      GENERAL: No acute distress, Alert and oriented x 4  EYES: Anicteric, no pallor noted.  ENT: Oropharynx is clear  NECK: Supple, no masses, no thyromegally.  CHEST: Equal breath sounds bilaterally, no wheezing.  CARDIOVASCULAR: Regular rate and rhythm. Murmurs, rubs and gallops absent.  ABDOMEN: soft, non-tender, non-distended, normal bowel sounds.  EXTREMITIES: No clubbing, cyanosis or edema.      Labs: Pertinent labs reviewed.     Endoscopy:  Iron deficiency anemia has " resolved.    CRC Screening: Previous report reviewed.    Assessment:  1. Abnormal CT of the abdomen        Recommendations:  1.  Abnormal CT scan of the abdomen: The radiologist describes an apple core lesion in the distal ascending colon.  Given her recent colonoscopy I doubt that this is of any clinical significance and more likely represents a spasm of the colon but given the potential severity of such a condition and the association with possible malignancy I did recommend we repeat her colonoscopy to reassess this area.  If the colonoscopy is unrevealing then no further evaluation will be necessary.    Return to Clinic:    Follow up based on the above evaluation.

## 2017-10-12 NOTE — ANESTHESIA PREPROCEDURE EVALUATION
10/12/2017  Jasmine Velásquez is a 77 y.o., female.    Pre-op Assessment    I have reviewed the Patient Summary Reports.     I have reviewed the Nursing Notes.   I have reviewed the Medications.     Review of Systems  Anesthesia Hx:  No problems with previous Anesthesia  History of prior surgery of interest to airway management or planning: Previous anesthesia: General, MAC Denies Family Hx of Anesthesia complications.   Denies Personal Hx of Anesthesia complications.   Social:  Former Smoker, No Alcohol Use    Hematology/Oncology:         -- Anemia: --  Cancer in past history:  Oncology Comments: Uterine cancer in the past.     EENT/Dental:   chronic allergic rhinitis   Cardiovascular:   Exercise tolerance: good Hypertension, well controlled Valvular problems/Murmurs hyperlipidemia Valve replacement in 2013.  Cardiologist last seen 6 months ago  With no changes in medication.  Carotid surgery in 2017.   Pulmonary:  Pulmonary Normal    Renal/:  Renal/ Normal     Hepatic/GI:   Bowel Prep. GERD, well controlled 0730 last drink of fluid.   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   Endocrine:   Diabetes, well controlled, type 2 Hypothyroidism    Dermatological:  Skin Normal    Psych:  Psychiatric Normal           Physical Exam  General:  Well nourished, Obesity    Airway/Jaw/Neck:  Airway Findings: Mouth Opening: Normal Tongue: Normal  General Airway Assessment: Adult  Mallampati: II  Improves to I with phonation.  TM Distance: Normal, at least 6 cm       Chest/Lungs:  Chest/Lungs Findings: Clear to auscultation, Normal Respiratory Rate     Heart/Vascular:  Heart Findings: Rate: Normal             Anesthesia Plan  Type of Anesthesia, risks & benefits discussed:  Anesthesia Type:  MAC  Patient's Preference:   Intra-op  Monitoring Plan: standard ASA monitors  Intra-op Monitoring Plan Comments:   Post Op Pain Control Plan:   Post Op Pain Control Plan Comments:   Induction:   IV  Beta Blocker:  Patient is not currently on a Beta-Blocker (No further documentation required).       Informed Consent: Patient understands risks and agrees with Anesthesia plan.  Questions answered. Anesthesia consent signed with patient.  ASA Score: 3     Day of Surgery Review of History & Physical: I have interviewed and examined the patient. I have reviewed the patient's H&P dated: 05/24/17. There are no significant changes.  H&P update referred to the surgeon.         Ready For Surgery From Anesthesia Perspective.

## 2017-10-12 NOTE — DISCHARGE SUMMARY
[facility]  Brief Operative Note     SUMMARY     Surgery Date: 10/12/2017     Surgeon(s) and Role:     * Marcel Jacques MD - Primary    Assisting Surgeon: None    Pre-op Diagnosis:  Abnormal CT of the abdomen [R93.5]    Post-op Diagnosis:  Post-Op Diagnosis Codes:     * Abnormal CT of the abdomen [R93.5]    Procedure(s) (LRB):  COLONOSCOPY (N/A)    Anesthesia: Monitor Anesthesia Care    Description of the findings of the procedure: Procedure completed. See Procedure note for details.     Findings/Key Components: Procedure completed. See Procedure note for details.     Prosthesis/Implants: None    Estimated Blood Loss: less than 10         Specimens:   Specimen (12h ago through future)    None          Discharge Note    SUMMARY     Admit Date: 10/12/2017    Discharge Date and Time:  10/12/2017 10:47 AM    Hospital Course (synopsis of major diagnoses, care, treatment, and services provided during the course of the hospital stay): Procedure completed. See Procedure note for details.      Final Diagnosis: Post-Op Diagnosis Codes:     * Abnormal CT of the abdomen [R93.5]    Disposition: Home or Self Care    Follow Up/Patient Instructions:     Medications:  Reconciled Home Medications:   Current Discharge Medication List      CONTINUE these medications which have NOT CHANGED    Details   allopurinol (ZYLOPRIM) 300 MG tablet TAKE 1 TABLET (300 MG TOTAL) BY MOUTH ONCE DAILY.  Qty: 90 tablet, Refills: 3      amlodipine (NORVASC) 5 MG tablet Take 1 tablet (5 mg total) by mouth once daily.  Qty: 30 tablet, Refills: 11      aspirin (ECOTRIN) 81 MG EC tablet Take 81 mg by mouth once daily.      biotin 1 mg Cap Take by mouth.      fish oil-omega-3 fatty acids 300-1,000 mg capsule Take 2 g by mouth once daily.      furosemide (LASIX) 40 MG tablet Take 40 mg by mouth once daily.      gabapentin (NEURONTIN) 100 MG capsule TAKE 1 CAPSULE (100 MG TOTAL) BY MOUTH 3 (THREE) TIMES DAILY.  Qty: 270 capsule, Refills:  3    Comments: PLEASE REFILL      glipiZIDE (GLUCOTROL) 5 MG tablet TAKE 1 TABLET (5 MG TOTAL) BY MOUTH 2 (TWO) TIMES DAILY BEFORE MEALS.  Qty: 180 tablet, Refills: 2      glyBURIDE (DIABETA) 5 MG tablet Take 5 mg by mouth once daily.      levothyroxine (SYNTHROID) 75 MCG tablet Take 1 tablet (75 mcg total) by mouth before breakfast.  Qty: 90 tablet, Refills: 3      losartan (COZAAR) 100 MG tablet TAKE 1 TABLET (100 MG TOTAL) BY MOUTH ONCE DAILY.  Qty: 90 tablet, Refills: 3      lovastatin (MEVACOR) 40 MG tablet TAKE 1 TABLET (40 MG TOTAL) BY MOUTH EVERY EVENING.  Qty: 30 tablet, Refills: 6      metformin (GLUCOPHAGE) 1000 MG tablet TAKE 1 TABLET (1,000 MG TOTAL) BY MOUTH 2 (TWO) TIMES DAILY.  Qty: 180 tablet, Refills: 3      metoprolol tartrate (LOPRESSOR) 50 MG tablet 1 BY MOUTH DAILY  Refills: 3      potassium chloride SA (K-DUR,KLOR-CON) 10 MEQ tablet Take once daily  Qty: 90 tablet, Refills: 3    Comments: TAKE FOR POTASSIUM SUPPLEMENT      trazodone (DESYREL) 50 MG tablet Take 1 tablet (50 mg total) by mouth every evening.  Qty: 30 tablet, Refills: 5      pantoprazole (PROTONIX) 40 MG tablet Take 1 tablet (40 mg total) by mouth once daily.  Qty: 30 tablet, Refills: 6      pramipexole (MIRAPEX) 0.25 MG tablet TAKE 1 TABLET (0.25 MG TOTAL) BY MOUTH EVERY EVENING.  Qty: 90 tablet, Refills: 3    Associated Diagnoses: Restless legs syndrome (RLS)             Discharge Procedure Orders  Diet general     Activity as tolerated       Follow-up Information     Angely Roberts MD.    Specialty:  Family Medicine  Contact information:  [address] [address] [address] [phone]

## 2017-10-12 NOTE — DISCHARGE INSTRUCTIONS
Hemorrhoids    Hemorrhoids are swollen and inflamed veins inside the rectum and near the anus. The rectum is the last several inches of the colon. The anus is the passage between the rectum and the outside of the body.  Causes  The veins can become swollen due to increased pressure in them. This is most often caused by:  · Chronic constipation or diarrhea  · Straining when having a bowel movement  · Sitting too long on the toilet  · A low-fiber diet  · Pregnancy  Symptoms  · Bleeding from the rectum (this may be noticeable after bowel movements)  · Lump near the anus  · Itching around the anus  · Pain around the anus  There are different types of hemorrhoids. Depending on the type you have and the severity, you may be able to treat yourself at home. In some cases, a procedure may be the best treatment option. Your healthcare provider can tell you more about this, if needed.  Home care  General care  · To get relief from pain or itching, try:  ¨ Topical products. Your healthcare provider may prescribe or recommend creams, ointments, or pads that can be applied to the hemorrhoid. Use these exactly as directed.  ¨ Medicines. Your healthcare provider may recommend stool softeners, suppositories, or laxatives to help manage constipation. Use these exactly as directed.  ¨ Sitz baths. A sitz bath involves sitting in a few inches of warm bath water. Be careful not to make the water so hot that you burn yourself--test it before sitting in it. Soak for about 10 to 15 minutes a few times a day. This may help relieve pain.  Tips to help prevent hemorrhoids  · Eat more fiber. Fiber adds bulk to stool and absorbs water as it moves through your colon. This makes stool softer and easier to pass.  ¨ Increase the fiber in your diet with more fiber-rich foods. These include fresh fruit, vegetables, and whole grains.  ¨ Take a fiber supplement or bulking agent, if advised to by your provider. These include products such as psyllium  or methylcellulose.  · Drink plenty of water, if directed to by your provider. This can help keep stool soft.  · Be more active. Frequent exercise aids digestion and helps prevent constipation. It may also help make bowel movements more regular.  · Dont strain during bowel movements. This can make hemorrhoids more likely. Also, dont sit on the toilet for long periods of time.  Follow-up care  Follow up with your healthcare provider, or as advised. If a culture or imaging tests were done, you will be notified of the results when they are ready. This may take a few days or longer.  When to seek medical advice  Call your healthcare provider right away if any of these occur:  · Increased bleeding from the rectum  · Increased pain around the rectum or anus  · Weakness or dizziness  Call 911  Call 911 or return to the emergency department right away if any of these occur:  · Trouble breathing or swallowing  · Fainting or loss of consciousness  · Unusually fast heart rate  · Vomiting blood  · Large amounts of blood in stool  Date Last Reviewed: 6/22/2015 © 2000-2017 [employer]. [address] [place] [address]. All rights reserved. This information is not intended as a substitute for professional medical care. Always follow your healthcare professional's instructions.        Diverticulosis    Diverticulosis means that small pouches have formed in the wall of your large intestine (colon). Most often, this problem causes no symptoms and is common as people age. But the pouches in the colon are at risk of becoming infected. When this happens, the condition is called diverticulitis. Although most people with diverticulosis never develop diverticulitis, it is still not uncommon. Rectal bleeding can also occur and in less common situations, a type of colon inflammation called colitis.  While most people do not have symptoms, some people with diverticulosis may have:  · Abdominal cramps and  pain  · Bloating  · Constipation  · Change in bowel habits  Causes  The exact cause of diverticulosis (and diverticulitis) has not been proved, but a few things are associated with the condition:  · Low-fiber diet  · Constipation  · Lack of exercise  Your healthcare provider will talk with you about how to manage your condition. Diet changes may be all that are needed to help control diverticulosis and prevent progression to diverticulitis. If you develop diverticulitis, you will likely need other treatments.  Home care  You may be told to take fiber supplements daily. Fiber adds bulk to the stool so that it passes through the colon more easily. Stool softeners may be recommended. You may also be given medications for pain relief. Be sure to take all medications as directed.  In the past, people were told to avoid corn, nuts, and seeds. This is no longer necessary.  Follow these guidelines when caring for yourself at home:  · Eat unprocessed foods that are high in fiber. Whole grains, fruits, and vegetables are good choices.  · Drink 6 to 8 glasses of water every day unless your healthcare provider has you limit how much fluid you should have.  · Watch for changes in your bowel movements. Tell your provider if you notice any changes.  · Begin an exercise program. Ask your provider how to get started. Generally, walking is the best.  · Get plenty of rest and sleep.  Follow-up care  Follow up with your healthcare provider, or as advised. Regular visits may be needed to check on your health. Sometimes special procedures such as colonoscopy, are needed after an episode of diverticulitis or blooding. Be sure to keep all your appointments.  If a stool sample was taken, or cultures were done, you should be told if they are positive, or if your treatment needs to be changed. You can call as directed for the results.  If X-rays were done, a radiologist will look at them. You will be told if there is a change in your  treatment.  If antibiotics were prescribed, be sure to finish them all.  When to seek medical advice  Call your healthcare provider right away if any of these occur:  · Fever of 100.4°F (38°C) or higher, or as directed by your healthcare provider  · Severe cramps in the lower left side of the abdomen or pain that is getting worse  · Tenderness in the lower left side of the abdomen or worsening pain throughout the abdomen  · Diarrhea or constipation that doesn't get better within 24 hours  · Nausea and vomiting  · Bleeding from the rectum  Call 911  Call emergency services if any of the following occur:  · Trouble breathing  · Confusion  · Very drowsy or trouble awakening  · Fainting or loss of consciousness  · Rapid heart rate  · Chest pain  Date Last Reviewed: 12/30/2015 © 2000-2017 [employer]. [address] [place] [address], [place] [address]. All rights reserved. This information is not intended as a substitute for professional medical care. Always follow your healthcare professional's instructions.

## 2017-10-13 VITALS
HEIGHT: 64 IN | WEIGHT: 170 LBS | BODY MASS INDEX: 29.02 KG/M2 | DIASTOLIC BLOOD PRESSURE: 62 MMHG | RESPIRATION RATE: 19 BRPM | TEMPERATURE: 98 F | SYSTOLIC BLOOD PRESSURE: 123 MMHG | OXYGEN SATURATION: 97 % | HEART RATE: 64 BPM

## 2017-12-28 ENCOUNTER — LAB VISIT (OUTPATIENT)
Dept: LAB | Facility: HOSPITAL | Age: 77
End: 2017-12-28
Attending: FAMILY MEDICINE
Payer: MEDICARE

## 2017-12-28 DIAGNOSIS — I10 ESSENTIAL HYPERTENSION: ICD-10-CM

## 2017-12-28 DIAGNOSIS — E11.42 DIABETIC POLYNEUROPATHY ASSOCIATED WITH TYPE 2 DIABETES MELLITUS: ICD-10-CM

## 2017-12-28 DIAGNOSIS — D50.9 IRON DEFICIENCY ANEMIA, UNSPECIFIED IRON DEFICIENCY ANEMIA TYPE: ICD-10-CM

## 2017-12-28 LAB
ALBUMIN SERPL BCP-MCNC: 4 G/DL
ALP SERPL-CCNC: 64 U/L
ALT SERPL W/O P-5'-P-CCNC: 12 U/L
ANION GAP SERPL CALC-SCNC: 11 MMOL/L
AST SERPL-CCNC: 18 U/L
BASOPHILS # BLD AUTO: 0.08 K/UL
BASOPHILS NFR BLD: 1.1 %
BILIRUB SERPL-MCNC: 0.7 MG/DL
BUN SERPL-MCNC: 21 MG/DL
CALCIUM SERPL-MCNC: 9.9 MG/DL
CHLORIDE SERPL-SCNC: 102 MMOL/L
CHOLEST SERPL-MCNC: 150 MG/DL
CHOLEST/HDLC SERPL: 3.3 {RATIO}
CO2 SERPL-SCNC: 29 MMOL/L
CREAT SERPL-MCNC: 0.9 MG/DL
DIFFERENTIAL METHOD: NORMAL
EOSINOPHIL # BLD AUTO: 0.2 K/UL
EOSINOPHIL NFR BLD: 2.7 %
ERYTHROCYTE [DISTWIDTH] IN BLOOD BY AUTOMATED COUNT: 14.3 %
EST. GFR  (AFRICAN AMERICAN): >60 ML/MIN/1.73 M^2
EST. GFR  (NON AFRICAN AMERICAN): >60 ML/MIN/1.73 M^2
ESTIMATED AVG GLUCOSE: 114 MG/DL
GLUCOSE SERPL-MCNC: 131 MG/DL
HBA1C MFR BLD HPLC: 5.6 %
HCT VFR BLD AUTO: 39.2 %
HDLC SERPL-MCNC: 45 MG/DL
HDLC SERPL: 30 %
HGB BLD-MCNC: 12.7 G/DL
IMM GRANULOCYTES # BLD AUTO: 0.02 K/UL
IMM GRANULOCYTES NFR BLD AUTO: 0.3 %
LDLC SERPL CALC-MCNC: 70 MG/DL
LYMPHOCYTES # BLD AUTO: 2.3 K/UL
LYMPHOCYTES NFR BLD: 31.4 %
MCH RBC QN AUTO: 28.5 PG
MCHC RBC AUTO-ENTMCNC: 32.4 G/DL
MCV RBC AUTO: 88 FL
MONOCYTES # BLD AUTO: 0.6 K/UL
MONOCYTES NFR BLD: 7.8 %
NEUTROPHILS # BLD AUTO: 4.2 K/UL
NEUTROPHILS NFR BLD: 56.7 %
NONHDLC SERPL-MCNC: 105 MG/DL
NRBC BLD-RTO: 0 /100 WBC
PLATELET # BLD AUTO: 185 K/UL
PMV BLD AUTO: 10 FL
POTASSIUM SERPL-SCNC: 4 MMOL/L
PROT SERPL-MCNC: 7.4 G/DL
RBC # BLD AUTO: 4.45 M/UL
SODIUM SERPL-SCNC: 142 MMOL/L
TRIGL SERPL-MCNC: 175 MG/DL
TSH SERPL DL<=0.005 MIU/L-ACNC: 2.4 UIU/ML
WBC # BLD AUTO: 7.4 K/UL

## 2017-12-28 PROCEDURE — 80053 COMPREHEN METABOLIC PANEL: CPT

## 2017-12-28 PROCEDURE — 80061 LIPID PANEL: CPT

## 2017-12-28 PROCEDURE — 85025 COMPLETE CBC W/AUTO DIFF WBC: CPT

## 2017-12-28 PROCEDURE — 83036 HEMOGLOBIN GLYCOSYLATED A1C: CPT

## 2017-12-28 PROCEDURE — 36415 COLL VENOUS BLD VENIPUNCTURE: CPT | Mod: PO

## 2017-12-28 PROCEDURE — 84443 ASSAY THYROID STIM HORMONE: CPT

## 2018-01-08 ENCOUNTER — OFFICE VISIT (OUTPATIENT)
Dept: FAMILY MEDICINE | Facility: CLINIC | Age: 78
End: 2018-01-08
Payer: MEDICARE

## 2018-01-08 VITALS
SYSTOLIC BLOOD PRESSURE: 160 MMHG | OXYGEN SATURATION: 98 % | TEMPERATURE: 98 F | BODY MASS INDEX: 29.4 KG/M2 | DIASTOLIC BLOOD PRESSURE: 70 MMHG | HEIGHT: 64 IN | HEART RATE: 70 BPM | RESPIRATION RATE: 16 BRPM | WEIGHT: 172.19 LBS

## 2018-01-08 DIAGNOSIS — E03.4 HYPOTHYROIDISM DUE TO ACQUIRED ATROPHY OF THYROID: ICD-10-CM

## 2018-01-08 DIAGNOSIS — D50.9 IRON DEFICIENCY ANEMIA, UNSPECIFIED IRON DEFICIENCY ANEMIA TYPE: ICD-10-CM

## 2018-01-08 DIAGNOSIS — I10 HYPERTENSION NOT AT GOAL: Primary | ICD-10-CM

## 2018-01-08 DIAGNOSIS — E11.42 DIABETIC POLYNEUROPATHY ASSOCIATED WITH TYPE 2 DIABETES MELLITUS: ICD-10-CM

## 2018-01-08 PROCEDURE — 99999 PR PBB SHADOW E&M-EST. PATIENT-LVL III: CPT | Mod: PBBFAC,,, | Performed by: FAMILY MEDICINE

## 2018-01-08 PROCEDURE — 99214 OFFICE O/P EST MOD 30 MIN: CPT | Mod: S$GLB,,, | Performed by: FAMILY MEDICINE

## 2018-01-08 RX ORDER — LOSARTAN POTASSIUM AND HYDROCHLOROTHIAZIDE 12.5; 1 MG/1; MG/1
1 TABLET ORAL DAILY
Qty: 90 TABLET | Refills: 3 | Status: SHIPPED | OUTPATIENT
Start: 2018-01-08 | End: 2018-05-02

## 2018-01-08 RX ORDER — AMLODIPINE BESYLATE 10 MG/1
10 TABLET ORAL DAILY
Qty: 30 TABLET | Refills: 11 | Status: SHIPPED | OUTPATIENT
Start: 2018-01-08 | End: 2018-11-19

## 2018-01-08 RX ORDER — METOPROLOL TARTRATE 50 MG/1
50 TABLET ORAL 2 TIMES DAILY
Qty: 60 TABLET | Refills: 11 | Status: SHIPPED | OUTPATIENT
Start: 2018-01-08 | End: 2019-06-18

## 2018-01-08 NOTE — PROGRESS NOTES
Chief Complaint:    Chief Complaint   Patient presents with    Follow-up       History of Present Illness:    Presents today for follow-up of chronic medical problems,  Diabetes is improved A1c 5.6 she's lost a bit of weight.  Lipids chemistries also stable  Her blood pressure is elevated she is not checked depression lately.    ROS:  Review of Systems   Constitutional: Negative for activity change, chills, fatigue, fever and unexpected weight change.   HENT: Negative for congestion, ear discharge, ear pain, hearing loss, postnasal drip and rhinorrhea.    Eyes: Negative for pain and visual disturbance.   Respiratory: Negative for cough, chest tightness and shortness of breath.    Cardiovascular: Negative for chest pain and palpitations.   Gastrointestinal: Negative for abdominal pain, diarrhea and vomiting.   Endocrine: Negative for heat intolerance.   Genitourinary: Negative for dysuria, flank pain, frequency and hematuria.   Musculoskeletal: Negative for back pain, gait problem and neck pain.   Skin: Negative for color change and rash.   Neurological: Negative for dizziness, tremors, seizures, numbness and headaches.   Psychiatric/Behavioral: Negative for agitation, hallucinations, self-injury, sleep disturbance and suicidal ideas. The patient is not nervous/anxious.        Past Medical History:   Diagnosis Date    Angiodysplasia of cecum     Diabetes mellitus, type 2 2006    BS doesn't check 06/15/2016    GERD (gastroesophageal reflux disease)     Hyperlipidemia     Hypertension     Hypothyroidism        Social History:  Social History     Social History    Marital status:      Spouse name: N/A    Number of children: N/A    Years of education: N/A     Social History Main Topics    Smoking status: Former Smoker     Packs/day: 1.00     Years: 25.00     Quit date: 5/24/2002    Smokeless tobacco: Never Used    Alcohol use No    Drug use: No    Sexual activity: Not Asked     Other Topics  "Concern    None     Social History Narrative    None       Family History:   family history includes COPD in her sister; Cancer (age of onset: 46) in her mother; Diabetes in her father; Heart disease in her father and mother; Hypertension in her father.    Health Maintenance   Topic Date Due    Zoster Vaccine  08/10/2000    Foot Exam  03/14/2018    Eye Exam  06/16/2018    Hemoglobin A1c  06/28/2018    Lipid Panel  12/28/2018    DEXA SCAN  05/18/2019    Colonoscopy  10/12/2022    TETANUS VACCINE  02/02/2026    Pneumococcal (65+)  Completed    Influenza Vaccine  Completed       Physical Exam:    Vital Signs  Temp: 97.6 °F (36.4 °C)  Temp src: Tympanic  Pulse: 70  Resp: 16  SpO2: 98 %  BP: (!) 160/70  BP Location: Left arm  Patient Position: Sitting  Pain Score: 0-No pain  Height and Weight  Height: 5' 4" (162.6 cm)  Weight: 78.1 kg (172 lb 2.9 oz)  BSA (Calculated - sq m): 1.88 sq meters  BMI (Calculated): 29.6  Weight in (lb) to have BMI = 25: 145.3]    Body mass index is 29.55 kg/m².    Physical Exam   Constitutional: She is oriented to person, place, and time. She appears well-developed.   HENT:   Mouth/Throat: Oropharynx is clear and moist.   Eyes: Conjunctivae are normal. Pupils are equal, round, and reactive to light.   Neck: Normal range of motion. Neck supple.   Cardiovascular: Normal rate, regular rhythm and normal heart sounds.    No murmur heard.  Pulmonary/Chest: Effort normal and breath sounds normal. No respiratory distress. She has no wheezes. She has no rales. She exhibits no tenderness.   Abdominal: Soft. She exhibits no distension and no mass. There is no tenderness. There is no guarding.   Musculoskeletal: She exhibits no edema or tenderness.   Lymphadenopathy:     She has no cervical adenopathy.   Neurological: She is alert and oriented to person, place, and time. She has normal reflexes.   Skin: Skin is warm and dry.   Psychiatric: She has a normal mood and affect. Her behavior is " normal. Judgment and thought content normal.       Lab Results   Component Value Date    CHOL 150 12/28/2017    CHOL 142 09/21/2017    CHOL 143 06/14/2017    TRIG 175 (H) 12/28/2017    TRIG 228 (H) 09/21/2017    TRIG 211 (H) 06/14/2017    HDL 45 12/28/2017    HDL 39 (L) 09/21/2017    HDL 38 (L) 06/14/2017    TOTALCHOLEST 3.3 12/28/2017    TOTALCHOLEST 3.6 09/21/2017    TOTALCHOLEST 3.8 06/14/2017    NONHDLCHOL 105 12/28/2017    NONHDLCHOL 103 09/21/2017    NONHDLCHOL 105 06/14/2017       Lab Results   Component Value Date    HGBA1C 5.6 12/28/2017       Assessment:      ICD-10-CM ICD-9-CM   1. Hypertension not at goal I10 401.9   2. Diabetic polyneuropathy associated with type 2 diabetes mellitus E11.42 250.60     357.2   3. Hypothyroidism due to acquired atrophy of thyroid E03.4 244.8     246.8   4. Iron deficiency anemia, unspecified iron deficiency anemia type D50.9 280.9         Plan:  Increase amlodipine to 10 mg, change losartan to losartan hydrochlorothiazide 100-12 0.5 and metoprolol 50 mg once daily.  Metoprolol 50 mg total times a day and start monitoring blood pressure carefully and bring the readings in the numbers in the next 2 weeks  Other medical problems as above are stable continue current plan.  C) 2 weeks and again in 3 months  Orders Placed This Encounter   Procedures    Comprehensive metabolic panel    Hemoglobin A1c    Lipid panel    Microalbumin/creatinine urine ratio    TSH       Current Outpatient Prescriptions   Medication Sig Dispense Refill    allopurinol (ZYLOPRIM) 300 MG tablet TAKE 1 TABLET (300 MG TOTAL) BY MOUTH ONCE DAILY. 90 tablet 3    amLODIPine (NORVASC) 10 MG tablet Take 1 tablet (10 mg total) by mouth once daily. 30 tablet 11    aspirin (ECOTRIN) 81 MG EC tablet Take 81 mg by mouth once daily.      biotin 1 mg Cap Take by mouth.      fish oil-omega-3 fatty acids 300-1,000 mg capsule Take 2 g by mouth once daily.      furosemide (LASIX) 40 MG tablet Take 40 mg by  mouth once daily.      gabapentin (NEURONTIN) 100 MG capsule TAKE 1 CAPSULE (100 MG TOTAL) BY MOUTH 3 (THREE) TIMES DAILY. 270 capsule 3    glipiZIDE (GLUCOTROL) 5 MG tablet TAKE 1 TABLET (5 MG TOTAL) BY MOUTH 2 (TWO) TIMES DAILY BEFORE MEALS. 180 tablet 2    glyBURIDE (DIABETA) 5 MG tablet Take 5 mg by mouth once daily.      levothyroxine (SYNTHROID) 75 MCG tablet Take 1 tablet (75 mcg total) by mouth before breakfast. 90 tablet 3    lovastatin (MEVACOR) 40 MG tablet TAKE 1 TABLET (40 MG TOTAL) BY MOUTH EVERY EVENING. 30 tablet 6    metformin (GLUCOPHAGE) 1000 MG tablet TAKE 1 TABLET (1,000 MG TOTAL) BY MOUTH 2 (TWO) TIMES DAILY. 180 tablet 3    potassium chloride SA (K-DUR,KLOR-CON) 10 MEQ tablet Take once daily 90 tablet 3    pramipexole (MIRAPEX) 0.25 MG tablet TAKE 1 TABLET (0.25 MG TOTAL) BY MOUTH EVERY EVENING. 90 tablet 3    losartan-hydrochlorothiazide 100-12.5 mg (HYZAAR) 100-12.5 mg Tab Take 1 tablet by mouth once daily. 90 tablet 3    metoprolol tartrate (LOPRESSOR) 50 MG tablet Take 1 tablet (50 mg total) by mouth 2 (two) times daily. 60 tablet 11     No current facility-administered medications for this visit.        Medications Discontinued During This Encounter   Medication Reason    pantoprazole (PROTONIX) 40 MG tablet Therapy completed    trazodone (DESYREL) 50 MG tablet Therapy not effective    losartan (COZAAR) 100 MG tablet     metoprolol tartrate (LOPRESSOR) 50 MG tablet     amlodipine (NORVASC) 5 MG tablet Reorder       Return in about 3 months (around 4/8/2018) for in 2 wks with Dr. Roberts.      Angely Roberts MD

## 2018-02-07 RX ORDER — GABAPENTIN 100 MG/1
CAPSULE ORAL
Qty: 270 CAPSULE | Refills: 1 | Status: SHIPPED | OUTPATIENT
Start: 2018-02-07 | End: 2018-08-13 | Stop reason: SDUPTHER

## 2018-04-03 ENCOUNTER — PATIENT OUTREACH (OUTPATIENT)
Dept: ADMINISTRATIVE | Facility: HOSPITAL | Age: 78
End: 2018-04-03

## 2018-04-09 ENCOUNTER — LAB VISIT (OUTPATIENT)
Dept: LAB | Facility: HOSPITAL | Age: 78
End: 2018-04-09
Attending: FAMILY MEDICINE
Payer: MEDICARE

## 2018-04-09 DIAGNOSIS — I10 HYPERTENSION NOT AT GOAL: ICD-10-CM

## 2018-04-09 DIAGNOSIS — E03.4 HYPOTHYROIDISM DUE TO ACQUIRED ATROPHY OF THYROID: ICD-10-CM

## 2018-04-09 DIAGNOSIS — E11.42 DIABETIC POLYNEUROPATHY ASSOCIATED WITH TYPE 2 DIABETES MELLITUS: ICD-10-CM

## 2018-04-09 LAB
ALBUMIN SERPL BCP-MCNC: 4.1 G/DL
ALP SERPL-CCNC: 63 U/L
ALT SERPL W/O P-5'-P-CCNC: 11 U/L
ANION GAP SERPL CALC-SCNC: 11 MMOL/L
AST SERPL-CCNC: 16 U/L
BILIRUB SERPL-MCNC: 0.5 MG/DL
BUN SERPL-MCNC: 19 MG/DL
CALCIUM SERPL-MCNC: 10.3 MG/DL
CHLORIDE SERPL-SCNC: 105 MMOL/L
CHOLEST SERPL-MCNC: 147 MG/DL
CHOLEST/HDLC SERPL: 3.6 {RATIO}
CO2 SERPL-SCNC: 28 MMOL/L
CREAT SERPL-MCNC: 0.9 MG/DL
EST. GFR  (AFRICAN AMERICAN): >60 ML/MIN/1.73 M^2
EST. GFR  (NON AFRICAN AMERICAN): >60 ML/MIN/1.73 M^2
GLUCOSE SERPL-MCNC: 100 MG/DL
HDLC SERPL-MCNC: 41 MG/DL
HDLC SERPL: 27.9 %
LDLC SERPL CALC-MCNC: 62.8 MG/DL
NONHDLC SERPL-MCNC: 106 MG/DL
POTASSIUM SERPL-SCNC: 4.3 MMOL/L
PROT SERPL-MCNC: 7.1 G/DL
SODIUM SERPL-SCNC: 144 MMOL/L
TRIGL SERPL-MCNC: 216 MG/DL
TSH SERPL DL<=0.005 MIU/L-ACNC: 1.91 UIU/ML

## 2018-04-09 PROCEDURE — 80061 LIPID PANEL: CPT

## 2018-04-09 PROCEDURE — 83036 HEMOGLOBIN GLYCOSYLATED A1C: CPT

## 2018-04-09 PROCEDURE — 84443 ASSAY THYROID STIM HORMONE: CPT

## 2018-04-09 PROCEDURE — 80053 COMPREHEN METABOLIC PANEL: CPT

## 2018-04-09 PROCEDURE — 36415 COLL VENOUS BLD VENIPUNCTURE: CPT | Mod: PO

## 2018-04-10 LAB
ESTIMATED AVG GLUCOSE: 114 MG/DL
HBA1C MFR BLD HPLC: 5.6 %

## 2018-05-02 ENCOUNTER — OFFICE VISIT (OUTPATIENT)
Dept: FAMILY MEDICINE | Facility: CLINIC | Age: 78
End: 2018-05-02
Payer: MEDICARE

## 2018-05-02 VITALS
HEIGHT: 64 IN | BODY MASS INDEX: 28.79 KG/M2 | DIASTOLIC BLOOD PRESSURE: 64 MMHG | TEMPERATURE: 99 F | HEART RATE: 76 BPM | SYSTOLIC BLOOD PRESSURE: 118 MMHG | OXYGEN SATURATION: 94 % | WEIGHT: 168.63 LBS

## 2018-05-02 DIAGNOSIS — I10 ESSENTIAL HYPERTENSION: ICD-10-CM

## 2018-05-02 DIAGNOSIS — E11.42 DIABETIC POLYNEUROPATHY ASSOCIATED WITH TYPE 2 DIABETES MELLITUS: Primary | ICD-10-CM

## 2018-05-02 DIAGNOSIS — G25.81 RESTLESS LEGS SYNDROME (RLS): Chronic | ICD-10-CM

## 2018-05-02 PROCEDURE — 99214 OFFICE O/P EST MOD 30 MIN: CPT | Mod: S$GLB,,, | Performed by: FAMILY MEDICINE

## 2018-05-02 PROCEDURE — 99499 UNLISTED E&M SERVICE: CPT | Mod: S$GLB,,, | Performed by: FAMILY MEDICINE

## 2018-05-02 PROCEDURE — 3078F DIAST BP <80 MM HG: CPT | Mod: CPTII,S$GLB,, | Performed by: FAMILY MEDICINE

## 2018-05-02 PROCEDURE — 99999 PR PBB SHADOW E&M-EST. PATIENT-LVL IV: CPT | Mod: PBBFAC,,, | Performed by: FAMILY MEDICINE

## 2018-05-02 PROCEDURE — 3074F SYST BP LT 130 MM HG: CPT | Mod: CPTII,S$GLB,, | Performed by: FAMILY MEDICINE

## 2018-05-02 RX ORDER — LOSARTAN POTASSIUM 100 MG/1
100 TABLET ORAL DAILY
Qty: 90 TABLET | Refills: 3
Start: 2018-05-02 | End: 2019-10-31

## 2018-05-02 NOTE — PROGRESS NOTES
Chief Complaint:    Chief Complaint   Patient presents with    Follow-up       History of Present Illness:    Presents today for follow-up  A1c is under control  She was given hydrochlorothiazide with losartan which she had side effects to it so she stopped using it currently she is taking plain losartan  Patient's kidney function and liver function okay.    ROS:  Review of Systems   Constitutional: Negative for activity change, chills, fatigue, fever and unexpected weight change.   HENT: Negative for congestion, ear discharge, ear pain, hearing loss, postnasal drip and rhinorrhea.    Eyes: Negative for pain and visual disturbance.   Respiratory: Negative for cough, chest tightness and shortness of breath.    Cardiovascular: Negative for chest pain and palpitations.   Gastrointestinal: Negative for abdominal pain, diarrhea and vomiting.   Endocrine: Negative for heat intolerance.   Genitourinary: Negative for dysuria, flank pain, frequency and hematuria.   Musculoskeletal: Negative for back pain, gait problem and neck pain.   Skin: Negative for color change and rash.   Neurological: Negative for dizziness, tremors, seizures, numbness and headaches.   Psychiatric/Behavioral: Negative for agitation, hallucinations, self-injury, sleep disturbance and suicidal ideas. The patient is not nervous/anxious.        Past Medical History:   Diagnosis Date    Angiodysplasia of cecum     Diabetes mellitus, type 2 2006    BS doesn't check 06/15/2016    GERD (gastroesophageal reflux disease)     Hyperlipidemia     Hypertension     Hypothyroidism        Social History:  Social History     Social History    Marital status:      Spouse name: N/A    Number of children: N/A    Years of education: N/A     Social History Main Topics    Smoking status: Former Smoker     Packs/day: 1.00     Years: 25.00     Quit date: 5/24/2002    Smokeless tobacco: Never Used    Alcohol use No    Drug use: No    Sexual activity: Not  "Asked     Other Topics Concern    None     Social History Narrative    None       Family History:   family history includes COPD in her sister; Cancer (age of onset: 46) in her mother; Diabetes in her father; Heart disease in her father and mother; Hypertension in her father.    Health Maintenance   Topic Date Due    Zoster Vaccine  08/10/2000    Eye Exam  06/16/2018    Influenza Vaccine  08/01/2018    Hemoglobin A1c  10/09/2018    Lipid Panel  04/09/2019    Foot Exam  05/02/2019    DEXA SCAN  05/18/2019    Colonoscopy  10/12/2022    TETANUS VACCINE  02/02/2026    Pneumococcal (65+)  Completed       Physical Exam:    Vital Signs  Temp: 98.9 °F (37.2 °C)  Temp src: Tympanic  Pulse: 76  SpO2: (!) 94 %  BP: 118/64  BP Location: Left arm  Patient Position: Sitting  Pain Score: 0-No pain  Height and Weight  Height: 5' 4" (162.6 cm)  Weight: 76.5 kg (168 lb 10.4 oz)  BSA (Calculated - sq m): 1.86 sq meters  BMI (Calculated): 29  Weight in (lb) to have BMI = 25: 145.3]    Body mass index is 28.95 kg/m².    Physical Exam   Constitutional: She is oriented to person, place, and time. She appears well-developed.   HENT:   Mouth/Throat: Oropharynx is clear and moist.   Eyes: Conjunctivae are normal. Pupils are equal, round, and reactive to light.   Neck: Normal range of motion. Neck supple.   Cardiovascular: Normal rate, regular rhythm and normal heart sounds.    No murmur heard.  Pulses:       Dorsalis pedis pulses are 2+ on the right side, and 1+ on the left side.        Posterior tibial pulses are 1+ on the right side, and 1+ on the left side.   Pulmonary/Chest: Effort normal and breath sounds normal. No respiratory distress. She has no wheezes. She has no rales. She exhibits no tenderness.   Abdominal: Soft. She exhibits no distension and no mass. There is no tenderness. There is no guarding.   Musculoskeletal: She exhibits no edema or tenderness.   Feet:   Right Foot:   Protective Sensation: 10 sites tested. 9 " sites sensed.   Left Foot:   Protective Sensation: 10 sites tested. 9 sites sensed.   Lymphadenopathy:     She has no cervical adenopathy.   Neurological: She is alert and oriented to person, place, and time. She has normal reflexes.   Skin: Skin is warm and dry.   Psychiatric: She has a normal mood and affect. Her behavior is normal. Judgment and thought content normal.         Diabetes Management Status    Statin: Not taking  ACE/ARB: Taking    Screening or Prevention Patient's value Goal Complete/Controlled?   HgA1C Testing and Control   Lab Results   Component Value Date    HGBA1C 5.6 04/09/2018      Annually/Less than 8% Yes   Lipid profile : 04/09/2018 Annually Yes   LDL control Lab Results   Component Value Date    LDLCALC 62.8 (L) 04/09/2018    Annually/Less than 100 mg/dl  Yes   Nephropathy screening Lab Results   Component Value Date    LABMICR 56.0 04/09/2018     Lab Results   Component Value Date    PROTEINUA Trace (A) 05/25/2017    Annually Yes   Blood pressure BP Readings from Last 1 Encounters:   05/02/18 118/64    Less than 140/90 Yes   Dilated retinal exam : 06/16/2017 Annually Yes   Foot exam   : 05/02/2018 Annually Yes       Assessment:      ICD-10-CM ICD-9-CM   1. Diabetic polyneuropathy associated with type 2 diabetes mellitus E11.42 250.60     357.2   2. Restless legs syndrome (RLS) G25.81 333.94   3. Essential hypertension I10 401.9         Plan:  She will continue losartan 100 mg for now and other blood pressure medicines  Diabetes is stable she appears to be on glipizide and Glucotrol will discontinue Glucotrol.  Continue current medications  Follow-up 3 months  Orders Placed This Encounter   Procedures    Comprehensive metabolic panel    Hemoglobin A1c    Lipid panel    Microalbumin/creatinine urine ratio       Current Outpatient Prescriptions   Medication Sig Dispense Refill    allopurinol (ZYLOPRIM) 300 MG tablet TAKE 1 TABLET (300 MG TOTAL) BY MOUTH ONCE DAILY. 90 tablet 3     amLODIPine (NORVASC) 10 MG tablet Take 1 tablet (10 mg total) by mouth once daily. 30 tablet 11    aspirin (ECOTRIN) 81 MG EC tablet Take 81 mg by mouth once daily.      biotin 1 mg Cap Take by mouth.      fish oil-omega-3 fatty acids 300-1,000 mg capsule Take 2 g by mouth once daily.      furosemide (LASIX) 40 MG tablet Take 40 mg by mouth once daily.      gabapentin (NEURONTIN) 100 MG capsule TAKE ONE CAPSULE BY MOUTH THREE TIMES DAILY 270 capsule 1    levothyroxine (SYNTHROID) 75 MCG tablet Take 1 tablet (75 mcg total) by mouth before breakfast. 90 tablet 3    lovastatin (MEVACOR) 40 MG tablet TAKE 1 TABLET (40 MG TOTAL) BY MOUTH EVERY EVENING. 30 tablet 6    metformin (GLUCOPHAGE) 1000 MG tablet TAKE 1 TABLET (1,000 MG TOTAL) BY MOUTH 2 (TWO) TIMES DAILY. 180 tablet 3    metoprolol tartrate (LOPRESSOR) 50 MG tablet Take 1 tablet (50 mg total) by mouth 2 (two) times daily. 60 tablet 11    pramipexole (MIRAPEX) 0.25 MG tablet TAKE 1 TABLET (0.25 MG TOTAL) BY MOUTH EVERY EVENING. 90 tablet 3    glipiZIDE (GLUCOTROL) 5 MG tablet TAKE 1 TABLET (5 MG TOTAL) BY MOUTH 2 (TWO) TIMES DAILY BEFORE MEALS. 180 tablet 2    losartan (COZAAR) 100 MG tablet Take 1 tablet (100 mg total) by mouth once daily. 90 tablet 3    potassium chloride SA (K-DUR,KLOR-CON) 10 MEQ tablet Take once daily 90 tablet 3     No current facility-administered medications for this visit.        Medications Discontinued During This Encounter   Medication Reason    losartan-hydrochlorothiazide 100-12.5 mg (HYZAAR) 100-12.5 mg Tab     glyBURIDE (DIABETA) 5 MG tablet        Follow-up in about 3 months (around 8/2/2018).      Angely Roberts MD

## 2018-06-18 RX ORDER — LEVOTHYROXINE SODIUM 75 UG/1
75 TABLET ORAL
Qty: 90 TABLET | Refills: 2 | Status: SHIPPED | OUTPATIENT
Start: 2018-06-18 | End: 2019-03-15 | Stop reason: SDUPTHER

## 2018-06-19 RX ORDER — AMLODIPINE BESYLATE 5 MG/1
TABLET ORAL
Qty: 30 TABLET | Refills: 10 | Status: SHIPPED | OUTPATIENT
Start: 2018-06-19 | End: 2018-11-19 | Stop reason: SDUPTHER

## 2018-06-29 ENCOUNTER — OFFICE VISIT (OUTPATIENT)
Dept: OPHTHALMOLOGY | Facility: CLINIC | Age: 78
End: 2018-06-29
Payer: MEDICARE

## 2018-06-29 DIAGNOSIS — H52.4 BILATERAL PRESBYOPIA: ICD-10-CM

## 2018-06-29 DIAGNOSIS — E11.3213 BOTH EYES AFFECTED BY MILD NONPROLIFERATIVE DIABETIC RETINOPATHY WITH MACULAR EDEMA, ASSOCIATED WITH TYPE 2 DIABETES MELLITUS: Primary | ICD-10-CM

## 2018-06-29 DIAGNOSIS — Z96.1 PSEUDOPHAKIA OF BOTH EYES: ICD-10-CM

## 2018-06-29 PROCEDURE — 92015 DETERMINE REFRACTIVE STATE: CPT | Mod: S$GLB,,, | Performed by: OPTOMETRIST

## 2018-06-29 PROCEDURE — 99999 PR PBB SHADOW E&M-EST. PATIENT-LVL II: CPT | Mod: PBBFAC,,, | Performed by: OPTOMETRIST

## 2018-06-29 PROCEDURE — 92014 COMPRE OPH EXAM EST PT 1/>: CPT | Mod: S$GLB,,, | Performed by: OPTOMETRIST

## 2018-06-29 RX ORDER — POTASSIUM CHLORIDE 750 MG/1
TABLET, EXTENDED RELEASE ORAL
COMMUNITY
Start: 2018-05-25 | End: 2018-06-29 | Stop reason: SDUPTHER

## 2018-06-29 NOTE — PROGRESS NOTES
HPI     NIDDM exam.  No visual complaints.  Last eye exam 06/16/2017 TRF.  Update glasses RX.    Last edited by Cheri Park on 6/29/2018 10:20 AM. (History)            Assessment /Plan     For exam results, see Encounter Report.    Both eyes affected by mild nonproliferative diabetic retinopathy with macular edema, associated with type 2 diabetes mellitus    Pseudophakia of both eyes    Bilateral presbyopia      Scattered hemes outside arcade.    Stable IOL OU.    Dispense Final Rx for glasses.  RTC 1 year  Discussed above and answered questions.

## 2018-06-29 NOTE — PATIENT INSTRUCTIONS
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     Stable IOL OU.     Dispense Final Rx for glasses.  RTC 1 year  Discussed above and answered questions.

## 2018-06-29 NOTE — Clinical Note
A few scattered hemes in the retina. Mrs Velásquez says her BS is under control. Last A1c was 5.6. I will watch closely for any worsening.

## 2018-08-02 ENCOUNTER — LAB VISIT (OUTPATIENT)
Dept: LAB | Facility: HOSPITAL | Age: 78
End: 2018-08-02
Attending: FAMILY MEDICINE
Payer: MEDICARE

## 2018-08-02 DIAGNOSIS — E11.42 DIABETIC POLYNEUROPATHY ASSOCIATED WITH TYPE 2 DIABETES MELLITUS: ICD-10-CM

## 2018-08-02 DIAGNOSIS — G25.81 RESTLESS LEGS SYNDROME (RLS): Chronic | ICD-10-CM

## 2018-08-02 DIAGNOSIS — I10 ESSENTIAL HYPERTENSION: ICD-10-CM

## 2018-08-02 LAB
ALBUMIN SERPL BCP-MCNC: 3.8 G/DL
ALP SERPL-CCNC: 53 U/L
ALT SERPL W/O P-5'-P-CCNC: 12 U/L
ANION GAP SERPL CALC-SCNC: 13 MMOL/L
AST SERPL-CCNC: 17 U/L
BILIRUB SERPL-MCNC: 0.5 MG/DL
BUN SERPL-MCNC: 21 MG/DL
CALCIUM SERPL-MCNC: 10 MG/DL
CHLORIDE SERPL-SCNC: 104 MMOL/L
CHOLEST SERPL-MCNC: 125 MG/DL
CHOLEST/HDLC SERPL: 3.4 {RATIO}
CO2 SERPL-SCNC: 26 MMOL/L
CREAT SERPL-MCNC: 0.9 MG/DL
EST. GFR  (AFRICAN AMERICAN): >60 ML/MIN/1.73 M^2
EST. GFR  (NON AFRICAN AMERICAN): >60 ML/MIN/1.73 M^2
ESTIMATED AVG GLUCOSE: 114 MG/DL
GLUCOSE SERPL-MCNC: 120 MG/DL
HBA1C MFR BLD HPLC: 5.6 %
HDLC SERPL-MCNC: 37 MG/DL
HDLC SERPL: 29.6 %
LDLC SERPL CALC-MCNC: 52.6 MG/DL
NONHDLC SERPL-MCNC: 88 MG/DL
POTASSIUM SERPL-SCNC: 3.9 MMOL/L
PROT SERPL-MCNC: 6.7 G/DL
SODIUM SERPL-SCNC: 143 MMOL/L
TRIGL SERPL-MCNC: 177 MG/DL

## 2018-08-02 PROCEDURE — 80053 COMPREHEN METABOLIC PANEL: CPT

## 2018-08-02 PROCEDURE — 36415 COLL VENOUS BLD VENIPUNCTURE: CPT | Mod: PO

## 2018-08-02 PROCEDURE — 80061 LIPID PANEL: CPT

## 2018-08-02 PROCEDURE — 83036 HEMOGLOBIN GLYCOSYLATED A1C: CPT

## 2018-08-09 ENCOUNTER — OFFICE VISIT (OUTPATIENT)
Dept: FAMILY MEDICINE | Facility: CLINIC | Age: 78
End: 2018-08-09
Payer: MEDICARE

## 2018-08-09 VITALS
TEMPERATURE: 98 F | SYSTOLIC BLOOD PRESSURE: 120 MMHG | BODY MASS INDEX: 27.95 KG/M2 | HEART RATE: 73 BPM | HEIGHT: 64 IN | OXYGEN SATURATION: 98 % | DIASTOLIC BLOOD PRESSURE: 70 MMHG | WEIGHT: 163.69 LBS

## 2018-08-09 DIAGNOSIS — M47.816 LUMBAR SPONDYLOSIS: ICD-10-CM

## 2018-08-09 DIAGNOSIS — E03.4 HYPOTHYROIDISM DUE TO ACQUIRED ATROPHY OF THYROID: ICD-10-CM

## 2018-08-09 DIAGNOSIS — I10 ESSENTIAL HYPERTENSION: ICD-10-CM

## 2018-08-09 DIAGNOSIS — M79.605 PAIN IN BOTH LOWER EXTREMITIES: ICD-10-CM

## 2018-08-09 DIAGNOSIS — D50.9 IRON DEFICIENCY ANEMIA, UNSPECIFIED IRON DEFICIENCY ANEMIA TYPE: ICD-10-CM

## 2018-08-09 DIAGNOSIS — M79.604 PAIN IN BOTH LOWER EXTREMITIES: ICD-10-CM

## 2018-08-09 DIAGNOSIS — E11.42 DIABETIC POLYNEUROPATHY ASSOCIATED WITH TYPE 2 DIABETES MELLITUS: Primary | ICD-10-CM

## 2018-08-09 PROCEDURE — 99214 OFFICE O/P EST MOD 30 MIN: CPT | Mod: S$GLB,,, | Performed by: FAMILY MEDICINE

## 2018-08-09 PROCEDURE — 99999 PR PBB SHADOW E&M-EST. PATIENT-LVL III: CPT | Mod: PBBFAC,,, | Performed by: FAMILY MEDICINE

## 2018-08-09 PROCEDURE — 3074F SYST BP LT 130 MM HG: CPT | Mod: CPTII,S$GLB,, | Performed by: FAMILY MEDICINE

## 2018-08-09 PROCEDURE — 3078F DIAST BP <80 MM HG: CPT | Mod: CPTII,S$GLB,, | Performed by: FAMILY MEDICINE

## 2018-08-09 NOTE — PROGRESS NOTES
Chief Complaint:    Chief Complaint   Patient presents with    Follow-up    Otalgia       History of Present Illness:  She presents today for three-month follow-up  She is doing okay she does have some microalbuminuria.  Blood sugar stable no hypoglycemic episodes blood pressure is normal today  She does suffer with severe back trouble spondylosis and has had steroid injections was deemed a poor candidate for surgery.  She suffers with leg pain mostly at night.  She had seen a neurosurgeon.  The      ROS:  Review of Systems   Constitutional: Negative for activity change, chills, fatigue, fever and unexpected weight change.   HENT: Negative for congestion, ear discharge, ear pain, hearing loss, postnasal drip and rhinorrhea.    Eyes: Negative for pain and visual disturbance.   Respiratory: Negative for cough, chest tightness and shortness of breath.    Cardiovascular: Negative for chest pain and palpitations.   Gastrointestinal: Negative for abdominal pain, diarrhea and vomiting.   Endocrine: Negative for heat intolerance.   Genitourinary: Negative for dysuria, flank pain, frequency and hematuria.   Musculoskeletal: Negative for back pain, gait problem and neck pain.   Skin: Negative for color change and rash.   Neurological: Negative for dizziness, tremors, seizures, numbness and headaches.   Psychiatric/Behavioral: Negative for agitation, hallucinations, self-injury, sleep disturbance and suicidal ideas. The patient is not nervous/anxious.        Past Medical History:   Diagnosis Date    Angiodysplasia of cecum     Diabetes mellitus, type 2 2006    BS doesn't check 06/15/2016    DM (diabetes mellitus) 2006    BS doesn't check 06/29/2018    GERD (gastroesophageal reflux disease)     Hyperlipidemia     Hypertension     Hypothyroidism        Social History:  Social History     Social History    Marital status:      Spouse name: N/A    Number of children: N/A    Years of education: N/A     Social  "History Main Topics    Smoking status: Former Smoker     Packs/day: 1.00     Years: 25.00     Quit date: 5/24/2002    Smokeless tobacco: Never Used    Alcohol use No    Drug use: No    Sexual activity: Not Asked     Other Topics Concern    None     Social History Narrative    None       Family History:   family history includes COPD in her sister; Cancer (age of onset: 46) in her mother; Diabetes in her father; Heart disease in her father and mother; Hypertension in her father.    Health Maintenance   Topic Date Due    Zoster Vaccine  08/10/2000    Influenza Vaccine  08/01/2018    Hemoglobin A1c  02/02/2019    Foot Exam  05/02/2019    DEXA SCAN  05/18/2019    Eye Exam  06/29/2019    Lipid Panel  08/02/2019    Colonoscopy  10/12/2022    TETANUS VACCINE  02/02/2026    Pneumococcal (65+)  Completed       Physical Exam:    Vital Signs  Temp: 98.4 °F (36.9 °C)  Temp src: Tympanic  Pulse: 73  SpO2: 98 %  BP: 120/70  BP Location: Left arm  Patient Position: Sitting  Pain Score:   4  Pain Loc: Back  Height and Weight  Height: 5' 4" (162.6 cm)  Weight: 74.2 kg (163 lb 11.1 oz)  BSA (Calculated - sq m): 1.83 sq meters  BMI (Calculated): 28.2  Weight in (lb) to have BMI = 25: 145.3]    Body mass index is 28.1 kg/m².    Physical Exam   Constitutional: She is oriented to person, place, and time. She appears well-developed.   HENT:   Mouth/Throat: Oropharynx is clear and moist.   Eyes: Conjunctivae are normal. Pupils are equal, round, and reactive to light.   Neck: Normal range of motion. Neck supple.   Cardiovascular: Normal rate, regular rhythm and normal heart sounds.    No murmur heard.  Pulmonary/Chest: Effort normal and breath sounds normal. No respiratory distress. She has no wheezes. She has no rales. She exhibits no tenderness.   Abdominal: Soft. She exhibits no distension and no mass. There is no tenderness. There is no guarding.   Musculoskeletal: She exhibits no edema or tenderness.   Bilateral pedal " pulses are intact strength all straight leg raising test is negative.  No muscle tenderness.   Lymphadenopathy:     She has no cervical adenopathy.   Neurological: She is alert and oriented to person, place, and time. She has normal reflexes.   Skin: Skin is warm and dry.   Psychiatric: She has a normal mood and affect. Her behavior is normal. Judgment and thought content normal.         Diabetes Management Status    Statin: Not taking  ACE/ARB: Taking    Screening or Prevention Patient's value Goal Complete/Controlled?   HgA1C Testing and Control   Lab Results   Component Value Date    HGBA1C 5.6 08/02/2018      Annually/Less than 8% Yes   Lipid profile : 08/02/2018 Annually Yes   LDL control Lab Results   Component Value Date    LDLCALC 52.6 (L) 08/02/2018    Annually/Less than 100 mg/dl  Yes   Nephropathy screening Lab Results   Component Value Date    LABMICR 13.0 08/02/2018     Lab Results   Component Value Date    PROTEINUA Trace (A) 05/25/2017    Annually Yes   Blood pressure BP Readings from Last 1 Encounters:   08/09/18 120/70    Less than 140/90 Yes   Dilated retinal exam : 06/29/2018 Annually Yes   Foot exam   : 05/02/2018 Annually Yes       Assessment:      ICD-10-CM ICD-9-CM   1. Diabetic polyneuropathy associated with type 2 diabetes mellitus E11.42 250.60     357.2   2. Essential hypertension I10 401.9   3. Hypothyroidism due to acquired atrophy of thyroid E03.4 244.8     246.8   4. Lumbar spondylosis M47.816 721.3   5. Iron deficiency anemia, unspecified iron deficiency anemia type D50.9 280.9   6. Pain in both lower extremities M79.604 729.5    M79.605          Plan:  Leg pain is probably due to spinal stenosis, may take Tylenol as needed some walking program as tolerated may help  Other medical problems as above stable please continue to watch for any hypoglycemic episodes especially since A1c is so tightly controlled.  Does have some microalbuminuria episodically and has some diabetic retinopathy  continue to follow with Ophthalmology.    Orders Placed This Encounter   Procedures    Hemoglobin A1c    Lipid panel    Comprehensive metabolic panel    CBC auto differential       Current Outpatient Prescriptions   Medication Sig Dispense Refill    allopurinol (ZYLOPRIM) 300 MG tablet TAKE 1 TABLET (300 MG TOTAL) BY MOUTH ONCE DAILY. 90 tablet 3    amLODIPine (NORVASC) 10 MG tablet Take 1 tablet (10 mg total) by mouth once daily. 30 tablet 11    amLODIPine (NORVASC) 5 MG tablet TAKE ONE TABLET BY MOUTH DAILY 30 tablet 10    aspirin (ECOTRIN) 81 MG EC tablet Take 81 mg by mouth once daily.      biotin 1 mg Cap Take by mouth.      fish oil-omega-3 fatty acids 300-1,000 mg capsule Take 2 g by mouth once daily.      furosemide (LASIX) 40 MG tablet Take 40 mg by mouth once daily.      gabapentin (NEURONTIN) 100 MG capsule TAKE ONE CAPSULE BY MOUTH THREE TIMES DAILY 270 capsule 1    glipiZIDE (GLUCOTROL) 5 MG tablet TAKE 1 TABLET (5 MG TOTAL) BY MOUTH 2 (TWO) TIMES DAILY BEFORE MEALS. 180 tablet 2    levothyroxine (SYNTHROID) 75 MCG tablet TAKE 1 TABLET (75 MCG TOTAL) BY MOUTH BEFORE BREAKFAST. 90 tablet 2    losartan (COZAAR) 100 MG tablet Take 1 tablet (100 mg total) by mouth once daily. 90 tablet 3    lovastatin (MEVACOR) 40 MG tablet TAKE 1 TABLET (40 MG TOTAL) BY MOUTH EVERY EVENING. 30 tablet 6    metformin (GLUCOPHAGE) 1000 MG tablet TAKE 1 TABLET (1,000 MG TOTAL) BY MOUTH 2 (TWO) TIMES DAILY. 180 tablet 3    metoprolol tartrate (LOPRESSOR) 50 MG tablet Take 1 tablet (50 mg total) by mouth 2 (two) times daily. 60 tablet 11    pramipexole (MIRAPEX) 0.25 MG tablet TAKE 1 TABLET (0.25 MG TOTAL) BY MOUTH EVERY EVENING. 90 tablet 3    potassium chloride SA (K-DUR,KLOR-CON) 10 MEQ tablet Take once daily 90 tablet 3     No current facility-administered medications for this visit.        There are no discontinued medications.    Follow-up in about 3 months (around 11/9/2018).      Angely Roberts,  MD

## 2018-08-13 RX ORDER — GABAPENTIN 100 MG/1
CAPSULE ORAL
Qty: 270 CAPSULE | Refills: 0 | Status: SHIPPED | OUTPATIENT
Start: 2018-08-13 | End: 2018-09-10 | Stop reason: SDUPTHER

## 2018-08-13 RX ORDER — FERROUS SULFATE 325(65) MG
TABLET ORAL
Qty: 60 TABLET | Refills: 3 | Status: SHIPPED | OUTPATIENT
Start: 2018-08-13 | End: 2019-06-18

## 2018-09-10 ENCOUNTER — TELEPHONE (OUTPATIENT)
Dept: FAMILY MEDICINE | Facility: CLINIC | Age: 78
End: 2018-09-10

## 2018-09-10 RX ORDER — GABAPENTIN 100 MG/1
CAPSULE ORAL
Qty: 270 CAPSULE | Refills: 0 | Status: SHIPPED | OUTPATIENT
Start: 2018-09-10 | End: 2019-02-04 | Stop reason: SDUPTHER

## 2018-09-11 NOTE — TELEPHONE ENCOUNTER
Last visit 08/09/18. Next visit 11/19/18.    54 y/o F, PMH DM, HLD, HTN, morbid obesity, CAD status post stent, PSH ventral hernia repair with mesh in May 2017, complicated by multiple fluid collections requiring IR drainage s/p explantation of mesh and placement of Surgimend mesh on 11/3/17 by Dr. Luis Salvador, b/l lower abd SHANTAL drains placed 11/3. PICC line placed to RUE 11/20/17. Last admission 11/27/17, mesh removed at that time. Pt presents today for potassium 6.3 drawn by visiting nurse at home. Pt c/o 7/10 abd pain. Pt denies fever, chills, chest pain, SOB. Pt reports SHANTAL drain output is currently as it has always been, pinkish output noted b/l. Safety maintained.

## 2018-09-13 RX ORDER — TRAZODONE HYDROCHLORIDE 50 MG/1
TABLET ORAL
Qty: 30 TABLET | Refills: 4 | Status: SHIPPED | OUTPATIENT
Start: 2018-09-13 | End: 2019-06-18 | Stop reason: SDUPTHER

## 2018-09-17 RX ORDER — ALLOPURINOL 300 MG/1
TABLET ORAL
Qty: 90 TABLET | Refills: 3 | Status: SHIPPED | OUTPATIENT
Start: 2018-09-17 | End: 2019-10-29 | Stop reason: SDUPTHER

## 2018-09-17 NOTE — TELEPHONE ENCOUNTER
----- Message from Tataelle Mcgarry sent at 9/17/2018  7:25 AM CDT -----  Contact: pt   1. What is the name of the medication you are requesting? allopurinol (ZYLOPRIM)  2. What is the dose?  300 MG tablet  3. How do you take the medication? Orally, topically, etc? oral  4. How often do you take this medication? Daily   5. Do you need a 30 day or 90 day supply? 90  6. How many refills are you requesting?   7. What is your preferred pharmacy and location of the pharmacy?   [id number] [address] [address] [address]  Phone: [phone] Fax: [phone]  8. Who can we contact with further questions? the patient    Pt states that she has been out of med for 7 days

## 2018-10-10 RX ORDER — METFORMIN HYDROCHLORIDE 1000 MG/1
TABLET ORAL
Qty: 180 TABLET | Refills: 3 | Status: SHIPPED | OUTPATIENT
Start: 2018-10-10 | End: 2019-10-14 | Stop reason: SDUPTHER

## 2018-10-22 ENCOUNTER — IMMUNIZATION (OUTPATIENT)
Dept: FAMILY MEDICINE | Facility: CLINIC | Age: 78
End: 2018-10-22
Payer: MEDICARE

## 2018-10-22 PROCEDURE — 90662 IIV NO PRSV INCREASED AG IM: CPT | Mod: PBBFAC,PO

## 2018-11-12 ENCOUNTER — LAB VISIT (OUTPATIENT)
Dept: LAB | Facility: HOSPITAL | Age: 78
End: 2018-11-12
Attending: FAMILY MEDICINE
Payer: MEDICARE

## 2018-11-12 DIAGNOSIS — E11.42 DIABETIC POLYNEUROPATHY ASSOCIATED WITH TYPE 2 DIABETES MELLITUS: ICD-10-CM

## 2018-11-12 DIAGNOSIS — D50.9 IRON DEFICIENCY ANEMIA, UNSPECIFIED IRON DEFICIENCY ANEMIA TYPE: ICD-10-CM

## 2018-11-12 LAB
ALBUMIN SERPL BCP-MCNC: 4 G/DL
ALP SERPL-CCNC: 51 U/L
ALT SERPL W/O P-5'-P-CCNC: 12 U/L
ANION GAP SERPL CALC-SCNC: 10 MMOL/L
AST SERPL-CCNC: 19 U/L
BASOPHILS # BLD AUTO: 0.05 K/UL
BASOPHILS NFR BLD: 0.8 %
BILIRUB SERPL-MCNC: 0.4 MG/DL
BUN SERPL-MCNC: 16 MG/DL
CALCIUM SERPL-MCNC: 10.4 MG/DL
CHLORIDE SERPL-SCNC: 104 MMOL/L
CHOLEST SERPL-MCNC: 141 MG/DL
CHOLEST/HDLC SERPL: 3.3 {RATIO}
CO2 SERPL-SCNC: 27 MMOL/L
CREAT SERPL-MCNC: 0.9 MG/DL
DIFFERENTIAL METHOD: NORMAL
EOSINOPHIL # BLD AUTO: 0.2 K/UL
EOSINOPHIL NFR BLD: 3 %
ERYTHROCYTE [DISTWIDTH] IN BLOOD BY AUTOMATED COUNT: 14.5 %
EST. GFR  (AFRICAN AMERICAN): >60 ML/MIN/1.73 M^2
EST. GFR  (NON AFRICAN AMERICAN): >60 ML/MIN/1.73 M^2
ESTIMATED AVG GLUCOSE: 114 MG/DL
GLUCOSE SERPL-MCNC: 106 MG/DL
HBA1C MFR BLD HPLC: 5.6 %
HCT VFR BLD AUTO: 39.9 %
HDLC SERPL-MCNC: 43 MG/DL
HDLC SERPL: 30.5 %
HGB BLD-MCNC: 13 G/DL
IMM GRANULOCYTES # BLD AUTO: 0.01 K/UL
IMM GRANULOCYTES NFR BLD AUTO: 0.2 %
LDLC SERPL CALC-MCNC: 63 MG/DL
LYMPHOCYTES # BLD AUTO: 2.1 K/UL
LYMPHOCYTES NFR BLD: 32.5 %
MCH RBC QN AUTO: 29.3 PG
MCHC RBC AUTO-ENTMCNC: 32.6 G/DL
MCV RBC AUTO: 90 FL
MONOCYTES # BLD AUTO: 0.5 K/UL
MONOCYTES NFR BLD: 8.1 %
NEUTROPHILS # BLD AUTO: 3.6 K/UL
NEUTROPHILS NFR BLD: 55.4 %
NONHDLC SERPL-MCNC: 98 MG/DL
NRBC BLD-RTO: 0 /100 WBC
PLATELET # BLD AUTO: 172 K/UL
PMV BLD AUTO: 10.4 FL
POTASSIUM SERPL-SCNC: 4 MMOL/L
PROT SERPL-MCNC: 7 G/DL
RBC # BLD AUTO: 4.43 M/UL
SODIUM SERPL-SCNC: 141 MMOL/L
TRIGL SERPL-MCNC: 175 MG/DL
WBC # BLD AUTO: 6.4 K/UL

## 2018-11-12 PROCEDURE — 80061 LIPID PANEL: CPT

## 2018-11-12 PROCEDURE — 36415 COLL VENOUS BLD VENIPUNCTURE: CPT | Mod: PO

## 2018-11-12 PROCEDURE — 85025 COMPLETE CBC W/AUTO DIFF WBC: CPT

## 2018-11-12 PROCEDURE — 80053 COMPREHEN METABOLIC PANEL: CPT

## 2018-11-12 PROCEDURE — 83036 HEMOGLOBIN GLYCOSYLATED A1C: CPT

## 2018-11-19 ENCOUNTER — OFFICE VISIT (OUTPATIENT)
Dept: FAMILY MEDICINE | Facility: CLINIC | Age: 78
End: 2018-11-19
Payer: MEDICARE

## 2018-11-19 VITALS
HEIGHT: 64 IN | BODY MASS INDEX: 27.96 KG/M2 | WEIGHT: 163.81 LBS | OXYGEN SATURATION: 97 % | HEART RATE: 72 BPM | SYSTOLIC BLOOD PRESSURE: 132 MMHG | DIASTOLIC BLOOD PRESSURE: 68 MMHG | TEMPERATURE: 98 F

## 2018-11-19 DIAGNOSIS — Z12.39 BREAST CANCER SCREENING: ICD-10-CM

## 2018-11-19 DIAGNOSIS — E03.4 HYPOTHYROIDISM DUE TO ACQUIRED ATROPHY OF THYROID: ICD-10-CM

## 2018-11-19 DIAGNOSIS — D50.9 IRON DEFICIENCY ANEMIA, UNSPECIFIED IRON DEFICIENCY ANEMIA TYPE: ICD-10-CM

## 2018-11-19 DIAGNOSIS — I10 ESSENTIAL HYPERTENSION: ICD-10-CM

## 2018-11-19 DIAGNOSIS — E11.42 DIABETIC POLYNEUROPATHY ASSOCIATED WITH TYPE 2 DIABETES MELLITUS: Primary | ICD-10-CM

## 2018-11-19 PROCEDURE — 1101F PT FALLS ASSESS-DOCD LE1/YR: CPT | Mod: CPTII,S$GLB,, | Performed by: FAMILY MEDICINE

## 2018-11-19 PROCEDURE — 99999 PR PBB SHADOW E&M-EST. PATIENT-LVL III: CPT | Mod: PBBFAC,,, | Performed by: FAMILY MEDICINE

## 2018-11-19 PROCEDURE — 99214 OFFICE O/P EST MOD 30 MIN: CPT | Mod: S$GLB,,, | Performed by: FAMILY MEDICINE

## 2018-11-19 PROCEDURE — 3078F DIAST BP <80 MM HG: CPT | Mod: CPTII,S$GLB,, | Performed by: FAMILY MEDICINE

## 2018-11-19 PROCEDURE — 3075F SYST BP GE 130 - 139MM HG: CPT | Mod: CPTII,S$GLB,, | Performed by: FAMILY MEDICINE

## 2018-11-19 RX ORDER — PANTOPRAZOLE SODIUM 40 MG/1
40 TABLET, DELAYED RELEASE ORAL DAILY
COMMUNITY
End: 2018-12-18

## 2018-11-19 NOTE — PROGRESS NOTES
Chief Complaint:    Chief Complaint   Patient presents with    Follow-up       History of Present Illness:  Patient presents today for three-month follow-up of chronic medical problems,  She is doing okay blood pressure is normal she is taking over iron supplementation her H&H has improved.  She does have some microalbuminuria.      ROS:  Review of Systems   Constitutional: Negative for activity change, chills, fatigue, fever and unexpected weight change.   HENT: Negative for congestion, ear discharge, ear pain, hearing loss, postnasal drip and rhinorrhea.    Eyes: Negative for pain and visual disturbance.   Respiratory: Negative for cough, chest tightness and shortness of breath.    Cardiovascular: Negative for chest pain and palpitations.   Gastrointestinal: Negative for abdominal pain, diarrhea and vomiting.   Endocrine: Negative for heat intolerance.   Genitourinary: Negative for dysuria, flank pain, frequency and hematuria.   Musculoskeletal: Negative for back pain, gait problem and neck pain.   Skin: Negative for color change and rash.   Neurological: Negative for dizziness, tremors, seizures, numbness and headaches.   Psychiatric/Behavioral: Negative for agitation, hallucinations, self-injury, sleep disturbance and suicidal ideas. The patient is not nervous/anxious.        Past Medical History:   Diagnosis Date    Angiodysplasia of cecum     Diabetes mellitus, type 2 2006    BS doesn't check 06/15/2016    DM (diabetes mellitus) 2006    BS doesn't check 06/29/2018    GERD (gastroesophageal reflux disease)     Hyperlipidemia     Hypertension     Hypothyroidism        Social History:  Social History     Socioeconomic History    Marital status:      Spouse name: None    Number of children: None    Years of education: None    Highest education level: None   Social Needs    Financial resource strain: None    Food insecurity - worry: None    Food insecurity - inability: None     "Transportation needs - medical: None    Transportation needs - non-medical: None   Occupational History    None   Tobacco Use    Smoking status: Former Smoker     Packs/day: 1.00     Years: 25.00     Pack years: 25.00     Last attempt to quit: 2002     Years since quittin.5    Smokeless tobacco: Never Used   Substance and Sexual Activity    Alcohol use: No     Alcohol/week: 0.0 oz    Drug use: No    Sexual activity: None   Other Topics Concern    None   Social History Narrative    None       Family History:   family history includes COPD in her sister; Cancer (age of onset: 46) in her mother; Diabetes in her father; Heart disease in her father and mother; Hypertension in her father.    Health Maintenance   Topic Date Due    Zoster Vaccine  08/10/2000    Foot Exam  2019    Hemoglobin A1c  2019    DEXA SCAN  2019    Eye Exam  2019    Lipid Panel  2019    Colonoscopy  10/12/2022    TETANUS VACCINE  2026    Pneumococcal (65+)  Completed    Influenza Vaccine  Completed       Physical Exam:    Vital Signs  Temp: 97.9 °F (36.6 °C)  Temp src: Tympanic  Pulse: 72  SpO2: 97 %  BP: 132/68  BP Location: Left arm  Patient Position: Sitting  Pain Score: 0-No pain  Height and Weight  Height: 5' 4" (162.6 cm)  Weight: 74.3 kg (163 lb 12.8 oz)  BSA (Calculated - sq m): 1.83 sq meters  BMI (Calculated): 28.2  Weight in (lb) to have BMI = 25: 145.3]    Body mass index is 28.12 kg/m².    Physical Exam   Constitutional: She is oriented to person, place, and time. She appears well-developed.   HENT:   Mouth/Throat: Oropharynx is clear and moist.   Eyes: Conjunctivae are normal. Pupils are equal, round, and reactive to light.   Neck: Normal range of motion. Neck supple.   Cardiovascular: Normal rate, regular rhythm and normal heart sounds.   No murmur heard.  Pulmonary/Chest: Effort normal and breath sounds normal. No respiratory distress. She has no wheezes. She has no " rales. She exhibits no tenderness.   Abdominal: Soft. She exhibits no distension and no mass. There is no tenderness. There is no guarding.   Musculoskeletal: She exhibits no edema or tenderness.   Lymphadenopathy:     She has no cervical adenopathy.   Neurological: She is alert and oriented to person, place, and time. She has normal reflexes.   Skin: Skin is warm and dry.   Psychiatric: She has a normal mood and affect. Her behavior is normal. Judgment and thought content normal.         Assessment:      ICD-10-CM ICD-9-CM   1. Diabetic polyneuropathy associated with type 2 diabetes mellitus E11.42 250.60     357.2   2. Essential hypertension I10 401.9   3. Hypothyroidism due to acquired atrophy of thyroid E03.4 244.8     246.8   4. Iron deficiency anemia, unspecified iron deficiency anemia type D50.9 280.9   5. Breast cancer screening Z12.31 V76.10         Plan:    Will continue current medications and plan  Schedule a screening mammogram  Follow-up 3 months      Orders Placed This Encounter   Procedures    Mammo Digital Screening Bilat    Lipid panel    Hemoglobin A1c    Comprehensive metabolic panel    CBC auto differential    Microalbumin/creatinine urine ratio    TSH       Current Outpatient Medications   Medication Sig Dispense Refill    allopurinol (ZYLOPRIM) 300 MG tablet TAKE 1 TABLET (300 MG TOTAL) BY MOUTH ONCE DAILY. 90 tablet 3    aspirin (ECOTRIN) 81 MG EC tablet Take 81 mg by mouth once daily.      biotin 1 mg Cap Take by mouth.      ferrous sulfate 325 mg (65 mg iron) Tab tablet TAKE 1 TABLET BY MOUTH TWICE A DAY 2 HOURS AFTER A MEAL 60 tablet 3    fish oil-omega-3 fatty acids 300-1,000 mg capsule Take 2 g by mouth once daily.      furosemide (LASIX) 40 MG tablet Take 40 mg by mouth once daily.      gabapentin (NEURONTIN) 100 MG capsule TAKE ONE CAPSULE BY MOUTH THREE TIMES DAILY 270 capsule 0    levothyroxine (SYNTHROID) 75 MCG tablet TAKE 1 TABLET (75 MCG TOTAL) BY MOUTH BEFORE  BREAKFAST. 90 tablet 2    losartan (COZAAR) 100 MG tablet Take 1 tablet (100 mg total) by mouth once daily. 90 tablet 3    lovastatin (MEVACOR) 40 MG tablet TAKE 1 TABLET (40 MG TOTAL) BY MOUTH EVERY EVENING. 30 tablet 6    metFORMIN (GLUCOPHAGE) 1000 MG tablet TAKE 1 TABLET (1,000 MG TOTAL) BY MOUTH 2 (TWO) TIMES DAILY. 180 tablet 3    metoprolol tartrate (LOPRESSOR) 50 MG tablet Take 1 tablet (50 mg total) by mouth 2 (two) times daily. 60 tablet 11    pantoprazole (PROTONIX) 40 MG tablet Take 40 mg by mouth once daily.      traZODone (DESYREL) 50 MG tablet TAKE 1 TABLET (50 MG TOTAL) BY MOUTH EVERY EVENING. 30 tablet 4     No current facility-administered medications for this visit.        Medications Discontinued During This Encounter   Medication Reason    amLODIPine (NORVASC) 5 MG tablet Duplicate Order    potassium chloride SA (K-DUR,KLOR-CON) 10 MEQ tablet Patient no longer taking    pramipexole (MIRAPEX) 0.25 MG tablet Patient no longer taking    glipiZIDE (GLUCOTROL) 5 MG tablet Patient no longer taking    amLODIPine (NORVASC) 10 MG tablet Patient no longer taking       Follow-up in about 3 months (around 2/19/2019).      Angely Roberts MD

## 2018-12-18 ENCOUNTER — HOSPITAL ENCOUNTER (OUTPATIENT)
Dept: RADIOLOGY | Facility: HOSPITAL | Age: 78
Discharge: HOME | End: 2018-12-18
Attending: NURSE PRACTITIONER
Payer: MEDICARE

## 2018-12-18 ENCOUNTER — OFFICE VISIT (OUTPATIENT)
Dept: FAMILY MEDICINE | Facility: CLINIC | Age: 78
End: 2018-12-18
Payer: MEDICARE

## 2018-12-18 VITALS
DIASTOLIC BLOOD PRESSURE: 72 MMHG | WEIGHT: 163 LBS | BODY MASS INDEX: 27.98 KG/M2 | HEART RATE: 81 BPM | SYSTOLIC BLOOD PRESSURE: 144 MMHG | TEMPERATURE: 98 F | OXYGEN SATURATION: 98 %

## 2018-12-18 DIAGNOSIS — R06.02 SOB (SHORTNESS OF BREATH): ICD-10-CM

## 2018-12-18 DIAGNOSIS — R14.0 ABDOMINAL BLOATING: Primary | ICD-10-CM

## 2018-12-18 DIAGNOSIS — R14.0 ABDOMINAL BLOATING: ICD-10-CM

## 2018-12-18 PROCEDURE — 99999 PR PBB SHADOW E&M-EST. PATIENT-LVL IV: CPT | Mod: PBBFAC,,, | Performed by: NURSE PRACTITIONER

## 2018-12-18 PROCEDURE — 74019 RADEX ABDOMEN 2 VIEWS: CPT | Mod: TC,PO

## 2018-12-18 PROCEDURE — 1101F PT FALLS ASSESS-DOCD LE1/YR: CPT | Mod: CPTII,S$GLB,, | Performed by: NURSE PRACTITIONER

## 2018-12-18 PROCEDURE — 74019 RADEX ABDOMEN 2 VIEWS: CPT | Mod: 26,,, | Performed by: RADIOLOGY

## 2018-12-18 PROCEDURE — 3078F DIAST BP <80 MM HG: CPT | Mod: CPTII,S$GLB,, | Performed by: NURSE PRACTITIONER

## 2018-12-18 PROCEDURE — 71046 X-RAY EXAM CHEST 2 VIEWS: CPT | Mod: TC,PO

## 2018-12-18 PROCEDURE — 71046 X-RAY EXAM CHEST 2 VIEWS: CPT | Mod: 26,,, | Performed by: RADIOLOGY

## 2018-12-18 PROCEDURE — 3077F SYST BP >= 140 MM HG: CPT | Mod: CPTII,S$GLB,, | Performed by: NURSE PRACTITIONER

## 2018-12-18 PROCEDURE — 99214 OFFICE O/P EST MOD 30 MIN: CPT | Mod: S$GLB,,, | Performed by: NURSE PRACTITIONER

## 2018-12-18 RX ORDER — AMLODIPINE BESYLATE 5 MG/1
TABLET ORAL
COMMUNITY
Start: 2018-12-02 | End: 2019-06-07 | Stop reason: SDUPTHER

## 2018-12-18 RX ORDER — VIT C/E/ZN/COPPR/LUTEIN/ZEAXAN 250MG-90MG
1000 CAPSULE ORAL
COMMUNITY

## 2018-12-18 NOTE — PROGRESS NOTES
"Subjective:       Patient ID: Jasmine Velásquez is a 78 y.o. female.    Chief Complaint: Abdominal Pain and Edema  pt reports to clinic with chief complaint of "stomach swelling".  Pt reports onset was yesterday she noticed abdomen was "bigger than normal".  States she has taken 2 extra doses of lasix to help with "swelling".  Denies abd pain, n/v or diarrhea.  Reports daily BM, 3 soft Bm's on yesterday.  Reports "feel like I cant take a deep breath".  Denies palpitations or chest pain. No swelling in extremities.  PMH of HNT, RLS, DM2 with neuropathy.  Weight is sustaining at 163 since August 2018.   HPI  Review of Systems   Constitutional: Negative.    HENT: Negative.    Respiratory: Positive for shortness of breath. Negative for wheezing.    Cardiovascular: Negative.    Gastrointestinal: Positive for abdominal distention and abdominal pain. Negative for diarrhea and nausea.   Genitourinary: Negative.    Musculoskeletal: Negative.        Objective:      Physical Exam   Constitutional: She is oriented to person, place, and time. She appears well-developed and well-nourished.   HENT:   Head: Normocephalic.   Eyes: EOM are normal.   Neck: Neck supple.   Cardiovascular: Normal rate and normal heart sounds.   Pulmonary/Chest: Effort normal and breath sounds normal.   Abdominal: Soft. Bowel sounds are normal. She exhibits no distension. There is no tenderness.   Musculoskeletal: She exhibits no edema.   Neurological: She is alert and oriented to person, place, and time.   Skin: Skin is warm and dry.   Vitals reviewed.      Assessment:       1. Abdominal bloating    2. SOB (shortness of breath)        Plan:   Abdominal bloating  -     X-Ray Abdomen Flat And Erect; Future; Expected date: 12/18/2018  -     Comprehensive metabolic panel; Future; Expected date: 12/18/2018  Will recheck potassium considering self dosing of lasix.  Pt instructed not to take extra medication without MD's approval.  Physical exam is " unremarkable at this time.  Will r/o constipation  SOB (shortness of breath)  -     X-Ray Chest PA And Lateral; Future; Expected date: 12/18/2018      No Follow-up on file.

## 2019-01-03 ENCOUNTER — HOSPITAL ENCOUNTER (OUTPATIENT)
Dept: RADIOLOGY | Facility: HOSPITAL | Age: 79
Discharge: HOME | End: 2019-01-03
Attending: FAMILY MEDICINE
Payer: MEDICARE

## 2019-01-03 VITALS — WEIGHT: 163 LBS | HEIGHT: 64 IN | BODY MASS INDEX: 27.83 KG/M2

## 2019-01-03 DIAGNOSIS — Z12.39 BREAST CANCER SCREENING: ICD-10-CM

## 2019-01-03 PROCEDURE — 77067 MAMMO DIGITAL SCREENING BILAT WITH CAD: ICD-10-PCS | Mod: 26,,, | Performed by: RADIOLOGY

## 2019-01-03 PROCEDURE — 77067 SCR MAMMO BI INCL CAD: CPT | Mod: TC

## 2019-01-03 PROCEDURE — 77067 SCR MAMMO BI INCL CAD: CPT | Mod: 26,,, | Performed by: RADIOLOGY

## 2019-02-04 RX ORDER — GABAPENTIN 100 MG/1
CAPSULE ORAL
Qty: 270 CAPSULE | Refills: 0 | Status: SHIPPED | OUTPATIENT
Start: 2019-02-04 | End: 2019-05-12 | Stop reason: SDUPTHER

## 2019-02-12 RX ORDER — TRAZODONE HYDROCHLORIDE 50 MG/1
TABLET ORAL
Qty: 30 TABLET | Refills: 3 | Status: SHIPPED | OUTPATIENT
Start: 2019-02-12 | End: 2019-03-01 | Stop reason: SDUPTHER

## 2019-02-19 ENCOUNTER — LAB VISIT (OUTPATIENT)
Dept: LAB | Facility: HOSPITAL | Age: 79
End: 2019-02-19
Attending: FAMILY MEDICINE
Payer: MEDICARE

## 2019-02-19 DIAGNOSIS — I10 ESSENTIAL HYPERTENSION: ICD-10-CM

## 2019-02-19 DIAGNOSIS — E11.42 DIABETIC POLYNEUROPATHY ASSOCIATED WITH TYPE 2 DIABETES MELLITUS: ICD-10-CM

## 2019-02-19 DIAGNOSIS — E03.4 HYPOTHYROIDISM DUE TO ACQUIRED ATROPHY OF THYROID: ICD-10-CM

## 2019-02-19 DIAGNOSIS — D50.9 IRON DEFICIENCY ANEMIA, UNSPECIFIED IRON DEFICIENCY ANEMIA TYPE: ICD-10-CM

## 2019-02-19 LAB
ALBUMIN SERPL BCP-MCNC: 3.9 G/DL
ALP SERPL-CCNC: 57 U/L
ALT SERPL W/O P-5'-P-CCNC: 10 U/L
ANION GAP SERPL CALC-SCNC: 10 MMOL/L
AST SERPL-CCNC: 16 U/L
BASOPHILS # BLD AUTO: 0.06 K/UL
BASOPHILS NFR BLD: 0.9 %
BILIRUB SERPL-MCNC: 0.4 MG/DL
BUN SERPL-MCNC: 16 MG/DL
CALCIUM SERPL-MCNC: 10.6 MG/DL
CHLORIDE SERPL-SCNC: 103 MMOL/L
CHOLEST SERPL-MCNC: 128 MG/DL
CHOLEST/HDLC SERPL: 2.9 {RATIO}
CO2 SERPL-SCNC: 31 MMOL/L
CREAT SERPL-MCNC: 0.9 MG/DL
DIFFERENTIAL METHOD: ABNORMAL
EOSINOPHIL # BLD AUTO: 0.2 K/UL
EOSINOPHIL NFR BLD: 2.5 %
ERYTHROCYTE [DISTWIDTH] IN BLOOD BY AUTOMATED COUNT: 13.6 %
EST. GFR  (AFRICAN AMERICAN): >60 ML/MIN/1.73 M^2
EST. GFR  (NON AFRICAN AMERICAN): >60 ML/MIN/1.73 M^2
GLUCOSE SERPL-MCNC: 102 MG/DL
HCT VFR BLD AUTO: 43 %
HDLC SERPL-MCNC: 44 MG/DL
HDLC SERPL: 34.4 %
HGB BLD-MCNC: 13.6 G/DL
IMM GRANULOCYTES # BLD AUTO: 0.01 K/UL
IMM GRANULOCYTES NFR BLD AUTO: 0.2 %
LDLC SERPL CALC-MCNC: 55.2 MG/DL
LYMPHOCYTES # BLD AUTO: 2.2 K/UL
LYMPHOCYTES NFR BLD: 34.5 %
MCH RBC QN AUTO: 29.4 PG
MCHC RBC AUTO-ENTMCNC: 31.6 G/DL
MCV RBC AUTO: 93 FL
MONOCYTES # BLD AUTO: 0.5 K/UL
MONOCYTES NFR BLD: 7.3 %
NEUTROPHILS # BLD AUTO: 3.5 K/UL
NEUTROPHILS NFR BLD: 54.6 %
NONHDLC SERPL-MCNC: 84 MG/DL
NRBC BLD-RTO: 0 /100 WBC
PLATELET # BLD AUTO: 206 K/UL
PMV BLD AUTO: 10.3 FL
POTASSIUM SERPL-SCNC: 4.6 MMOL/L
PROT SERPL-MCNC: 7.3 G/DL
RBC # BLD AUTO: 4.63 M/UL
SODIUM SERPL-SCNC: 144 MMOL/L
TRIGL SERPL-MCNC: 144 MG/DL
TSH SERPL DL<=0.005 MIU/L-ACNC: 1.01 UIU/ML
WBC # BLD AUTO: 6.46 K/UL

## 2019-02-19 PROCEDURE — 80061 LIPID PANEL: CPT

## 2019-02-19 PROCEDURE — 80053 COMPREHEN METABOLIC PANEL: CPT

## 2019-02-19 PROCEDURE — 84443 ASSAY THYROID STIM HORMONE: CPT

## 2019-02-19 PROCEDURE — 83036 HEMOGLOBIN GLYCOSYLATED A1C: CPT

## 2019-02-19 PROCEDURE — 36415 COLL VENOUS BLD VENIPUNCTURE: CPT | Mod: PO

## 2019-02-19 PROCEDURE — 85025 COMPLETE CBC W/AUTO DIFF WBC: CPT

## 2019-02-20 LAB
ESTIMATED AVG GLUCOSE: 114 MG/DL
HBA1C MFR BLD HPLC: 5.6 %

## 2019-03-01 ENCOUNTER — OFFICE VISIT (OUTPATIENT)
Dept: FAMILY MEDICINE | Facility: CLINIC | Age: 79
End: 2019-03-01
Payer: MEDICARE

## 2019-03-01 VITALS
DIASTOLIC BLOOD PRESSURE: 60 MMHG | HEART RATE: 66 BPM | WEIGHT: 163.81 LBS | OXYGEN SATURATION: 98 % | SYSTOLIC BLOOD PRESSURE: 118 MMHG | HEIGHT: 64 IN | BODY MASS INDEX: 27.96 KG/M2 | TEMPERATURE: 98 F

## 2019-03-01 DIAGNOSIS — E11.42 DIABETIC POLYNEUROPATHY ASSOCIATED WITH TYPE 2 DIABETES MELLITUS: Primary | ICD-10-CM

## 2019-03-01 DIAGNOSIS — E03.4 HYPOTHYROIDISM DUE TO ACQUIRED ATROPHY OF THYROID: ICD-10-CM

## 2019-03-01 DIAGNOSIS — D50.9 IRON DEFICIENCY ANEMIA, UNSPECIFIED IRON DEFICIENCY ANEMIA TYPE: ICD-10-CM

## 2019-03-01 DIAGNOSIS — K21.9 GASTROESOPHAGEAL REFLUX DISEASE, ESOPHAGITIS PRESENCE NOT SPECIFIED: ICD-10-CM

## 2019-03-01 DIAGNOSIS — I10 ESSENTIAL HYPERTENSION: ICD-10-CM

## 2019-03-01 PROCEDURE — 3078F PR MOST RECENT DIASTOLIC BLOOD PRESSURE < 80 MM HG: ICD-10-PCS | Mod: CPTII,S$GLB,, | Performed by: FAMILY MEDICINE

## 2019-03-01 PROCEDURE — 1101F PR PT FALLS ASSESS DOC 0-1 FALLS W/OUT INJ PAST YR: ICD-10-PCS | Mod: CPTII,S$GLB,, | Performed by: FAMILY MEDICINE

## 2019-03-01 PROCEDURE — 1101F PT FALLS ASSESS-DOCD LE1/YR: CPT | Mod: CPTII,S$GLB,, | Performed by: FAMILY MEDICINE

## 2019-03-01 PROCEDURE — 99214 PR OFFICE/OUTPT VISIT, EST, LEVL IV, 30-39 MIN: ICD-10-PCS | Mod: S$GLB,,, | Performed by: FAMILY MEDICINE

## 2019-03-01 PROCEDURE — 99999 PR PBB SHADOW E&M-EST. PATIENT-LVL III: ICD-10-PCS | Mod: PBBFAC,,, | Performed by: FAMILY MEDICINE

## 2019-03-01 PROCEDURE — 3074F PR MOST RECENT SYSTOLIC BLOOD PRESSURE < 130 MM HG: ICD-10-PCS | Mod: CPTII,S$GLB,, | Performed by: FAMILY MEDICINE

## 2019-03-01 PROCEDURE — 3074F SYST BP LT 130 MM HG: CPT | Mod: CPTII,S$GLB,, | Performed by: FAMILY MEDICINE

## 2019-03-01 PROCEDURE — 99999 PR PBB SHADOW E&M-EST. PATIENT-LVL III: CPT | Mod: PBBFAC,,, | Performed by: FAMILY MEDICINE

## 2019-03-01 PROCEDURE — 99499 UNLISTED E&M SERVICE: CPT | Mod: S$GLB,,, | Performed by: FAMILY MEDICINE

## 2019-03-01 PROCEDURE — 3078F DIAST BP <80 MM HG: CPT | Mod: CPTII,S$GLB,, | Performed by: FAMILY MEDICINE

## 2019-03-01 PROCEDURE — 99214 OFFICE O/P EST MOD 30 MIN: CPT | Mod: S$GLB,,, | Performed by: FAMILY MEDICINE

## 2019-03-01 PROCEDURE — 99499 RISK ADDL DX/OHS AUDIT: ICD-10-PCS | Mod: S$GLB,,, | Performed by: FAMILY MEDICINE

## 2019-03-01 RX ORDER — TRAMADOL HYDROCHLORIDE 50 MG/1
TABLET ORAL
COMMUNITY
Start: 2019-01-17 | End: 2019-03-01

## 2019-03-01 RX ORDER — AMOXICILLIN 500 MG/1
CAPSULE ORAL
COMMUNITY
Start: 2019-01-17 | End: 2019-06-18 | Stop reason: ALTCHOICE

## 2019-03-01 NOTE — PROGRESS NOTES
Chief Complaint:    Chief Complaint   Patient presents with    Follow-up     3 month        History of Present Illness:  Patient presents today for three-month follow-up of chronic medical problems,  She is doing okay blood pressure is normal she is taking over iron supplementation her H&H has improved.  Diabetes is stable  Blood pressure is normal today        ROS:  Review of Systems   Constitutional: Negative for activity change, chills, fatigue, fever and unexpected weight change.   HENT: Negative for congestion, ear discharge, ear pain, hearing loss, postnasal drip and rhinorrhea.    Eyes: Negative for pain and visual disturbance.   Respiratory: Negative for cough, chest tightness and shortness of breath.    Cardiovascular: Negative for chest pain and palpitations.   Gastrointestinal: Negative for abdominal pain, diarrhea and vomiting.   Endocrine: Negative for heat intolerance.   Genitourinary: Negative for dysuria, flank pain, frequency and hematuria.   Musculoskeletal: Negative for back pain, gait problem and neck pain.   Skin: Negative for color change and rash.   Neurological: Negative for dizziness, tremors, seizures, numbness and headaches.   Psychiatric/Behavioral: Negative for agitation, hallucinations, self-injury, sleep disturbance and suicidal ideas. The patient is not nervous/anxious.        Past Medical History:   Diagnosis Date    Angiodysplasia of cecum     Diabetes mellitus, type 2 2006    BS doesn't check 06/15/2016    DM (diabetes mellitus) 2006    BS doesn't check 06/29/2018    GERD (gastroesophageal reflux disease)     Hyperlipidemia     Hypertension     Hypothyroidism        Social History:  Social History     Socioeconomic History    Marital status:      Spouse name: None    Number of children: None    Years of education: None    Highest education level: None   Social Needs    Financial resource strain: None    Food insecurity - worry: None    Food insecurity -  "inability: None    Transportation needs - medical: None    Transportation needs - non-medical: None   Occupational History    None   Tobacco Use    Smoking status: Former Smoker     Packs/day: 1.00     Years: 25.00     Pack years: 25.00     Last attempt to quit: 2002     Years since quittin.7    Smokeless tobacco: Never Used   Substance and Sexual Activity    Alcohol use: No     Alcohol/week: 0.0 oz    Drug use: No    Sexual activity: None   Other Topics Concern    None   Social History Narrative    None       Family History:   family history includes COPD in her sister; Cancer (age of onset: 46) in her mother; Diabetes in her father; Heart disease in her father and mother; Hypertension in her father.    Health Maintenance   Topic Date Due    Zoster Vaccine  08/10/2000    Foot Exam  2019    DEXA SCAN  2019    Eye Exam  2019    Hemoglobin A1c  2019    Lipid Panel  2020    Colonoscopy  10/12/2022    TETANUS VACCINE  2026    Pneumococcal Vaccine (65+ Low/Medium Risk)  Completed    Influenza Vaccine  Completed       Physical Exam:    Vital Signs  Temp: 98.2 °F (36.8 °C)  Temp src: Oral  Pulse: 66  SpO2: 98 %  BP: 118/60  BP Location: Left arm  Patient Position: Sitting  Pain Score: 0-No pain  Height and Weight  Height: 5' 4" (162.6 cm)  Weight: 74.3 kg (163 lb 12.8 oz)  BSA (Calculated - sq m): 1.83 sq meters  BMI (Calculated): 28.2  Weight in (lb) to have BMI = 25: 145.3]    Body mass index is 28.12 kg/m².    Physical Exam   Constitutional: She is oriented to person, place, and time. She appears well-developed.   HENT:   Mouth/Throat: Oropharynx is clear and moist.   Eyes: Conjunctivae are normal. Pupils are equal, round, and reactive to light.   Neck: Normal range of motion. Neck supple.   Cardiovascular: Normal rate, regular rhythm and normal heart sounds.   No murmur heard.  Pulmonary/Chest: Effort normal and breath sounds normal. No respiratory " distress. She has no wheezes. She has no rales. She exhibits no tenderness.   Abdominal: Soft. She exhibits no distension and no mass. There is no tenderness. There is no guarding.   Musculoskeletal: She exhibits no edema or tenderness.   Lymphadenopathy:     She has no cervical adenopathy.   Neurological: She is alert and oriented to person, place, and time. She has normal reflexes.   Skin: Skin is warm and dry.   Psychiatric: She has a normal mood and affect. Her behavior is normal. Judgment and thought content normal.         Assessment:      ICD-10-CM ICD-9-CM   1. Diabetic polyneuropathy associated with type 2 diabetes mellitus E11.42 250.60     357.2   2. Essential hypertension I10 401.9   3. Gastroesophageal reflux disease, esophagitis presence not specified K21.9 530.81   4. Hypothyroidism due to acquired atrophy of thyroid E03.4 244.8     246.8   5. Iron deficiency anemia, unspecified iron deficiency anemia type D50.9 280.9         Plan:    She can stop supplementing with iron pills at this time  Other medical problems are stable  Thyroid is therapeutic   Follow-up 3 months      Orders Placed This Encounter   Procedures    CBC auto differential    Comprehensive metabolic panel    Lipid panel    Hemoglobin A1c    Microalbumin/creatinine urine ratio    TSH       Current Outpatient Medications   Medication Sig Dispense Refill    allopurinol (ZYLOPRIM) 300 MG tablet TAKE 1 TABLET (300 MG TOTAL) BY MOUTH ONCE DAILY. 90 tablet 3    amLODIPine (NORVASC) 5 MG tablet       amoxicillin (AMOXIL) 500 MG capsule       aspirin (ECOTRIN) 81 MG EC tablet Take 81 mg by mouth once daily.      biotin 1 mg Cap Take by mouth.      cholecalciferol, vitamin D3, (VITAMIN D3) 1,000 unit capsule Take 1,000 Units by mouth.      ferrous sulfate 325 mg (65 mg iron) Tab tablet TAKE 1 TABLET BY MOUTH TWICE A DAY 2 HOURS AFTER A MEAL 60 tablet 3    fish oil-omega-3 fatty acids 300-1,000 mg capsule Take 2 g by mouth once  daily.      furosemide (LASIX) 40 MG tablet Take 40 mg by mouth once daily.      gabapentin (NEURONTIN) 100 MG capsule TAKE ONE CAPSULE BY MOUTH THREE TIMES DAILY 270 capsule 0    levothyroxine (SYNTHROID) 75 MCG tablet TAKE 1 TABLET (75 MCG TOTAL) BY MOUTH BEFORE BREAKFAST. 90 tablet 2    losartan (COZAAR) 100 MG tablet Take 1 tablet (100 mg total) by mouth once daily. 90 tablet 3    lovastatin (MEVACOR) 40 MG tablet TAKE 1 TABLET (40 MG TOTAL) BY MOUTH EVERY EVENING. 30 tablet 6    metFORMIN (GLUCOPHAGE) 1000 MG tablet TAKE 1 TABLET (1,000 MG TOTAL) BY MOUTH 2 (TWO) TIMES DAILY. 180 tablet 3    metoprolol tartrate (LOPRESSOR) 50 MG tablet Take 1 tablet (50 mg total) by mouth 2 (two) times daily. 60 tablet 11    traZODone (DESYREL) 50 MG tablet TAKE 1 TABLET (50 MG TOTAL) BY MOUTH EVERY EVENING. 30 tablet 4     No current facility-administered medications for this visit.        Medications Discontinued During This Encounter   Medication Reason    traMADol (ULTRAM) 50 mg tablet Patient no longer taking    traZODone (DESYREL) 50 MG tablet Duplicate Order       Follow-up in about 3 months (around 6/1/2019).      Angely Roberts MD

## 2019-03-15 RX ORDER — LEVOTHYROXINE SODIUM 75 UG/1
75 TABLET ORAL
Qty: 90 TABLET | Refills: 1 | Status: SHIPPED | OUTPATIENT
Start: 2019-03-15 | End: 2019-06-18 | Stop reason: SDUPTHER

## 2019-05-12 RX ORDER — GABAPENTIN 100 MG/1
CAPSULE ORAL
Qty: 270 CAPSULE | Refills: 0 | Status: SHIPPED | OUTPATIENT
Start: 2019-05-12 | End: 2019-08-26

## 2019-06-03 ENCOUNTER — LAB VISIT (OUTPATIENT)
Dept: LAB | Facility: HOSPITAL | Age: 79
End: 2019-06-03
Attending: FAMILY MEDICINE
Payer: MEDICARE

## 2019-06-03 DIAGNOSIS — D50.9 IRON DEFICIENCY ANEMIA, UNSPECIFIED IRON DEFICIENCY ANEMIA TYPE: ICD-10-CM

## 2019-06-03 DIAGNOSIS — E03.4 HYPOTHYROIDISM DUE TO ACQUIRED ATROPHY OF THYROID: ICD-10-CM

## 2019-06-03 DIAGNOSIS — E11.42 DIABETIC POLYNEUROPATHY ASSOCIATED WITH TYPE 2 DIABETES MELLITUS: ICD-10-CM

## 2019-06-03 LAB
ALBUMIN SERPL BCP-MCNC: 4.2 G/DL (ref 3.5–5.2)
ALP SERPL-CCNC: 47 U/L (ref 55–135)
ALT SERPL W/O P-5'-P-CCNC: 12 U/L (ref 10–44)
ANION GAP SERPL CALC-SCNC: 11 MMOL/L (ref 8–16)
AST SERPL-CCNC: 18 U/L (ref 10–40)
BASOPHILS # BLD AUTO: 0.08 K/UL (ref 0–0.2)
BASOPHILS NFR BLD: 1.2 % (ref 0–1.9)
BILIRUB SERPL-MCNC: 0.5 MG/DL (ref 0.1–1)
BUN SERPL-MCNC: 24 MG/DL (ref 8–23)
CALCIUM SERPL-MCNC: 10.8 MG/DL (ref 8.7–10.5)
CHLORIDE SERPL-SCNC: 106 MMOL/L (ref 95–110)
CHOLEST SERPL-MCNC: 144 MG/DL (ref 120–199)
CHOLEST/HDLC SERPL: 3.2 {RATIO} (ref 2–5)
CO2 SERPL-SCNC: 26 MMOL/L (ref 23–29)
CREAT SERPL-MCNC: 1 MG/DL (ref 0.5–1.4)
DIFFERENTIAL METHOD: ABNORMAL
EOSINOPHIL # BLD AUTO: 0.2 K/UL (ref 0–0.5)
EOSINOPHIL NFR BLD: 2.5 % (ref 0–8)
ERYTHROCYTE [DISTWIDTH] IN BLOOD BY AUTOMATED COUNT: 14.9 % (ref 11.5–14.5)
EST. GFR  (AFRICAN AMERICAN): >60 ML/MIN/1.73 M^2
EST. GFR  (NON AFRICAN AMERICAN): 54.1 ML/MIN/1.73 M^2
ESTIMATED AVG GLUCOSE: 108 MG/DL (ref 68–131)
GLUCOSE SERPL-MCNC: 105 MG/DL (ref 70–110)
HBA1C MFR BLD HPLC: 5.4 % (ref 4–5.6)
HCT VFR BLD AUTO: 41.1 % (ref 37–48.5)
HDLC SERPL-MCNC: 45 MG/DL (ref 40–75)
HDLC SERPL: 31.3 % (ref 20–50)
HGB BLD-MCNC: 13.2 G/DL (ref 12–16)
IMM GRANULOCYTES # BLD AUTO: 0.02 K/UL (ref 0–0.04)
IMM GRANULOCYTES NFR BLD AUTO: 0.3 % (ref 0–0.5)
LDLC SERPL CALC-MCNC: 69.6 MG/DL (ref 63–159)
LYMPHOCYTES # BLD AUTO: 2.1 K/UL (ref 1–4.8)
LYMPHOCYTES NFR BLD: 32.4 % (ref 18–48)
MCH RBC QN AUTO: 29.3 PG (ref 27–31)
MCHC RBC AUTO-ENTMCNC: 32.1 G/DL (ref 32–36)
MCV RBC AUTO: 91 FL (ref 82–98)
MONOCYTES # BLD AUTO: 0.4 K/UL (ref 0.3–1)
MONOCYTES NFR BLD: 6.9 % (ref 4–15)
NEUTROPHILS # BLD AUTO: 3.6 K/UL (ref 1.8–7.7)
NEUTROPHILS NFR BLD: 56.7 % (ref 38–73)
NONHDLC SERPL-MCNC: 99 MG/DL
NRBC BLD-RTO: 0 /100 WBC
PLATELET # BLD AUTO: 181 K/UL (ref 150–350)
PMV BLD AUTO: 10.2 FL (ref 9.2–12.9)
POTASSIUM SERPL-SCNC: 4.6 MMOL/L (ref 3.5–5.1)
PROT SERPL-MCNC: 7.2 G/DL (ref 6–8.4)
RBC # BLD AUTO: 4.51 M/UL (ref 4–5.4)
SODIUM SERPL-SCNC: 143 MMOL/L (ref 136–145)
TRIGL SERPL-MCNC: 147 MG/DL (ref 30–150)
TSH SERPL DL<=0.005 MIU/L-ACNC: 1.46 UIU/ML (ref 0.4–4)
WBC # BLD AUTO: 6.42 K/UL (ref 3.9–12.7)

## 2019-06-03 PROCEDURE — 80061 LIPID PANEL: CPT

## 2019-06-03 PROCEDURE — 83036 HEMOGLOBIN GLYCOSYLATED A1C: CPT

## 2019-06-03 PROCEDURE — 80053 COMPREHEN METABOLIC PANEL: CPT

## 2019-06-03 PROCEDURE — 84443 ASSAY THYROID STIM HORMONE: CPT

## 2019-06-03 PROCEDURE — 85025 COMPLETE CBC W/AUTO DIFF WBC: CPT

## 2019-06-03 PROCEDURE — 36415 COLL VENOUS BLD VENIPUNCTURE: CPT | Mod: PO

## 2019-06-07 RX ORDER — AMLODIPINE BESYLATE 5 MG/1
TABLET ORAL
Qty: 30 TABLET | Refills: 9 | Status: SHIPPED | OUTPATIENT
Start: 2019-06-07 | End: 2020-05-11

## 2019-06-16 RX ORDER — TRAZODONE HYDROCHLORIDE 50 MG/1
TABLET ORAL
Qty: 30 TABLET | Refills: 2 | Status: SHIPPED | OUTPATIENT
Start: 2019-06-16 | End: 2019-06-18 | Stop reason: SDUPTHER

## 2019-06-17 ENCOUNTER — PATIENT OUTREACH (OUTPATIENT)
Dept: ADMINISTRATIVE | Facility: HOSPITAL | Age: 79
End: 2019-06-17

## 2019-06-18 ENCOUNTER — OFFICE VISIT (OUTPATIENT)
Dept: FAMILY MEDICINE | Facility: CLINIC | Age: 79
End: 2019-06-18
Payer: MEDICARE

## 2019-06-18 VITALS
SYSTOLIC BLOOD PRESSURE: 110 MMHG | BODY MASS INDEX: 27.8 KG/M2 | WEIGHT: 162.81 LBS | OXYGEN SATURATION: 95 % | TEMPERATURE: 98 F | HEIGHT: 64 IN | HEART RATE: 63 BPM | DIASTOLIC BLOOD PRESSURE: 60 MMHG

## 2019-06-18 DIAGNOSIS — E11.42 DIABETIC POLYNEUROPATHY ASSOCIATED WITH TYPE 2 DIABETES MELLITUS: ICD-10-CM

## 2019-06-18 DIAGNOSIS — Z00.00 WELL ADULT EXAM: Primary | ICD-10-CM

## 2019-06-18 DIAGNOSIS — Z78.0 ASYMPTOMATIC MENOPAUSAL STATE: ICD-10-CM

## 2019-06-18 DIAGNOSIS — I10 ESSENTIAL HYPERTENSION: ICD-10-CM

## 2019-06-18 DIAGNOSIS — E03.4 HYPOTHYROIDISM DUE TO ACQUIRED ATROPHY OF THYROID: ICD-10-CM

## 2019-06-18 DIAGNOSIS — E83.52 HYPERCALCEMIA: ICD-10-CM

## 2019-06-18 PROCEDURE — 3074F PR MOST RECENT SYSTOLIC BLOOD PRESSURE < 130 MM HG: ICD-10-PCS | Mod: CPTII,S$GLB,, | Performed by: FAMILY MEDICINE

## 2019-06-18 PROCEDURE — 99499 UNLISTED E&M SERVICE: CPT | Mod: S$GLB,,, | Performed by: FAMILY MEDICINE

## 2019-06-18 PROCEDURE — 3078F DIAST BP <80 MM HG: CPT | Mod: CPTII,S$GLB,, | Performed by: FAMILY MEDICINE

## 2019-06-18 PROCEDURE — 99214 PR OFFICE/OUTPT VISIT, EST, LEVL IV, 30-39 MIN: ICD-10-PCS | Mod: S$GLB,,, | Performed by: FAMILY MEDICINE

## 2019-06-18 PROCEDURE — 99999 PR PBB SHADOW E&M-EST. PATIENT-LVL IV: CPT | Mod: PBBFAC,,, | Performed by: FAMILY MEDICINE

## 2019-06-18 PROCEDURE — 99999 PR PBB SHADOW E&M-EST. PATIENT-LVL IV: ICD-10-PCS | Mod: PBBFAC,,, | Performed by: FAMILY MEDICINE

## 2019-06-18 PROCEDURE — 3074F SYST BP LT 130 MM HG: CPT | Mod: CPTII,S$GLB,, | Performed by: FAMILY MEDICINE

## 2019-06-18 PROCEDURE — 99214 OFFICE O/P EST MOD 30 MIN: CPT | Mod: S$GLB,,, | Performed by: FAMILY MEDICINE

## 2019-06-18 PROCEDURE — 99499 RISK ADDL DX/OHS AUDIT: ICD-10-PCS | Mod: S$GLB,,, | Performed by: FAMILY MEDICINE

## 2019-06-18 PROCEDURE — 3078F PR MOST RECENT DIASTOLIC BLOOD PRESSURE < 80 MM HG: ICD-10-PCS | Mod: CPTII,S$GLB,, | Performed by: FAMILY MEDICINE

## 2019-06-18 RX ORDER — LEVOTHYROXINE SODIUM 75 UG/1
75 TABLET ORAL
Qty: 90 TABLET | Refills: 1 | Status: SHIPPED | OUTPATIENT
Start: 2019-06-18 | End: 2020-03-13

## 2019-06-18 RX ORDER — METOPROLOL TARTRATE 50 MG/1
1 TABLET ORAL DAILY
COMMUNITY
Start: 2019-06-07 | End: 2021-08-25

## 2019-06-18 RX ORDER — TRAZODONE HYDROCHLORIDE 50 MG/1
50 TABLET ORAL NIGHTLY
Qty: 30 TABLET | Refills: 4 | Status: SHIPPED | OUTPATIENT
Start: 2019-06-18 | End: 2020-02-12

## 2019-06-18 NOTE — PROGRESS NOTES
Chief Complaint:    Chief Complaint   Patient presents with    Follow-up       History of Present Illness:  Patient is here for three-month follow-up of chronic medical problems:  A1c has come down.  Blood pressure is normal  Lipids chemistries at goal.  Calcium borderline elevated  She says she has a diabetic eye exam scheduled.      ROS:  Review of Systems   Constitutional: Negative for activity change, chills, fatigue, fever and unexpected weight change.   HENT: Negative for congestion, ear discharge, ear pain, hearing loss, postnasal drip and rhinorrhea.    Eyes: Negative for pain and visual disturbance.   Respiratory: Negative for cough, chest tightness and shortness of breath.    Cardiovascular: Negative for chest pain and palpitations.   Gastrointestinal: Negative for abdominal pain, diarrhea and vomiting.   Endocrine: Negative for heat intolerance.   Genitourinary: Negative for dysuria, flank pain, frequency and hematuria.   Musculoskeletal: Negative for back pain, gait problem and neck pain.   Skin: Negative for color change and rash.   Neurological: Negative for dizziness, tremors, seizures, numbness and headaches.   Psychiatric/Behavioral: Negative for agitation, hallucinations, self-injury, sleep disturbance and suicidal ideas. The patient is not nervous/anxious.        Past Medical History:   Diagnosis Date    Angiodysplasia of cecum     Diabetes mellitus, type 2 2006    BS doesn't check 06/15/2016    DM (diabetes mellitus) 2006    BS doesn't check 06/29/2018    GERD (gastroesophageal reflux disease)     Hyperlipidemia     Hypertension     Hypothyroidism        Social History:  Social History     Socioeconomic History    Marital status:      Spouse name: Not on file    Number of children: Not on file    Years of education: Not on file    Highest education level: Not on file   Occupational History    Not on file   Social Needs    Financial resource strain: Not on file    Food  "insecurity:     Worry: Not on file     Inability: Not on file    Transportation needs:     Medical: Not on file     Non-medical: Not on file   Tobacco Use    Smoking status: Former Smoker     Packs/day: 1.00     Years: 25.00     Pack years: 25.00     Last attempt to quit: 2002     Years since quittin.0    Smokeless tobacco: Never Used   Substance and Sexual Activity    Alcohol use: No     Alcohol/week: 0.0 oz    Drug use: No    Sexual activity: Not on file   Lifestyle    Physical activity:     Days per week: Not on file     Minutes per session: Not on file    Stress: Not on file   Relationships    Social connections:     Talks on phone: Not on file     Gets together: Not on file     Attends Pentecostalism service: Not on file     Active member of club or organization: Not on file     Attends meetings of clubs or organizations: Not on file     Relationship status: Not on file   Other Topics Concern    Not on file   Social History Narrative    Not on file       Family History:   family history includes COPD in her sister; Cancer (age of onset: 46) in her mother; Diabetes in her father; Heart disease in her father and mother; Hypertension in her father.    Health Maintenance   Topic Date Due    DEXA SCAN  2019    Eye Exam  2019    Influenza Vaccine  2019    Hemoglobin A1c  2019    Lipid Panel  2020    Urine Microalbumin  2020    Foot Exam  2020    Colonoscopy  10/12/2022    TETANUS VACCINE  2026    Pneumococcal Vaccine (65+ Low/Medium Risk)  Completed       Physical Exam:    Vital Signs  Temp: 97.9 °F (36.6 °C)  Temp src: Temporal  Pulse: 63  SpO2: 95 %  BP: 110/60  BP Location: Left arm  Patient Position: Sitting  Pain Score: 0-No pain  Height and Weight  Height: 5' 4" (162.6 cm)  Weight: 73.9 kg (162 lb 13 oz)  BSA (Calculated - sq m): 1.83 sq meters  BMI (Calculated): 28  Weight in (lb) to have BMI = 25: 145.3]    Body mass index is 27.95 " kg/m².    Physical Exam   Constitutional: She is oriented to person, place, and time. She appears well-developed.   HENT:   Mouth/Throat: Oropharynx is clear and moist.   Eyes: Pupils are equal, round, and reactive to light. Conjunctivae are normal.   Neck: Normal range of motion. Neck supple.   Cardiovascular: Normal rate, regular rhythm and normal heart sounds.   No murmur heard.  Pulses:       Dorsalis pedis pulses are 2+ on the right side, and 1+ on the left side.        Posterior tibial pulses are 1+ on the right side, and 1+ on the left side.   Pulmonary/Chest: Effort normal and breath sounds normal. No respiratory distress. She has no wheezes. She has no rales. She exhibits no tenderness.   Abdominal: Soft. She exhibits no distension and no mass. There is no tenderness. There is no guarding.   Musculoskeletal: She exhibits no edema or tenderness.   Feet:   Right Foot:   Protective Sensation: 10 sites tested. 9 sites sensed.   Left Foot:   Protective Sensation: 10 sites tested. 8 sites sensed.   Lymphadenopathy:     She has no cervical adenopathy.   Neurological: She is alert and oriented to person, place, and time. She has normal reflexes.   Skin: Skin is warm and dry.   Psychiatric: She has a normal mood and affect. Her behavior is normal. Judgment and thought content normal.         Assessment:      ICD-10-CM ICD-9-CM   1. Well adult exam Z00.00 V70.0   2. Diabetic polyneuropathy associated with type 2 diabetes mellitus E11.42 250.60     357.2   3. Essential hypertension I10 401.9   4. Hypothyroidism due to acquired atrophy of thyroid E03.4 244.8     246.8   5. Asymptomatic menopausal state Z78.0 V49.81   6. Hypercalcemia E83.52 275.42         Plan:    Will check a PTH and ionized calcium levels next visit  Keep appointment for eye exam  Schedule DEXA scan  Other medical problems stable    Orders Placed This Encounter   Procedures    DXA Bone Density Spine And Hip    Comprehensive metabolic panel    CBC  auto differential    Microalbumin/creatinine urine ratio    Lipid panel    Hemoglobin A1c    TSH    Calcium, ionized    PTH, intact       Current Outpatient Medications   Medication Sig Dispense Refill    allopurinol (ZYLOPRIM) 300 MG tablet TAKE 1 TABLET (300 MG TOTAL) BY MOUTH ONCE DAILY. 90 tablet 3    amLODIPine (NORVASC) 5 MG tablet TAKE ONE TABLET BY MOUTH DAILY 30 tablet 9    aspirin (ECOTRIN) 81 MG EC tablet Take 81 mg by mouth once daily.      biotin 1 mg Cap Take by mouth.      cholecalciferol, vitamin D3, (VITAMIN D3) 1,000 unit capsule Take 1,000 Units by mouth.      fish oil-omega-3 fatty acids 300-1,000 mg capsule Take 2 g by mouth once daily.      furosemide (LASIX) 40 MG tablet Take 40 mg by mouth once daily.      gabapentin (NEURONTIN) 100 MG capsule TAKE ONE CAPSULE BY MOUTH THREE TIMES DAILY 270 capsule 0    levothyroxine (SYNTHROID) 75 MCG tablet Take 1 tablet (75 mcg total) by mouth before breakfast. 90 tablet 1    lovastatin (MEVACOR) 40 MG tablet TAKE 1 TABLET (40 MG TOTAL) BY MOUTH EVERY EVENING. 30 tablet 6    metFORMIN (GLUCOPHAGE) 1000 MG tablet TAKE 1 TABLET (1,000 MG TOTAL) BY MOUTH 2 (TWO) TIMES DAILY. 180 tablet 3    metoprolol tartrate (LOPRESSOR) 50 MG tablet Take 1 tablet by mouth once daily.      traZODone (DESYREL) 50 MG tablet Take 1 tablet (50 mg total) by mouth every evening. 30 tablet 4    losartan (COZAAR) 100 MG tablet Take 1 tablet (100 mg total) by mouth once daily. 90 tablet 3     No current facility-administered medications for this visit.        Medications Discontinued During This Encounter   Medication Reason    ferrous sulfate 325 mg (65 mg iron) Tab tablet Patient no longer taking    traZODone (DESYREL) 50 MG tablet Duplicate Order    metoprolol tartrate (LOPRESSOR) 50 MG tablet     amoxicillin (AMOXIL) 500 MG capsule Therapy completed    traZODone (DESYREL) 50 MG tablet Reorder    levothyroxine (SYNTHROID) 75 MCG tablet Reorder        Follow up in about 3 months (around 9/18/2019).      Angely Roberts MD

## 2019-07-05 ENCOUNTER — OFFICE VISIT (OUTPATIENT)
Dept: OPHTHALMOLOGY | Facility: CLINIC | Age: 79
End: 2019-07-05
Payer: MEDICARE

## 2019-07-05 DIAGNOSIS — E11.9 DIABETES MELLITUS TYPE 2 WITHOUT RETINOPATHY: Primary | ICD-10-CM

## 2019-07-05 DIAGNOSIS — H52.4 BILATERAL PRESBYOPIA: ICD-10-CM

## 2019-07-05 DIAGNOSIS — Z96.1 PSEUDOPHAKIA OF BOTH EYES: ICD-10-CM

## 2019-07-05 PROCEDURE — 99999 PR PBB SHADOW E&M-EST. PATIENT-LVL II: ICD-10-PCS | Mod: PBBFAC,,, | Performed by: OPTOMETRIST

## 2019-07-05 PROCEDURE — 99499 RISK ADDL DX/OHS AUDIT: ICD-10-PCS | Mod: S$GLB,,, | Performed by: OPTOMETRIST

## 2019-07-05 PROCEDURE — 99999 PR PBB SHADOW E&M-EST. PATIENT-LVL II: CPT | Mod: PBBFAC,,, | Performed by: OPTOMETRIST

## 2019-07-05 PROCEDURE — 92015 PR REFRACTION: ICD-10-PCS | Mod: S$GLB,,, | Performed by: OPTOMETRIST

## 2019-07-05 PROCEDURE — 92014 PR EYE EXAM, EST PATIENT,COMPREHESV: ICD-10-PCS | Mod: S$GLB,,, | Performed by: OPTOMETRIST

## 2019-07-05 PROCEDURE — 99499 UNLISTED E&M SERVICE: CPT | Mod: S$GLB,,, | Performed by: OPTOMETRIST

## 2019-07-05 PROCEDURE — 92015 DETERMINE REFRACTIVE STATE: CPT | Mod: S$GLB,,, | Performed by: OPTOMETRIST

## 2019-07-05 PROCEDURE — 92014 COMPRE OPH EXAM EST PT 1/>: CPT | Mod: S$GLB,,, | Performed by: OPTOMETRIST

## 2019-07-05 NOTE — PROGRESS NOTES
HPI     NIDDM exam.  No visual complaints.  Update glasses RX.  Lab Results       Component                Value               Date                       HGBA1C                   5.4                 06/03/2019                   Last edited by Gerardo Lauren, OD on 7/5/2019 10:22 AM. (History)            Assessment /Plan     For exam results, see Encounter Report.    Diabetes mellitus type 2 without retinopathy    Pseudophakia of both eyes    Bilateral presbyopia      No Background Diabetic Retinopathy  A few scattered blot hemes outside of posterior pole OU.    Stable IOL OU.    Dispense Final Rx for glasses.  RTC 1 year  Discussed above and answered questions.

## 2019-08-26 ENCOUNTER — OFFICE VISIT (OUTPATIENT)
Dept: FAMILY MEDICINE | Facility: CLINIC | Age: 79
End: 2019-08-26
Payer: MEDICARE

## 2019-08-26 VITALS
HEART RATE: 63 BPM | TEMPERATURE: 98 F | SYSTOLIC BLOOD PRESSURE: 122 MMHG | HEIGHT: 64 IN | OXYGEN SATURATION: 97 % | BODY MASS INDEX: 27.53 KG/M2 | WEIGHT: 161.25 LBS | DIASTOLIC BLOOD PRESSURE: 50 MMHG

## 2019-08-26 DIAGNOSIS — G25.81 RLS (RESTLESS LEGS SYNDROME): ICD-10-CM

## 2019-08-26 DIAGNOSIS — R07.89 CHEST WALL PAIN: Primary | ICD-10-CM

## 2019-08-26 DIAGNOSIS — I10 ESSENTIAL HYPERTENSION: ICD-10-CM

## 2019-08-26 PROCEDURE — 3074F PR MOST RECENT SYSTOLIC BLOOD PRESSURE < 130 MM HG: ICD-10-PCS | Mod: CPTII,S$GLB,, | Performed by: FAMILY MEDICINE

## 2019-08-26 PROCEDURE — 1101F PT FALLS ASSESS-DOCD LE1/YR: CPT | Mod: CPTII,S$GLB,, | Performed by: FAMILY MEDICINE

## 2019-08-26 PROCEDURE — 99999 PR PBB SHADOW E&M-EST. PATIENT-LVL III: ICD-10-PCS | Mod: PBBFAC,,, | Performed by: FAMILY MEDICINE

## 2019-08-26 PROCEDURE — 3074F SYST BP LT 130 MM HG: CPT | Mod: CPTII,S$GLB,, | Performed by: FAMILY MEDICINE

## 2019-08-26 PROCEDURE — 3078F PR MOST RECENT DIASTOLIC BLOOD PRESSURE < 80 MM HG: ICD-10-PCS | Mod: CPTII,S$GLB,, | Performed by: FAMILY MEDICINE

## 2019-08-26 PROCEDURE — 99214 OFFICE O/P EST MOD 30 MIN: CPT | Mod: S$GLB,,, | Performed by: FAMILY MEDICINE

## 2019-08-26 PROCEDURE — 1101F PR PT FALLS ASSESS DOC 0-1 FALLS W/OUT INJ PAST YR: ICD-10-PCS | Mod: CPTII,S$GLB,, | Performed by: FAMILY MEDICINE

## 2019-08-26 PROCEDURE — 3078F DIAST BP <80 MM HG: CPT | Mod: CPTII,S$GLB,, | Performed by: FAMILY MEDICINE

## 2019-08-26 PROCEDURE — 99214 PR OFFICE/OUTPT VISIT, EST, LEVL IV, 30-39 MIN: ICD-10-PCS | Mod: S$GLB,,, | Performed by: FAMILY MEDICINE

## 2019-08-26 PROCEDURE — 99999 PR PBB SHADOW E&M-EST. PATIENT-LVL III: CPT | Mod: PBBFAC,,, | Performed by: FAMILY MEDICINE

## 2019-08-26 RX ORDER — ROPINIROLE 1 MG/1
1 TABLET, FILM COATED ORAL NIGHTLY
Qty: 30 TABLET | Refills: 11 | Status: SHIPPED | OUTPATIENT
Start: 2019-08-26 | End: 2020-07-28

## 2019-08-26 RX ORDER — INDOMETHACIN 25 MG/1
25 CAPSULE ORAL 2 TIMES DAILY WITH MEALS
Qty: 10 CAPSULE | Refills: 1 | Status: SHIPPED | OUTPATIENT
Start: 2019-08-26 | End: 2019-08-29

## 2019-08-26 RX ORDER — DICYCLOMINE HYDROCHLORIDE 20 MG/1
20 TABLET ORAL EVERY 6 HOURS
Qty: 30 TABLET | Refills: 1 | Status: SHIPPED | OUTPATIENT
Start: 2019-08-26 | End: 2019-09-25

## 2019-08-26 NOTE — PROGRESS NOTES
Chief Complaint:    Chief Complaint   Patient presents with    Follow-up    Back Pain       History of Present Illness:  Presents today complaining of pain in the lower part of the mid back mostly associated with deep breathing or coughing.  No trauma pain is moderate.  She seems to think his fluids she had it before.  No diaphoresis no shortness of breath no relation to exertion.    Hypertension blood pressures been running low so she has been holding off on the losartan    She has restless legs Neurontin did not do any good wants to try Requip      ROS:  Review of Systems   Constitutional: Negative for activity change, chills, fatigue, fever and unexpected weight change.   HENT: Negative for congestion, ear discharge, ear pain, hearing loss, postnasal drip and rhinorrhea.    Eyes: Negative for pain and visual disturbance.   Respiratory: Negative for cough, chest tightness and shortness of breath.    Cardiovascular: Negative for chest pain and palpitations.   Gastrointestinal: Negative for abdominal pain, diarrhea and vomiting.   Endocrine: Negative for heat intolerance.   Genitourinary: Negative for dysuria, flank pain, frequency and hematuria.   Musculoskeletal: Negative for back pain, gait problem and neck pain.   Skin: Negative for color change and rash.   Neurological: Negative for dizziness, tremors, seizures, numbness and headaches.   Psychiatric/Behavioral: Negative for agitation, hallucinations, self-injury, sleep disturbance and suicidal ideas. The patient is not nervous/anxious.        Past Medical History:   Diagnosis Date    Angiodysplasia of cecum     Diabetes mellitus, type 2 2006    BS doesn't check 06/15/2016    DM (diabetes mellitus) 2006    BS doesn't check 06/29/2018    GERD (gastroesophageal reflux disease)     Hyperlipidemia     Hypertension     Hypothyroidism        Social History:  Social History     Socioeconomic History    Marital status:      Spouse name: Not on file     Number of children: Not on file    Years of education: Not on file    Highest education level: Not on file   Occupational History    Not on file   Social Needs    Financial resource strain: Not on file    Food insecurity:     Worry: Not on file     Inability: Not on file    Transportation needs:     Medical: Not on file     Non-medical: Not on file   Tobacco Use    Smoking status: Former Smoker     Packs/day: 1.00     Years: 25.00     Pack years: 25.00     Last attempt to quit: 2002     Years since quittin.2    Smokeless tobacco: Never Used   Substance and Sexual Activity    Alcohol use: No     Alcohol/week: 0.0 oz    Drug use: No    Sexual activity: Not on file   Lifestyle    Physical activity:     Days per week: Not on file     Minutes per session: Not on file    Stress: Not on file   Relationships    Social connections:     Talks on phone: Not on file     Gets together: Not on file     Attends Christian service: Not on file     Active member of club or organization: Not on file     Attends meetings of clubs or organizations: Not on file     Relationship status: Not on file   Other Topics Concern    Not on file   Social History Narrative    Not on file       Family History:   family history includes COPD in her sister; Cancer (age of onset: 46) in her mother; Diabetes in her father; Heart disease in her father and mother; Hypertension in her father.    Health Maintenance   Topic Date Due    DEXA SCAN  2019    Hemoglobin A1c  2019    Lipid Panel  2020    Urine Microalbumin  2020    Foot Exam  2020    Eye Exam  2020    Colonoscopy  10/12/2022    TETANUS VACCINE  2026    Pneumococcal Vaccine (65+ Low/Medium Risk)  Completed       Physical Exam:    Vital Signs  Temp: 97.7 °F (36.5 °C)  Temp src: Temporal  Pulse: 63  SpO2: 97 %  BP: (!) 122/50  BP Location: Left arm  Patient Position: Sitting  Pain Score:   4  Pain Loc: Back  Height and  "Weight  Height: 5' 4" (162.6 cm)  Weight: 73.1 kg (161 lb 4.3 oz)  BSA (Calculated - sq m): 1.82 sq meters  BMI (Calculated): 27.7  Weight in (lb) to have BMI = 25: 145.3]    Body mass index is 27.68 kg/m².    Physical Exam   Constitutional: She is oriented to person, place, and time. She appears well-developed.   HENT:   Mouth/Throat: Oropharynx is clear and moist.   Eyes: Pupils are equal, round, and reactive to light. Conjunctivae are normal.   Neck: Normal range of motion. Neck supple.   Cardiovascular: Normal rate, regular rhythm and normal heart sounds.   No murmur heard.  Pulmonary/Chest: Effort normal and breath sounds normal. No respiratory distress. She has no wheezes. She has no rales.         She exhibits no tenderness.   Abdominal: Soft. She exhibits no distension and no mass. There is no tenderness. There is no guarding.   Musculoskeletal: She exhibits no edema or tenderness.   Lymphadenopathy:     She has no cervical adenopathy.   Neurological: She is alert and oriented to person, place, and time. She has normal reflexes.   Skin: Skin is warm and dry.   Psychiatric: She has a normal mood and affect. Her behavior is normal. Judgment and thought content normal.         Assessment:      ICD-10-CM ICD-9-CM   1. Chest wall pain R07.89 786.52   2. Essential hypertension I10 401.9   3. RLS (restless legs syndrome) G25.81 333.94         Plan:        Appears to have chest wall pain, lungs are clear.  Patient reassured may take Tylenol for pain  Hypertension okay to hold off on the blood pressure medicine at this systolic blood pressure runs less than 110.  Requip given for restless legs  Indomethacin refill for gout attacks use sparingly and for no more than 2 days  She also requested dicyclomine that she occasionally takes for stomach spasms        No orders of the defined types were placed in this encounter.      Current Outpatient Medications   Medication Sig Dispense Refill    allopurinol (ZYLOPRIM) 300 " MG tablet TAKE 1 TABLET (300 MG TOTAL) BY MOUTH ONCE DAILY. 90 tablet 3    amLODIPine (NORVASC) 5 MG tablet TAKE ONE TABLET BY MOUTH DAILY 30 tablet 9    aspirin (ECOTRIN) 81 MG EC tablet Take 81 mg by mouth once daily.      biotin 1 mg Cap Take by mouth.      cholecalciferol, vitamin D3, (VITAMIN D3) 1,000 unit capsule Take 1,000 Units by mouth.      fish oil-omega-3 fatty acids 300-1,000 mg capsule Take 2 g by mouth once daily.      furosemide (LASIX) 40 MG tablet Take 40 mg by mouth once daily.      levothyroxine (SYNTHROID) 75 MCG tablet Take 1 tablet (75 mcg total) by mouth before breakfast. 90 tablet 1    lovastatin (MEVACOR) 40 MG tablet TAKE 1 TABLET (40 MG TOTAL) BY MOUTH EVERY EVENING. 30 tablet 6    metFORMIN (GLUCOPHAGE) 1000 MG tablet TAKE 1 TABLET (1,000 MG TOTAL) BY MOUTH 2 (TWO) TIMES DAILY. 180 tablet 3    metoprolol tartrate (LOPRESSOR) 50 MG tablet Take 1 tablet by mouth once daily.      traZODone (DESYREL) 50 MG tablet Take 1 tablet (50 mg total) by mouth every evening. 30 tablet 4    dicyclomine (BENTYL) 20 mg tablet Take 1 tablet (20 mg total) by mouth every 6 (six) hours. 30 tablet 1    indomethacin (INDOCIN) 25 MG capsule Take 1 capsule (25 mg total) by mouth 2 (two) times daily with meals. for 3 days 10 capsule 1    losartan (COZAAR) 100 MG tablet Take 1 tablet (100 mg total) by mouth once daily. 90 tablet 3    rOPINIRole (REQUIP) 1 MG tablet Take 1 tablet (1 mg total) by mouth every evening. 30 tablet 11     No current facility-administered medications for this visit.        Medications Discontinued During This Encounter   Medication Reason    gabapentin (NEURONTIN) 100 MG capsule Patient no longer taking       No follow-ups on file.      Angely Roberts MD

## 2019-09-17 ENCOUNTER — LAB VISIT (OUTPATIENT)
Dept: LAB | Facility: HOSPITAL | Age: 79
End: 2019-09-17
Attending: FAMILY MEDICINE
Payer: MEDICARE

## 2019-09-17 DIAGNOSIS — E03.4 HYPOTHYROIDISM DUE TO ACQUIRED ATROPHY OF THYROID: ICD-10-CM

## 2019-09-17 DIAGNOSIS — E11.42 DIABETIC POLYNEUROPATHY ASSOCIATED WITH TYPE 2 DIABETES MELLITUS: ICD-10-CM

## 2019-09-17 DIAGNOSIS — E83.52 HYPERCALCEMIA: ICD-10-CM

## 2019-09-17 LAB
BASOPHILS # BLD AUTO: 0.05 K/UL (ref 0–0.2)
BASOPHILS NFR BLD: 0.8 % (ref 0–1.9)
DIFFERENTIAL METHOD: ABNORMAL
EOSINOPHIL # BLD AUTO: 0.1 K/UL (ref 0–0.5)
EOSINOPHIL NFR BLD: 2.3 % (ref 0–8)
ERYTHROCYTE [DISTWIDTH] IN BLOOD BY AUTOMATED COUNT: 14.6 % (ref 11.5–14.5)
HCT VFR BLD AUTO: 41.6 % (ref 37–48.5)
HGB BLD-MCNC: 14.1 G/DL (ref 12–16)
IMM GRANULOCYTES # BLD AUTO: 0.02 K/UL (ref 0–0.04)
IMM GRANULOCYTES NFR BLD AUTO: 0.3 % (ref 0–0.5)
LYMPHOCYTES # BLD AUTO: 1.8 K/UL (ref 1–4.8)
LYMPHOCYTES NFR BLD: 30.6 % (ref 18–48)
MCH RBC QN AUTO: 31.7 PG (ref 27–31)
MCHC RBC AUTO-ENTMCNC: 33.9 G/DL (ref 32–36)
MCV RBC AUTO: 94 FL (ref 82–98)
MONOCYTES # BLD AUTO: 0.4 K/UL (ref 0.3–1)
MONOCYTES NFR BLD: 7.3 % (ref 4–15)
NEUTROPHILS # BLD AUTO: 3.5 K/UL (ref 1.8–7.7)
NEUTROPHILS NFR BLD: 58.7 % (ref 38–73)
NRBC BLD-RTO: 0 /100 WBC
PLATELET # BLD AUTO: 205 K/UL (ref 150–350)
PMV BLD AUTO: 10.7 FL (ref 9.2–12.9)
PTH-INTACT SERPL-MCNC: 80 PG/ML (ref 9–77)
RBC # BLD AUTO: 4.45 M/UL (ref 4–5.4)
WBC # BLD AUTO: 6.01 K/UL (ref 3.9–12.7)

## 2019-09-17 PROCEDURE — 85025 COMPLETE CBC W/AUTO DIFF WBC: CPT

## 2019-09-17 PROCEDURE — 80061 LIPID PANEL: CPT

## 2019-09-17 PROCEDURE — 80053 COMPREHEN METABOLIC PANEL: CPT

## 2019-09-17 PROCEDURE — 82330 ASSAY OF CALCIUM: CPT

## 2019-09-17 PROCEDURE — 83036 HEMOGLOBIN GLYCOSYLATED A1C: CPT

## 2019-09-17 PROCEDURE — 83970 ASSAY OF PARATHORMONE: CPT

## 2019-09-17 PROCEDURE — 36415 COLL VENOUS BLD VENIPUNCTURE: CPT | Mod: PO

## 2019-09-17 PROCEDURE — 84443 ASSAY THYROID STIM HORMONE: CPT

## 2019-09-18 LAB
ALBUMIN SERPL BCP-MCNC: 4.2 G/DL (ref 3.5–5.2)
ALP SERPL-CCNC: 47 U/L (ref 55–135)
ALT SERPL W/O P-5'-P-CCNC: 10 U/L (ref 10–44)
ANION GAP SERPL CALC-SCNC: 12 MMOL/L (ref 8–16)
AST SERPL-CCNC: 18 U/L (ref 10–40)
BILIRUB SERPL-MCNC: 0.6 MG/DL (ref 0.1–1)
BUN SERPL-MCNC: 19 MG/DL (ref 8–23)
CA-I BLDV-SCNC: 1.26 MMOL/L (ref 1.06–1.42)
CALCIUM SERPL-MCNC: 9.8 MG/DL (ref 8.7–10.5)
CHLORIDE SERPL-SCNC: 104 MMOL/L (ref 95–110)
CHOLEST SERPL-MCNC: 128 MG/DL (ref 120–199)
CHOLEST/HDLC SERPL: 3 {RATIO} (ref 2–5)
CO2 SERPL-SCNC: 28 MMOL/L (ref 23–29)
CREAT SERPL-MCNC: 1 MG/DL (ref 0.5–1.4)
EST. GFR  (AFRICAN AMERICAN): >60 ML/MIN/1.73 M^2
EST. GFR  (NON AFRICAN AMERICAN): 53.7 ML/MIN/1.73 M^2
ESTIMATED AVG GLUCOSE: 105 MG/DL (ref 68–131)
GLUCOSE SERPL-MCNC: 96 MG/DL (ref 70–110)
HBA1C MFR BLD HPLC: 5.3 % (ref 4–5.6)
HDLC SERPL-MCNC: 42 MG/DL (ref 40–75)
HDLC SERPL: 32.8 % (ref 20–50)
LDLC SERPL CALC-MCNC: 57.6 MG/DL (ref 63–159)
NONHDLC SERPL-MCNC: 86 MG/DL
POTASSIUM SERPL-SCNC: 4.3 MMOL/L (ref 3.5–5.1)
PROT SERPL-MCNC: 6.9 G/DL (ref 6–8.4)
SODIUM SERPL-SCNC: 144 MMOL/L (ref 136–145)
TRIGL SERPL-MCNC: 142 MG/DL (ref 30–150)
TSH SERPL DL<=0.005 MIU/L-ACNC: 1.63 UIU/ML (ref 0.4–4)

## 2019-09-23 ENCOUNTER — PATIENT OUTREACH (OUTPATIENT)
Dept: ADMINISTRATIVE | Facility: HOSPITAL | Age: 79
End: 2019-09-23

## 2019-09-23 NOTE — PROGRESS NOTES
Health Maintenance reviewed.   Immunizations: Abstracted.  Care Everywhere abstracted: No new results found.  Health Maintenance Due   Topic    DEXA SCAN    DEXA:DUE

## 2019-09-24 ENCOUNTER — OFFICE VISIT (OUTPATIENT)
Dept: FAMILY MEDICINE | Facility: CLINIC | Age: 79
End: 2019-09-24
Payer: MEDICARE

## 2019-09-24 VITALS
HEART RATE: 85 BPM | DIASTOLIC BLOOD PRESSURE: 70 MMHG | HEIGHT: 64 IN | BODY MASS INDEX: 27.4 KG/M2 | WEIGHT: 160.5 LBS | OXYGEN SATURATION: 98 % | TEMPERATURE: 98 F | SYSTOLIC BLOOD PRESSURE: 120 MMHG

## 2019-09-24 DIAGNOSIS — K21.9 GASTROESOPHAGEAL REFLUX DISEASE, ESOPHAGITIS PRESENCE NOT SPECIFIED: ICD-10-CM

## 2019-09-24 DIAGNOSIS — I10 ESSENTIAL HYPERTENSION: ICD-10-CM

## 2019-09-24 DIAGNOSIS — E03.4 HYPOTHYROIDISM DUE TO ACQUIRED ATROPHY OF THYROID: ICD-10-CM

## 2019-09-24 DIAGNOSIS — Z78.0 ASYMPTOMATIC MENOPAUSAL STATE: ICD-10-CM

## 2019-09-24 DIAGNOSIS — G25.81 RESTLESS LEGS SYNDROME (RLS): Chronic | ICD-10-CM

## 2019-09-24 DIAGNOSIS — Z95.3 H/O AORTIC VALVE REPLACEMENT WITH PORCINE VALVE: ICD-10-CM

## 2019-09-24 DIAGNOSIS — E11.42 DIABETIC POLYNEUROPATHY ASSOCIATED WITH TYPE 2 DIABETES MELLITUS: Primary | ICD-10-CM

## 2019-09-24 PROCEDURE — 1101F PR PT FALLS ASSESS DOC 0-1 FALLS W/OUT INJ PAST YR: ICD-10-PCS | Mod: CPTII,S$GLB,, | Performed by: FAMILY MEDICINE

## 2019-09-24 PROCEDURE — 99499 RISK ADDL DX/OHS AUDIT: ICD-10-PCS | Mod: S$GLB,,, | Performed by: FAMILY MEDICINE

## 2019-09-24 PROCEDURE — 3078F PR MOST RECENT DIASTOLIC BLOOD PRESSURE < 80 MM HG: ICD-10-PCS | Mod: CPTII,S$GLB,, | Performed by: FAMILY MEDICINE

## 2019-09-24 PROCEDURE — 99999 PR PBB SHADOW E&M-EST. PATIENT-LVL III: ICD-10-PCS | Mod: PBBFAC,,, | Performed by: FAMILY MEDICINE

## 2019-09-24 PROCEDURE — 99214 PR OFFICE/OUTPT VISIT, EST, LEVL IV, 30-39 MIN: ICD-10-PCS | Mod: S$GLB,,, | Performed by: FAMILY MEDICINE

## 2019-09-24 PROCEDURE — 3074F SYST BP LT 130 MM HG: CPT | Mod: CPTII,S$GLB,, | Performed by: FAMILY MEDICINE

## 2019-09-24 PROCEDURE — 99499 UNLISTED E&M SERVICE: CPT | Mod: S$GLB,,, | Performed by: FAMILY MEDICINE

## 2019-09-24 PROCEDURE — 99214 OFFICE O/P EST MOD 30 MIN: CPT | Mod: S$GLB,,, | Performed by: FAMILY MEDICINE

## 2019-09-24 PROCEDURE — 1101F PT FALLS ASSESS-DOCD LE1/YR: CPT | Mod: CPTII,S$GLB,, | Performed by: FAMILY MEDICINE

## 2019-09-24 PROCEDURE — 3074F PR MOST RECENT SYSTOLIC BLOOD PRESSURE < 130 MM HG: ICD-10-PCS | Mod: CPTII,S$GLB,, | Performed by: FAMILY MEDICINE

## 2019-09-24 PROCEDURE — 99999 PR PBB SHADOW E&M-EST. PATIENT-LVL III: CPT | Mod: PBBFAC,,, | Performed by: FAMILY MEDICINE

## 2019-09-24 PROCEDURE — 3078F DIAST BP <80 MM HG: CPT | Mod: CPTII,S$GLB,, | Performed by: FAMILY MEDICINE

## 2019-09-24 NOTE — PROGRESS NOTES
Chief Complaint:    Chief Complaint   Patient presents with    Follow-up       History of Present Illness:  Patient is here for three-month follow-up of chronic medical problems:  A1c has come down.  Blood pressure is normal at home but here it is elevated.  Lipids chemistries okay cholesterol is normal        ROS:  Review of Systems   Constitutional: Negative for activity change, chills, fatigue, fever and unexpected weight change.   HENT: Negative for congestion, ear discharge, ear pain, hearing loss, postnasal drip and rhinorrhea.    Eyes: Negative for pain and visual disturbance.   Respiratory: Negative for cough, chest tightness and shortness of breath.    Cardiovascular: Negative for chest pain and palpitations.   Gastrointestinal: Negative for abdominal pain, diarrhea and vomiting.   Endocrine: Negative for heat intolerance.   Genitourinary: Negative for dysuria, flank pain, frequency and hematuria.   Musculoskeletal: Negative for back pain, gait problem and neck pain.   Skin: Negative for color change and rash.   Neurological: Negative for dizziness, tremors, seizures, numbness and headaches.   Psychiatric/Behavioral: Negative for agitation, hallucinations, self-injury, sleep disturbance and suicidal ideas. The patient is not nervous/anxious.        Past Medical History:   Diagnosis Date    Angiodysplasia of cecum     Diabetes mellitus, type 2 2006    BS doesn't check 06/15/2016    DM (diabetes mellitus) 2006    BS doesn't check 06/29/2018    GERD (gastroesophageal reflux disease)     Hyperlipidemia     Hypertension     Hypothyroidism        Social History:  Social History     Socioeconomic History    Marital status:      Spouse name: Not on file    Number of children: Not on file    Years of education: Not on file    Highest education level: Not on file   Occupational History    Not on file   Social Needs    Financial resource strain: Not on file    Food insecurity:     Worry: Not on  "file     Inability: Not on file    Transportation needs:     Medical: Not on file     Non-medical: Not on file   Tobacco Use    Smoking status: Former Smoker     Packs/day: 1.00     Years: 25.00     Pack years: 25.00     Last attempt to quit: 2002     Years since quittin.3    Smokeless tobacco: Never Used   Substance and Sexual Activity    Alcohol use: No     Alcohol/week: 0.0 standard drinks    Drug use: No    Sexual activity: Not on file   Lifestyle    Physical activity:     Days per week: Not on file     Minutes per session: Not on file    Stress: Not on file   Relationships    Social connections:     Talks on phone: Not on file     Gets together: Not on file     Attends Confucianist service: Not on file     Active member of club or organization: Not on file     Attends meetings of clubs or organizations: Not on file     Relationship status: Not on file   Other Topics Concern    Not on file   Social History Narrative    Not on file       Family History:   family history includes COPD in her sister; Cancer (age of onset: 46) in her mother; Diabetes in her father; Heart disease in her father and mother; Hypertension in her father.    Health Maintenance   Topic Date Due    DEXA SCAN  2019    Hemoglobin A1c  2020    Foot Exam  2020    Eye Exam  2020    Lipid Panel  2020    Urine Microalbumin  2020    Colonoscopy  10/12/2022    TETANUS VACCINE  2026    Pneumococcal Vaccine (65+ Low/Medium Risk)  Completed       Physical Exam:    Vital Signs  Temp: 97.9 °F (36.6 °C)  Temp src: Temporal  Pulse: 85  SpO2: 98 %  BP: 120/70  BP Location: Right arm  Patient Position: Sitting  Pain Score: 0-No pain  Height and Weight  Height: 5' 4" (162.6 cm)  Weight: 72.8 kg (160 lb 7.9 oz)  BSA (Calculated - sq m): 1.81 sq meters  BMI (Calculated): 27.6  Weight in (lb) to have BMI = 25: 145.3]    Body mass index is 27.55 kg/m².    Physical Exam   Constitutional: She is " oriented to person, place, and time. She appears well-developed.   HENT:   Mouth/Throat: Oropharynx is clear and moist.   Eyes: Pupils are equal, round, and reactive to light. Conjunctivae are normal.   Neck: Normal range of motion. Neck supple.   Cardiovascular: Normal rate, regular rhythm and normal heart sounds.   No murmur heard.  Pulses:       Dorsalis pedis pulses are 2+ on the right side, and 1+ on the left side.        Posterior tibial pulses are 1+ on the right side, and 1+ on the left side.   Pulmonary/Chest: Effort normal and breath sounds normal. No respiratory distress. She has no wheezes. She has no rales. She exhibits no tenderness.   Abdominal: Soft. She exhibits no distension and no mass. There is no tenderness. There is no guarding.   Musculoskeletal: She exhibits no edema or tenderness.   Feet:   Right Foot:   Protective Sensation: 10 sites tested. 9 sites sensed.   Left Foot:   Protective Sensation: 10 sites tested. 8 sites sensed.   Lymphadenopathy:     She has no cervical adenopathy.   Neurological: She is alert and oriented to person, place, and time. She has normal reflexes.   Skin: Skin is warm and dry.   Psychiatric: She has a normal mood and affect. Her behavior is normal. Judgment and thought content normal.         Assessment:      ICD-10-CM ICD-9-CM   1. Diabetic polyneuropathy associated with type 2 diabetes mellitus E11.42 250.60     357.2   2. Essential hypertension I10 401.9   3. Gastroesophageal reflux disease, esophagitis presence not specified K21.9 530.81   4. Hypothyroidism due to acquired atrophy of thyroid E03.4 244.8     246.8   5. H/O aortic valve replacement with porcine valve Z95.3 V42.2   6. Restless legs syndrome (RLS) G25.81 333.94   7. Asymptomatic menopausal state Z78.0 V49.81         Plan:    Please monitor blood pressure readings at home  Reschedule DEXA  Continue current plan  Follow-up 6 months    Orders Placed This Encounter   Procedures    DXA Bone Density  Spine And Hip    Hemoglobin A1c    Comprehensive metabolic panel    Lipid panel    TSH       Current Outpatient Medications   Medication Sig Dispense Refill    allopurinol (ZYLOPRIM) 300 MG tablet TAKE 1 TABLET (300 MG TOTAL) BY MOUTH ONCE DAILY. 90 tablet 3    amLODIPine (NORVASC) 5 MG tablet TAKE ONE TABLET BY MOUTH DAILY 30 tablet 9    aspirin (ECOTRIN) 81 MG EC tablet Take 81 mg by mouth once daily.      biotin 1 mg Cap Take by mouth.      cholecalciferol, vitamin D3, (VITAMIN D3) 1,000 unit capsule Take 1,000 Units by mouth.      dicyclomine (BENTYL) 20 mg tablet Take 1 tablet (20 mg total) by mouth every 6 (six) hours. 30 tablet 1    fish oil-omega-3 fatty acids 300-1,000 mg capsule Take 2 g by mouth once daily.      furosemide (LASIX) 40 MG tablet Take 40 mg by mouth once daily.      levothyroxine (SYNTHROID) 75 MCG tablet Take 1 tablet (75 mcg total) by mouth before breakfast. 90 tablet 1    lovastatin (MEVACOR) 40 MG tablet TAKE 1 TABLET (40 MG TOTAL) BY MOUTH EVERY EVENING. 30 tablet 6    metFORMIN (GLUCOPHAGE) 1000 MG tablet TAKE 1 TABLET (1,000 MG TOTAL) BY MOUTH 2 (TWO) TIMES DAILY. 180 tablet 3    metoprolol tartrate (LOPRESSOR) 50 MG tablet Take 1 tablet by mouth once daily.      rOPINIRole (REQUIP) 1 MG tablet Take 1 tablet (1 mg total) by mouth every evening. 30 tablet 11    traZODone (DESYREL) 50 MG tablet Take 1 tablet (50 mg total) by mouth every evening. 30 tablet 4    losartan (COZAAR) 100 MG tablet Take 1 tablet (100 mg total) by mouth once daily. 90 tablet 3     No current facility-administered medications for this visit.        There are no discontinued medications.    Follow up in about 6 months (around 3/24/2020).      Angely Roberts MD

## 2019-10-03 ENCOUNTER — APPOINTMENT (OUTPATIENT)
Dept: RADIOLOGY | Facility: HOSPITAL | Age: 79
End: 2019-10-03
Attending: FAMILY MEDICINE
Payer: MEDICARE

## 2019-10-03 DIAGNOSIS — Z78.0 ASYMPTOMATIC MENOPAUSAL STATE: ICD-10-CM

## 2019-10-03 PROCEDURE — 77080 DXA BONE DENSITY AXIAL: CPT | Mod: TC

## 2019-10-03 PROCEDURE — 77080 DXA BONE DENSITY AXIAL: CPT | Mod: 26,,, | Performed by: RADIOLOGY

## 2019-10-03 PROCEDURE — 77080 DEXA BONE DENSITY SPINE HIP: ICD-10-PCS | Mod: 26,,, | Performed by: RADIOLOGY

## 2019-10-14 RX ORDER — METFORMIN HYDROCHLORIDE 1000 MG/1
TABLET ORAL
Qty: 180 TABLET | Refills: 2 | Status: SHIPPED | OUTPATIENT
Start: 2019-10-14 | End: 2020-07-13

## 2019-10-14 RX ORDER — GABAPENTIN 100 MG/1
CAPSULE ORAL
Qty: 270 CAPSULE | Refills: 0 | Status: SHIPPED | OUTPATIENT
Start: 2019-10-14 | End: 2019-12-27

## 2019-10-18 ENCOUNTER — TELEPHONE (OUTPATIENT)
Dept: FAMILY MEDICINE | Facility: CLINIC | Age: 79
End: 2019-10-18

## 2019-10-18 ENCOUNTER — HOSPITAL ENCOUNTER (OUTPATIENT)
Dept: RADIOLOGY | Facility: HOSPITAL | Age: 79
Discharge: HOME | End: 2019-10-18
Attending: PHYSICIAN ASSISTANT
Payer: MEDICARE

## 2019-10-18 ENCOUNTER — TELEPHONE (OUTPATIENT)
Dept: ORTHOPEDICS | Facility: CLINIC | Age: 79
End: 2019-10-18

## 2019-10-18 ENCOUNTER — OFFICE VISIT (OUTPATIENT)
Dept: ORTHOPEDICS | Facility: CLINIC | Age: 79
End: 2019-10-18
Payer: MEDICARE

## 2019-10-18 VITALS
BODY MASS INDEX: 27.4 KG/M2 | WEIGHT: 160.5 LBS | SYSTOLIC BLOOD PRESSURE: 139 MMHG | DIASTOLIC BLOOD PRESSURE: 67 MMHG | HEIGHT: 64 IN | HEART RATE: 64 BPM

## 2019-10-18 DIAGNOSIS — R20.0 BILATERAL LEG NUMBNESS: ICD-10-CM

## 2019-10-18 DIAGNOSIS — M79.605 PAIN IN BOTH LOWER EXTREMITIES: Primary | ICD-10-CM

## 2019-10-18 DIAGNOSIS — M79.604 PAIN IN BOTH LOWER EXTREMITIES: Primary | ICD-10-CM

## 2019-10-18 DIAGNOSIS — M79.605 PAIN IN BOTH LOWER EXTREMITIES: ICD-10-CM

## 2019-10-18 DIAGNOSIS — E11.42 DIABETIC POLYNEUROPATHY ASSOCIATED WITH TYPE 2 DIABETES MELLITUS: ICD-10-CM

## 2019-10-18 DIAGNOSIS — M79.604 PAIN IN BOTH LOWER EXTREMITIES: ICD-10-CM

## 2019-10-18 PROCEDURE — 99999 PR PBB SHADOW E&M-EST. PATIENT-LVL IV: CPT | Mod: PBBFAC,,, | Performed by: PHYSICIAN ASSISTANT

## 2019-10-18 PROCEDURE — 3075F PR MOST RECENT SYSTOLIC BLOOD PRESS GE 130-139MM HG: ICD-10-PCS | Mod: CPTII,S$GLB,, | Performed by: PHYSICIAN ASSISTANT

## 2019-10-18 PROCEDURE — 72110 X-RAY EXAM L-2 SPINE 4/>VWS: CPT | Mod: 26,,, | Performed by: RADIOLOGY

## 2019-10-18 PROCEDURE — 99203 OFFICE O/P NEW LOW 30 MIN: CPT | Mod: S$GLB,,, | Performed by: PHYSICIAN ASSISTANT

## 2019-10-18 PROCEDURE — 72110 XR LUMBAR SPINE COMPLETE 5 VIEW: ICD-10-PCS | Mod: 26,,, | Performed by: RADIOLOGY

## 2019-10-18 PROCEDURE — 3075F SYST BP GE 130 - 139MM HG: CPT | Mod: CPTII,S$GLB,, | Performed by: PHYSICIAN ASSISTANT

## 2019-10-18 PROCEDURE — 3078F PR MOST RECENT DIASTOLIC BLOOD PRESSURE < 80 MM HG: ICD-10-PCS | Mod: CPTII,S$GLB,, | Performed by: PHYSICIAN ASSISTANT

## 2019-10-18 PROCEDURE — 72110 X-RAY EXAM L-2 SPINE 4/>VWS: CPT | Mod: TC

## 2019-10-18 PROCEDURE — 1101F PR PT FALLS ASSESS DOC 0-1 FALLS W/OUT INJ PAST YR: ICD-10-PCS | Mod: CPTII,S$GLB,, | Performed by: PHYSICIAN ASSISTANT

## 2019-10-18 PROCEDURE — 1101F PT FALLS ASSESS-DOCD LE1/YR: CPT | Mod: CPTII,S$GLB,, | Performed by: PHYSICIAN ASSISTANT

## 2019-10-18 PROCEDURE — 3078F DIAST BP <80 MM HG: CPT | Mod: CPTII,S$GLB,, | Performed by: PHYSICIAN ASSISTANT

## 2019-10-18 PROCEDURE — 99999 PR PBB SHADOW E&M-EST. PATIENT-LVL IV: ICD-10-PCS | Mod: PBBFAC,,, | Performed by: PHYSICIAN ASSISTANT

## 2019-10-18 PROCEDURE — 99203 PR OFFICE/OUTPT VISIT, NEW, LEVL III, 30-44 MIN: ICD-10-PCS | Mod: S$GLB,,, | Performed by: PHYSICIAN ASSISTANT

## 2019-10-18 NOTE — TELEPHONE ENCOUNTER
Patient notified of test results and verbalized understanding      MANDY Childers     Please let her know she has severe degenerative changes in her back.  I definitely want her to follow-up with interventional pain management to get established for her back..

## 2019-10-18 NOTE — TELEPHONE ENCOUNTER
Spoke to patient and let her know that Dr. Wright doesn't treat the back and lower legs an the patient requested to be transferred to the appointment desk so she can be scheduled with the proper doctor.

## 2019-10-18 NOTE — PROGRESS NOTES
Subjective:      Patient ID: Jasmine Velásquez is a 79 y.o. female.    Chief Complaint: Pain of the Left Knee; Pain of the Right Knee; Pain of the Left Lower Leg; and Pain of the Right Lower Leg      HPI: Jasmine Velásquez  is a 79 y.o. female who c/o Pain of the Left Knee; Pain of the Right Knee; Pain of the Left Lower Leg; and Pain of the Right Lower Leg   for duration of chronically, but worsening over the last couple of weeks.  She has a history of lumbar spine surgery. She is also diabetic.  She has a prescription of gabapentin.  She stopped taking it and only recently got back on it for days ago.  Severity is 10/10.  She describes pain is burning and radiating from about the legs down to the feet.  She complains of associated numbness and tingling in her toes and feet.  Alleviating factors include rest.  Aggravating factors include nothing specific the symptoms are constant in nature.    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    APPENDECTOMY      BACK SURGERY      CARDIAC SURGERY      CAROTID ENDARTERECTOMY Left 2016    CATARACT EXTRACTION Bilateral     [facility]    COLONOSCOPY N/A 8/23/2016    Procedure: COLONOSCOPY;  Surgeon: Marcel Jacques MD;  Location: [facility];  Service: Endoscopy;  Laterality: N/A;    COLONOSCOPY N/A 5/24/2017    Procedure: COLONOSCOPY;  Surgeon: Jayy Rosa MD;  Location: [facility];  Service: Endoscopy;  Laterality: N/A;    COLONOSCOPY N/A 10/12/2017    Procedure: COLONOSCOPY;  Surgeon: Marcel Jacques MD;  Location: [facility];  Service: Endoscopy;   Laterality: N/A;    TONSILLECTOMY       Family History   Problem Relation Age of Onset    Cancer Mother 46        colon    Heart disease Mother     Heart disease Father     Hypertension Father     Diabetes Father     COPD Sister      Social History     Socioeconomic History    Marital status:      Spouse name: Not on file    Number of children: Not on file    Years of education: Not on file    Highest education level: Not on file   Occupational History    Not on file   Social Needs    Financial resource strain: Not on file    Food insecurity:     Worry: Not on file     Inability: Not on file    Transportation needs:     Medical: Not on file     Non-medical: Not on file   Tobacco Use    Smoking status: Former Smoker     Packs/day: 1.00     Years: 25.00     Pack years: 25.00     Last attempt to quit: 2002     Years since quittin.4    Smokeless tobacco: Never Used   Substance and Sexual Activity    Alcohol use: No     Alcohol/week: 0.0 standard drinks    Drug use: No    Sexual activity: Not on file   Lifestyle    Physical activity:     Days per week: Not on file     Minutes per session: Not on file    Stress: Not on file   Relationships    Social connections:     Talks on phone: Not on file     Gets together: Not on file     Attends Yarsani service: Not on file     Active member of club or organization: Not on file     Attends meetings of clubs or organizations: Not on file     Relationship status: Not on file   Other Topics Concern    Not on file   Social History Narrative    Not on file     Medication List with Changes/Refills   Current Medications    ALLOPURINOL (ZYLOPRIM) 300 MG TABLET    TAKE 1 TABLET (300 MG TOTAL) BY MOUTH ONCE DAILY.    AMLODIPINE (NORVASC) 5 MG TABLET    TAKE ONE TABLET BY MOUTH DAILY    ASPIRIN (ECOTRIN) 81 MG EC TABLET    Take 81 mg by mouth once daily.    BIOTIN 1 MG CAP    Take by mouth.    CHOLECALCIFEROL, VITAMIN D3, (VITAMIN D3) 1,000 UNIT  CAPSULE    Take 1,000 Units by mouth.    FISH OIL-OMEGA-3 FATTY ACIDS 300-1,000 MG CAPSULE    Take 2 g by mouth once daily.    FUROSEMIDE (LASIX) 40 MG TABLET    Take 40 mg by mouth once daily.    GABAPENTIN (NEURONTIN) 100 MG CAPSULE    TAKE ONE CAPSULE BY MOUTH THREE TIMES DAILY    LEVOTHYROXINE (SYNTHROID) 75 MCG TABLET    Take 1 tablet (75 mcg total) by mouth before breakfast.    LOSARTAN (COZAAR) 100 MG TABLET    Take 1 tablet (100 mg total) by mouth once daily.    LOVASTATIN (MEVACOR) 40 MG TABLET    TAKE 1 TABLET (40 MG TOTAL) BY MOUTH EVERY EVENING.    METFORMIN (GLUCOPHAGE) 1000 MG TABLET    TAKE ONE TABLET BY MOUTH TWICE DAILY    METOPROLOL TARTRATE (LOPRESSOR) 50 MG TABLET    Take 1 tablet by mouth once daily.    ROPINIROLE (REQUIP) 1 MG TABLET    Take 1 tablet (1 mg total) by mouth every evening.    TRAZODONE (DESYREL) 50 MG TABLET    Take 1 tablet (50 mg total) by mouth every evening.     Review of patient's allergies indicates:   Allergen Reactions    Ace inhibitors Other (See Comments)     COUGH    Codeine        Review of Systems   Constitution: Negative for fever.   Cardiovascular: Negative for chest pain.   Respiratory: Negative for cough and shortness of breath.    Skin: Negative for rash.   Musculoskeletal: Positive for back pain. Negative for joint pain, joint swelling and stiffness.   Gastrointestinal: Negative for heartburn.   Neurological: Positive for numbness (BLE) and paresthesias (BLE). Negative for headaches.         Objective:        General    Nursing note and vitals reviewed.  Constitutional: She is oriented to person, place, and time. She appears well-developed and well-nourished.   HENT:   Head: Normocephalic and atraumatic.   Eyes: EOM are normal.   Cardiovascular: Normal rate and regular rhythm.    Pulmonary/Chest: Effort normal.   Abdominal: Soft.   Neurological: She is alert and oriented to person, place, and time.   Psychiatric: She has a normal mood and affect. Her behavior  is normal.         Back (L-Spine & T-Spine) / Neck (C-Spine) Exam     Tenderness Right paramedian tenderness of the Lower L-Spine. Left paramedian tenderness of the Lower L-Spine.     Back (L-Spine & T-Spine) Range of Motion   Extension: normal   Flexion: normal   Rotation right: normal   Rotation left: normal     Spinal Sensation   Right Side Sensation  L-Spine Level: normal  Left Side Sensation  L-Spine Level: normal      Muscle Strength   Right Lower Extremity   Hip Abduction: 5/5   Hip Flexion: 5/5   Hip Extensors: 5/5  Quadriceps:  5/5   Hamstrin/5   Anterior tibial:  55/5  Gastrocsoleus:  /5  EHL:  5/5  Left Lower Extremity   Hip Abduction: 5/5   Hip Flexion: 5/5   Hip Extensors: 5/5  Quadriceps:  5/5   Hamstrin/5   Anterior tibial:  /   Gastrocsoleus:    EHL:  5/5    Reflexes     Left Side  Quadriceps:  2+  Achilles:  2+    Right Side   Quadriceps:  2+  Achilles:  2+                Assessment:       Encounter Diagnoses   Name Primary?    Pain in both lower extremities Yes    Bilateral leg numbness     Diabetic polyneuropathy associated with type 2 diabetes mellitus           Plan:       Jasmine was seen today for pain, pain, pain and pain.    Diagnoses and all orders for this visit:    Pain in both lower extremities  -     Cancel: Ambulatory referral to Pain Clinic  -     Cancel: Nerve conduction test; Future  -     X-Ray Lumbar Spine Complete 5 View; Future  -     Ambulatory referral to Pain Clinic  -     Nerve conduction test; Future    Bilateral leg numbness  -     Cancel: Ambulatory referral to Pain Clinic  -     Cancel: Nerve conduction test; Future  -     X-Ray Lumbar Spine Complete 5 View; Future  -     Ambulatory referral to Pain Clinic  -     Nerve conduction test; Future    Diabetic polyneuropathy associated with type 2 diabetes mellitus  -     Cancel: Nerve conduction test; Future  -     Nerve conduction test; Future        Othella Sherlyn Velásquez is a new pt who  comes in today for the above problems.  I would like her to get a lumbar spine x-ray on her way out today.  We will notify her of those results.  Additionally, she would benefit from a nerve test to evaluate for lumbar radiculopathy versus diabetic neuropathy.  If this is a diabetic neuropathy, would certainly have her follow up with her primary care doctor.  Obviously, if she has a radiculopathy, she may benefit from epidural steroid injections.  I would like to refer her to Dr. Durant interventional pain management for further evaluation of her back as well as her leg pain. She will continue with gabapentin as well as Tylenol as needed for symptomatic relief.  She verbalizes understanding and agrees with the above plan.    Follow up for IPM consult and EMG/NCS.          The patient understands, chooses and consents to this plan and accepts all   the risks which include but are not limited to the risks mentioned above.     Disclaimer: This note was prepared using a voice recognition system and is likely to have sound alike errors within the text.

## 2019-10-18 NOTE — TELEPHONE ENCOUNTER
----- Message from Kaela Cavazos sent at 10/18/2019  8:52 AM CDT -----  ..Type:  Patient Requesting Referral    Who Called:patient  Does the patient already have the specialty appointment scheduled?no  If yes, what is the date of that appointment?:  Referral to What Specialty: neuro  Reason for Referral: back pain  Does the patient want the referral with a specific physician?:Dr. Rosado  Is the specialist an [facility]?:  Patient Requesting a Response?:yes  Would the patient rather a call back or a response via [facility]?   Best Call Back Number:..[phone] (home)     Additional Information:

## 2019-10-18 NOTE — LETTER
October 18, 2019      Angely Roberts MD  [id number] [address] [address]           [facility]  [address] [place] [id number]  Phone: [phone]  Fax: [phone]          Patient: Jasmine Velásquez   MR Number: [id number]   YOB: 1940   Date of Visit: 10/18/2019       Dear Dr. Angely Roberts:    Thank you for referring Jasmine Velásquez to me for evaluation. Attached you will find relevant portions of my assessment and plan of care.    If you have questions, please do not hesitate to call me. I look forward to following Jasmine Velásquez along with you.    Sincerely,    MANDY Childersosure  CC:  No Recipients    If you would like to receive this communication electronically, please contact [email or web address] or [phone] to request more information on [employer] access.    For providers and/or their staff who would like to refer a patient to [facility], please contact us through our one-stop-shop provider referral line, [facility], at [phone].    If you feel you have received this communication in error or would no longer like to receive these types of communications, please e-mail [email or web address]

## 2019-10-22 ENCOUNTER — TELEPHONE (OUTPATIENT)
Dept: PAIN MEDICINE | Facility: CLINIC | Age: 79
End: 2019-10-22

## 2019-10-22 NOTE — TELEPHONE ENCOUNTER
----- Message from Kim Lee sent at 10/22/2019  7:03 AM CDT -----  Contact: [phone]  Would like to consult with the nurse, Patient will be a new Patient and has an Appt on Oct 29, 2019, Patient states that she is in a lot of Pain and would like to be seen sooner if Possible, Patient would like to Speak with the nurse , Please call back at [phone],  Thanks sj

## 2019-10-23 ENCOUNTER — OFFICE VISIT (OUTPATIENT)
Dept: PAIN MEDICINE | Facility: CLINIC | Age: 79
End: 2019-10-23
Payer: MEDICARE

## 2019-10-23 VITALS
HEIGHT: 64 IN | SYSTOLIC BLOOD PRESSURE: 165 MMHG | WEIGHT: 160.5 LBS | DIASTOLIC BLOOD PRESSURE: 76 MMHG | HEART RATE: 69 BPM | BODY MASS INDEX: 27.4 KG/M2

## 2019-10-23 DIAGNOSIS — M47.816 LUMBAR SPONDYLOSIS: Primary | ICD-10-CM

## 2019-10-23 PROCEDURE — 99999 PR PBB SHADOW E&M-EST. PATIENT-LVL III: CPT | Mod: PBBFAC,,, | Performed by: PAIN MEDICINE

## 2019-10-23 PROCEDURE — 99999 PR PBB SHADOW E&M-EST. PATIENT-LVL III: ICD-10-PCS | Mod: PBBFAC,,, | Performed by: PAIN MEDICINE

## 2019-10-23 PROCEDURE — 1101F PR PT FALLS ASSESS DOC 0-1 FALLS W/OUT INJ PAST YR: ICD-10-PCS | Mod: CPTII,S$GLB,, | Performed by: PAIN MEDICINE

## 2019-10-23 PROCEDURE — 99204 PR OFFICE/OUTPT VISIT, NEW, LEVL IV, 45-59 MIN: ICD-10-PCS | Mod: S$GLB,,, | Performed by: PAIN MEDICINE

## 2019-10-23 PROCEDURE — 3077F PR MOST RECENT SYSTOLIC BLOOD PRESSURE >= 140 MM HG: ICD-10-PCS | Mod: CPTII,S$GLB,, | Performed by: PAIN MEDICINE

## 2019-10-23 PROCEDURE — 3078F PR MOST RECENT DIASTOLIC BLOOD PRESSURE < 80 MM HG: ICD-10-PCS | Mod: CPTII,S$GLB,, | Performed by: PAIN MEDICINE

## 2019-10-23 PROCEDURE — 99204 OFFICE O/P NEW MOD 45 MIN: CPT | Mod: S$GLB,,, | Performed by: PAIN MEDICINE

## 2019-10-23 PROCEDURE — 3078F DIAST BP <80 MM HG: CPT | Mod: CPTII,S$GLB,, | Performed by: PAIN MEDICINE

## 2019-10-23 PROCEDURE — 1101F PT FALLS ASSESS-DOCD LE1/YR: CPT | Mod: CPTII,S$GLB,, | Performed by: PAIN MEDICINE

## 2019-10-23 PROCEDURE — 3077F SYST BP >= 140 MM HG: CPT | Mod: CPTII,S$GLB,, | Performed by: PAIN MEDICINE

## 2019-10-23 RX ORDER — DICLOFENAC EPOLAMINE 0.01 G/1
1 SYSTEM TOPICAL DAILY
Qty: 30 PATCH | Refills: 3 | Status: SHIPPED | OUTPATIENT
Start: 2019-10-23 | End: 2020-01-28 | Stop reason: SDUPTHER
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
## 2019-10-23 NOTE — PATIENT INSTRUCTIONS
-will schedule for bilateral lumbar medial branch blocks  -provided prescription for Flector patches  -follow up in clinic p.r.n.     Pain Management Pre-Procedure Instructions  (also available in your [employer] account)    Patient Name:___Jasmine Velásquez____MRN: [id number] you are scheduled to have the following procedure:__ Lumbar Medial Branch Block  _with______LErlinda Allen MD on: _____10/29/19__ at: [facility]    You will be contacted the day before your procedure to be given an arrival and procedure time                                                                                                            Day of Procedure   Ensure you have obtained arrival time from the Pain Management department  o We will call 48 hours in advance with your arrival time. Please check any voicemails you may have  o If you arrive past your scheduled procedure time, you may be asked to reschedule your procedure.   For your safety, ensure you have a  with you to remain present throughout your procedure   o If you arrive without a responsible adult to stay with you and drive you home, you may be asked to reschedule your procedure   Take all of your prescribed medications (exceptions noted below) with a small amount of water  DO NOT TAKE ASPIRIN OR FISH OIL 1 day prior  o [x] Nothing by mouth after midnight the night before your procedure.  It is ok to take your regular medications with a small sip of water.     Wear loose, comfortable clothing    You may wear glasses, dentures, contact lenses and/or hearing aids. Please leave all valuable items at home.   Contact the Pain Management department at [phone] or via [employer] if you are:  o Running a fever above 100 degrees  o Feel ill, have any type of infection, or are taking antibiotics now or have in the past 2 weeks  o Have had any outpatient procedures in the past 2 weeks (colonoscopy, major dental work, etc.)  o If you are  allergic to iodine, IVP dye or shellfish.      Contact Information: [phone], ask to speak to the pain management department with any questions or concerns or send a message via [employer]

## 2019-10-23 NOTE — LETTER
October 23, 2019      Estela Da Silva MD  [address] [facility]  [address]  [place] [address]           [facility]  [address] [facility]  [place] [address]  Phone: [phone]  Fax: [phone]          Patient: Jasmine Velásquez   MR Number: [id number]   YOB: 1940   Date of Visit: 10/23/2019       Dear Dr. Estela Da Silva:    Thank you for referring Jasmine Velásquez to me for evaluation. Attached you will find relevant portions of my assessment and plan of care.    If you have questions, please do not hesitate to call me. I look forward to following Jasmine Velásquez along with you.    Sincerely,    Manuel Allen MD    Enclosure  CC:  No Recipients    If you would like to receive this communication electronically, please contact [email or web address] or [phone] to request more information on [email or web address] access.    For providers and/or their staff who would like to refer a patient to [facility], please contact us through our one-stop-shop provider referral line, [facility], at [phone].    If you feel you have received this communication in error or would no longer like to receive these types of communications, please e-mail [email or web address]

## 2019-10-24 ENCOUNTER — IMMUNIZATION (OUTPATIENT)
Dept: FAMILY MEDICINE | Facility: CLINIC | Age: 79
End: 2019-10-24
Payer: MEDICARE

## 2019-10-24 PROCEDURE — G0008 ADMIN INFLUENZA VIRUS VAC: HCPCS | Mod: S$GLB,,, | Performed by: FAMILY MEDICINE

## 2019-10-24 PROCEDURE — 90662 FLU VACCINE - HIGH DOSE (65+) PRESERVATIVE FREE IM: ICD-10-PCS | Mod: S$GLB,,, | Performed by: FAMILY MEDICINE

## 2019-10-24 PROCEDURE — G0008 FLU VACCINE - HIGH DOSE (65+) PRESERVATIVE FREE IM: ICD-10-PCS | Mod: S$GLB,,, | Performed by: FAMILY MEDICINE

## 2019-10-24 PROCEDURE — 90662 IIV NO PRSV INCREASED AG IM: CPT | Mod: S$GLB,,, | Performed by: FAMILY MEDICINE

## 2019-10-28 NOTE — PRE-PROCEDURE INSTRUCTIONS
Spokew with patient, states she stopped her meds as directed. Aware to remain NPO after mn, Arrival time of 0930 at [facility].Informed her that she is approved by insurance

## 2019-10-29 ENCOUNTER — HOSPITAL ENCOUNTER (OUTPATIENT)
Facility: HOSPITAL | Age: 79
Discharge: HOME | End: 2019-10-29
Attending: PAIN MEDICINE | Admitting: PAIN MEDICINE
Payer: MEDICARE

## 2019-10-29 VITALS
DIASTOLIC BLOOD PRESSURE: 61 MMHG | BODY MASS INDEX: 27.2 KG/M2 | WEIGHT: 159.31 LBS | TEMPERATURE: 98 F | RESPIRATION RATE: 14 BRPM | HEIGHT: 64 IN | OXYGEN SATURATION: 95 % | HEART RATE: 74 BPM | SYSTOLIC BLOOD PRESSURE: 134 MMHG

## 2019-10-29 DIAGNOSIS — M47.816 LUMBAR SPONDYLOSIS: Primary | ICD-10-CM

## 2019-10-29 LAB
POCT GLUCOSE: 22 MG/DL (ref 70–110)
POCT GLUCOSE: 94 MG/DL (ref 70–110)

## 2019-10-29 PROCEDURE — 99152 PR MOD CONSCIOUS SEDATION, SAME PHYS, 5+ YRS, FIRST 15 MIN: ICD-10-PCS | Mod: ,,, | Performed by: PAIN MEDICINE

## 2019-10-29 PROCEDURE — 64494 INJ PARAVERT F JNT L/S 2 LEV: CPT | Mod: 50,,, | Performed by: PAIN MEDICINE

## 2019-10-29 PROCEDURE — 64494 PR INJ DX/THER AGNT PARAVERT FACET JOINT,IMG GUIDE,LUMBAR/SAC, 2ND LEVEL: ICD-10-PCS | Mod: 50,,, | Performed by: PAIN MEDICINE

## 2019-10-29 PROCEDURE — 99152 MOD SED SAME PHYS/QHP 5/>YRS: CPT | Mod: ,,, | Performed by: PAIN MEDICINE

## 2019-10-29 PROCEDURE — 63600175 PHARM REV CODE 636 W HCPCS: Performed by: PAIN MEDICINE

## 2019-10-29 PROCEDURE — 64493 PR INJ DX/THER AGNT PARAVERT FACET JOINT,IMG GUIDE,LUMBAR/SAC,1ST LVL: ICD-10-PCS | Mod: 50,,, | Performed by: PAIN MEDICINE

## 2019-10-29 PROCEDURE — 64493 INJ PARAVERT F JNT L/S 1 LEV: CPT | Mod: 50 | Performed by: PAIN MEDICINE

## 2019-10-29 PROCEDURE — 64493 INJ PARAVERT F JNT L/S 1 LEV: CPT | Mod: 50,,, | Performed by: PAIN MEDICINE

## 2019-10-29 PROCEDURE — 64494 INJ PARAVERT F JNT L/S 2 LEV: CPT | Mod: 50 | Performed by: PAIN MEDICINE

## 2019-10-29 PROCEDURE — 64495 INJ PARAVERT F JNT L/S 3 LEV: CPT | Mod: 50 | Performed by: PAIN MEDICINE

## 2019-10-29 PROCEDURE — 25000003 PHARM REV CODE 250: Performed by: PAIN MEDICINE

## 2019-10-29 PROCEDURE — 82962 GLUCOSE BLOOD TEST: CPT | Performed by: PAIN MEDICINE

## 2019-10-29 RX ORDER — FENTANYL CITRATE 50 UG/ML
INJECTION, SOLUTION INTRAMUSCULAR; INTRAVENOUS
Status: DISCONTINUED | OUTPATIENT
Start: 2019-10-29 | End: 2019-10-29 | Stop reason: HOSPADM

## 2019-10-29 RX ORDER — MIDAZOLAM HYDROCHLORIDE 1 MG/ML
INJECTION, SOLUTION INTRAMUSCULAR; INTRAVENOUS
Status: DISCONTINUED | OUTPATIENT
Start: 2019-10-29 | End: 2019-10-29 | Stop reason: HOSPADM

## 2019-10-29 RX ORDER — ALLOPURINOL 300 MG/1
TABLET ORAL
Qty: 90 TABLET | Refills: 2 | Status: SHIPPED | OUTPATIENT
Start: 2019-10-29 | End: 2020-07-28

## 2019-10-29 RX ORDER — LIDOCAINE HYDROCHLORIDE 20 MG/ML
INJECTION, SOLUTION EPIDURAL; INFILTRATION; INTRACAUDAL; PERINEURAL
Status: DISCONTINUED | OUTPATIENT
Start: 2019-10-29 | End: 2019-10-29 | Stop reason: HOSPADM

## 2019-10-29 NOTE — DISCHARGE SUMMARY
[facility]  Short Stay  Discharge Summary    Admit Date: 10/29/2019    Discharge Date and Time: 10/29/2019 11:20 AM      Discharge Attending Physician: GARY Allen MD     Hospital Course (synopsis of major diagnoses, care, treatment, and services provided during the course of the hospital stay): Patient was admitted to Pre-op where informed consent was signed.  The patient was then taken to the procedure suite where the procedure was performed.  The patient was then return to the Pre-Op area and discharge was performed.     Final Diagnoses:    Principal Problem: <principal problem not specified>   Secondary Diagnoses: There are no hospital problems to display for this patient.      Discharged Condition: good    Disposition: Home or Self Care    Follow up/Patient Instructions:    Medications:  Reconciled Home Medications:      Medication List      CONTINUE taking these medications    allopurinol 300 MG tablet  Commonly known as:  ZYLOPRIM  TAKE 1 TABLET (300 MG TOTAL) BY MOUTH ONCE DAILY.     amLODIPine 5 MG tablet  Commonly known as:  NORVASC  TAKE ONE TABLET BY MOUTH DAILY     aspirin 81 MG EC tablet  Commonly known as:  ECOTRIN  Take 81 mg by mouth once daily.     biotin 1 mg Cap  Take by mouth.     cholecalciferol (vitamin D3) 1,000 unit capsule  Commonly known as:  VITAMIN D3  Take 1,000 Units by mouth.     diclofenac 1.3 % Pt12  Commonly known as:  FLECTOR  Apply 1 patch topically once daily. for 10 days     fish oil-omega-3 fatty acids 300-1,000 mg capsule  Take 2 g by mouth once daily.     furosemide 40 MG tablet  Commonly known as:  LASIX  Take 40 mg by mouth once daily.     gabapentin 100 MG capsule  Commonly known as:  NEURONTIN  TAKE ONE CAPSULE BY MOUTH THREE TIMES DAILY     levothyroxine 75 MCG tablet  Commonly known as:  SYNTHROID  Take 1 tablet (75 mcg total) by mouth before breakfast.     losartan 100 MG tablet  Commonly known as:  COZAAR  Take 1 tablet (100 mg total) by mouth  once daily.     lovastatin 40 MG tablet  Commonly known as:  MEVACOR  TAKE 1 TABLET (40 MG TOTAL) BY MOUTH EVERY EVENING.     metFORMIN 1000 MG tablet  Commonly known as:  GLUCOPHAGE  TAKE ONE TABLET BY MOUTH TWICE DAILY     metoprolol tartrate 50 MG tablet  Commonly known as:  LOPRESSOR  Take 1 tablet by mouth once daily.     rOPINIRole 1 MG tablet  Commonly known as:  REQUIP  Take 1 tablet (1 mg total) by mouth every evening.     traZODone 50 MG tablet  Commonly known as:  DESYREL  Take 1 tablet (50 mg total) by mouth every evening.          Discharge Procedure Orders   Diet general     Call MD for:  severe uncontrolled pain     Call MD for:  difficulty breathing, headache or visual disturbances     Call MD for:  redness, tenderness, or signs of infection (pain, swelling, redness, odor or green/yellow discharge around incision site)     Activity as tolerated

## 2019-10-29 NOTE — DISCHARGE INSTRUCTIONS

## 2019-10-31 ENCOUNTER — TELEPHONE (OUTPATIENT)
Dept: PAIN MEDICINE | Facility: CLINIC | Age: 79
End: 2019-10-31

## 2019-10-31 RX ORDER — IRBESARTAN 150 MG/1
150 TABLET ORAL DAILY
Qty: 90 TABLET | Refills: 0 | Status: CANCELLED | OUTPATIENT
Start: 2019-10-31 | End: 2020-01-29

## 2019-10-31 RX ORDER — IRBESARTAN 150 MG/1
150 TABLET ORAL DAILY
Qty: 90 TABLET | Refills: 0 | Status: SHIPPED | OUTPATIENT
Start: 2019-10-31 | End: 2019-11-06 | Stop reason: CLARIF

## 2019-10-31 NOTE — TELEPHONE ENCOUNTER
Spoke with pt regarding new pt and educated pt on importance of closely monitoring BP. Advised to call with any questions

## 2019-10-31 NOTE — TELEPHONE ENCOUNTER
Received a message from [facility] pharmacy that Losartan Potassium 100 MG is on extensive backorder. Please advise

## 2019-10-31 NOTE — TELEPHONE ENCOUNTER
No answer. Left message    ----- Message from Nile King sent at 10/31/2019  1:54 PM CDT -----  Contact: Jorge Luis ( [facility] )   Checking on status of medical necessity form that was previously faxed over on 10/30 .pls return call.       [phone] x [phone]

## 2019-11-06 RX ORDER — OLMESARTAN MEDOXOMIL 20 MG/1
20 TABLET ORAL DAILY
Qty: 90 TABLET | Refills: 3 | Status: ON HOLD | OUTPATIENT
Start: 2019-11-06 | End: 2019-11-22

## 2019-11-06 NOTE — TELEPHONE ENCOUNTER
PT prescribed Irbesartan 150 mg tab po daily.  This medication is backordered @ [facility].  They have Olmesartan ans Telmisartan avaliable    Please advise.

## 2019-11-08 ENCOUNTER — OFFICE VISIT (OUTPATIENT)
Dept: PHYSICAL MEDICINE AND REHAB | Facility: CLINIC | Age: 79
End: 2019-11-08
Payer: MEDICARE

## 2019-11-08 VITALS
DIASTOLIC BLOOD PRESSURE: 75 MMHG | BODY MASS INDEX: 27.14 KG/M2 | HEIGHT: 64 IN | WEIGHT: 159 LBS | SYSTOLIC BLOOD PRESSURE: 165 MMHG | HEART RATE: 79 BPM | RESPIRATION RATE: 12 BRPM

## 2019-11-08 DIAGNOSIS — M54.16 LUMBAR RADICULOPATHY: ICD-10-CM

## 2019-11-08 DIAGNOSIS — M79.605 PAIN IN BOTH LOWER EXTREMITIES: ICD-10-CM

## 2019-11-08 DIAGNOSIS — M79.604 PAIN IN BOTH LOWER EXTREMITIES: ICD-10-CM

## 2019-11-08 PROBLEM — I35.0 NONRHEUMATIC AORTIC VALVE STENOSIS: Status: ACTIVE | Noted: 2019-11-08

## 2019-11-08 PROBLEM — I65.22 OCCLUSION OF LEFT CAROTID ARTERY: Status: ACTIVE | Noted: 2019-11-08

## 2019-11-08 PROBLEM — E11.9 DIABETES MELLITUS WITHOUT COMPLICATION: Status: ACTIVE | Noted: 2019-11-08

## 2019-11-08 PROBLEM — E66.9 OBESITY: Status: ACTIVE | Noted: 2019-11-08

## 2019-11-08 PROBLEM — M10.9 GOUT: Status: ACTIVE | Noted: 2019-11-08

## 2019-11-08 PROBLEM — I11.9 HYPERTENSIVE HEART DISEASE WITHOUT CONGESTIVE HEART FAILURE: Status: ACTIVE | Noted: 2019-11-08

## 2019-11-08 PROCEDURE — 3078F PR MOST RECENT DIASTOLIC BLOOD PRESSURE < 80 MM HG: ICD-10-PCS | Mod: CPTII,S$GLB,, | Performed by: PHYSICAL MEDICINE & REHABILITATION

## 2019-11-08 PROCEDURE — 95886 MUSC TEST DONE W/N TEST COMP: CPT | Mod: S$GLB,,, | Performed by: PHYSICAL MEDICINE & REHABILITATION

## 2019-11-08 PROCEDURE — 1101F PR PT FALLS ASSESS DOC 0-1 FALLS W/OUT INJ PAST YR: ICD-10-PCS | Mod: CPTII,S$GLB,, | Performed by: PHYSICAL MEDICINE & REHABILITATION

## 2019-11-08 PROCEDURE — 99204 PR OFFICE/OUTPT VISIT, NEW, LEVL IV, 45-59 MIN: ICD-10-PCS | Mod: 25,S$GLB,, | Performed by: PHYSICAL MEDICINE & REHABILITATION

## 2019-11-08 PROCEDURE — 95908 NRV CNDJ TST 3-4 STUDIES: CPT | Mod: S$GLB,,, | Performed by: PHYSICAL MEDICINE & REHABILITATION

## 2019-11-08 PROCEDURE — 3078F DIAST BP <80 MM HG: CPT | Mod: CPTII,S$GLB,, | Performed by: PHYSICAL MEDICINE & REHABILITATION

## 2019-11-08 PROCEDURE — 99204 OFFICE O/P NEW MOD 45 MIN: CPT | Mod: 25,S$GLB,, | Performed by: PHYSICAL MEDICINE & REHABILITATION

## 2019-11-08 PROCEDURE — 99999 PR PBB SHADOW E&M-EST. PATIENT-LVL III: CPT | Mod: PBBFAC,,, | Performed by: PHYSICAL MEDICINE & REHABILITATION

## 2019-11-08 PROCEDURE — 95886 PR EMG COMPLETE, W/ NERVE CONDUCTION STUDIES, 5+ MUSCLES: ICD-10-PCS | Mod: S$GLB,,, | Performed by: PHYSICAL MEDICINE & REHABILITATION

## 2019-11-08 PROCEDURE — 95908 PR NERVE CONDUCTION STUDY; 3-4 STUDIES: ICD-10-PCS | Mod: S$GLB,,, | Performed by: PHYSICAL MEDICINE & REHABILITATION

## 2019-11-08 PROCEDURE — 99999 PR PBB SHADOW E&M-EST. PATIENT-LVL III: ICD-10-PCS | Mod: PBBFAC,,, | Performed by: PHYSICAL MEDICINE & REHABILITATION

## 2019-11-08 PROCEDURE — 3077F SYST BP >= 140 MM HG: CPT | Mod: CPTII,S$GLB,, | Performed by: PHYSICAL MEDICINE & REHABILITATION

## 2019-11-08 PROCEDURE — 3077F PR MOST RECENT SYSTOLIC BLOOD PRESSURE >= 140 MM HG: ICD-10-PCS | Mod: CPTII,S$GLB,, | Performed by: PHYSICAL MEDICINE & REHABILITATION

## 2019-11-08 PROCEDURE — 1101F PT FALLS ASSESS-DOCD LE1/YR: CPT | Mod: CPTII,S$GLB,, | Performed by: PHYSICAL MEDICINE & REHABILITATION

## 2019-11-08 NOTE — PROGRESS NOTES
[facility]   [address] [facility]  [place] [address]  Phone: [phone]        Full Name: maddison casas YOB: 1940  Patient ID: [id number]      Visit Date: 11/8/2019 09:58  Age: 79 Years 2 Months Old  Examining Physician: Maria Elena Childers M.D.  Referring Physician: dann  Reason for Referral: le pain, numbness            Chief Complaint   Patient presents with    Numbness     legs and feet    Back Pain     HPI: This is a 79 y.o.  female being seen in clinic today for evaluation of chronic back and leg pain with numbness/tingling into her legs.  She was started on gabapentin which has helped her a lot with pain, balance, and improved her gait.   She was having difficulty with prolonged sitting and walking.     History obtained from patient    Past family, medical, social, and surgical history reviewed in chart    Review of Systems:     General- denies lethargy, weight change, fever, chills  Head/neck- denies swallowing difficulties  ENT- denies hearing changes  Cardiovascular-denies chest pain  Pulmonary- denies shortness of breath  GI- denies constipation or bowel incontinence  - denies bladder incontinence  Skin- denies wounds or rashes  Musculoskeletal- denies weakness, +pain  Neurologic- +numbness and tingling  Psychiatric- denies depressive or psychotic features, denies anxiety  Lymphatic-denies swelling  Endocrine- denies hypoglycemic symptoms/+DM history  All other pertinent systems negative     Physical Examination:  General: Well developed, well nourished female, NAD  HEENT:NCAT EOMI bilaterally   Pulmonary:Normal respirations    Spinal Examination: CERVICAL  Active ROM is within normal limits.  Inspection: No deformity of spinal alignment.    Spinal Examination: LUMBAR or THORACIC  Active ROM is within normal limits.  Inspection: No deformity of spinal alignment.        Bilateral Upper and Lower Extremities:  Pulses are 2+ at radial  bilaterally.  Shoulder/Elbow/Wrist/Hand ROM   Hip/Knee/Ankle ROM wnl  Bilateral Extremities show normal capillary refill.  No signs of cyanosis, rubor, edema, skin changes, or dysvascular changes of appendages.  Nails appear intact.    Neurological Exam:  Cranial Nerves:  II-XII grossly intact    Manual Muscle Testing: (Motor 5=normal)  4/5 strength bilateral lower extremities    No focal atrophy is noted of either lower extremity.    Bilateral Reflexes:hypo at patellar  No clonus at knee or ankle.    Sensation: tested to light touch  - intact in legs except mild dec at lower ext  Gait: Narrow base and good arm swing.      Entire procedure explained to patient prior to proceeding.  Verbal consent obtained        SNC      Nerve / Sites Rec. Site Onset Lat Peak Lat Amp Segments Distance Velocity     ms ms µV  mm m/s   L Sural - Ankle (Calf)      Calf Ankle 3.8 4.7 5.0 Calf - Ankle 140 37       MNC      Nerve / Sites Muscle Latency Amplitude Duration Rel Amp Segments Distance Lat Diff Velocity     ms mV ms %  mm ms m/s   L Peroneal - EDB      Ankle EDB 4.1 2.2 6.3 100 Ankle - EDB 80        Fib head EDB 10.8 1.9 7.3 87.2 Fib head - Ankle 280 6.7 42      Pop fossa EDB 12.6 1.9 7.2 99.6 Pop fossa - Fib head 80 1.8 45         Pop fossa - Ankle  8.4    L Tibial - AH      Ankle AH 6.5 3.0 5.1 100 Ankle - AH 80        Pop fossa AH 17.4 2.0 6.2 67.1 Pop fossa - Ankle 400 10.9 37       EMG           EMG Summary Table     Spontaneous MUAP Recruitment   Muscle IA Fib PSW Fasc Other Dur. Dur Amp Dur Polys Pattern Effort   L. Tibialis anterior N None None None .   N N N N Max   L. Gastrocnemius (Medial head) Incr 1+ None None CRD   N N 1+ sl red Max   L. Rectus femoris N None None None .   N N N N Max   L. Peroneus longus N None None None .   N N N sl red Max   L. Extensor digitorum brevis N None None None .   N Sl Incr 1+ Reduced Max                            INTERPRETATION  -Left sural sensory nerve  conduction study showed  prolonged peak latency and normal amplitude  -Left peroneal motor nerve conduction study showed normal latency, amplitude, and conduction velocity  -Left tibial motor nerve conduction study showed prolonged latency, dec amplitude, and dec conduction velocity  -Needle EMG examination performed to above mentioned muscles       IMPRESSION  1. ABNORMAL study  2. There is electrodiagnostic evidence of an ACUTE on CHRONIC radiculopathy at S1 and a CHRONIC radiculopathy at L5. There was no evidence of a large fiber diabetic polyneuropathy     PLAN  1. Follow up with referring provider: Estela Rice  2. Handouts on lumbar radic provided. Cont fu with IPM, consider PT with traction, core strength, etc. Cont gabapentin   3. This study is good for one year. If symptoms worsen or do not improve, please re-consult.    Maria Elena Childers M.D.  Physical Medicine and Rehab

## 2019-11-08 NOTE — LETTER
November 8, 2019      Estela Rice PA-C  [address] [facility]  [place] [address]           [facility]  [address] [facility]  [place] [address]  Phone: [phone]  Fax: [phone]          Patient: Jasmine Velásquez   MR Number: [id number]   YOB: 1940   Date of Visit: 11/8/2019       Dear Estela Rice:    Thank you for referring Jasmine Velásquez to me for evaluation. Attached you will find relevant portions of my assessment and plan of care.    If you have questions, please do not hesitate to call me. I look forward to following Jasmine Velásquez along with you.    Sincerely,    Maria Elena Childers MD    Enclosure  CC:  No Recipients    If you would like to receive this communication electronically, please contact [email or web address] or [phone] to request more information on [facility] access.    For providers and/or their staff who would like to refer a patient to [facility], please contact us through our one-stop-shop provider referral line, [facility], at [phone].    If you feel you have received this communication in error or would no longer like to receive these types of communications, please e-mail [email or web address]

## 2019-11-08 NOTE — PATIENT INSTRUCTIONS
Possible Causes of Low Back or Leg Pain    The symptoms in your back or leg may be due to pressure on a nerve. This pressure may be caused by a damaged disk or by abnormal bone growth. Either way, you may feel pain, burning, tingling, or numbness. If you have pressure on a nerve that connects to the sciatic nerve, pain may shoot down your leg.    Pressure from the disk  Constant wear and tear can weaken a disk over time and cause back pain. The disk can then be damaged by a sudden movement or injury. If its soft center begins to bulge, the disk may press on a nerve. Or the outside of the disk may tear, and the soft center may squeeze through and pinch a nerve.    Pressure from bone  As a disk wears out, the vertebrae right above and below the disk begin to touch. This can put pressure on a nerve. Often, abnormal bone (called bone spurs) grows where the vertebrae rub against each other. This can cause the foramen or the spinal canal to narrow (called stenosis) and press against a nerve.  Date Last Reviewed: 10/4/2015  © 0962-7688 [employer]. [address] [place] [address]. All rights reserved. This information is not intended as a substitute for professional medical care. Always follow your healthcare professional's instructions.        Relieving Back Pain  Back pain is a common problem. You can strain back muscles by lifting too much weight or just by moving the wrong way. Back strain can be uncomfortable, even painful. And it can take weeks or months to improve. To help yourself feel better and prevent future back strains, try these tips.  Important Note: Do not give aspirin to children or teens without first discussing it with your healthcare provider.      ? Ice    Ice reduces muscle pain and swelling. It helps most during the first 24 to 48 hours after an injury.  · Wrap an ice pack or a bag of frozen peas in a thin towel. (Never place ice directly on your skin.)  · Place the ice  where your back hurts the most.  · Dont ice for more than 20 minutes at a time.  · You can use ice several times a day.  ? Medicines  Over-the-counter pain relievers can include acetaminophen and anti-inflammatory medicines, which includes aspirin or ibuprofen. They can help ease discomfort. Some also reduce swelling.  · Tell your healthcare provider about any medicines you are already taking.  · Take medicines only as directed.  ? Heat  After the first 48 hours, heat can relax sore muscles and improve blood flow.  · Try a warm bath or shower. Or use a heating pad set on low. To prevent a burn, keep a cloth between you and the heating pad.  · Dont use a heating pad for more than 15 minutes at a time. Never sleep on a heating pad.  Date Last Reviewed: 9/1/2015  © 5620-8859 [employer]. [address] [place] [address]. All rights reserved. This information is not intended as a substitute for professional medical care. Always follow your healthcare professional's instructions.        Exercises to Strengthen Your Lower Back  Strong lower back and abdominal muscles work together to support your spine. The exercises below will help strengthen the lower back. It is important that you begin exercising slowly and increase levels gradually.  Always begin any exercise program with stretching. If you feel pain while doing any of these exercises, stop and talk to your doctor about a more specific exercise program that better suits your condition.   Low back stretch  The point of stretching is to make you more flexible and increase your range of motion. Stretch only as much as you are able. Stretch slowly. Do not push your stretch to the limit. If at any point you feel pain while stretching, this is your (temporary) limit.  · Lie on your back with your knees bent and both feet on the ground.  · Slowly raise your left knee to your chest as you flatten your lower back against the floor. Hold for 5  seconds.  · Relax and repeat the exercise with your right knee.  · Do 10 of these exercises for each leg.  · Repeat hugging both knees to your chest at the same time.  Building lower back strength  Start your exercise routine with 10 to 30 minutes a day, 1 to 3 times a day.  Initial exercises  Lying on your back:  1. Ankle pumps: Move your foot up and down, towards your head, and then away. Repeat 10 times with each foot.  2. Heel slides: Slowly bend your knee, drawing the heel of your foot towards you. Then slide your heel/foot from you, straightening your knee. Do not lift your foot off the floor (this is not a leg lift).  3. Abdominal contraction: Bend your knees and put your hands on your stomach. Tighten your stomach muscles. Hold for 5 seconds, then relax. Repeat 10 times.  4. Straight leg raise: Bend one leg at the knee and keep the other leg straight. Tighten your stomach muscles. Slowly lift your straight leg 6 to 12 inches off the floor and hold for up to 5 seconds. Repeat 10 times on each side.  Standin. Wall squats: Stand with your back against the wall. Move your feet about 12 inches away from the wall. Tighten your stomach muscles, and slowly bend your knees until they are at about a 45 degree angle. Do not go down too far. Hold about 5 seconds. Then slowly return to your starting position. Repeat 10 times.  2. Heel raises: Stand facing the wall. Slowly raise the heels of your feet up and down, while keeping your toes on the floor. If you have trouble balancing, you can touch the wall with your hands. Repeat 10 times.  More advanced exercises  When you feel comfortable enough, try these exercises.  1. Kneeling lumbar extension: Begin on your hands and knees. At the same time, raise and straighten your right arm and left leg until they are parallel to the ground. Hold for 2 seconds and come back slowly to a starting position. Repeat with left arm and right leg, alternating 10 times.  2. Prone  lumbar extension: Lie face down, arms extended overhead, palms on the floor. At the same time, raise your right arm and left leg as high as comfortably possible. Hold for 10 seconds and slowly return to start. Repeat with left arm and right leg, alternating 10 times. Gradually build up to 20 times. (Advanced: Repeat this exercise raising both arms and both legs a few inches off the floor at the same time. Hold for 5 seconds and release.)  3. Pelvic tilt: Lie on the floor on your back with your knees bent at 90 degrees. Your feet should be flat on the floor. Inhale, exhale, then slowly contract your abdominal muscles bringing your navel toward your spine. Let your pelvis rock back until your lower back is flat on the floor. Hold for 10 seconds while breathing smoothly.  4. Abdominal crunch: Perform a pelvic tilt (above) flattening your lower back against the floor. Holding the tension in your abdominal muscles, take another breath and raise your shoulder blades off the ground (this is not a full sit-up). Keep your head in line with your body (dont bend your neck forward). Hold for 2 seconds, then slowly lower.  Date Last Reviewed: 6/1/2016 © 2000-2017 [employer]. [address] [place] [address], [place] [address]. All rights reserved. This information is not intended as a substitute for professional medical care. Always follow your healthcare professional's instructions.        Understanding Lumbar Radiculopathy    Lumbar radiculopathy is irritation or inflammation of a nerve root in the low back. It causes symptoms that spread out from the back down one or both legs. To understand this condition, it helps to understand the parts of the spine:  · Vertebrae. These are bones that stack to form the spine. The lumbar spine contains the 5 bottom vertebrae.  · Disks. These are soft pads of tissue between the vertebrae. They act as shock absorbers for the spine.  · Spinal canal. This is a tunnel formed within the  stacked vertebrae. In the lumbar spine, nerves run through this canal.  · Nerves. These branch off and leave the spinal canal, traveling out to parts of the body. As they leave the spinal canal, nerves pass through openings between the vertebrae. The nerve root is the part of the nerve that is closest to the spinal canal.  · Sciatic nerve. This is a large nerve formed from several nerve roots in the low back. This nerve extends down the back of the leg to the foot.  With lumbar radiculopathy, nerve roots in the low back become irritated. This leads to pain and symptoms. The sciatic nerve is commonly involved, so the condition is often called sciatica.  What causes lumbar radiculopathy?  Aging, injury, poor posture, extra body weight, and other issues can lead to problems in the low back. These problems may then irritate nerve roots. They include:  · Damage to a disk in the lumbar spine. The damaged disk may then press on nearby nerve roots.  · Degeneration from wear and tear, and aging. This can lead to narrowing (stenosis) of the openings between the vertebrae. The narrowed openings press on nerve roots as they leave the spinal canal.  · Unstable spine. This is when a vertebra slips forward. It can then press on a nerve root.  Other, less common things can put pressure on nerves in the low back. These include diabetes, infection, or a tumor.  Symptoms of lumbar radiculopathy  These include:  · Pain in the low back  · Pain, numbness, tingling, or weakness that travels into the buttocks, hip, groin, or leg  · Muscle spasms  Treatment for lumbar radiculopathy  In most cases, your healthcare provider will first try treatments that help relieve symptoms. These may include:  · Prescription and over-the-counter pain medicines. These help relieve pain, swelling, and irritation.  · Limits on positions and activities that increase pain. But lying in bed or avoiding all movement is only recommended for a short period of  time.  · Physical therapy, including exercises and stretches. This helps decrease pain and increase movement and function.  · Steroid shots into the lower back. This may help relieve symptoms for a time.  · Weight-loss program. If you are overweight, losing extra pounds may help relieve symptoms.  In some cases, you may need surgery to fix the underlying problem. This depends on the cause, the symptoms, and how long the pain has lasted.  Possible complications  Over time, an irritated and inflamed nerve may become damaged. This may lead to long-lasting (permanent) numbness or weakness in your legs and feet. If symptoms change suddenly or get worse, be sure to let your healthcare provider know.  When to call your healthcare provider  Call your healthcare provider right away if you have any of these:  · New pain or pain that gets worse  · New or increasing weakness, tingling, or numbness in your leg or foot  · Problems controlling your bladder or bowel   Date Last Reviewed: 3/10/2016  © 5968-8679 [employer]. [address] [place] [address], [place] [address]. All rights reserved. This information is not intended as a substitute for professional medical care. Always follow your healthcare professional's instructions.

## 2019-11-11 ENCOUNTER — TELEPHONE (OUTPATIENT)
Dept: PAIN MEDICINE | Facility: CLINIC | Age: 79
End: 2019-11-11

## 2019-11-11 ENCOUNTER — TELEPHONE (OUTPATIENT)
Dept: ORTHOPEDICS | Facility: CLINIC | Age: 79
End: 2019-11-11

## 2019-11-11 NOTE — TELEPHONE ENCOUNTER
----- Message from Estephania Campos sent at 11/11/2019  8:36 AM CST -----  Contact: [facility] - [facility] [place]  Stated calling about an applied on medication, she can be reached at [phone] Thanks

## 2019-11-11 NOTE — TELEPHONE ENCOUNTER
Pt wants MBB , but no orders in , so sent message to  to put in orders and informed pt once  get back with me about MBB I will callback to schedule.

## 2019-11-11 NOTE — TELEPHONE ENCOUNTER
Discussed results of EMG with pt.  She will follow up with Dr. Allen.  I have forwarded those results to him directly.

## 2019-11-13 ENCOUNTER — TELEPHONE (OUTPATIENT)
Dept: PAIN MEDICINE | Facility: CLINIC | Age: 79
End: 2019-11-13

## 2019-11-13 DIAGNOSIS — M47.816 LUMBAR SPONDYLOSIS: Primary | ICD-10-CM

## 2019-11-13 RX ORDER — DICLOFENAC SODIUM 10 MG/G
2 GEL TOPICAL 4 TIMES DAILY PRN
Qty: 5 TUBE | Refills: 3 | Status: SHIPPED | OUTPATIENT
Start: 2019-11-13 | End: 2020-01-28 | Stop reason: SDUPTHER

## 2019-11-13 NOTE — TELEPHONE ENCOUNTER
Contacted [employer]; informed Ida that we will move forward with Diclofenac Topical Gel.     ----- Message from Maribel Leavitt sent at 11/13/2019  1:40 PM CST -----  Contact: Ida/[employer]  Please call Ida @ [phone] regarding pt Diclofenac Patch.

## 2019-11-13 NOTE — TELEPHONE ENCOUNTER
----- Message from Manuel Allen MD sent at 11/13/2019  9:48 AM CST -----  Contact: [facility] - [facility] [place]  Ordered!  ----- Message -----  From: Hitesh Yañez MA  Sent: 11/11/2019  11:23 AM CST  To: Manuel Allen MD    Pt states she wants to get the MBB. I seen in your last note you did want to do that . If you put in the orders I will call her and schedule.   ----- Message -----  From: Estephania Campos  Sent: 11/11/2019   8:36 AM CST  To: [facility]    Stated calling about an applied on medication, she can be reached at [phone] Thanks

## 2019-11-13 NOTE — TELEPHONE ENCOUNTER
Contacted pt. Appt for procedure scheduled November 22, 2019 with . Went over instructions with pt and pt verbalized understanding.Instructions also mailed to pt.  All questions answered.

## 2019-11-18 NOTE — PRE-PROCEDURE INSTRUCTIONS
Spoke with  patient     regarding procedure scheduled on 11/22/19  Has patient been sick with fever or on antibiotics within the last 7 days? no  Has patient received a vaccination within the last 7 days? no  Has the patient stopped all medications as directed? Yes last dose of aspirin and fish oil 11/14/19  Does patient have a pacemaker and or defibrillator? no  Does the patient have a ride to and from procedure and someone reliable to remain with patient? yes  Is the patient diabetic? yes  Does the patient have sleep apnea? No Or use O2 at home? no  Is the patient receiving sedation? yes  Is the patient instructed to remain NPO beginning at midnight the night before their procedure? yes

## 2019-11-20 ENCOUNTER — TELEPHONE (OUTPATIENT)
Dept: PAIN MEDICINE | Facility: CLINIC | Age: 79
End: 2019-11-20

## 2019-11-20 NOTE — TELEPHONE ENCOUNTER
----- Message from Kim Lee sent at 11/20/2019  1:29 PM CST -----  Contact: [phone]  Would like to consult with the nurse,  Patient has some question concerning her surgery, please call back at [phone], thanks sj

## 2019-11-20 NOTE — TELEPHONE ENCOUNTER
Contacted pt. Pt ok with  proceeding with planned injection dated 11/22/19. All questions answered.//lp

## 2019-11-20 NOTE — TELEPHONE ENCOUNTER
left message on voicemail to call back at earliest convenience to discuss procedure with Dr. Allen Friday, 11/22/19 as Dr. Allen will be out.

## 2019-11-22 ENCOUNTER — HOSPITAL ENCOUNTER (OUTPATIENT)
Facility: HOSPITAL | Age: 79
Discharge: HOME | End: 2019-11-22
Attending: PHYSICAL MEDICINE & REHABILITATION | Admitting: PHYSICAL MEDICINE & REHABILITATION
Payer: MEDICARE

## 2019-11-22 VITALS
RESPIRATION RATE: 16 BRPM | TEMPERATURE: 98 F | HEART RATE: 66 BPM | HEIGHT: 64 IN | OXYGEN SATURATION: 98 % | DIASTOLIC BLOOD PRESSURE: 70 MMHG | WEIGHT: 158.75 LBS | SYSTOLIC BLOOD PRESSURE: 147 MMHG | BODY MASS INDEX: 27.1 KG/M2

## 2019-11-22 DIAGNOSIS — M47.816 SPONDYLOSIS WITHOUT MYELOPATHY OR RADICULOPATHY, LUMBAR REGION: ICD-10-CM

## 2019-11-22 LAB — POCT GLUCOSE: 97 MG/DL (ref 70–110)

## 2019-11-22 PROCEDURE — S0020 INJECTION, BUPIVICAINE HYDRO: HCPCS | Performed by: PHYSICAL MEDICINE & REHABILITATION

## 2019-11-22 PROCEDURE — 64493 INJ PARAVERT F JNT L/S 1 LEV: CPT | Mod: 50,,, | Performed by: PHYSICAL MEDICINE & REHABILITATION

## 2019-11-22 PROCEDURE — 64495 INJ PARAVERT F JNT L/S 3 LEV: CPT | Mod: 50 | Performed by: PHYSICAL MEDICINE & REHABILITATION

## 2019-11-22 PROCEDURE — 25000003 PHARM REV CODE 250: Performed by: PHYSICAL MEDICINE & REHABILITATION

## 2019-11-22 PROCEDURE — 99152 PR MOD CONSCIOUS SEDATION, SAME PHYS, 5+ YRS, FIRST 15 MIN: ICD-10-PCS | Mod: ,,, | Performed by: PHYSICAL MEDICINE & REHABILITATION

## 2019-11-22 PROCEDURE — 64494 INJ PARAVERT F JNT L/S 2 LEV: CPT | Mod: 50,,, | Performed by: PHYSICAL MEDICINE & REHABILITATION

## 2019-11-22 PROCEDURE — 99152 MOD SED SAME PHYS/QHP 5/>YRS: CPT | Mod: ,,, | Performed by: PHYSICAL MEDICINE & REHABILITATION

## 2019-11-22 PROCEDURE — 64494 INJ PARAVERT F JNT L/S 2 LEV: CPT | Mod: 50 | Performed by: PHYSICAL MEDICINE & REHABILITATION

## 2019-11-22 PROCEDURE — 64493 PR INJ DX/THER AGNT PARAVERT FACET JOINT,IMG GUIDE,LUMBAR/SAC,1ST LVL: ICD-10-PCS | Mod: 50,,, | Performed by: PHYSICAL MEDICINE & REHABILITATION

## 2019-11-22 PROCEDURE — 63600175 PHARM REV CODE 636 W HCPCS: Performed by: PHYSICAL MEDICINE & REHABILITATION

## 2019-11-22 PROCEDURE — 64494 PR INJ DX/THER AGNT PARAVERT FACET JOINT,IMG GUIDE,LUMBAR/SAC, 2ND LEVEL: ICD-10-PCS | Mod: 50,,, | Performed by: PHYSICAL MEDICINE & REHABILITATION

## 2019-11-22 PROCEDURE — 64493 INJ PARAVERT F JNT L/S 1 LEV: CPT | Mod: 50 | Performed by: PHYSICAL MEDICINE & REHABILITATION

## 2019-11-22 RX ORDER — LIDOCAINE HYDROCHLORIDE 10 MG/ML
INJECTION INFILTRATION; PERINEURAL
Status: DISCONTINUED | OUTPATIENT
Start: 2019-11-22 | End: 2019-11-22 | Stop reason: HOSPADM

## 2019-11-22 RX ORDER — ONDANSETRON 2 MG/ML
4 INJECTION INTRAMUSCULAR; INTRAVENOUS ONCE AS NEEDED
Status: DISCONTINUED | OUTPATIENT
Start: 2019-11-22 | End: 2019-11-22 | Stop reason: HOSPADM

## 2019-11-22 RX ORDER — MIDAZOLAM HYDROCHLORIDE 1 MG/ML
INJECTION, SOLUTION INTRAMUSCULAR; INTRAVENOUS
Status: DISCONTINUED | OUTPATIENT
Start: 2019-11-22 | End: 2019-11-22 | Stop reason: HOSPADM

## 2019-11-22 RX ORDER — FENTANYL CITRATE 50 UG/ML
INJECTION, SOLUTION INTRAMUSCULAR; INTRAVENOUS
Status: DISCONTINUED | OUTPATIENT
Start: 2019-11-22 | End: 2019-11-22 | Stop reason: HOSPADM

## 2019-11-22 RX ORDER — BUPIVACAINE HYDROCHLORIDE 5 MG/ML
INJECTION, SOLUTION EPIDURAL; INTRACAUDAL
Status: DISCONTINUED | OUTPATIENT
Start: 2019-11-22 | End: 2019-11-22 | Stop reason: HOSPADM

## 2019-11-22 NOTE — INTERVAL H&P NOTE
The patient has been examined and the H&P has been reviewed:    I concur with the findings and no changes have occurred since H&P was written.    Anesthesia/Surgery risks, benefits and alternative options discussed and understood by patient/family.          Active Hospital Problems    Diagnosis  POA    Spondylosis without myelopathy or radiculopathy, lumbar region [M47.816]  Yes      Resolved Hospital Problems   No resolved problems to display.       Plan:    Proceed with bilateral lumbar medial branch blocks at L3,4,5    Quirino Durant MD  Interventional Pain Medicine  [facility] [place]

## 2019-11-22 NOTE — DISCHARGE SUMMARY
Discharge Note  Short Stay      SUMMARY     Admit Date: 11/22/2019    Attending Physician: Quirino Durant MD        Discharge Physician: Quirino Durant MD        Discharge Date: 11/22/2019 12:03 PM    Procedure(s) (LRB):  Bilateral L3-5 MBB (Bilateral)    Final Diagnosis: Lumbar spondylosis [M47.816]    Disposition: Home or self care    Patient Instructions:   Current Discharge Medication List      CONTINUE these medications which have NOT CHANGED    Details   allopurinol (ZYLOPRIM) 300 MG tablet TAKE 1 TABLET (300 MG TOTAL) BY MOUTH ONCE DAILY.  Qty: 90 tablet, Refills: 2      amLODIPine (NORVASC) 5 MG tablet TAKE ONE TABLET BY MOUTH DAILY  Qty: 30 tablet, Refills: 9      furosemide (LASIX) 40 MG tablet Take 40 mg by mouth once daily.      gabapentin (NEURONTIN) 100 MG capsule TAKE ONE CAPSULE BY MOUTH THREE TIMES DAILY  Qty: 270 capsule, Refills: 0      levothyroxine (SYNTHROID) 75 MCG tablet Take 1 tablet (75 mcg total) by mouth before breakfast.  Qty: 90 tablet, Refills: 1      lovastatin (MEVACOR) 40 MG tablet TAKE 1 TABLET (40 MG TOTAL) BY MOUTH EVERY EVENING.  Qty: 30 tablet, Refills: 6      metFORMIN (GLUCOPHAGE) 1000 MG tablet TAKE ONE TABLET BY MOUTH TWICE DAILY  Qty: 180 tablet, Refills: 2      metoprolol tartrate (LOPRESSOR) 50 MG tablet Take 1 tablet by mouth once daily.      rOPINIRole (REQUIP) 1 MG tablet Take 1 tablet (1 mg total) by mouth every evening.  Qty: 30 tablet, Refills: 11      aspirin (ECOTRIN) 81 MG EC tablet Take 81 mg by mouth once daily.      biotin 1 mg Cap Take by mouth.      cholecalciferol, vitamin D3, (VITAMIN D3) 1,000 unit capsule Take 1,000 Units by mouth.      diclofenac (FLECTOR) 1.3 % PT12 Apply 1 patch topically once daily. for 10 days  Qty: 30 patch, Refills: 3      diclofenac sodium (VOLTAREN) 1 % Gel Apply 2 g topically 4 (four) times daily as needed.  Qty: 5 Tube, Refills: 3      fish oil-omega-3 fatty acids 300-1,000 mg capsule Take 2 g by mouth once daily.       traZODone (DESYREL) 50 MG tablet Take 1 tablet (50 mg total) by mouth every evening.  Qty: 30 tablet, Refills: 4         STOP taking these medications       olmesartan (BENICAR) 20 MG tablet Comments:   Reason for Stopping:                   Discharge Diagnosis: Lumbar spondylosis [M47.816]  Condition on Discharge: Stable with no complications to procedure   Diet on Discharge: Same as before.  Activity: as per instruction sheet.  Discharge to: Home with a responsible adult.  Follow up: 2-4 weeks       Please call the office if you experience any weakness or loss of sensation, fever > 101.5, pain uncontrolled with oral medications, persistent nausea/vomiting/or diarrhea, redness or drainage from the incisions, or any other worrisome concerns. If physician on call was not reached or could not communicate with our office for any reason please go to the nearest emergency department

## 2019-11-22 NOTE — OP NOTE
LUMBAR Medial Branch Block Under Fluoroscopy  Time-out taken to identify patient and procedure side prior to starting the procedure.            11/22/2019                                                         PROCEDURE:  Bilateral medial branch block at the transverse processes at the level of L4, L5, Sacral ala    REASON FOR PROCEDURE: Lumbar spondylosis [M47.816]    PHYSICIAN: Quirino Durant MD  ASSISTANTS: None    SEDATION MEDICATIONS: Conscious sedation provided by M.D    The patient was monitored with continuous pulse oximetry, EKG, and intermittent blood pressure monitors.  The patient was hemodynamically stable throughout the entire process was responsive to voice, and breathing spontaneously.  Supplemental O2 was provided at 2L/min via nasal cannula.  Patient was comfortable for the duration of the procedure. (See nurse documentation and case log for sedation time)    There was a total of 1mg IV Midazolam and 50mcg Fentanyl titrated for the procedure    MEDICATIONS INJECTED: 0.5% bupivicane, 1mL at each level  LOCAL ANESTHETIC USED: Xylocaine 1% 10ml      ESTIMATED BLOOD LOSS:  None.    COMPLICATIONS:  None.    TECHNIQUE: Laying in a prone position, the patient was prepped and draped in the usual sterile fashion using ChloraPrep and fenestrated drape.  The level was determined under fluoroscopic guidance.  Local anesthetic was given by going down to the hub of the 27-gauge 1.25in needle and raising a wheel.  A 25-gauge 3.5inch needle was introduced to the anatomic local of the medial branch at each of the above levels using fluoroscopy in the AP, oblique, and lateral views.  After negative aspiration, medication was injected slowly. The patient tolerated the procedure well.       The patient was monitored after the procedure.  Patient was given post procedure and discharge instructions to follow at home.  We will see the patient back in two weeks or the patient may call to inform of status. The patient  was discharged in a stable condition

## 2019-11-22 NOTE — DISCHARGE INSTRUCTIONS

## 2019-11-22 NOTE — PROGRESS NOTES
Ok to discharge patient per Dr. Durant. Patient stood at side of bed and walked with assistance to restroom. Discharging to home with ride.

## 2019-11-22 NOTE — PROGRESS NOTES
Patient stood at side of bed after procedure to get dressed and began complaining of leg numbness. Sat patient back on side of bed. Patient has sensation to both legs and can move legs. Notified Dr. Durant; neuro assessment preformed at bedside by Dr. Durant. Ordered to keep patient for an extra 10 minutes in recovery and recheck. Will continue to monitor.

## 2019-11-22 NOTE — PLAN OF CARE
Discharge instructions reviewed with patient and spouse prior to procedure, both verbalized understanding. Bandaids x4 to lower back; clean, dry and intact. No complaints at this time. Discharging to home with ride.

## 2019-11-25 ENCOUNTER — TELEPHONE (OUTPATIENT)
Dept: PAIN MEDICINE | Facility: CLINIC | Age: 79
End: 2019-11-25

## 2019-11-25 DIAGNOSIS — M47.816 SPONDYLOSIS WITHOUT MYELOPATHY OR RADICULOPATHY, LUMBAR REGION: Primary | ICD-10-CM

## 2019-11-25 NOTE — TELEPHONE ENCOUNTER
Contacted patient to check relief of diagnostic injection with Dr. Durant on 11/22/2019. Patient reports 90% relief from injection. Will notify Dr. Durant of patient's relief.

## 2019-11-25 NOTE — TELEPHONE ENCOUNTER
Contacted pt. Appt for procedure scheduled Dec.10, 2019 and Dec.31,2019 with . Went over instructions with pt and pt verbalized understanding. Instructions also mailed to pt.  All questions answered.

## 2019-12-02 ENCOUNTER — TELEPHONE (OUTPATIENT)
Dept: PAIN MEDICINE | Facility: CLINIC | Age: 79
End: 2019-12-02

## 2019-12-02 NOTE — TELEPHONE ENCOUNTER
Pt got 90% relief from injection. You can refer to 11/25/19 telephone encounter when I got % relief from pt and spoke with pt about MBB relief.

## 2019-12-03 NOTE — PRE-PROCEDURE INSTRUCTIONS
Spoke with  patient     regarding procedure scheduled on 12/10/19  Arrival time patient is not yet approved at this time, patient has been made aware she will get updates with any changes  Has patient been sick with fever or on antibiotics within the last 7 days? no  Has patient received a vaccination within the last 7 days? no  Has the patient stopped all medications as directed? Yes, last dose of aspirin biotin vitamin D3 and fish oil all on 12/3/19  Does patient have a pacemaker and or defibrillator? no  Does the patient have a ride to and from procedure and someone reliable to remain with patient? Yes,  caro  Is the patient diabetic? yes  Does the patient have sleep apnea? No Or use O2 at home? no  Is the patient receiving sedation? yes  Is the patient instructed to remain NPO beginning at midnight the night before their procedure? yes  Procedure location confirmed with patient? yes

## 2019-12-06 ENCOUNTER — TELEPHONE (OUTPATIENT)
Dept: PAIN MEDICINE | Facility: CLINIC | Age: 79
End: 2019-12-06
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
## 2019-12-06 NOTE — TELEPHONE ENCOUNTER
----- Message from Kaela Cavazos sent at 12/6/2019  9:00 AM CST -----  Patient needs all back rg 12/10 surgery..[phone] (home)

## 2019-12-10 ENCOUNTER — HOSPITAL ENCOUNTER (OUTPATIENT)
Facility: HOSPITAL | Age: 79
Discharge: HOME | End: 2019-12-10
Attending: PAIN MEDICINE | Admitting: PAIN MEDICINE
Payer: MEDICARE

## 2019-12-10 VITALS
RESPIRATION RATE: 24 BRPM | DIASTOLIC BLOOD PRESSURE: 62 MMHG | BODY MASS INDEX: 27.92 KG/M2 | OXYGEN SATURATION: 95 % | WEIGHT: 163.56 LBS | TEMPERATURE: 98 F | SYSTOLIC BLOOD PRESSURE: 135 MMHG | HEART RATE: 67 BPM | HEIGHT: 64 IN

## 2019-12-10 DIAGNOSIS — M47.816 LUMBAR SPONDYLOSIS: ICD-10-CM

## 2019-12-10 DIAGNOSIS — M47.816 SPONDYLOSIS WITHOUT MYELOPATHY OR RADICULOPATHY, LUMBAR REGION: Primary | ICD-10-CM

## 2019-12-10 LAB — POCT GLUCOSE: 97 MG/DL (ref 70–110)

## 2019-12-10 PROCEDURE — 64636 DESTROY L/S FACET JNT ADDL: CPT | Performed by: PAIN MEDICINE

## 2019-12-10 PROCEDURE — 63600175 PHARM REV CODE 636 W HCPCS: Performed by: PAIN MEDICINE

## 2019-12-10 PROCEDURE — 25000003 PHARM REV CODE 250: Performed by: PAIN MEDICINE

## 2019-12-10 PROCEDURE — 99152 MOD SED SAME PHYS/QHP 5/>YRS: CPT | Mod: ,,, | Performed by: PAIN MEDICINE

## 2019-12-10 PROCEDURE — 64635 PR DESTROY LUMB/SAC FACET JNT: ICD-10-PCS | Mod: LT,,, | Performed by: PAIN MEDICINE

## 2019-12-10 PROCEDURE — 99152 PR MOD CONSCIOUS SEDATION, SAME PHYS, 5+ YRS, FIRST 15 MIN: ICD-10-PCS | Mod: ,,, | Performed by: PAIN MEDICINE

## 2019-12-10 PROCEDURE — 64635 DESTROY LUMB/SAC FACET JNT: CPT | Mod: LT,,, | Performed by: PAIN MEDICINE

## 2019-12-10 PROCEDURE — 64636 DESTROY L/S FACET JNT ADDL: CPT | Mod: LT,,, | Performed by: PAIN MEDICINE

## 2019-12-10 PROCEDURE — 82962 GLUCOSE BLOOD TEST: CPT | Performed by: PAIN MEDICINE

## 2019-12-10 PROCEDURE — S0020 INJECTION, BUPIVICAINE HYDRO: HCPCS | Performed by: PAIN MEDICINE

## 2019-12-10 PROCEDURE — 64636 PR DESTROY L/S FACET JNT ADDL: ICD-10-PCS | Mod: LT,,, | Performed by: PAIN MEDICINE

## 2019-12-10 PROCEDURE — 64635 DESTROY LUMB/SAC FACET JNT: CPT | Performed by: PAIN MEDICINE

## 2019-12-10 RX ORDER — FENTANYL CITRATE 50 UG/ML
INJECTION, SOLUTION INTRAMUSCULAR; INTRAVENOUS
Status: DISCONTINUED | OUTPATIENT
Start: 2019-12-10 | End: 2019-12-10 | Stop reason: HOSPADM

## 2019-12-10 RX ORDER — LIDOCAINE HYDROCHLORIDE 20 MG/ML
INJECTION, SOLUTION EPIDURAL; INFILTRATION; INTRACAUDAL; PERINEURAL
Status: DISCONTINUED | OUTPATIENT
Start: 2019-12-10 | End: 2019-12-10 | Stop reason: HOSPADM

## 2019-12-10 RX ORDER — BUPIVACAINE HYDROCHLORIDE 5 MG/ML
INJECTION, SOLUTION EPIDURAL; INTRACAUDAL
Status: DISCONTINUED | OUTPATIENT
Start: 2019-12-10 | End: 2019-12-10 | Stop reason: HOSPADM

## 2019-12-10 RX ORDER — MIDAZOLAM HYDROCHLORIDE 1 MG/ML
INJECTION, SOLUTION INTRAMUSCULAR; INTRAVENOUS
Status: DISCONTINUED | OUTPATIENT
Start: 2019-12-10 | End: 2019-12-10 | Stop reason: HOSPADM
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
## 2019-12-10 NOTE — OP NOTE
PROCEDURE: Lumbar medial branch radiofrequency (RF) ablation under fluoroscopic guidance    SIDE: left    LEVEL:   Sacral ala (Corresponding to the L5 dorsal ramus),   L5 transverse process (Corresponding to the L4 medial branch),   L4 transverse process (Corresponding to the L3 medial branch),     PROCEDURE DATE: 12/10/2019    PREOPERATIVE DIAGNOSIS: Lumbar spondylosis  POSTOPERATIVE DIAGNOSIS: Lumbar spondylosis    PROVIDER: GARY Allen MD  Assistant(s): None    ANESTHESIA: Local, IV Sedation    >> 1 mg of VERSED    >> 50 mcg of FENTANYL     INDICATION: The patient has low back pain without radiculopathy symptoms unresponsive to conservative treatments. Fluoroscopy was used to optimize visualization of needle placement and to maximize safety.        PROCEDURE DESCRIPTION / TECHNIQUE:   The patient was seen and identified in the preoperative area. Risks, benefits, complications, and alternatives were discussed with the patient. The patient agreed to proceed with the procedure and signed the consent. The site and side of the procedure was identified and marked. An iv was started.    The patient was taken to the procedural suite. The patient was positioned in prone orientation on procedure table and a pillow was placed under the abdomen to reduce lumbar lordosis. A time out was performed prior to any intervention. The procedure, site, side, and allergies were stated and agreed to by all present. The lumbosacral area was widely prepped with ChloraPrep. The procedural site was draped in usual sterile fashion. Vital signs were closely monitored throughout this procedure. Conscious sedation was used for this procedure to decrease patient anxiety.    Using AP fluoroscopy, the junction of the vetebral transverse process (TP) and the superior articular process (SAP) at the above noted levels was identified. The skin caudal and oblique to the SAP-TP junctions described above was marked. Superficial tissues were localized  with 2% PF Lidocaine at each level. [employer] 100 mm 20-gauge radiofrequency cannulae with curved 10-mm active tips were advanced to the above noted TP-SAP junction until osseus contact was made. The needle was walked off of the sulcus. The fluoroscope was obliqued to the ipsilateral side and the needles were repositioned as needed until the active tip was aligned along the base of the SAP and the needle tip met the anterior fluoroscopic shadow of the SAP. Lateral fluoroscopic imaging was captured and the needle tips were adjusted such that the tips extended into the SAP shadow, but not beyond the SAP silhouette into the intervertebral foramen. After satisfactory cannula positioning was realized in true lateral orientation, the needle stylets were removed, RF electrodes were introduced, and sensory and motor testing was performed.     Nerves were tested sequentially. Sensory testing was not performed at 50 Hz up to  0.5 volt with production of concordant pain. Motor testing was performed at 2 Hz up to 1.5 volts with elicitation of mulitifidus muscle contraction and with no radicular pain, paresthesias, or distal muscle contraction beyond the buttocks produced during motor testing. Following testing, RF electrodes were removed and 1 mL of 2% lidocaine and 1ml of 0.5% bupivacaine was injected through each cannula following negative aspiration. The RF electrodes were then replaced and each targeted medial branch was sequentially subjected to thermal coagulation at 80 degrees Celsius for 90 seconds. Following the burn, 1ml of 0.5% bupivacaine was injected, RF electrodes were removed, stylets were replaced, and each cannua was removed intact.    Description of Findings: Not applicable    Prosthetic devices, grafts, tissues, or devices implanted: None    Specimen Removed: No    Estimated Blood Loss: minimal    COMPLICATIONS: None    DISPOSITION / PLANS: The patient was transferred to the recovery area in a stable condition  for observation. The patient was reexamined prior to discharge. There was no evidence of acute neurologic injury following the procedure.  Patient was discharged from the recovery room after meeting discharge criteria. Home discharge instructions were given to the patient by the staff.

## 2019-12-10 NOTE — DISCHARGE SUMMARY
[facility]  Short Stay  Discharge Summary    Admit Date: 12/10/2019    Discharge Date and Time: 12/10/2019 11:14 AM      Discharge Attending Physician: GARY Allen MD     Hospital Course (synopsis of major diagnoses, care, treatment, and services provided during the course of the hospital stay): Patient was admitted to Pre-op where informed consent was signed.  The patient was then taken to the procedure suite where the procedure was performed.  The patient was then return to the Pre-Op area and discharge was performed.     Final Diagnoses:    Principal Problem: <principal problem not specified>   Secondary Diagnoses:   Active Hospital Problems    Diagnosis  POA    Lumbar spondylosis [M47.816]  Yes      Resolved Hospital Problems   No resolved problems to display.       Discharged Condition: good    Disposition: Home or Self Care    Follow up/Patient Instructions:    Medications:  Reconciled Home Medications:      Medication List      CONTINUE taking these medications    allopurinol 300 MG tablet  Commonly known as:  ZYLOPRIM  TAKE 1 TABLET (300 MG TOTAL) BY MOUTH ONCE DAILY.     amLODIPine 5 MG tablet  Commonly known as:  NORVASC  TAKE ONE TABLET BY MOUTH DAILY     aspirin 81 MG EC tablet  Commonly known as:  ECOTRIN  Take 81 mg by mouth once daily.     biotin 1 mg Cap  Take by mouth.     cholecalciferol (vitamin D3) 25 mcg (1,000 unit) capsule  Commonly known as:  VITAMIN D3  Take 1,000 Units by mouth.     diclofenac 1.3 % Pt12  Commonly known as:  FLECTOR  Apply 1 patch topically once daily. for 10 days     diclofenac sodium 1 % Gel  Commonly known as:  VOLTAREN  Apply 2 g topically 4 (four) times daily as needed.     fish oil-omega-3 fatty acids 300-1,000 mg capsule  Take 2 g by mouth once daily.     furosemide 40 MG tablet  Commonly known as:  LASIX  Take 40 mg by mouth once daily.     gabapentin 100 MG capsule  Commonly known as:  NEURONTIN  TAKE ONE CAPSULE BY MOUTH THREE TIMES DAILY      levothyroxine 75 MCG tablet  Commonly known as:  SYNTHROID  Take 1 tablet (75 mcg total) by mouth before breakfast.     lovastatin 40 MG tablet  Commonly known as:  MEVACOR  TAKE 1 TABLET (40 MG TOTAL) BY MOUTH EVERY EVENING.     metFORMIN 1000 MG tablet  Commonly known as:  GLUCOPHAGE  TAKE ONE TABLET BY MOUTH TWICE DAILY     metoprolol tartrate 50 MG tablet  Commonly known as:  LOPRESSOR  Take 1 tablet by mouth once daily.     rOPINIRole 1 MG tablet  Commonly known as:  REQUIP  Take 1 tablet (1 mg total) by mouth every evening.     traZODone 50 MG tablet  Commonly known as:  DESYREL  Take 1 tablet (50 mg total) by mouth every evening.          Discharge Procedure Orders   Diet general     Call MD for:  severe uncontrolled pain     Call MD for:  difficulty breathing, headache or visual disturbances     Call MD for:  redness, tenderness, or signs of infection (pain, swelling, redness, odor or green/yellow discharge around incision site)     Activity as tolerated

## 2019-12-10 NOTE — PLAN OF CARE
Pt and spouse verbalized understanding of discharge instructions. Bandaids x 2 to lower back c/d/i. Patient voiced no complaints, with no further questions at this time. Patient stood at side of bed, walked steps with no new motor deficits. Neuro intact. VSS on RA. Recovery care complete.

## 2019-12-23 ENCOUNTER — TELEPHONE (OUTPATIENT)
Dept: PAIN MEDICINE | Facility: CLINIC | Age: 79
End: 2019-12-23

## 2019-12-23 NOTE — PRE-PROCEDURE INSTRUCTIONS
Spoke with patient      regarding procedure scheduled on 12/31/19  Arrival time 1150  Has patient been sick with fever or on antibiotics within the last 7 days? no  Has patient received a vaccination within the last 7 days? no  Has the patient stopped all medications as directed? Yes, last dose of aspirin, biotin, vitamin D, fish oil on 12/22/19  Does patient have a pacemaker and or defibrillator? no  Does the patient have a ride to and from procedure and someone reliable to remain with patient? Yes,   Is the patient diabetic? yes  Does the patient have sleep apnea? Or use O2 at home? No, no  Is the patient receiving sedation? yes  Is the patient instructed to remain NPO beginning at midnight the night before their procedure? yes  Procedure location confirmed with patient? yes

## 2019-12-23 NOTE — TELEPHONE ENCOUNTER
is performing an RFA on 12/31. Pt is on aspirin and would need to hold 7 days prior . Please advise .

## 2019-12-27 RX ORDER — GABAPENTIN 100 MG/1
CAPSULE ORAL
Qty: 270 CAPSULE | Refills: 0 | Status: SHIPPED | OUTPATIENT
Start: 2019-12-27 | End: 2020-05-19

## 2019-12-31 ENCOUNTER — HOSPITAL ENCOUNTER (OUTPATIENT)
Facility: HOSPITAL | Age: 79
Discharge: HOME | End: 2019-12-31
Attending: PAIN MEDICINE | Admitting: PAIN MEDICINE
Payer: MEDICARE

## 2019-12-31 VITALS
HEART RATE: 72 BPM | SYSTOLIC BLOOD PRESSURE: 156 MMHG | RESPIRATION RATE: 16 BRPM | OXYGEN SATURATION: 94 % | DIASTOLIC BLOOD PRESSURE: 71 MMHG | WEIGHT: 162.25 LBS | HEIGHT: 64 IN | BODY MASS INDEX: 27.7 KG/M2 | TEMPERATURE: 98 F

## 2019-12-31 DIAGNOSIS — M47.816 LUMBAR SPONDYLOSIS: ICD-10-CM

## 2019-12-31 DIAGNOSIS — M47.816 SPONDYLOSIS WITHOUT MYELOPATHY OR RADICULOPATHY, LUMBAR REGION: Primary | ICD-10-CM

## 2019-12-31 LAB — POCT GLUCOSE: 91 MG/DL (ref 70–110)

## 2019-12-31 PROCEDURE — 64635 DESTROY LUMB/SAC FACET JNT: CPT | Performed by: PAIN MEDICINE

## 2019-12-31 PROCEDURE — 64636 PR DESTROY L/S FACET JNT ADDL: ICD-10-PCS | Mod: RT,,, | Performed by: PAIN MEDICINE

## 2019-12-31 PROCEDURE — 99152 MOD SED SAME PHYS/QHP 5/>YRS: CPT | Mod: ,,, | Performed by: PAIN MEDICINE

## 2019-12-31 PROCEDURE — 64635 PR DESTROY LUMB/SAC FACET JNT: ICD-10-PCS | Mod: RT,,, | Performed by: PAIN MEDICINE

## 2019-12-31 PROCEDURE — 25000003 PHARM REV CODE 250: Performed by: PAIN MEDICINE

## 2019-12-31 PROCEDURE — 63600175 PHARM REV CODE 636 W HCPCS: Performed by: PAIN MEDICINE

## 2019-12-31 PROCEDURE — 64636 DESTROY L/S FACET JNT ADDL: CPT | Mod: RT,,, | Performed by: PAIN MEDICINE

## 2019-12-31 PROCEDURE — 64635 DESTROY LUMB/SAC FACET JNT: CPT | Mod: RT,,, | Performed by: PAIN MEDICINE

## 2019-12-31 PROCEDURE — S0020 INJECTION, BUPIVICAINE HYDRO: HCPCS | Performed by: PAIN MEDICINE

## 2019-12-31 PROCEDURE — 64636 DESTROY L/S FACET JNT ADDL: CPT | Performed by: PAIN MEDICINE

## 2019-12-31 PROCEDURE — 99152 PR MOD CONSCIOUS SEDATION, SAME PHYS, 5+ YRS, FIRST 15 MIN: ICD-10-PCS | Mod: ,,, | Performed by: PAIN MEDICINE

## 2019-12-31 RX ORDER — MIDAZOLAM HYDROCHLORIDE 1 MG/ML
INJECTION, SOLUTION INTRAMUSCULAR; INTRAVENOUS
Status: DISCONTINUED | OUTPATIENT
Start: 2019-12-31 | End: 2019-12-31 | Stop reason: HOSPADM

## 2019-12-31 RX ORDER — BUPIVACAINE HYDROCHLORIDE 5 MG/ML
INJECTION, SOLUTION EPIDURAL; INTRACAUDAL
Status: DISCONTINUED | OUTPATIENT
Start: 2019-12-31 | End: 2019-12-31 | Stop reason: HOSPADM

## 2019-12-31 RX ORDER — LIDOCAINE HYDROCHLORIDE 20 MG/ML
INJECTION, SOLUTION EPIDURAL; INFILTRATION; INTRACAUDAL; PERINEURAL
Status: DISCONTINUED | OUTPATIENT
Start: 2019-12-31 | End: 2019-12-31 | Stop reason: HOSPADM

## 2019-12-31 RX ORDER — FENTANYL CITRATE 50 UG/ML
INJECTION, SOLUTION INTRAMUSCULAR; INTRAVENOUS
Status: DISCONTINUED | OUTPATIENT
Start: 2019-12-31 | End: 2019-12-31 | Stop reason: HOSPADM

## 2019-12-31 NOTE — OP NOTE
PROCEDURE: Lumbar medial branch radiofrequency (RF) ablation under fluoroscopic guidance    SIDE: right    LEVEL:   Sacral ala (Corresponding to the L5 dorsal ramus),   L5 transverse process (Corresponding to the L4 medial branch),   L4 transverse process (Corresponding to the L3 medial branch),     PROCEDURE DATE: 12/31/2019    PREOPERATIVE DIAGNOSIS: Lumbar spondylosis  POSTOPERATIVE DIAGNOSIS: Lumbar spondylosis    PROVIDER: GARY Allen MD  Assistant(s): None    ANESTHESIA: Local, IV Sedation    >> 1 mg of VERSED    >> 25 mcg of FENTANYL     INDICATION: The patient has low back pain without radiculopathy symptoms unresponsive to conservative treatments. Fluoroscopy was used to optimize visualization of needle placement and to maximize safety.        PROCEDURE DESCRIPTION / TECHNIQUE:   The patient was seen and identified in the preoperative area. Risks, benefits, complications, and alternatives were discussed with the patient. The patient agreed to proceed with the procedure and signed the consent. The site and side of the procedure was identified and marked. An iv was started.    The patient was taken to the procedural suite. The patient was positioned in prone orientation on procedure table and a pillow was placed under the abdomen to reduce lumbar lordosis. A time out was performed prior to any intervention. The procedure, site, side, and allergies were stated and agreed to by all present. The lumbosacral area was widely prepped with ChloraPrep. The procedural site was draped in usual sterile fashion. Vital signs were closely monitored throughout this procedure. Conscious sedation was used for this procedure to decrease patient anxiety.    Using AP fluoroscopy, the junction of the vetebral transverse process (TP) and the superior articular process (SAP) at the above noted levels was identified. The skin caudal and oblique to the SAP-TP junctions described above was marked. Superficial tissues were  localized with 2% PF Lidocaine at each level. [employer] 100 mm 20-gauge radiofrequency cannulae with curved 10-mm active tips were advanced to the above noted TP-SAP junction until osseus contact was made. The needle was walked off of the sulcus. The fluoroscope was obliqued to the ipsilateral side and the needles were repositioned as needed until the active tip was aligned along the base of the SAP and the needle tip met the anterior fluoroscopic shadow of the SAP. Lateral fluoroscopic imaging was captured and the needle tips were adjusted such that the tips extended into the SAP shadow, but not beyond the SAP silhouette into the intervertebral foramen. After satisfactory cannula positioning was realized in true lateral orientation, the needle stylets were removed, RF electrodes were introduced, and sensory and motor testing was performed.     Nerves were tested sequentially. Sensory testing was not performed at 50 Hz up to  0.5 volt with production of concordant pain. Motor testing was performed at 2 Hz up to 1.5 volts with elicitation of mulitifidus muscle contraction and with no radicular pain, paresthesias, or distal muscle contraction beyond the buttocks produced during motor testing. Following testing, RF electrodes were removed and 1 mL of 2% lidocaine and 1ml of 0.5% bupivacaine was injected through each cannula following negative aspiration. The RF electrodes were then replaced and each targeted medial branch was sequentially subjected to thermal coagulation at 80 degrees Celsius for 90 seconds. Following the burn, 1ml of 0.5% bupivacaine was injected, RF electrodes were removed, stylets were replaced, and each cannua was removed intact.    Description of Findings: Not applicable    Prosthetic devices, grafts, tissues, or devices implanted: None    Specimen Removed: No    Estimated Blood Loss: minimal    COMPLICATIONS: None    DISPOSITION / PLANS: The patient was transferred to the recovery area in a stable  condition for observation. The patient was reexamined prior to discharge. There was no evidence of acute neurologic injury following the procedure.  Patient was discharged from the recovery room after meeting discharge criteria. Home discharge instructions were given to the patient by the staff.

## 2019-12-31 NOTE — DISCHARGE INSTRUCTIONS

## 2020-01-02 NOTE — DISCHARGE SUMMARY
[facility]  Short Stay  Discharge Summary    Admit Date: 12/31/2019    Discharge Date and Time: 12/31/2019  1:40 PM      Discharge Attending Physician: GARY Allen MD     Hospital Course (synopsis of major diagnoses, care, treatment, and services provided during the course of the hospital stay): Patient was admitted to Pre-op where informed consent was signed.  The patient was then taken to the procedure suite where the procedure was performed.  The patient was then return to the Pre-Op area and discharge was performed.     Final Diagnoses:    Principal Problem: <principal problem not specified>   Secondary Diagnoses:   Active Hospital Problems    Diagnosis  POA    Lumbar spondylosis [M47.816]  Yes      Resolved Hospital Problems   No resolved problems to display.       Discharged Condition: good    Disposition: Home or Self Care    Follow up/Patient Instructions:    Medications:  Reconciled Home Medications:      Medication List      CONTINUE taking these medications    allopurinol 300 MG tablet  Commonly known as:  ZYLOPRIM  TAKE 1 TABLET (300 MG TOTAL) BY MOUTH ONCE DAILY.     amLODIPine 5 MG tablet  Commonly known as:  NORVASC  TAKE ONE TABLET BY MOUTH DAILY     aspirin 81 MG EC tablet  Commonly known as:  ECOTRIN  Take 81 mg by mouth once daily.     biotin 1 mg Cap  Take by mouth.     cholecalciferol (vitamin D3) 25 mcg (1,000 unit) capsule  Commonly known as:  VITAMIN D3  Take 1,000 Units by mouth.     diclofenac 1.3 % Pt12  Commonly known as:  FLECTOR  Apply 1 patch topically once daily. for 10 days     diclofenac sodium 1 % Gel  Commonly known as:  VOLTAREN  Apply 2 g topically 4 (four) times daily as needed.     fish oil-omega-3 fatty acids 300-1,000 mg capsule  Take 2 g by mouth once daily.     furosemide 40 MG tablet  Commonly known as:  LASIX  Take 40 mg by mouth once daily.     gabapentin 100 MG capsule  Commonly known as:  NEURONTIN  TAKE ONE CAPSULE BY MOUTH THREE TIMES DAILY      levothyroxine 75 MCG tablet  Commonly known as:  SYNTHROID  Take 1 tablet (75 mcg total) by mouth before breakfast.     lovastatin 40 MG tablet  Commonly known as:  MEVACOR  TAKE 1 TABLET (40 MG TOTAL) BY MOUTH EVERY EVENING.     metFORMIN 1000 MG tablet  Commonly known as:  GLUCOPHAGE  TAKE ONE TABLET BY MOUTH TWICE DAILY     metoprolol tartrate 50 MG tablet  Commonly known as:  LOPRESSOR  Take 1 tablet by mouth once daily.     rOPINIRole 1 MG tablet  Commonly known as:  REQUIP  Take 1 tablet (1 mg total) by mouth every evening.     traZODone 50 MG tablet  Commonly known as:  DESYREL  Take 1 tablet (50 mg total) by mouth every evening.          Discharge Procedure Orders   Diet general     Call MD for:  severe uncontrolled pain     Call MD for:  difficulty breathing, headache or visual disturbances     Call MD for:  redness, tenderness, or signs of infection (pain, swelling, redness, odor or green/yellow discharge around incision site)     Activity as tolerated

## 2020-01-28 ENCOUNTER — OFFICE VISIT (OUTPATIENT)
Dept: PAIN MEDICINE | Facility: CLINIC | Age: 80
End: 2020-01-28
Payer: MEDICARE

## 2020-01-28 VITALS
HEART RATE: 87 BPM | BODY MASS INDEX: 27.66 KG/M2 | HEIGHT: 64 IN | SYSTOLIC BLOOD PRESSURE: 134 MMHG | RESPIRATION RATE: 18 BRPM | WEIGHT: 162 LBS | DIASTOLIC BLOOD PRESSURE: 66 MMHG

## 2020-01-28 DIAGNOSIS — M54.59 LUMBAR FACET JOINT PAIN: ICD-10-CM

## 2020-01-28 DIAGNOSIS — M47.816 LUMBAR SPONDYLOSIS: Primary | ICD-10-CM

## 2020-01-28 DIAGNOSIS — M54.50 LOW BACK PAIN OF OVER 3 MONTHS DURATION: ICD-10-CM

## 2020-01-28 DIAGNOSIS — M54.16 LEFT LUMBAR RADICULOPATHY: ICD-10-CM

## 2020-01-28 PROCEDURE — 1101F PT FALLS ASSESS-DOCD LE1/YR: CPT | Mod: CPTII,S$GLB,, | Performed by: PHYSICIAN ASSISTANT

## 2020-01-28 PROCEDURE — 99999 PR PBB SHADOW E&M-EST. PATIENT-LVL IV: ICD-10-PCS | Mod: PBBFAC,,, | Performed by: PHYSICIAN ASSISTANT

## 2020-01-28 PROCEDURE — 99214 PR OFFICE/OUTPT VISIT, EST, LEVL IV, 30-39 MIN: ICD-10-PCS | Mod: S$GLB,,, | Performed by: PHYSICIAN ASSISTANT

## 2020-01-28 PROCEDURE — 1125F PR PAIN SEVERITY QUANTIFIED, PAIN PRESENT: ICD-10-PCS | Mod: S$GLB,,, | Performed by: PHYSICIAN ASSISTANT

## 2020-01-28 PROCEDURE — 1125F AMNT PAIN NOTED PAIN PRSNT: CPT | Mod: S$GLB,,, | Performed by: PHYSICIAN ASSISTANT

## 2020-01-28 PROCEDURE — 1101F PR PT FALLS ASSESS DOC 0-1 FALLS W/OUT INJ PAST YR: ICD-10-PCS | Mod: CPTII,S$GLB,, | Performed by: PHYSICIAN ASSISTANT

## 2020-01-28 PROCEDURE — 3075F PR MOST RECENT SYSTOLIC BLOOD PRESS GE 130-139MM HG: ICD-10-PCS | Mod: CPTII,S$GLB,, | Performed by: PHYSICIAN ASSISTANT

## 2020-01-28 PROCEDURE — 99214 OFFICE O/P EST MOD 30 MIN: CPT | Mod: S$GLB,,, | Performed by: PHYSICIAN ASSISTANT

## 2020-01-28 PROCEDURE — 3078F PR MOST RECENT DIASTOLIC BLOOD PRESSURE < 80 MM HG: ICD-10-PCS | Mod: CPTII,S$GLB,, | Performed by: PHYSICIAN ASSISTANT

## 2020-01-28 PROCEDURE — 3075F SYST BP GE 130 - 139MM HG: CPT | Mod: CPTII,S$GLB,, | Performed by: PHYSICIAN ASSISTANT

## 2020-01-28 PROCEDURE — 1159F MED LIST DOCD IN RCRD: CPT | Mod: S$GLB,,, | Performed by: PHYSICIAN ASSISTANT

## 2020-01-28 PROCEDURE — 1159F PR MEDICATION LIST DOCUMENTED IN MEDICAL RECORD: ICD-10-PCS | Mod: S$GLB,,, | Performed by: PHYSICIAN ASSISTANT

## 2020-01-28 PROCEDURE — 3078F DIAST BP <80 MM HG: CPT | Mod: CPTII,S$GLB,, | Performed by: PHYSICIAN ASSISTANT

## 2020-01-28 PROCEDURE — 99999 PR PBB SHADOW E&M-EST. PATIENT-LVL IV: CPT | Mod: PBBFAC,,, | Performed by: PHYSICIAN ASSISTANT
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
## 2020-02-12 RX ORDER — TRAZODONE HYDROCHLORIDE 50 MG/1
50 TABLET ORAL NIGHTLY
Qty: 30 TABLET | Refills: 3 | Status: SHIPPED | OUTPATIENT
Start: 2020-02-12 | End: 2020-07-29

## 2020-02-19 ENCOUNTER — LAB VISIT (OUTPATIENT)
Dept: LAB | Facility: HOSPITAL | Age: 80
End: 2020-02-19
Attending: FAMILY MEDICINE
Payer: MEDICARE

## 2020-02-19 ENCOUNTER — OFFICE VISIT (OUTPATIENT)
Dept: FAMILY MEDICINE | Facility: CLINIC | Age: 80
End: 2020-02-19
Payer: MEDICARE

## 2020-02-19 VITALS
OXYGEN SATURATION: 93 % | BODY MASS INDEX: 27.48 KG/M2 | HEART RATE: 77 BPM | TEMPERATURE: 98 F | WEIGHT: 160.94 LBS | DIASTOLIC BLOOD PRESSURE: 60 MMHG | SYSTOLIC BLOOD PRESSURE: 116 MMHG | HEIGHT: 64 IN

## 2020-02-19 DIAGNOSIS — R10.30 LOWER ABDOMINAL PAIN: ICD-10-CM

## 2020-02-19 DIAGNOSIS — R19.7 DIARRHEA, UNSPECIFIED TYPE: ICD-10-CM

## 2020-02-19 DIAGNOSIS — R10.30 LOWER ABDOMINAL PAIN: Primary | ICD-10-CM

## 2020-02-19 LAB
ALBUMIN SERPL BCP-MCNC: 3.6 G/DL (ref 3.5–5.2)
ALP SERPL-CCNC: 66 U/L (ref 55–135)
ALT SERPL W/O P-5'-P-CCNC: 11 U/L (ref 10–44)
ANION GAP SERPL CALC-SCNC: 15 MMOL/L (ref 8–16)
AST SERPL-CCNC: 16 U/L (ref 10–40)
BACTERIA #/AREA URNS AUTO: NORMAL /HPF
BASOPHILS # BLD AUTO: 0.07 K/UL (ref 0–0.2)
BASOPHILS NFR BLD: 0.5 % (ref 0–1.9)
BILIRUB SERPL-MCNC: 0.4 MG/DL (ref 0.1–1)
BILIRUB UR QL STRIP: NEGATIVE
BUN SERPL-MCNC: 18 MG/DL (ref 8–23)
CALCIUM SERPL-MCNC: 9.3 MG/DL (ref 8.7–10.5)
CHLORIDE SERPL-SCNC: 98 MMOL/L (ref 95–110)
CLARITY UR REFRACT.AUTO: CLEAR
CO2 SERPL-SCNC: 23 MMOL/L (ref 23–29)
COLOR UR AUTO: YELLOW
CREAT SERPL-MCNC: 1.1 MG/DL (ref 0.5–1.4)
CRP SERPL-MCNC: 130 MG/L (ref 0–8.2)
DIFFERENTIAL METHOD: ABNORMAL
EOSINOPHIL # BLD AUTO: 0.1 K/UL (ref 0–0.5)
EOSINOPHIL NFR BLD: 1 % (ref 0–8)
ERYTHROCYTE [DISTWIDTH] IN BLOOD BY AUTOMATED COUNT: 14.7 % (ref 11.5–14.5)
EST. GFR  (AFRICAN AMERICAN): 55.2 ML/MIN/1.73 M^2
EST. GFR  (NON AFRICAN AMERICAN): 47.9 ML/MIN/1.73 M^2
GLUCOSE SERPL-MCNC: 86 MG/DL (ref 70–110)
GLUCOSE UR QL STRIP: NEGATIVE
HCT VFR BLD AUTO: 40.5 % (ref 37–48.5)
HGB BLD-MCNC: 12.4 G/DL (ref 12–16)
HGB UR QL STRIP: NEGATIVE
IMM GRANULOCYTES # BLD AUTO: 0.05 K/UL (ref 0–0.04)
IMM GRANULOCYTES NFR BLD AUTO: 0.4 % (ref 0–0.5)
KETONES UR QL STRIP: NEGATIVE
LEUKOCYTE ESTERASE UR QL STRIP: ABNORMAL
LYMPHOCYTES # BLD AUTO: 2.3 K/UL (ref 1–4.8)
LYMPHOCYTES NFR BLD: 17.1 % (ref 18–48)
MCH RBC QN AUTO: 28.2 PG (ref 27–31)
MCHC RBC AUTO-ENTMCNC: 30.6 G/DL (ref 32–36)
MCV RBC AUTO: 92 FL (ref 82–98)
MICROSCOPIC COMMENT: NORMAL
MONOCYTES # BLD AUTO: 0.8 K/UL (ref 0.3–1)
MONOCYTES NFR BLD: 6.2 % (ref 4–15)
NEUTROPHILS # BLD AUTO: 10.1 K/UL (ref 1.8–7.7)
NEUTROPHILS NFR BLD: 74.8 % (ref 38–73)
NITRITE UR QL STRIP: NEGATIVE
NRBC BLD-RTO: 0 /100 WBC
PH UR STRIP: 5 [PH] (ref 5–8)
PLATELET # BLD AUTO: 233 K/UL (ref 150–350)
PMV BLD AUTO: 10.2 FL (ref 9.2–12.9)
POTASSIUM SERPL-SCNC: 3.5 MMOL/L (ref 3.5–5.1)
PROT SERPL-MCNC: 7.2 G/DL (ref 6–8.4)
PROT UR QL STRIP: NEGATIVE
RBC # BLD AUTO: 4.39 M/UL (ref 4–5.4)
SODIUM SERPL-SCNC: 136 MMOL/L (ref 136–145)
SP GR UR STRIP: 1.01 (ref 1–1.03)
URN SPEC COLLECT METH UR: ABNORMAL
WBC # BLD AUTO: 13.48 K/UL (ref 3.9–12.7)
WBC #/AREA URNS AUTO: 5 /HPF (ref 0–5)

## 2020-02-19 PROCEDURE — 3074F SYST BP LT 130 MM HG: CPT | Mod: CPTII,S$GLB,, | Performed by: FAMILY MEDICINE

## 2020-02-19 PROCEDURE — 3078F DIAST BP <80 MM HG: CPT | Mod: CPTII,S$GLB,, | Performed by: FAMILY MEDICINE

## 2020-02-19 PROCEDURE — 81001 URINALYSIS AUTO W/SCOPE: CPT

## 2020-02-19 PROCEDURE — 80053 COMPREHEN METABOLIC PANEL: CPT

## 2020-02-19 PROCEDURE — 1125F AMNT PAIN NOTED PAIN PRSNT: CPT | Mod: S$GLB,,, | Performed by: FAMILY MEDICINE

## 2020-02-19 PROCEDURE — 99214 OFFICE O/P EST MOD 30 MIN: CPT | Mod: S$GLB,,, | Performed by: FAMILY MEDICINE

## 2020-02-19 PROCEDURE — 1101F PT FALLS ASSESS-DOCD LE1/YR: CPT | Mod: CPTII,S$GLB,, | Performed by: FAMILY MEDICINE

## 2020-02-19 PROCEDURE — 3078F PR MOST RECENT DIASTOLIC BLOOD PRESSURE < 80 MM HG: ICD-10-PCS | Mod: CPTII,S$GLB,, | Performed by: FAMILY MEDICINE

## 2020-02-19 PROCEDURE — 3074F PR MOST RECENT SYSTOLIC BLOOD PRESSURE < 130 MM HG: ICD-10-PCS | Mod: CPTII,S$GLB,, | Performed by: FAMILY MEDICINE

## 2020-02-19 PROCEDURE — 1101F PR PT FALLS ASSESS DOC 0-1 FALLS W/OUT INJ PAST YR: ICD-10-PCS | Mod: CPTII,S$GLB,, | Performed by: FAMILY MEDICINE

## 2020-02-19 PROCEDURE — 1159F PR MEDICATION LIST DOCUMENTED IN MEDICAL RECORD: ICD-10-PCS | Mod: S$GLB,,, | Performed by: FAMILY MEDICINE

## 2020-02-19 PROCEDURE — 99999 PR PBB SHADOW E&M-EST. PATIENT-LVL III: ICD-10-PCS | Mod: PBBFAC,,, | Performed by: FAMILY MEDICINE

## 2020-02-19 PROCEDURE — 85025 COMPLETE CBC W/AUTO DIFF WBC: CPT

## 2020-02-19 PROCEDURE — 36415 COLL VENOUS BLD VENIPUNCTURE: CPT | Mod: PO

## 2020-02-19 PROCEDURE — 1125F PR PAIN SEVERITY QUANTIFIED, PAIN PRESENT: ICD-10-PCS | Mod: S$GLB,,, | Performed by: FAMILY MEDICINE

## 2020-02-19 PROCEDURE — 99214 PR OFFICE/OUTPT VISIT, EST, LEVL IV, 30-39 MIN: ICD-10-PCS | Mod: S$GLB,,, | Performed by: FAMILY MEDICINE

## 2020-02-19 PROCEDURE — 99999 PR PBB SHADOW E&M-EST. PATIENT-LVL III: CPT | Mod: PBBFAC,,, | Performed by: FAMILY MEDICINE

## 2020-02-19 PROCEDURE — 86140 C-REACTIVE PROTEIN: CPT

## 2020-02-19 PROCEDURE — 1159F MED LIST DOCD IN RCRD: CPT | Mod: S$GLB,,, | Performed by: FAMILY MEDICINE

## 2020-02-19 RX ORDER — METRONIDAZOLE 375 MG/1
375 CAPSULE ORAL 2 TIMES DAILY
Qty: 14 CAPSULE | Refills: 0 | Status: SHIPPED | OUTPATIENT
Start: 2020-02-19 | End: 2020-02-26

## 2020-02-19 RX ORDER — CIPROFLOXACIN 500 MG/1
500 TABLET ORAL 2 TIMES DAILY
Qty: 14 TABLET | Refills: 0 | Status: SHIPPED | OUTPATIENT
Start: 2020-02-19 | End: 2020-02-26

## 2020-02-19 NOTE — PROGRESS NOTES
Chief Complaint:    Chief Complaint   Patient presents with    Abdominal Pain       History of Present Illness:  Presents today with complaints of lower abdominal pain for the past few days, pain is moderate in nature comes and goes.  No nausea vomiting but she has had loose stools few times no blood no fever  She says she did have some burning sensation 1 day but is went away.      ROS:  Review of Systems   Constitutional: Negative for activity change, chills, fatigue, fever and unexpected weight change.   HENT: Negative for congestion, ear discharge, ear pain, hearing loss, postnasal drip and rhinorrhea.    Eyes: Negative for pain and visual disturbance.   Respiratory: Negative for chest tightness and shortness of breath.    Cardiovascular: Negative for chest pain and palpitations.   Gastrointestinal: Positive for abdominal pain. Negative for diarrhea and vomiting.   Endocrine: Negative for heat intolerance.   Genitourinary: Negative for dysuria, flank pain, frequency and hematuria.   Musculoskeletal: Negative for back pain, gait problem and neck pain.   Skin: Negative for color change and rash.   Neurological: Negative for dizziness, tremors, seizures, numbness and headaches.   Psychiatric/Behavioral: Negative for agitation, hallucinations, self-injury, sleep disturbance and suicidal ideas. The patient is not nervous/anxious.        Past Medical History:   Diagnosis Date    Angiodysplasia of cecum     Diabetes mellitus, type 2 2006    BS doesn't check 06/15/2016    DM (diabetes mellitus) 2006    BS doesn't check 06/29/2018    GERD (gastroesophageal reflux disease)     Hyperlipidemia     Hypertension     Hypothyroidism        Social History:  Social History     Socioeconomic History    Marital status:      Spouse name: Not on file    Number of children: Not on file    Years of education: Not on file    Highest education level: Not on file   Occupational History    Not on file   Social Needs  "   Financial resource strain: Not on file    Food insecurity:     Worry: Not on file     Inability: Not on file    Transportation needs:     Medical: Not on file     Non-medical: Not on file   Tobacco Use    Smoking status: Former Smoker     Packs/day: 1.00     Years: 25.00     Pack years: 25.00     Last attempt to quit: 2002     Years since quittin.7    Smokeless tobacco: Never Used   Substance and Sexual Activity    Alcohol use: No     Alcohol/week: 0.0 standard drinks    Drug use: No    Sexual activity: Not on file   Lifestyle    Physical activity:     Days per week: Not on file     Minutes per session: Not on file    Stress: Not on file   Relationships    Social connections:     Talks on phone: Not on file     Gets together: Not on file     Attends Sabianist service: Not on file     Active member of club or organization: Not on file     Attends meetings of clubs or organizations: Not on file     Relationship status: Not on file   Other Topics Concern    Not on file   Social History Narrative    Not on file       Family History:   family history includes COPD in her sister; Cancer (age of onset: 46) in her mother; Diabetes in her father; Heart disease in her father and mother; Hypertension in her father.    Health Maintenance   Topic Date Due    Hemoglobin A1c  2020    Foot Exam  2020    Eye Exam  2020    Lipid Panel  2020    Urine Microalbumin  2020    Aspirin/Antiplatelet Therapy  2021    DEXA SCAN  10/03/2022    Colonoscopy  10/12/2022    TETANUS VACCINE  2026    Pneumococcal Vaccine (65+ Low/Medium Risk)  Completed       Physical Exam:    Vital Signs  Temp: 97.9 °F (36.6 °C)  Temp src: Temporal  Pulse: 77  SpO2: (!) 93 %  BP: 116/60  BP Location: Left arm  Patient Position: Sitting  Pain Score:   8  Pain Loc: Abdomen  Height and Weight  Height: 5' 4" (162.6 cm)  Weight: 73 kg (160 lb 15 oz)  BSA (Calculated - sq m): 1.82 sq meters  BMI " (Calculated): 27.6  Weight in (lb) to have BMI = 25: 145.3]    Body mass index is 27.62 kg/m².    Physical Exam   Constitutional: She is oriented to person, place, and time. She appears well-developed.   HENT:   Mouth/Throat: Oropharynx is clear and moist.   Eyes: Pupils are equal, round, and reactive to light. Conjunctivae are normal.   Neck: Normal range of motion. Neck supple.   Cardiovascular: Normal rate, regular rhythm and normal heart sounds.   No murmur heard.  Pulses:       Dorsalis pedis pulses are 2+ on the right side, and 1+ on the left side.        Posterior tibial pulses are 1+ on the right side, and 1+ on the left side.   Pulmonary/Chest: Effort normal and breath sounds normal. No respiratory distress. She has no wheezes. She has no rales. She exhibits no tenderness.   Abdominal: Soft. She exhibits no distension and no mass. There is tenderness in the suprapubic area. There is no guarding.   Musculoskeletal: She exhibits no edema or tenderness.   Feet:   Right Foot:   Protective Sensation: 10 sites tested. 9 sites sensed.   Left Foot:   Protective Sensation: 10 sites tested. 8 sites sensed.   Lymphadenopathy:     She has no cervical adenopathy.   Neurological: She is alert and oriented to person, place, and time. She has normal reflexes.   Skin: Skin is warm and dry.   Psychiatric: She has a normal mood and affect. Her behavior is normal. Judgment and thought content normal.         Assessment:      ICD-10-CM ICD-9-CM   1. Lower abdominal pain R10.30 789.09   2. Diarrhea, unspecified type R19.7 787.91         Plan:    Check labs and check inflammatory markers if elevated consider doing a CT  If symptoms get worse please go to the ED  Orders Placed This Encounter   Procedures    C-reactive protein    CBC auto differential    Comprehensive metabolic panel    Urinalysis, Reflex to Urine Culture Urine, Clean Catch       Current Outpatient Medications   Medication Sig Dispense Refill    allopurinol  (ZYLOPRIM) 300 MG tablet TAKE 1 TABLET (300 MG TOTAL) BY MOUTH ONCE DAILY. 90 tablet 2    amLODIPine (NORVASC) 5 MG tablet TAKE ONE TABLET BY MOUTH DAILY 30 tablet 9    aspirin (ECOTRIN) 81 MG EC tablet Take 81 mg by mouth once daily.      biotin 1 mg Cap Take by mouth.      cholecalciferol, vitamin D3, (VITAMIN D3) 1,000 unit capsule Take 1,000 Units by mouth.      fish oil-omega-3 fatty acids 300-1,000 mg capsule Take 2 g by mouth once daily.      furosemide (LASIX) 40 MG tablet Take 40 mg by mouth once daily.      gabapentin (NEURONTIN) 100 MG capsule TAKE ONE CAPSULE BY MOUTH THREE TIMES DAILY 270 capsule 0    levothyroxine (SYNTHROID) 75 MCG tablet Take 1 tablet (75 mcg total) by mouth before breakfast. 90 tablet 1    lovastatin (MEVACOR) 40 MG tablet TAKE 1 TABLET (40 MG TOTAL) BY MOUTH EVERY EVENING. 30 tablet 6    metFORMIN (GLUCOPHAGE) 1000 MG tablet TAKE ONE TABLET BY MOUTH TWICE DAILY 180 tablet 2    metoprolol tartrate (LOPRESSOR) 50 MG tablet Take 1 tablet by mouth once daily.      rOPINIRole (REQUIP) 1 MG tablet Take 1 tablet (1 mg total) by mouth every evening. 30 tablet 11    traZODone (DESYREL) 50 MG tablet TAKE 1 TABLET (50 MG TOTAL) BY MOUTH EVERY EVENING. 30 tablet 3    ciprofloxacin HCl (CIPRO) 500 MG tablet Take 1 tablet (500 mg total) by mouth 2 (two) times daily. for 7 days 14 tablet 0    metroNIDAZOLE (FLAGYL) 375 mg capsule Take 1 capsule (375 mg total) by mouth 2 (two) times daily. for 7 days 14 capsule 0     No current facility-administered medications for this visit.        There are no discontinued medications.    No follow-ups on file.      Angely Roberts MD

## 2020-02-20 ENCOUNTER — HOSPITAL ENCOUNTER (OUTPATIENT)
Dept: RADIOLOGY | Facility: HOSPITAL | Age: 80
Discharge: HOME | End: 2020-02-20
Attending: FAMILY MEDICINE
Payer: MEDICARE

## 2020-02-20 DIAGNOSIS — R10.9 ABDOMINAL PAIN, UNSPECIFIED ABDOMINAL LOCATION: Primary | ICD-10-CM

## 2020-02-20 DIAGNOSIS — R10.9 ABDOMINAL PAIN, UNSPECIFIED ABDOMINAL LOCATION: ICD-10-CM

## 2020-02-20 PROCEDURE — 25500020 PHARM REV CODE 255: Performed by: FAMILY MEDICINE

## 2020-02-20 PROCEDURE — 74178 CT ABDOMEN PELVIS W WO CONTRAST: ICD-10-PCS | Mod: 26,,, | Performed by: RADIOLOGY

## 2020-02-20 PROCEDURE — 74178 CT ABD&PLV WO CNTR FLWD CNTR: CPT | Mod: 26,,, | Performed by: RADIOLOGY

## 2020-02-20 PROCEDURE — 74178 CT ABD&PLV WO CNTR FLWD CNTR: CPT | Mod: TC

## 2020-02-20 RX ADMIN — IOHEXOL 75 ML: 350 INJECTION, SOLUTION INTRAVENOUS at 01:02

## 2020-02-20 RX ADMIN — IOHEXOL 30 ML: 350 INJECTION, SOLUTION INTRAVENOUS at 12:02

## 2020-02-24 ENCOUNTER — TELEPHONE (OUTPATIENT)
Dept: FAMILY MEDICINE | Facility: CLINIC | Age: 80
End: 2020-02-24

## 2020-02-24 NOTE — TELEPHONE ENCOUNTER
Patient saw you last week for abdominal pain, she said to let you know she is feeling a lot better no more pain

## 2020-02-24 NOTE — TELEPHONE ENCOUNTER
----- Message from Flora Fang sent at 2/24/2020 10:32 AM CST -----  Contact: Pt  Pt is calling to give a update, she states that she is doing great and isn't having any complications

## 2020-03-13 RX ORDER — LEVOTHYROXINE SODIUM 75 UG/1
75 TABLET ORAL
Qty: 90 TABLET | Refills: 0 | Status: SHIPPED | OUTPATIENT
Start: 2020-03-13 | End: 2020-06-07

## 2020-04-22 RX ORDER — DICYCLOMINE HYDROCHLORIDE 20 MG/1
TABLET ORAL
Qty: 30 TABLET | Refills: 0 | Status: SHIPPED | OUTPATIENT
Start: 2020-04-22 | End: 2021-03-11 | Stop reason: SDUPTHER

## 2020-05-11 RX ORDER — AMLODIPINE BESYLATE 5 MG/1
TABLET ORAL
Qty: 30 TABLET | Refills: 8 | Status: SHIPPED | OUTPATIENT
Start: 2020-05-11 | End: 2020-08-07 | Stop reason: SDUPTHER

## 2020-05-19 ENCOUNTER — OFFICE VISIT (OUTPATIENT)
Dept: FAMILY MEDICINE | Facility: CLINIC | Age: 80
End: 2020-05-19
Payer: MEDICARE

## 2020-05-19 VITALS
HEART RATE: 79 BPM | TEMPERATURE: 98 F | SYSTOLIC BLOOD PRESSURE: 130 MMHG | WEIGHT: 162.81 LBS | DIASTOLIC BLOOD PRESSURE: 62 MMHG | BODY MASS INDEX: 27.95 KG/M2 | OXYGEN SATURATION: 96 % | RESPIRATION RATE: 18 BRPM

## 2020-05-19 DIAGNOSIS — R21 RASH: Primary | ICD-10-CM

## 2020-05-19 PROCEDURE — 3078F DIAST BP <80 MM HG: CPT | Mod: CPTII,S$GLB,, | Performed by: FAMILY MEDICINE

## 2020-05-19 PROCEDURE — 3075F SYST BP GE 130 - 139MM HG: CPT | Mod: CPTII,S$GLB,, | Performed by: FAMILY MEDICINE

## 2020-05-19 PROCEDURE — 1125F AMNT PAIN NOTED PAIN PRSNT: CPT | Mod: S$GLB,,, | Performed by: FAMILY MEDICINE

## 2020-05-19 PROCEDURE — 99999 PR PBB SHADOW E&M-EST. PATIENT-LVL III: ICD-10-PCS | Mod: PBBFAC,,, | Performed by: FAMILY MEDICINE

## 2020-05-19 PROCEDURE — 3078F PR MOST RECENT DIASTOLIC BLOOD PRESSURE < 80 MM HG: ICD-10-PCS | Mod: CPTII,S$GLB,, | Performed by: FAMILY MEDICINE

## 2020-05-19 PROCEDURE — 99213 PR OFFICE/OUTPT VISIT, EST, LEVL III, 20-29 MIN: ICD-10-PCS | Mod: S$GLB,,, | Performed by: FAMILY MEDICINE

## 2020-05-19 PROCEDURE — 1125F PR PAIN SEVERITY QUANTIFIED, PAIN PRESENT: ICD-10-PCS | Mod: S$GLB,,, | Performed by: FAMILY MEDICINE

## 2020-05-19 PROCEDURE — 1101F PT FALLS ASSESS-DOCD LE1/YR: CPT | Mod: CPTII,S$GLB,, | Performed by: FAMILY MEDICINE

## 2020-05-19 PROCEDURE — 3075F PR MOST RECENT SYSTOLIC BLOOD PRESS GE 130-139MM HG: ICD-10-PCS | Mod: CPTII,S$GLB,, | Performed by: FAMILY MEDICINE

## 2020-05-19 PROCEDURE — 99999 PR PBB SHADOW E&M-EST. PATIENT-LVL III: CPT | Mod: PBBFAC,,, | Performed by: FAMILY MEDICINE

## 2020-05-19 PROCEDURE — 99213 OFFICE O/P EST LOW 20 MIN: CPT | Mod: S$GLB,,, | Performed by: FAMILY MEDICINE

## 2020-05-19 PROCEDURE — 1159F MED LIST DOCD IN RCRD: CPT | Mod: S$GLB,,, | Performed by: FAMILY MEDICINE

## 2020-05-19 PROCEDURE — 1101F PR PT FALLS ASSESS DOC 0-1 FALLS W/OUT INJ PAST YR: ICD-10-PCS | Mod: CPTII,S$GLB,, | Performed by: FAMILY MEDICINE

## 2020-05-19 PROCEDURE — 1159F PR MEDICATION LIST DOCUMENTED IN MEDICAL RECORD: ICD-10-PCS | Mod: S$GLB,,, | Performed by: FAMILY MEDICINE

## 2020-05-19 RX ORDER — GABAPENTIN 300 MG/1
300 CAPSULE ORAL 3 TIMES DAILY
Qty: 90 CAPSULE | Refills: 2 | Status: SHIPPED | OUTPATIENT
Start: 2020-05-19 | End: 2020-08-07 | Stop reason: SDUPTHER

## 2020-05-19 RX ORDER — MUPIROCIN 20 MG/G
OINTMENT TOPICAL 3 TIMES DAILY
Qty: 1 TUBE | Refills: 1 | Status: SHIPPED | OUTPATIENT
Start: 2020-05-19

## 2020-05-19 RX ORDER — TRIAMCINOLONE ACETONIDE 1 MG/G
CREAM TOPICAL 2 TIMES DAILY
Qty: 1 TUBE | Refills: 1 | Status: SHIPPED | OUTPATIENT
Start: 2020-05-19 | End: 2021-10-19

## 2020-05-19 NOTE — PROGRESS NOTES
Chief Complaint:    Chief Complaint   Patient presents with    Rash     under breast       History of Present Illness:    She has got a rash under the left breast not sure how it started which is been burning a lot.  She has been washing it with peroxide    ROS:  Review of Systems    Past Medical History:   Diagnosis Date    Angiodysplasia of cecum     Diabetes mellitus, type 2     BS doesn't check 06/15/2016    DM (diabetes mellitus)     BS doesn't check 2018    GERD (gastroesophageal reflux disease)     Hyperlipidemia     Hypertension     Hypothyroidism        Social History:  Social History     Socioeconomic History    Marital status:      Spouse name: Not on file    Number of children: Not on file    Years of education: Not on file    Highest education level: Not on file   Occupational History    Not on file   Social Needs    Financial resource strain: Not on file    Food insecurity:     Worry: Not on file     Inability: Not on file    Transportation needs:     Medical: Not on file     Non-medical: Not on file   Tobacco Use    Smoking status: Former Smoker     Packs/day: 1.00     Years: 25.00     Pack years: 25.00     Last attempt to quit: 2002     Years since quittin.0    Smokeless tobacco: Never Used   Substance and Sexual Activity    Alcohol use: No     Alcohol/week: 0.0 standard drinks    Drug use: No    Sexual activity: Not on file   Lifestyle    Physical activity:     Days per week: Not on file     Minutes per session: Not on file    Stress: Not on file   Relationships    Social connections:     Talks on phone: Not on file     Gets together: Not on file     Attends Confucianism service: Not on file     Active member of club or organization: Not on file     Attends meetings of clubs or organizations: Not on file     Relationship status: Not on file   Other Topics Concern    Not on file   Social History Narrative    Not on file       Family History:    family history includes COPD in her sister; Cancer (age of onset: 46) in her mother; Diabetes in her father; Heart disease in her father and mother; Hypertension in her father.    Health Maintenance   Topic Date Due    Hemoglobin A1c  03/17/2020    Foot Exam  06/18/2020    Eye Exam  07/05/2020    Lipid Panel  09/17/2020    Urine Microalbumin  02/19/2021    Aspirin/Antiplatelet Therapy  05/19/2021    DEXA SCAN  10/03/2022    Colonoscopy  10/12/2022    TETANUS VACCINE  02/02/2026    Pneumococcal Vaccine (65+ Low/Medium Risk)  Completed       Physical Exam:    Vital Signs  Temp: 98.2 °F (36.8 °C)  Temp src: Oral  Pulse: 79  Resp: 18  SpO2: 96 %  BP: 130/62  BP Location: Left arm  Patient Position: Sitting  Pain Score:   3  Pain Loc: Breast  Height and Weight  Weight: 73.9 kg (162 lb 13 oz)]    Body mass index is 27.95 kg/m².    Physical Exam          Assessment:      ICD-10-CM ICD-9-CM   1. Rash R21 782.1         Plan:        Please stop using peroxide and just use Bactroban ointment and re-evaluate in a couple of weeks and  Not sure what may have caused the skin lesion.        No orders of the defined types were placed in this encounter.      Current Outpatient Medications   Medication Sig Dispense Refill    allopurinol (ZYLOPRIM) 300 MG tablet TAKE 1 TABLET (300 MG TOTAL) BY MOUTH ONCE DAILY. 90 tablet 2    amLODIPine (NORVASC) 5 MG tablet TAKE ONE TABLET BY MOUTH DAILY 30 tablet 8    aspirin (ECOTRIN) 81 MG EC tablet Take 81 mg by mouth once daily.      biotin 1 mg Cap Take by mouth.      cholecalciferol, vitamin D3, (VITAMIN D3) 1,000 unit capsule Take 1,000 Units by mouth.      fish oil-omega-3 fatty acids 300-1,000 mg capsule Take 2 g by mouth once daily.      furosemide (LASIX) 40 MG tablet Take 40 mg by mouth once daily.      gabapentin (NEURONTIN) 300 MG capsule Take 1 capsule (300 mg total) by mouth 3 (three) times daily. 90 capsule 2    levothyroxine (SYNTHROID) 75 MCG tablet TAKE 1 TABLET  (75 MCG TOTAL) BY MOUTH BEFORE BREAKFAST. 90 tablet 0    lovastatin (MEVACOR) 40 MG tablet TAKE 1 TABLET (40 MG TOTAL) BY MOUTH EVERY EVENING. 30 tablet 6    metFORMIN (GLUCOPHAGE) 1000 MG tablet TAKE ONE TABLET BY MOUTH TWICE DAILY 180 tablet 2    metoprolol tartrate (LOPRESSOR) 50 MG tablet Take 1 tablet by mouth once daily.      rOPINIRole (REQUIP) 1 MG tablet Take 1 tablet (1 mg total) by mouth every evening. 30 tablet 11    traZODone (DESYREL) 50 MG tablet TAKE 1 TABLET (50 MG TOTAL) BY MOUTH EVERY EVENING. 30 tablet 3    dicyclomine (BENTYL) 20 mg tablet TAKE 1 TABLET (20 MG TOTAL) BY MOUTH EVERY 6 (SIX) HOURS. 30 tablet 0    mupirocin (BACTROBAN) 2 % ointment Apply topically 3 (three) times daily. 1 Tube 1    triamcinolone acetonide 0.1% (KENALOG) 0.1 % cream Apply topically 2 (two) times daily. for 10 days 1 Tube 1     No current facility-administered medications for this visit.        Medications Discontinued During This Encounter   Medication Reason    gabapentin (NEURONTIN) 100 MG capsule        No follow-ups on file.      Angely Roberts MD

## 2020-05-22 ENCOUNTER — TELEPHONE (OUTPATIENT)
Dept: FAMILY MEDICINE | Facility: CLINIC | Age: 80
End: 2020-05-22

## 2020-05-22 NOTE — TELEPHONE ENCOUNTER
Returned call to patient and advised her Dr. Roberts was gone for the day. Advised patient to go to Urgent care to be treated for the bladder infection. Patient stated she would go to urgent today    ----- Message from Rukhsana Dowd sent at 5/22/2020  6:52 AM CDT -----  Contact: pt  Pt requesting an appt today for a bladder infection. [phone]

## 2020-06-15 ENCOUNTER — TELEPHONE (OUTPATIENT)
Dept: FAMILY MEDICINE | Facility: CLINIC | Age: 80
End: 2020-06-15

## 2020-06-15 NOTE — TELEPHONE ENCOUNTER
----- Message from Nicolasa Henderson sent at 6/15/2020  8:32 AM CDT -----  Regarding: Pt-Wound  Pt is requesting call back in regards to discussing wound            Pls call back at [phone]

## 2020-07-01 ENCOUNTER — PATIENT OUTREACH (OUTPATIENT)
Dept: ADMINISTRATIVE | Facility: HOSPITAL | Age: 80
End: 2020-07-01

## 2020-07-01 NOTE — PROGRESS NOTES
Attempting to contact pt to schedule over due HM. Unable to reach patient at this time. Left voicemail.      Health Maintenance Updated.   Immunizations: Abstracted.  Care Everywhere: Abstracted:Results Updated.    Health Maintenance Due   Topic    Hemoglobin A1c     Foot Exam     Eye Exam      DM EYE:THO DM FOOT:PER PCP HA1c:DAY OF APPT  Portal access:DECLINED      Annual Wellness Visit Scheduled : Eligible         Digital Medicine: Not Eligible    Ambulatory/ Referral: Gastroenterology: 2/10/16, 8/17/16, 3/14/17 & 9/17/17    Ambulatory/Referral: Ophthalmology: 5/9/2016

## 2020-07-10 ENCOUNTER — OFFICE VISIT (OUTPATIENT)
Dept: OPHTHALMOLOGY | Facility: CLINIC | Age: 80
End: 2020-07-10
Payer: MEDICARE

## 2020-07-10 DIAGNOSIS — E11.9 DIABETES MELLITUS TYPE 2 WITHOUT RETINOPATHY: Primary | ICD-10-CM

## 2020-07-10 DIAGNOSIS — H52.4 BILATERAL PRESBYOPIA: ICD-10-CM

## 2020-07-10 DIAGNOSIS — Z96.1 PSEUDOPHAKIA OF BOTH EYES: ICD-10-CM

## 2020-07-10 PROCEDURE — 92015 PR REFRACTION: ICD-10-PCS | Mod: S$GLB,,, | Performed by: OPTOMETRIST

## 2020-07-10 PROCEDURE — 92015 DETERMINE REFRACTIVE STATE: CPT | Mod: S$GLB,,, | Performed by: OPTOMETRIST

## 2020-07-10 PROCEDURE — 99999 PR PBB SHADOW E&M-EST. PATIENT-LVL III: ICD-10-PCS | Mod: PBBFAC,,, | Performed by: OPTOMETRIST

## 2020-07-10 PROCEDURE — 92014 PR EYE EXAM, EST PATIENT,COMPREHESV: ICD-10-PCS | Mod: S$GLB,,, | Performed by: OPTOMETRIST

## 2020-07-10 PROCEDURE — 99999 PR PBB SHADOW E&M-EST. PATIENT-LVL III: CPT | Mod: PBBFAC,,, | Performed by: OPTOMETRIST

## 2020-07-10 PROCEDURE — 92014 COMPRE OPH EXAM EST PT 1/>: CPT | Mod: S$GLB,,, | Performed by: OPTOMETRIST

## 2020-07-10 NOTE — PROGRESS NOTES
HPI     NIDDM exam.  Decrease distance visual acuity with glasses.  Last eye exam 07/05/2019 TRF.  Update glasses RX.  Lab Results       Component                Value               Date                       HGBA1C                   5.3                 09/17/2019              Last edited by Gerardo Lauren, OD on 7/10/2020 10:42 AM. (History)            Assessment /Plan     For exam results, see Encounter Report.    Diabetes mellitus type 2 without retinopathy    Pseudophakia of both eyes    Bilateral presbyopia      No Background Diabetic Retinopathy    Stable IOL OU.    Dispense Final Rx for glasses.  RTC 1 year  Discussed above and answered questions.

## 2020-07-15 ENCOUNTER — OFFICE VISIT (OUTPATIENT)
Dept: FAMILY MEDICINE | Facility: CLINIC | Age: 80
End: 2020-07-15
Payer: MEDICARE

## 2020-07-15 VITALS
SYSTOLIC BLOOD PRESSURE: 136 MMHG | WEIGHT: 166.13 LBS | HEART RATE: 76 BPM | BODY MASS INDEX: 28.36 KG/M2 | TEMPERATURE: 99 F | HEIGHT: 64 IN | OXYGEN SATURATION: 96 % | DIASTOLIC BLOOD PRESSURE: 60 MMHG

## 2020-07-15 DIAGNOSIS — M75.82 ROTATOR CUFF TENDINITIS, LEFT: Primary | ICD-10-CM

## 2020-07-15 PROCEDURE — 99999 PR PBB SHADOW E&M-EST. PATIENT-LVL IV: ICD-10-PCS | Mod: PBBFAC,,, | Performed by: FAMILY MEDICINE

## 2020-07-15 PROCEDURE — 99213 OFFICE O/P EST LOW 20 MIN: CPT | Mod: 25,S$GLB,, | Performed by: FAMILY MEDICINE

## 2020-07-15 PROCEDURE — 99999 PR PBB SHADOW E&M-EST. PATIENT-LVL IV: CPT | Mod: PBBFAC,,, | Performed by: FAMILY MEDICINE

## 2020-07-15 PROCEDURE — 3075F SYST BP GE 130 - 139MM HG: CPT | Mod: CPTII,S$GLB,, | Performed by: FAMILY MEDICINE

## 2020-07-15 PROCEDURE — 1159F PR MEDICATION LIST DOCUMENTED IN MEDICAL RECORD: ICD-10-PCS | Mod: S$GLB,,, | Performed by: FAMILY MEDICINE

## 2020-07-15 PROCEDURE — 1101F PT FALLS ASSESS-DOCD LE1/YR: CPT | Mod: CPTII,S$GLB,, | Performed by: FAMILY MEDICINE

## 2020-07-15 PROCEDURE — 1101F PR PT FALLS ASSESS DOC 0-1 FALLS W/OUT INJ PAST YR: ICD-10-PCS | Mod: CPTII,S$GLB,, | Performed by: FAMILY MEDICINE

## 2020-07-15 PROCEDURE — 3078F DIAST BP <80 MM HG: CPT | Mod: CPTII,S$GLB,, | Performed by: FAMILY MEDICINE

## 2020-07-15 PROCEDURE — 1126F AMNT PAIN NOTED NONE PRSNT: CPT | Mod: S$GLB,,, | Performed by: FAMILY MEDICINE

## 2020-07-15 PROCEDURE — 99213 PR OFFICE/OUTPT VISIT, EST, LEVL III, 20-29 MIN: ICD-10-PCS | Mod: 25,S$GLB,, | Performed by: FAMILY MEDICINE

## 2020-07-15 PROCEDURE — 20610 DRAIN/INJ JOINT/BURSA W/O US: CPT | Mod: LT,S$GLB,, | Performed by: FAMILY MEDICINE

## 2020-07-15 PROCEDURE — 1159F MED LIST DOCD IN RCRD: CPT | Mod: S$GLB,,, | Performed by: FAMILY MEDICINE

## 2020-07-15 PROCEDURE — 3075F PR MOST RECENT SYSTOLIC BLOOD PRESS GE 130-139MM HG: ICD-10-PCS | Mod: CPTII,S$GLB,, | Performed by: FAMILY MEDICINE

## 2020-07-15 PROCEDURE — 1126F PR PAIN SEVERITY QUANTIFIED, NO PAIN PRESENT: ICD-10-PCS | Mod: S$GLB,,, | Performed by: FAMILY MEDICINE

## 2020-07-15 PROCEDURE — 20610 PR DRAIN/INJECT LARGE JOINT/BURSA: ICD-10-PCS | Mod: LT,S$GLB,, | Performed by: FAMILY MEDICINE

## 2020-07-15 PROCEDURE — 3078F PR MOST RECENT DIASTOLIC BLOOD PRESSURE < 80 MM HG: ICD-10-PCS | Mod: CPTII,S$GLB,, | Performed by: FAMILY MEDICINE

## 2020-07-15 RX ORDER — METHYLPREDNISOLONE ACETATE 80 MG/ML
80 INJECTION, SUSPENSION INTRA-ARTICULAR; INTRALESIONAL; INTRAMUSCULAR; SOFT TISSUE
Status: COMPLETED | OUTPATIENT
Start: 2020-07-15 | End: 2020-07-15

## 2020-07-15 RX ADMIN — METHYLPREDNISOLONE ACETATE 80 MG: 80 INJECTION, SUSPENSION INTRA-ARTICULAR; INTRALESIONAL; INTRAMUSCULAR; SOFT TISSUE at 02:07

## 2020-08-07 RX ORDER — AMLODIPINE BESYLATE 5 MG/1
5 TABLET ORAL DAILY
Qty: 30 TABLET | Refills: 8 | Status: SHIPPED | OUTPATIENT
Start: 2020-08-07 | End: 2020-08-13 | Stop reason: SDUPTHER

## 2020-08-07 RX ORDER — ROPINIROLE 1 MG/1
1 TABLET, FILM COATED ORAL NIGHTLY
Qty: 30 TABLET | Refills: 11 | Status: SHIPPED | OUTPATIENT
Start: 2020-08-07 | End: 2020-08-13 | Stop reason: SDUPTHER

## 2020-08-07 RX ORDER — ALLOPURINOL 300 MG/1
300 TABLET ORAL DAILY
Qty: 90 TABLET | Refills: 2 | Status: SHIPPED | OUTPATIENT
Start: 2020-08-07 | End: 2020-08-11 | Stop reason: SDUPTHER

## 2020-08-07 RX ORDER — LEVOTHYROXINE SODIUM 75 UG/1
75 TABLET ORAL
Qty: 90 TABLET | Refills: 0 | Status: SHIPPED | OUTPATIENT
Start: 2020-08-07 | End: 2020-08-13 | Stop reason: SDUPTHER

## 2020-08-07 RX ORDER — LOVASTATIN 40 MG/1
40 TABLET ORAL NIGHTLY
Qty: 30 TABLET | Refills: 6 | Status: SHIPPED | OUTPATIENT
Start: 2020-08-07 | End: 2020-08-13 | Stop reason: SDUPTHER

## 2020-08-07 RX ORDER — METFORMIN HYDROCHLORIDE 1000 MG/1
TABLET ORAL
Qty: 180 TABLET | Refills: 2 | Status: SHIPPED | OUTPATIENT
Start: 2020-08-07 | End: 2020-08-13 | Stop reason: SDUPTHER

## 2020-08-07 RX ORDER — GABAPENTIN 300 MG/1
300 CAPSULE ORAL 3 TIMES DAILY
Qty: 90 CAPSULE | Refills: 2 | Status: SHIPPED | OUTPATIENT
Start: 2020-08-07 | End: 2020-08-13 | Stop reason: SDUPTHER

## 2020-08-07 RX ORDER — TRAZODONE HYDROCHLORIDE 50 MG/1
50 TABLET ORAL NIGHTLY
Qty: 30 TABLET | Refills: 3 | Status: SHIPPED | OUTPATIENT
Start: 2020-08-07 | End: 2020-08-13 | Stop reason: SDUPTHER

## 2020-08-07 RX ORDER — FUROSEMIDE 40 MG/1
40 TABLET ORAL DAILY
Qty: 90 TABLET | Refills: 1 | Status: SHIPPED | OUTPATIENT
Start: 2020-08-07 | End: 2020-08-13 | Stop reason: SDUPTHER

## 2020-08-11 RX ORDER — ALLOPURINOL 300 MG/1
300 TABLET ORAL DAILY
Qty: 90 TABLET | Refills: 1 | Status: SHIPPED | OUTPATIENT
Start: 2020-08-11 | End: 2020-08-13 | Stop reason: SDUPTHER

## 2020-08-13 RX ORDER — GABAPENTIN 300 MG/1
300 CAPSULE ORAL 3 TIMES DAILY
Qty: 90 CAPSULE | Refills: 1 | Status: SHIPPED | OUTPATIENT
Start: 2020-08-13 | End: 2020-10-27

## 2020-08-13 RX ORDER — AMLODIPINE BESYLATE 5 MG/1
5 TABLET ORAL DAILY
Qty: 90 TABLET | Refills: 1 | Status: SHIPPED | OUTPATIENT
Start: 2020-08-13 | End: 2021-01-06

## 2020-08-13 RX ORDER — FUROSEMIDE 40 MG/1
40 TABLET ORAL DAILY
Qty: 90 TABLET | Refills: 1 | Status: SHIPPED | OUTPATIENT
Start: 2020-08-13 | End: 2021-01-06

## 2020-08-13 RX ORDER — LEVOTHYROXINE SODIUM 75 UG/1
75 TABLET ORAL
Qty: 90 TABLET | Refills: 0 | Status: SHIPPED | OUTPATIENT
Start: 2020-08-13 | End: 2020-12-23

## 2020-08-13 RX ORDER — ALLOPURINOL 300 MG/1
300 TABLET ORAL DAILY
Qty: 90 TABLET | Refills: 1 | Status: SHIPPED | OUTPATIENT
Start: 2020-08-13 | End: 2021-07-23

## 2020-08-13 RX ORDER — LOVASTATIN 40 MG/1
40 TABLET ORAL NIGHTLY
Qty: 90 TABLET | Refills: 1 | Status: SHIPPED | OUTPATIENT
Start: 2020-08-13 | End: 2021-01-06

## 2020-08-13 RX ORDER — METFORMIN HYDROCHLORIDE 1000 MG/1
TABLET ORAL
Qty: 180 TABLET | Refills: 1 | Status: SHIPPED | OUTPATIENT
Start: 2020-08-13 | End: 2021-06-22

## 2020-08-13 RX ORDER — TRAZODONE HYDROCHLORIDE 50 MG/1
50 TABLET ORAL NIGHTLY
Qty: 90 TABLET | Refills: 1 | Status: SHIPPED | OUTPATIENT
Start: 2020-08-13 | End: 2021-01-06

## 2020-08-13 RX ORDER — ROPINIROLE 1 MG/1
1 TABLET, FILM COATED ORAL NIGHTLY
Qty: 90 TABLET | Refills: 1 | Status: SHIPPED | OUTPATIENT
Start: 2020-08-13 | End: 2021-01-06

## 2020-08-18 RX ORDER — LOSARTAN POTASSIUM 100 MG/1
100 TABLET ORAL DAILY
Qty: 90 TABLET | Refills: 3 | Status: SHIPPED | OUTPATIENT
Start: 2020-08-18 | End: 2020-08-25

## 2020-08-18 NOTE — TELEPHONE ENCOUNTER
----- Message from Vale Orr sent at 8/18/2020  8:56 AM CDT -----  Would like to consult with nurse to inform doctor that [facility] will be sending over a fax to have her blood pressure medication refill. Please give a call back at [phone].

## 2020-08-25 RX ORDER — LOSARTAN POTASSIUM 100 MG/1
100 TABLET ORAL DAILY
Qty: 90 TABLET | Refills: 3 | Status: CANCELLED | OUTPATIENT
Start: 2020-08-25

## 2020-08-25 RX ORDER — LOSARTAN POTASSIUM 100 MG/1
100 TABLET ORAL DAILY
Qty: 90 TABLET | Refills: 3 | Status: SHIPPED | OUTPATIENT
Start: 2020-08-25 | End: 2020-08-27 | Stop reason: SDUPTHER

## 2020-08-25 NOTE — TELEPHONE ENCOUNTER
----- Message from Radha Kruger sent at 8/25/2020  1:16 PM CDT -----  Contact: Self  Pt would like a call back in regards to getting someone to check the fax [facility] has been sending over prescription. [facility] fax number is [phone]. The medication prescription is losartan (COZAAR) 100 MG tablet. Please call pt back at [phone].      Thanks  zs

## 2020-08-28 RX ORDER — LOSARTAN POTASSIUM 100 MG/1
100 TABLET ORAL DAILY
Qty: 90 TABLET | Refills: 1 | Status: SHIPPED | OUTPATIENT
Start: 2020-08-28 | End: 2021-06-22

## 2020-09-28 RX ORDER — AMOXICILLIN 500 MG/1
2000 TABLET, FILM COATED ORAL ONCE
Qty: 4 TABLET | Refills: 0 | Status: SHIPPED | OUTPATIENT
Start: 2020-09-28 | End: 2020-09-28

## 2020-09-28 NOTE — TELEPHONE ENCOUNTER
----- Message from Vale Orr sent at 9/28/2020 12:09 PM CDT -----  Would like to consult with nurse regarding a medication she needs ordered. States she was on the phone with nurse when call got disconnected. She needs 4 pencillin a hour before they the dentist can do any work on her teeth. Please give a call back at [phone].              [facility] - [place], [place] - [address]  [address]  [facility] [address]  Phone: [phone] Fax: [phone]

## 2020-09-28 NOTE — TELEPHONE ENCOUNTER
She is requesting to be medicated before dental procedure, she is requesting 4 PCN 1 hour prior to procedure, please advise

## 2020-09-30 RX ORDER — AMOXICILLIN 500 MG/1
TABLET, FILM COATED ORAL
Qty: 4 TABLET | Refills: 0 | Status: SHIPPED | OUTPATIENT
Start: 2020-09-30 | End: 2020-11-27 | Stop reason: SDUPTHER

## 2020-10-06 ENCOUNTER — OFFICE VISIT (OUTPATIENT)
Dept: FAMILY MEDICINE | Facility: CLINIC | Age: 80
End: 2020-10-06
Payer: MEDICARE

## 2020-10-06 ENCOUNTER — LAB VISIT (OUTPATIENT)
Dept: LAB | Facility: HOSPITAL | Age: 80
End: 2020-10-06
Payer: MEDICARE

## 2020-10-06 VITALS
TEMPERATURE: 98 F | OXYGEN SATURATION: 97 % | HEIGHT: 64 IN | BODY MASS INDEX: 28.49 KG/M2 | DIASTOLIC BLOOD PRESSURE: 78 MMHG | HEART RATE: 68 BPM | SYSTOLIC BLOOD PRESSURE: 122 MMHG | WEIGHT: 166.88 LBS

## 2020-10-06 DIAGNOSIS — E11.42 DIABETIC POLYNEUROPATHY ASSOCIATED WITH TYPE 2 DIABETES MELLITUS: ICD-10-CM

## 2020-10-06 DIAGNOSIS — I11.9 HYPERTENSIVE HEART DISEASE WITHOUT CONGESTIVE HEART FAILURE: ICD-10-CM

## 2020-10-06 DIAGNOSIS — E03.4 HYPOTHYROIDISM DUE TO ACQUIRED ATROPHY OF THYROID: ICD-10-CM

## 2020-10-06 DIAGNOSIS — Z95.3 H/O AORTIC VALVE REPLACEMENT WITH PORCINE VALVE: ICD-10-CM

## 2020-10-06 DIAGNOSIS — R32 URINARY INCONTINENCE, UNSPECIFIED TYPE: Primary | ICD-10-CM

## 2020-10-06 LAB
ALBUMIN SERPL BCP-MCNC: 4 G/DL (ref 3.5–5.2)
ALP SERPL-CCNC: 57 U/L (ref 55–135)
ALT SERPL W/O P-5'-P-CCNC: 11 U/L (ref 10–44)
ANION GAP SERPL CALC-SCNC: 11 MMOL/L (ref 8–16)
AST SERPL-CCNC: 18 U/L (ref 10–40)
BASOPHILS # BLD AUTO: 0.09 K/UL (ref 0–0.2)
BASOPHILS NFR BLD: 1.1 % (ref 0–1.9)
BILIRUB SERPL-MCNC: 0.3 MG/DL (ref 0.1–1)
BILIRUB UR QL STRIP: NEGATIVE
BUN SERPL-MCNC: 18 MG/DL (ref 8–23)
CALCIUM SERPL-MCNC: 9.7 MG/DL (ref 8.7–10.5)
CHLORIDE SERPL-SCNC: 99 MMOL/L (ref 95–110)
CHOLEST SERPL-MCNC: 127 MG/DL (ref 120–199)
CHOLEST/HDLC SERPL: 3 {RATIO} (ref 2–5)
CLARITY UR REFRACT.AUTO: CLEAR
CO2 SERPL-SCNC: 29 MMOL/L (ref 23–29)
COLOR UR AUTO: ABNORMAL
CREAT SERPL-MCNC: 1 MG/DL (ref 0.5–1.4)
DIFFERENTIAL METHOD: ABNORMAL
EOSINOPHIL # BLD AUTO: 0.3 K/UL (ref 0–0.5)
EOSINOPHIL NFR BLD: 2.9 % (ref 0–8)
ERYTHROCYTE [DISTWIDTH] IN BLOOD BY AUTOMATED COUNT: 14.8 % (ref 11.5–14.5)
EST. GFR  (AFRICAN AMERICAN): >60 ML/MIN/1.73 M^2
EST. GFR  (NON AFRICAN AMERICAN): 53.3 ML/MIN/1.73 M^2
GLUCOSE SERPL-MCNC: 96 MG/DL (ref 70–110)
GLUCOSE UR QL STRIP: NEGATIVE
HCT VFR BLD AUTO: 39.5 % (ref 37–48.5)
HDLC SERPL-MCNC: 42 MG/DL (ref 40–75)
HDLC SERPL: 33.1 % (ref 20–50)
HGB BLD-MCNC: 12.4 G/DL (ref 12–16)
HGB UR QL STRIP: NEGATIVE
IMM GRANULOCYTES # BLD AUTO: 0.03 K/UL (ref 0–0.04)
IMM GRANULOCYTES NFR BLD AUTO: 0.4 % (ref 0–0.5)
KETONES UR QL STRIP: NEGATIVE
LDLC SERPL CALC-MCNC: 52.4 MG/DL (ref 63–159)
LEUKOCYTE ESTERASE UR QL STRIP: ABNORMAL
LYMPHOCYTES # BLD AUTO: 2.6 K/UL (ref 1–4.8)
LYMPHOCYTES NFR BLD: 29.8 % (ref 18–48)
MCH RBC QN AUTO: 29.7 PG (ref 27–31)
MCHC RBC AUTO-ENTMCNC: 31.4 G/DL (ref 32–36)
MCV RBC AUTO: 95 FL (ref 82–98)
MICROSCOPIC COMMENT: NORMAL
MONOCYTES # BLD AUTO: 0.7 K/UL (ref 0.3–1)
MONOCYTES NFR BLD: 8.3 % (ref 4–15)
NEUTROPHILS # BLD AUTO: 4.9 K/UL (ref 1.8–7.7)
NEUTROPHILS NFR BLD: 57.5 % (ref 38–73)
NITRITE UR QL STRIP: NEGATIVE
NONHDLC SERPL-MCNC: 85 MG/DL
NRBC BLD-RTO: 0 /100 WBC
PH UR STRIP: 6 [PH] (ref 5–8)
PLATELET # BLD AUTO: 222 K/UL (ref 150–350)
PMV BLD AUTO: 10.5 FL (ref 9.2–12.9)
POTASSIUM SERPL-SCNC: 3.8 MMOL/L (ref 3.5–5.1)
PROT SERPL-MCNC: 7.2 G/DL (ref 6–8.4)
PROT UR QL STRIP: NEGATIVE
RBC # BLD AUTO: 4.17 M/UL (ref 4–5.4)
SODIUM SERPL-SCNC: 139 MMOL/L (ref 136–145)
SP GR UR STRIP: 1.01 (ref 1–1.03)
TRIGL SERPL-MCNC: 163 MG/DL (ref 30–150)
TSH SERPL DL<=0.005 MIU/L-ACNC: 2.85 UIU/ML (ref 0.4–4)
URN SPEC COLLECT METH UR: ABNORMAL
WBC # BLD AUTO: 8.56 K/UL (ref 3.9–12.7)
WBC #/AREA URNS AUTO: 1 /HPF (ref 0–5)

## 2020-10-06 PROCEDURE — 1101F PT FALLS ASSESS-DOCD LE1/YR: CPT | Mod: HCNC,CPTII,S$GLB, | Performed by: FAMILY MEDICINE

## 2020-10-06 PROCEDURE — 1126F PR PAIN SEVERITY QUANTIFIED, NO PAIN PRESENT: ICD-10-PCS | Mod: HCNC,S$GLB,, | Performed by: FAMILY MEDICINE

## 2020-10-06 PROCEDURE — 85025 COMPLETE CBC W/AUTO DIFF WBC: CPT | Mod: HCNC

## 2020-10-06 PROCEDURE — 1126F AMNT PAIN NOTED NONE PRSNT: CPT | Mod: HCNC,S$GLB,, | Performed by: FAMILY MEDICINE

## 2020-10-06 PROCEDURE — 3078F DIAST BP <80 MM HG: CPT | Mod: HCNC,CPTII,S$GLB, | Performed by: FAMILY MEDICINE

## 2020-10-06 PROCEDURE — 3074F PR MOST RECENT SYSTOLIC BLOOD PRESSURE < 130 MM HG: ICD-10-PCS | Mod: HCNC,CPTII,S$GLB, | Performed by: FAMILY MEDICINE

## 2020-10-06 PROCEDURE — 1159F MED LIST DOCD IN RCRD: CPT | Mod: HCNC,S$GLB,, | Performed by: FAMILY MEDICINE

## 2020-10-06 PROCEDURE — 80061 LIPID PANEL: CPT | Mod: HCNC

## 2020-10-06 PROCEDURE — 80053 COMPREHEN METABOLIC PANEL: CPT | Mod: HCNC

## 2020-10-06 PROCEDURE — 99999 PR PBB SHADOW E&M-EST. PATIENT-LVL V: ICD-10-PCS | Mod: PBBFAC,HCNC,, | Performed by: FAMILY MEDICINE

## 2020-10-06 PROCEDURE — 99999 PR PBB SHADOW E&M-EST. PATIENT-LVL V: CPT | Mod: PBBFAC,HCNC,, | Performed by: FAMILY MEDICINE

## 2020-10-06 PROCEDURE — 84443 ASSAY THYROID STIM HORMONE: CPT | Mod: HCNC

## 2020-10-06 PROCEDURE — 1159F PR MEDICATION LIST DOCUMENTED IN MEDICAL RECORD: ICD-10-PCS | Mod: HCNC,S$GLB,, | Performed by: FAMILY MEDICINE

## 2020-10-06 PROCEDURE — 81001 URINALYSIS AUTO W/SCOPE: CPT | Mod: HCNC

## 2020-10-06 PROCEDURE — 99214 OFFICE O/P EST MOD 30 MIN: CPT | Mod: HCNC,S$GLB,, | Performed by: FAMILY MEDICINE

## 2020-10-06 PROCEDURE — 99499 UNLISTED E&M SERVICE: CPT | Mod: S$GLB,,, | Performed by: FAMILY MEDICINE

## 2020-10-06 PROCEDURE — 99499 RISK ADDL DX/OHS AUDIT: ICD-10-PCS | Mod: S$GLB,,, | Performed by: FAMILY MEDICINE

## 2020-10-06 PROCEDURE — 36415 COLL VENOUS BLD VENIPUNCTURE: CPT | Mod: HCNC,PO

## 2020-10-06 PROCEDURE — 3074F SYST BP LT 130 MM HG: CPT | Mod: HCNC,CPTII,S$GLB, | Performed by: FAMILY MEDICINE

## 2020-10-06 PROCEDURE — 3078F PR MOST RECENT DIASTOLIC BLOOD PRESSURE < 80 MM HG: ICD-10-PCS | Mod: HCNC,CPTII,S$GLB, | Performed by: FAMILY MEDICINE

## 2020-10-06 PROCEDURE — 1101F PR PT FALLS ASSESS DOC 0-1 FALLS W/OUT INJ PAST YR: ICD-10-PCS | Mod: HCNC,CPTII,S$GLB, | Performed by: FAMILY MEDICINE

## 2020-10-06 PROCEDURE — 99214 PR OFFICE/OUTPT VISIT, EST, LEVL IV, 30-39 MIN: ICD-10-PCS | Mod: HCNC,S$GLB,, | Performed by: FAMILY MEDICINE

## 2020-10-06 PROCEDURE — 83036 HEMOGLOBIN GLYCOSYLATED A1C: CPT | Mod: HCNC

## 2020-10-06 RX ORDER — OXYBUTYNIN CHLORIDE 10 MG/1
10 TABLET, EXTENDED RELEASE ORAL DAILY
Qty: 30 TABLET | Refills: 11 | Status: SHIPPED | OUTPATIENT
Start: 2020-10-06 | End: 2021-01-07

## 2020-10-06 NOTE — PROGRESS NOTES
Chief Complaint:    Chief Complaint   Patient presents with    Urinary Frequency       History of Present Illness:  Patient is here for three-month follow-up of chronic medical problems:  She has diabetes due for A1c checked  Complaining of urinary incontinence going on for about a month says hard to control the urine no burning sensation.    Blood pressure is normal  Lipids chemistries at goal.      ROS:  Review of Systems   Constitutional: Negative for activity change, chills, fatigue, fever and unexpected weight change.   HENT: Negative for congestion, ear discharge, ear pain, hearing loss, postnasal drip and rhinorrhea.    Eyes: Negative for pain and visual disturbance.   Respiratory: Negative for cough, chest tightness and shortness of breath.    Cardiovascular: Negative for chest pain and palpitations.   Gastrointestinal: Negative for abdominal pain, diarrhea and vomiting.   Endocrine: Negative for heat intolerance.   Genitourinary: Negative for dysuria, flank pain, frequency and hematuria.   Musculoskeletal: Negative for back pain, gait problem and neck pain.   Skin: Negative for color change and rash.   Neurological: Negative for dizziness, tremors, seizures, numbness and headaches.   Psychiatric/Behavioral: Negative for agitation, hallucinations, self-injury, sleep disturbance and suicidal ideas. The patient is not nervous/anxious.        Past Medical History:   Diagnosis Date    Angiodysplasia of cecum     Diabetes mellitus, type 2 2006    BS doesn't check 06/15/2016    DM (diabetes mellitus) 2006    BS doesn't check 06/29/2018    DM (diabetes mellitus) 2006    BS doesn't check 07/10/2020    GERD (gastroesophageal reflux disease)     Hyperlipidemia     Hypertension     Hypothyroidism        Social History:  Social History     Socioeconomic History    Marital status:      Spouse name: Not on file    Number of children: Not on file    Years of education: Not on file    Highest education  "level: Not on file   Occupational History    Not on file   Social Needs    Financial resource strain: Not on file    Food insecurity     Worry: Not on file     Inability: Not on file    Transportation needs     Medical: Not on file     Non-medical: Not on file   Tobacco Use    Smoking status: Former Smoker     Packs/day: 1.00     Years: 25.00     Pack years: 25.00     Quit date: 2002     Years since quittin.3    Smokeless tobacco: Never Used   Substance and Sexual Activity    Alcohol use: No     Alcohol/week: 0.0 standard drinks    Drug use: No    Sexual activity: Not on file   Lifestyle    Physical activity     Days per week: Not on file     Minutes per session: Not on file    Stress: Not on file   Relationships    Social connections     Talks on phone: Not on file     Gets together: Not on file     Attends Mu-ism service: Not on file     Active member of club or organization: Not on file     Attends meetings of clubs or organizations: Not on file     Relationship status: Not on file   Other Topics Concern    Not on file   Social History Narrative    Not on file       Family History:   family history includes COPD in her sister; Cancer (age of onset: 46) in her mother; Diabetes in her father; Heart disease in her father and mother; Hypertension in her father.    Health Maintenance   Topic Date Due    Hemoglobin A1c  2020    Foot Exam  2020    Lipid Panel  2020    Eye Exam  07/10/2021    Aspirin/Antiplatelet Therapy  10/06/2021    DEXA SCAN  10/03/2022    Colonoscopy  10/12/2022    TETANUS VACCINE  2026    Pneumococcal Vaccine (65+ Low/Medium Risk)  Completed       Physical Exam:    Vital Signs  Temp: 98.1 °F (36.7 °C)  Temp src: Temporal  Pulse: 68  SpO2: 97 %  BP: 122/78  BP Location: Left arm  Patient Position: Sitting  Pain Score: 0-No pain  Height and Weight  Height: 5' 4" (162.6 cm)  Weight: 75.7 kg (166 lb 14.2 oz)  BSA (Calculated - sq m): 1.85 sq " meters  BMI (Calculated): 28.6  Weight in (lb) to have BMI = 25: 145.3]    Body mass index is 28.65 kg/m².    Physical Exam  Constitutional:       Appearance: She is well-developed.   Eyes:      Conjunctiva/sclera: Conjunctivae normal.      Pupils: Pupils are equal, round, and reactive to light.   Neck:      Musculoskeletal: Normal range of motion and neck supple.   Cardiovascular:      Rate and Rhythm: Normal rate and regular rhythm.      Pulses:           Dorsalis pedis pulses are 2+ on the right side and 1+ on the left side.        Posterior tibial pulses are 1+ on the right side and 1+ on the left side.      Heart sounds: Normal heart sounds. No murmur.   Pulmonary:      Effort: Pulmonary effort is normal. No respiratory distress.      Breath sounds: Normal breath sounds. No wheezing or rales.   Chest:      Chest wall: No tenderness.   Abdominal:      General: There is no distension.      Palpations: Abdomen is soft. There is no mass.      Tenderness: There is no abdominal tenderness. There is no guarding.   Musculoskeletal:         General: No tenderness.   Feet:      Right foot:      Protective Sensation: 10 sites tested. 9 sites sensed.      Left foot:      Protective Sensation: 10 sites tested. 8 sites sensed.   Lymphadenopathy:      Cervical: No cervical adenopathy.   Skin:     General: Skin is warm and dry.   Neurological:      Mental Status: She is alert and oriented to person, place, and time.      Deep Tendon Reflexes: Reflexes are normal and symmetric.   Psychiatric:         Behavior: Behavior normal.         Thought Content: Thought content normal.         Judgment: Judgment normal.           Assessment:      ICD-10-CM ICD-9-CM   1. Urinary incontinence, unspecified type  R32 788.30   2. Hypothyroidism due to acquired atrophy of thyroid  E03.4 244.8     246.8   3. Hypertensive heart disease without congestive heart failure  I11.9 402.90   4. H/O aortic valve replacement with porcine valve  Z95.3 V42.2    5. Diabetic polyneuropathy associated with type 2 diabetes mellitus  E11.42 250.60     357.2         Plan:  Check a urinalysis and culture and a trial of oxybutynin given if no improvement after 1 week referred to urology  Check labs as below other medical problems stable        Orders Placed This Encounter   Procedures    Urinalysis, Reflex to Urine Culture Urine, Clean Catch    Comprehensive Metabolic Panel    CBC auto differential    Lipid Panel    TSH    Hemoglobin A1C    Ambulatory referral/consult to Urology       Current Outpatient Medications   Medication Sig Dispense Refill    allopurinoL (ZYLOPRIM) 300 MG tablet Take 1 tablet (300 mg total) by mouth once daily. 90 tablet 1    amLODIPine (NORVASC) 5 MG tablet Take 1 tablet (5 mg total) by mouth once daily. 90 tablet 1    amoxicillin (AMOXIL) 500 MG Tab TAKE 4 TABLETS ONCE FOR 1 DOSE 4 tablet 0    aspirin (ECOTRIN) 81 MG EC tablet Take 81 mg by mouth once daily.      biotin 1 mg Cap Take by mouth.      cholecalciferol, vitamin D3, (VITAMIN D3) 1,000 unit capsule Take 1,000 Units by mouth.      dicyclomine (BENTYL) 20 mg tablet TAKE 1 TABLET (20 MG TOTAL) BY MOUTH EVERY 6 (SIX) HOURS. 30 tablet 0    fish oil-omega-3 fatty acids 300-1,000 mg capsule Take 2 g by mouth once daily.      furosemide (LASIX) 40 MG tablet Take 1 tablet (40 mg total) by mouth once daily. 90 tablet 1    gabapentin (NEURONTIN) 300 MG capsule Take 1 capsule (300 mg total) by mouth 3 (three) times daily. 90 capsule 1    levothyroxine (SYNTHROID) 75 MCG tablet Take 1 tablet (75 mcg total) by mouth before breakfast. 90 tablet 0    losartan (COZAAR) 100 MG tablet Take 1 tablet (100 mg total) by mouth once daily. 90 tablet 1    lovastatin (MEVACOR) 40 MG tablet Take 1 tablet (40 mg total) by mouth every evening. 90 tablet 1    metFORMIN (GLUCOPHAGE) 1000 MG tablet TAKE ONE TABLET BY MOUTH TWICE DAILY WITH FOOD 180 tablet 1    metoprolol tartrate (LOPRESSOR) 50 MG  tablet Take 1 tablet by mouth once daily.      mupirocin (BACTROBAN) 2 % ointment Apply topically 3 (three) times daily. 1 Tube 1    rOPINIRole (REQUIP) 1 MG tablet Take 1 tablet (1 mg total) by mouth every evening. 90 tablet 1    traZODone (DESYREL) 50 MG tablet Take 1 tablet (50 mg total) by mouth every evening. 90 tablet 1    oxybutynin (DITROPAN-XL) 10 MG 24 hr tablet Take 1 tablet (10 mg total) by mouth once daily. 30 tablet 11    triamcinolone acetonide 0.1% (KENALOG) 0.1 % cream Apply topically 2 (two) times daily. for 10 days 1 Tube 1     No current facility-administered medications for this visit.        There are no discontinued medications.    Follow up in about 6 months (around 4/6/2021).      Angely Roberts MD

## 2020-10-07 LAB
ESTIMATED AVG GLUCOSE: 108 MG/DL (ref 68–131)
HBA1C MFR BLD HPLC: 5.4 % (ref 4–5.6)

## 2020-10-09 ENCOUNTER — TELEPHONE (OUTPATIENT)
Dept: FAMILY MEDICINE | Facility: CLINIC | Age: 80
End: 2020-10-09

## 2020-10-14 ENCOUNTER — TELEPHONE (OUTPATIENT)
Dept: FAMILY MEDICINE | Facility: CLINIC | Age: 80
End: 2020-10-14

## 2020-10-14 NOTE — TELEPHONE ENCOUNTER
Patient stating medication oxybutynin not working , but she has appointment to see urology 11/22/20

## 2020-10-14 NOTE — TELEPHONE ENCOUNTER
----- Message from Evon Boo sent at 10/14/2020  2:07 PM CDT -----  Contact: patient [phone]  You prescribed medication last week and told patient to call to let you know how she did with the medicaton. Patient said that the oxybutynin did not help at all and would like for the nurse to call her.

## 2020-11-17 ENCOUNTER — OFFICE VISIT (OUTPATIENT)
Dept: UROLOGY | Facility: CLINIC | Age: 80
End: 2020-11-17
Payer: MEDICARE

## 2020-11-17 VITALS — HEIGHT: 64 IN | BODY MASS INDEX: 28.31 KG/M2 | WEIGHT: 165.81 LBS

## 2020-11-17 DIAGNOSIS — N20.0 NEPHROLITHIASIS: ICD-10-CM

## 2020-11-17 DIAGNOSIS — N39.41 URGE INCONTINENCE OF URINE: Primary | ICD-10-CM

## 2020-11-17 DIAGNOSIS — N39.3 STRESS INCONTINENCE: ICD-10-CM

## 2020-11-17 PROCEDURE — 99203 OFFICE O/P NEW LOW 30 MIN: CPT | Mod: HCNC,S$GLB,, | Performed by: UROLOGY

## 2020-11-17 PROCEDURE — 1126F AMNT PAIN NOTED NONE PRSNT: CPT | Mod: HCNC,S$GLB,, | Performed by: UROLOGY

## 2020-11-17 PROCEDURE — 99203 PR OFFICE/OUTPT VISIT, NEW, LEVL III, 30-44 MIN: ICD-10-PCS | Mod: HCNC,S$GLB,, | Performed by: UROLOGY

## 2020-11-17 PROCEDURE — 99999 PR PBB SHADOW E&M-EST. PATIENT-LVL IV: ICD-10-PCS | Mod: PBBFAC,HCNC,, | Performed by: UROLOGY

## 2020-11-17 PROCEDURE — 3072F LOW RISK FOR RETINOPATHY: CPT | Mod: HCNC,S$GLB,, | Performed by: UROLOGY

## 2020-11-17 PROCEDURE — 3072F PR LOW RISK FOR RETINOPATHY: ICD-10-PCS | Mod: HCNC,S$GLB,, | Performed by: UROLOGY

## 2020-11-17 PROCEDURE — 1159F MED LIST DOCD IN RCRD: CPT | Mod: HCNC,S$GLB,, | Performed by: UROLOGY

## 2020-11-17 PROCEDURE — 99999 PR PBB SHADOW E&M-EST. PATIENT-LVL IV: CPT | Mod: PBBFAC,HCNC,, | Performed by: UROLOGY

## 2020-11-17 PROCEDURE — 1159F PR MEDICATION LIST DOCUMENTED IN MEDICAL RECORD: ICD-10-PCS | Mod: HCNC,S$GLB,, | Performed by: UROLOGY

## 2020-11-17 PROCEDURE — 1126F PR PAIN SEVERITY QUANTIFIED, NO PAIN PRESENT: ICD-10-PCS | Mod: HCNC,S$GLB,, | Performed by: UROLOGY

## 2020-11-17 RX ORDER — MIRABEGRON 50 MG/1
50 TABLET, FILM COATED, EXTENDED RELEASE ORAL DAILY
Qty: 30 TABLET | Refills: 11 | Status: SHIPPED | OUTPATIENT
Start: 2020-11-17 | End: 2021-01-07

## 2020-11-17 NOTE — LETTER
November 17, 2020      Angely Roberts MD  [address] [address] [address]           [facility]  [id number] [place] [id number]  Phone: [phone]  Fax: [phone]          Patient: Jasmine Velásquez   MR Number: [id number]   YOB: 1940   Date of Visit: 11/17/2020       Dear Dr. Angely Roberts:    Thank you for referring Jasmine Velásquez to me for evaluation. Attached you will find relevant portions of my assessment and plan of care.    If you have questions, please do not hesitate to call me. I look forward to following Jasmine Velásquez along with you.    Sincerely,    Quirino Carias MD    Enclosure  CC:  No Recipients    If you would like to receive this communication electronically, please contact [email or web address] or [phone] to request more information on [employer] Link access.    For providers and/or their staff who would like to refer a patient to [facility], please contact us through our one-stop-shop provider referral line, [facility], at [phone].    If you feel you have received this communication in error or would no longer like to receive these types of communications, please e-mail [email or web address]

## 2020-11-17 NOTE — PROGRESS NOTES
Chief Complaint:   Encounter Diagnoses   Name Primary?    Urge incontinence of urine Yes    Stress incontinence     Nephrolithiasis        HPI:  80-year-old female who comes in with significant urge incontinence.  She states this been going on for some time, it has gotten so severe that she is actually obtained the bathroom chair to have her bedside for evenings.  He takes a diuretic 1st thing in the morning, this obviously makes it worse.  She is getting up about 6 times per evening, going about every hour to 2 during the daytime.  She is not complaining as much during the daytime.  She will wear a small pad, but she can usually get to the bathroom and is dry.  She has had 6 natural deliveries, she has all of her female organs.  She has had no slings, no other urological history except for significant history of stone disease.  Patient has had a PCNL years ago.  He has been years since her last stone passage.  She attempted oxybutynin extended release, this did not work at all she failed this.  No gross hematuria, she does have a history of smoking.  She self treats for constipation.  He has got family history of stones, but no other urological cancers or other issues urologically speaking.  She has both urge and stress incontinence, but stress incontinence is mild.  Her biggest complaint is urge incontinence.    Allergies:  Ace inhibitors and Codeine    Medications:  has a current medication list which includes the following prescription(s): allopurinol, amlodipine, amoxicillin, aspirin, biotin, cholecalciferol (vitamin d3), dicyclomine, fish oil-omega-3 fatty acids, furosemide, gabapentin, levothyroxine, losartan, lovastatin, metformin, metoprolol tartrate, myrbetriq, mupirocin, oxybutynin, ropinirole, trazodone, and triamcinolone acetonide 0.1%.    Review of Systems:  General: No fever, chills, fatigability, or weight loss.  Skin: No rashes, itching, or changes in color or texture of skin.  Chest: Denies HALL,  cyanosis, wheezing, cough, and sputum production.  Abdomen: Appetite fine. No weight loss. Denies diarrhea, abdominal pain, hematemesis, or blood in stool.  Musculoskeletal: No joint stiffness or swelling. Denies back pain.  : As above.  All other review of systems negative.    PMH:   has a past medical history of Angiodysplasia of cecum, Diabetes mellitus, type 2 (2006), DM (diabetes mellitus) (2006), DM (diabetes mellitus) (2006), GERD (gastroesophageal reflux disease), Hyperlipidemia, Hypertension, and Hypothyroidism.    PSH:   has a past surgical history that includes Back surgery; Appendectomy; Tonsillectomy; Colonoscopy (N/A, 8/23/2016); Colonoscopy (N/A, 5/24/2017); Aortic valve replacement (02/25/2014); Carotid endarterectomy (Left, 2016); Cardiac surgery; Colonoscopy (N/A, 10/12/2017); Cataract extraction (Bilateral); Injection of anesthetic agent around medial branch nerves innervating lumbar facet joint (Bilateral, 10/29/2019); Injection of anesthetic agent around medial branch nerves innervating lumbar facet joint (Bilateral, 11/22/2019); Radiofrequency thermocoagulation (Left, 12/10/2019); and Radiofrequency thermocoagulation (Right, 12/31/2019).    FamHx: family history includes COPD in her sister; Cancer (age of onset: 46) in her mother; Diabetes in her father; Heart disease in her father and mother; Hypertension in her father.    SocHx:  reports that she quit smoking about 18 years ago. She has a 25.00 pack-year smoking history. She has never used smokeless tobacco. She reports that she does not drink alcohol or use drugs.      Physical Exam:  There were no vitals filed for this visit.  General: A&Ox3, no apparent distress, no deformities  Neck: No masses, normal ROM  Lungs: normal inspiration, no use of accessory muscles  Heart: normal pulse, no arrhythmias  Abdomen: Soft, NT, ND, no masses, no hernias, no hepatosplenomegaly  Skin: The skin is warm and dry. No jaundice.  Ext: No  c/c/e.    Labs/Studies:   None    Impression/Plan:     1.  Urge incontinence- patient has already failed oxybutynin 10 mg extended release, will attempt Myrbetriq 50 mg trial.  She knows to monitor for hypertension.  I will have her return in 1 month with a postvoid residual and proceed as appropriate from there.    2.  Stress incontinence- at this point this is not too severe, will continue to monitor expectantly.     3.  Nephrolithiasis- reported history of PCNL, no stone passage in years.  Call with any complaints.

## 2020-11-23 ENCOUNTER — TELEPHONE (OUTPATIENT)
Dept: FAMILY MEDICINE | Facility: CLINIC | Age: 80
End: 2020-11-23

## 2020-11-23 ENCOUNTER — TELEPHONE (OUTPATIENT)
Dept: PAIN MEDICINE | Facility: CLINIC | Age: 80
End: 2020-11-23

## 2020-11-23 NOTE — TELEPHONE ENCOUNTER
----- Message from Mikel Hobbs sent at 11/23/2020  8:14 AM CST -----  Contact: Pt  Type:  Sooner Apoointment Request    Caller is requesting a sooner appointment.  Caller declined first available appointment listed below.  Caller will not accept being placed on the waitlist and is requesting a message be sent to doctor.  Name of Caller: pt   When is the first available appointment?01/2021  Symptoms:frmr pt of Dr Allen/ back pains/ recent injection in back  Would the patient rather a call back or a response via [facility]? phone  Best Call Back Number:[phone] or [phone]  Additional Information:

## 2020-11-23 NOTE — TELEPHONE ENCOUNTER
Made appt on 11/27, rubén silvestre, 10:40am due to pt being seen for same pain in back from [facility]. Informed pt  would not have an appt until January . Pt wanted appt this week. Pt understood. All questions answered.   Hitesh Yañez MA  [facility]

## 2020-11-23 NOTE — TELEPHONE ENCOUNTER
----- Message from Michelle Nolasco sent at 11/23/2020 10:38 AM CST -----  Contact: Self [phone]  Type: Patient Call Back    Who called: Self     What is the request in detail: Pt is calling because she made an appt with physical medicine/rehab for 11/27 and she is not sure if she needed a referral from the doctor before she goes    Can the clinic reply by [facility]? Call back    Would the patient rather a call back or a response via [facility]? Call back    Best call back number: [phone]

## 2020-11-23 NOTE — TELEPHONE ENCOUNTER
Patient was calling wandering if she needs a referral to pain management. Patient has a referral to the clinic .

## 2020-11-25 ENCOUNTER — PATIENT OUTREACH (OUTPATIENT)
Dept: ADMINISTRATIVE | Facility: OTHER | Age: 80
End: 2020-11-25

## 2020-11-25 NOTE — PROGRESS NOTES
Health Maintenance Due   Topic Date Due    Foot Exam  06/18/2020    Influenza Vaccine (1) 08/01/2020     Updates were requested from care everywhere.  Chart was reviewed for overdue Proactive [facility] Encounters (MICHELE) topics (CRS, Breast Cancer Screening, Eye exam)  Health Maintenance has been updated.  LINKS immunization registry triggered.  Immunizations were reconciled.

## 2020-11-27 ENCOUNTER — OFFICE VISIT (OUTPATIENT)
Dept: PAIN MEDICINE | Facility: CLINIC | Age: 80
End: 2020-11-27
Payer: MEDICARE

## 2020-11-27 VITALS
WEIGHT: 168.44 LBS | BODY MASS INDEX: 28.76 KG/M2 | HEART RATE: 70 BPM | HEIGHT: 64 IN | DIASTOLIC BLOOD PRESSURE: 67 MMHG | SYSTOLIC BLOOD PRESSURE: 133 MMHG

## 2020-11-27 DIAGNOSIS — M47.816 LUMBAR SPONDYLOSIS: Primary | ICD-10-CM

## 2020-11-27 DIAGNOSIS — M51.36 DDD (DEGENERATIVE DISC DISEASE), LUMBAR: ICD-10-CM

## 2020-11-27 DIAGNOSIS — M54.59 LUMBAR FACET JOINT PAIN: ICD-10-CM

## 2020-11-27 PROCEDURE — 99214 PR OFFICE/OUTPT VISIT, EST, LEVL IV, 30-39 MIN: ICD-10-PCS | Mod: HCNC,S$GLB,, | Performed by: PHYSICIAN ASSISTANT

## 2020-11-27 PROCEDURE — 1159F MED LIST DOCD IN RCRD: CPT | Mod: HCNC,S$GLB,, | Performed by: PHYSICIAN ASSISTANT

## 2020-11-27 PROCEDURE — 3078F PR MOST RECENT DIASTOLIC BLOOD PRESSURE < 80 MM HG: ICD-10-PCS | Mod: HCNC,CPTII,S$GLB, | Performed by: PHYSICIAN ASSISTANT

## 2020-11-27 PROCEDURE — 1101F PT FALLS ASSESS-DOCD LE1/YR: CPT | Mod: HCNC,CPTII,S$GLB, | Performed by: PHYSICIAN ASSISTANT

## 2020-11-27 PROCEDURE — 1159F PR MEDICATION LIST DOCUMENTED IN MEDICAL RECORD: ICD-10-PCS | Mod: HCNC,S$GLB,, | Performed by: PHYSICIAN ASSISTANT

## 2020-11-27 PROCEDURE — 99999 PR PBB SHADOW E&M-EST. PATIENT-LVL V: CPT | Mod: PBBFAC,HCNC,, | Performed by: PHYSICIAN ASSISTANT

## 2020-11-27 PROCEDURE — 3075F PR MOST RECENT SYSTOLIC BLOOD PRESS GE 130-139MM HG: ICD-10-PCS | Mod: HCNC,CPTII,S$GLB, | Performed by: PHYSICIAN ASSISTANT

## 2020-11-27 PROCEDURE — 99214 OFFICE O/P EST MOD 30 MIN: CPT | Mod: HCNC,S$GLB,, | Performed by: PHYSICIAN ASSISTANT

## 2020-11-27 PROCEDURE — 1101F PR PT FALLS ASSESS DOC 0-1 FALLS W/OUT INJ PAST YR: ICD-10-PCS | Mod: HCNC,CPTII,S$GLB, | Performed by: PHYSICIAN ASSISTANT

## 2020-11-27 PROCEDURE — 1125F PR PAIN SEVERITY QUANTIFIED, PAIN PRESENT: ICD-10-PCS | Mod: HCNC,S$GLB,, | Performed by: PHYSICIAN ASSISTANT

## 2020-11-27 PROCEDURE — 99999 PR PBB SHADOW E&M-EST. PATIENT-LVL V: ICD-10-PCS | Mod: PBBFAC,HCNC,, | Performed by: PHYSICIAN ASSISTANT

## 2020-11-27 PROCEDURE — 3075F SYST BP GE 130 - 139MM HG: CPT | Mod: HCNC,CPTII,S$GLB, | Performed by: PHYSICIAN ASSISTANT

## 2020-11-27 PROCEDURE — 3072F PR LOW RISK FOR RETINOPATHY: ICD-10-PCS | Mod: HCNC,S$GLB,, | Performed by: PHYSICIAN ASSISTANT

## 2020-11-27 PROCEDURE — 3288F FALL RISK ASSESSMENT DOCD: CPT | Mod: HCNC,CPTII,S$GLB, | Performed by: PHYSICIAN ASSISTANT

## 2020-11-27 PROCEDURE — 3078F DIAST BP <80 MM HG: CPT | Mod: HCNC,CPTII,S$GLB, | Performed by: PHYSICIAN ASSISTANT

## 2020-11-27 PROCEDURE — 1125F AMNT PAIN NOTED PAIN PRSNT: CPT | Mod: HCNC,S$GLB,, | Performed by: PHYSICIAN ASSISTANT

## 2020-11-27 PROCEDURE — 3288F PR FALLS RISK ASSESSMENT DOCUMENTED: ICD-10-PCS | Mod: HCNC,CPTII,S$GLB, | Performed by: PHYSICIAN ASSISTANT

## 2020-11-27 PROCEDURE — 3072F LOW RISK FOR RETINOPATHY: CPT | Mod: HCNC,S$GLB,, | Performed by: PHYSICIAN ASSISTANT
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
## 2020-12-08 ENCOUNTER — TELEPHONE (OUTPATIENT)
Dept: FAMILY MEDICINE | Facility: CLINIC | Age: 80
End: 2020-12-08

## 2020-12-08 NOTE — TELEPHONE ENCOUNTER
----- Message from Kaela Wong sent at 12/8/2020 10:51 AM CST -----  Contact: pt  Pt requesting a call back regarding a referral. She states that she was seen by Dr. Gomez and her insurance had changed and they are requesting a referral for the time she was seen. Please call pt back at [phone]

## 2020-12-08 NOTE — TELEPHONE ENCOUNTER
Patient requesting a referral to see dr quintero at [facility] . Due to a bill . She seen him in September and would need the referral dated for September of 2020 for [facility] Dr Quintero to be faxed to [facility]  At [phone]    Please advised

## 2020-12-17 ENCOUNTER — TELEPHONE (OUTPATIENT)
Dept: PAIN MEDICINE | Facility: CLINIC | Age: 80
End: 2020-12-17

## 2020-12-17 NOTE — TELEPHONE ENCOUNTER
Tried to call. No answer. No vm set up to set up RFA, clearance scanned in media  Hitesh Yañez MA  [facility]   [facility]

## 2020-12-18 ENCOUNTER — HOSPITAL ENCOUNTER (OUTPATIENT)
Dept: CARDIOLOGY | Facility: HOSPITAL | Age: 80
Discharge: HOME | End: 2020-12-18
Attending: UROLOGY
Payer: MEDICARE

## 2020-12-18 ENCOUNTER — HOSPITAL ENCOUNTER (OUTPATIENT)
Dept: RADIOLOGY | Facility: HOSPITAL | Age: 80
Discharge: HOME | End: 2020-12-18
Attending: UROLOGY
Payer: MEDICARE

## 2020-12-18 ENCOUNTER — OFFICE VISIT (OUTPATIENT)
Dept: UROLOGY | Facility: CLINIC | Age: 80
End: 2020-12-18
Payer: MEDICARE

## 2020-12-18 VITALS
SYSTOLIC BLOOD PRESSURE: 160 MMHG | BODY MASS INDEX: 28.66 KG/M2 | TEMPERATURE: 97 F | DIASTOLIC BLOOD PRESSURE: 84 MMHG | HEIGHT: 64 IN | WEIGHT: 167.88 LBS

## 2020-12-18 DIAGNOSIS — Z03.818 ENCOUNTER FOR OBSERVATION FOR SUSPECTED EXPOSURE TO OTHER BIOLOGICAL AGENTS RULED OUT: ICD-10-CM

## 2020-12-18 DIAGNOSIS — N20.0 NEPHROLITHIASIS: ICD-10-CM

## 2020-12-18 DIAGNOSIS — N39.41 URGE INCONTINENCE OF URINE: ICD-10-CM

## 2020-12-18 DIAGNOSIS — N39.3 STRESS INCONTINENCE: ICD-10-CM

## 2020-12-18 DIAGNOSIS — N39.41 URGE INCONTINENCE OF URINE: Primary | ICD-10-CM

## 2020-12-18 PROCEDURE — 3077F PR MOST RECENT SYSTOLIC BLOOD PRESSURE >= 140 MM HG: ICD-10-PCS | Mod: HCNC,CPTII,S$GLB, | Performed by: UROLOGY

## 2020-12-18 PROCEDURE — 71046 XR CHEST PA AND LATERAL: ICD-10-PCS | Mod: 26,HCNC,, | Performed by: RADIOLOGY

## 2020-12-18 PROCEDURE — 3077F SYST BP >= 140 MM HG: CPT | Mod: HCNC,CPTII,S$GLB, | Performed by: UROLOGY

## 2020-12-18 PROCEDURE — 3072F PR LOW RISK FOR RETINOPATHY: ICD-10-PCS | Mod: HCNC,S$GLB,, | Performed by: UROLOGY

## 2020-12-18 PROCEDURE — 1126F AMNT PAIN NOTED NONE PRSNT: CPT | Mod: HCNC,S$GLB,, | Performed by: UROLOGY

## 2020-12-18 PROCEDURE — 1126F PR PAIN SEVERITY QUANTIFIED, NO PAIN PRESENT: ICD-10-PCS | Mod: HCNC,S$GLB,, | Performed by: UROLOGY

## 2020-12-18 PROCEDURE — 99214 PR OFFICE/OUTPT VISIT, EST, LEVL IV, 30-39 MIN: ICD-10-PCS | Mod: HCNC,S$GLB,, | Performed by: UROLOGY

## 2020-12-18 PROCEDURE — 99999 PR PBB SHADOW E&M-EST. PATIENT-LVL III: ICD-10-PCS | Mod: PBBFAC,HCNC,, | Performed by: UROLOGY

## 2020-12-18 PROCEDURE — 3072F LOW RISK FOR RETINOPATHY: CPT | Mod: HCNC,S$GLB,, | Performed by: UROLOGY

## 2020-12-18 PROCEDURE — 1101F PT FALLS ASSESS-DOCD LE1/YR: CPT | Mod: HCNC,CPTII,S$GLB, | Performed by: UROLOGY

## 2020-12-18 PROCEDURE — 1159F PR MEDICATION LIST DOCUMENTED IN MEDICAL RECORD: ICD-10-PCS | Mod: HCNC,S$GLB,, | Performed by: UROLOGY

## 2020-12-18 PROCEDURE — 93005 ELECTROCARDIOGRAM TRACING: CPT | Mod: HCNC

## 2020-12-18 PROCEDURE — 1101F PR PT FALLS ASSESS DOC 0-1 FALLS W/OUT INJ PAST YR: ICD-10-PCS | Mod: HCNC,CPTII,S$GLB, | Performed by: UROLOGY

## 2020-12-18 PROCEDURE — 93010 ELECTROCARDIOGRAM REPORT: CPT | Mod: HCNC,,, | Performed by: INTERNAL MEDICINE

## 2020-12-18 PROCEDURE — 93010 EKG 12-LEAD: ICD-10-PCS | Mod: HCNC,,, | Performed by: INTERNAL MEDICINE

## 2020-12-18 PROCEDURE — 99999 PR PBB SHADOW E&M-EST. PATIENT-LVL III: CPT | Mod: PBBFAC,HCNC,, | Performed by: UROLOGY

## 2020-12-18 PROCEDURE — 3079F PR MOST RECENT DIASTOLIC BLOOD PRESSURE 80-89 MM HG: ICD-10-PCS | Mod: HCNC,CPTII,S$GLB, | Performed by: UROLOGY

## 2020-12-18 PROCEDURE — 3288F FALL RISK ASSESSMENT DOCD: CPT | Mod: HCNC,CPTII,S$GLB, | Performed by: UROLOGY

## 2020-12-18 PROCEDURE — 3079F DIAST BP 80-89 MM HG: CPT | Mod: HCNC,CPTII,S$GLB, | Performed by: UROLOGY

## 2020-12-18 PROCEDURE — 99214 OFFICE O/P EST MOD 30 MIN: CPT | Mod: HCNC,S$GLB,, | Performed by: UROLOGY

## 2020-12-18 PROCEDURE — 3288F PR FALLS RISK ASSESSMENT DOCUMENTED: ICD-10-PCS | Mod: HCNC,CPTII,S$GLB, | Performed by: UROLOGY

## 2020-12-18 PROCEDURE — 1159F MED LIST DOCD IN RCRD: CPT | Mod: HCNC,S$GLB,, | Performed by: UROLOGY

## 2020-12-18 PROCEDURE — 71046 X-RAY EXAM CHEST 2 VIEWS: CPT | Mod: TC,HCNC

## 2020-12-18 PROCEDURE — 71046 X-RAY EXAM CHEST 2 VIEWS: CPT | Mod: 26,HCNC,, | Performed by: RADIOLOGY

## 2020-12-18 NOTE — H&P (VIEW-ONLY)
Chief Complaint:   Encounter Diagnoses   Name Primary?    Urge incontinence of urine Yes    Stress incontinence     Nephrolithiasis        HPI:    12/18/20- patient states the Myrbetriq this really did not assist, but also caused severe constipation.  She would like to try a 3rd line therapy.  80-year-old female who comes in with significant urge incontinence.  She states this been going on for some time, it has gotten so severe that she is actually obtained the bathroom chair to have her bedside for evenings.  He takes a diuretic 1st thing in the morning, this obviously makes it worse.  She is getting up about 6 times per evening, going about every hour to 2 during the daytime.  She is not complaining as much during the daytime.  She will wear a small pad, but she can usually get to the bathroom and is dry.  She has had 6 natural deliveries, she has all of her female organs.  She has had no slings, no other urological history except for significant history of stone disease.  Patient has had a PCNL years ago.  He has been years since her last stone passage.  She attempted oxybutynin extended release, this did not work at all she failed this.  No gross hematuria, she does have a history of smoking.  She self treats for constipation.  He has got family history of stones, but no other urological cancers or other issues urologically speaking.  She has both urge and stress incontinence, but stress incontinence is mild.  Her biggest complaint is urge incontinence.    Allergies:  Ace inhibitors and Codeine    Medications:  has a current medication list which includes the following prescription(s): allopurinol, amlodipine, aspirin, biotin, cholecalciferol (vitamin d3), dicyclomine, fish oil-omega-3 fatty acids, furosemide, gabapentin, levothyroxine, losartan, lovastatin, metformin, metoprolol tartrate, myrbetriq, mupirocin, oxybutynin, ropinirole, trazodone, and triamcinolone acetonide 0.1%.    Review of  Systems:  General: No fever, chills, fatigability, or weight loss.  Skin: No rashes, itching, or changes in color or texture of skin.  Chest: Denies HALL, cyanosis, wheezing, cough, and sputum production.  Abdomen: Appetite fine. No weight loss. Denies diarrhea, abdominal pain, hematemesis, or blood in stool.  Musculoskeletal: No joint stiffness or swelling. Denies back pain.  : As above.  All other review of systems negative.    PMH:   has a past medical history of Angiodysplasia of cecum, Diabetes mellitus, type 2 (2006), DM (diabetes mellitus) (2006), DM (diabetes mellitus) (2006), GERD (gastroesophageal reflux disease), Hyperlipidemia, Hypertension, and Hypothyroidism.    PSH:   has a past surgical history that includes Back surgery; Appendectomy; Tonsillectomy; Colonoscopy (N/A, 8/23/2016); Colonoscopy (N/A, 5/24/2017); Aortic valve replacement (02/25/2014); Carotid endarterectomy (Left, 2016); Cardiac surgery; Colonoscopy (N/A, 10/12/2017); Cataract extraction (Bilateral); Injection of anesthetic agent around medial branch nerves innervating lumbar facet joint (Bilateral, 10/29/2019); Injection of anesthetic agent around medial branch nerves innervating lumbar facet joint (Bilateral, 11/22/2019); Radiofrequency thermocoagulation (Left, 12/10/2019); and Radiofrequency thermocoagulation (Right, 12/31/2019).    FamHx: family history includes COPD in her sister; Cancer (age of onset: 46) in her mother; Diabetes in her father; Heart disease in her father and mother; Hypertension in her father.    SocHx:  reports that she quit smoking about 18 years ago. She has a 25.00 pack-year smoking history. She has never used smokeless tobacco. She reports that she does not drink alcohol or use drugs.      Physical Exam:  Vitals:    12/18/20 1336   Temp: 97.3 °F (36.3 °C)     General: A&Ox3, no apparent distress, no deformities  Neck: No masses, normal ROM  Lungs: normal inspiration, no use of accessory muscles  Heart: normal  pulse, no arrhythmias  Abdomen: Soft, NT, ND, no masses, no hernias, no hepatosplenomegaly  Skin: The skin is warm and dry. No jaundice.  Ext: No c/c/e.    Labs/Studies:   None    Impression/Plan:     1.  Urge incontinence- patient has now failed oxybutynin 10 mg extended release and Myrbetriq 50 mg.  She would like to pursue 3rd line therapy.  I have discussed InterStim and Botox.  Patient would rather pursue Botox.  Therefore will schedule for cystoscopy with Botox injection.    Patient understands the risks, benefits and alternatives of the above-stated procedure.  These include but are not limited to damage to the surrounding structures including the urethra, ureters and bladder.  Risk of need for stents or possible catheterization if she has retention and cannot void postprocedure.  Risk of infection, hematuria, discomfort and pain with burning.  Risk of persistent incontinence.  Understanding that this will need to be repeated once the Botox wears off.  Risk of heart attack, stroke, death, DVT and PE.  Patient understanding of all the above has elected to pursue the procedure as stated.      2.  Stress incontinence- at this point this is not too severe, will continue to monitor expectantly.     3.  Nephrolithiasis- reported history of PCNL, no stone passage in years.  Call with any complaints.

## 2020-12-20 ENCOUNTER — LAB VISIT (OUTPATIENT)
Dept: URGENT CARE | Facility: CLINIC | Age: 80
End: 2020-12-20
Payer: MEDICARE

## 2020-12-20 DIAGNOSIS — Z03.818 ENCOUNTER FOR OBSERVATION FOR SUSPECTED EXPOSURE TO OTHER BIOLOGICAL AGENTS RULED OUT: ICD-10-CM

## 2020-12-20 PROCEDURE — U0003 INFECTIOUS AGENT DETECTION BY NUCLEIC ACID (DNA OR RNA); SEVERE ACUTE RESPIRATORY SYNDROME CORONAVIRUS 2 (SARS-COV-2) (CORONAVIRUS DISEASE [COVID-19]), AMPLIFIED PROBE TECHNIQUE, MAKING USE OF HIGH THROUGHPUT TECHNOLOGIES AS DESCRIBED BY CMS-2020-01-R: HCPCS | Mod: HCNC

## 2020-12-21 LAB — SARS-COV-2 RNA RESP QL NAA+PROBE: NOT DETECTED

## 2020-12-21 NOTE — PRE ADMISSION SCREENING
Pre op instructions reviewed with patient per phone:    To confirm, Your surgeon has instructed you:  Surgery is scheduled 12/23/20 at per MD.    Please report to [facility] 1st floor main lobby by per MD.     Covid 19 testing is scheduled for 12/20/20 at 0920  @ [facility]  Please self quarantine after Covid testing, prior to surgery      INSTRUCTIONS IMPORTANT!!!  ¨ Do not eat, drink, or smoke after 12 midnight prior to surgery, including water. OK to brush teeth, no gum, candy or mints!    ¨ Take only these medicines with a small swallow of water-morning of surgery.  Allopurinol  Amlodipine  Metoprolol  Levothyroxine          ____   Due to COVID 19 concerns, 1 visitor will be allowed in the pre operative area, and must adhere to social distancing guidelines.  One visitor/family member is currently allowed to visit in-patient rooms from 08:00 a.m - 6:00 p.m    ____   Family/caregivers will be updated re pt status via text/cell phone      ____  Do not wear makeup, including mascara.  ____  No powder, lotions or creams to surgical area.  ____  Please remove all jewelry, including piercings and leave at home.  ____  No money or valuables needed. Please leave at home.  ____  Please bring identification and insurance information to hospital.  ____  If going home the same day, arrange for a ride home. You will not be able to   drive if Anesthesia was used.  ____  Children, under 12 years old, must remain in the waiting room with an adult.  They are not allowed in patient areas.  ____  Wear loose fitting clothing. Allow for dressings, bandages.  ____  Stop Aspirin, Ibuprofen, Motrin and Aleve at least 5-7 days before surgery, unless otherwise instructed by your doctor, or the nurse.   You MAY use Tylenol/acetaminophen until day of surgery.  ____  If you take diabetic medication, do not take am of surgery unless instructed by   Doctor.  ____ Stop taking any Fish Oil supplement or any Vitamins that  contain Vitamin E at least 5 days prior to surgery.          Bathing Instructions-- The night before surgery and the morning prior to coming to the hospital:   -Do not shave the surgical area.   -Shower and wash your hair and body as usual with anti-bacterial  soap and shampoo.   -Rinse your hair and body completely.   -Use one packet of hibiclens to wash the surgical site (using your hand) gently for 5 minutes.  Do not scrub you skin too hard.   -Do not use hibiclens on your head, face, or genitals.   -Do not wash with anti-bacterial soap after you use the hibiclens.   -Rinse your body thoroughly.   -Dry with clean, soft towel.  Do not use lotion, cream, deodorant, or powders on   the surgical site.    Use antibacterial soap in place of hibiclens if your surgery is on the head, face or genitals.         Surgical Site Infection    Prevention of surgical site infections:     -Keep incisions clean and dry.   -Do not soak/submerge incisions in water until completely healed.   -Do not apply lotions, powders, creams, or deodorants to site.   -Always make sure hands are cleaned with antibacterial soap/ alcohol-based   prior to touching the surgical site.  (This includes doctors, nurses, staff, and yourself.)    Signs and symptoms:   -Redness and pain around the area where you had surgery   -Drainage of cloudy fluid from your surgical wound   -Fever over 100.4  I have read or had read and explained to me, and understand the above information.

## 2020-12-22 ENCOUNTER — TELEPHONE (OUTPATIENT)
Dept: UROLOGY | Facility: CLINIC | Age: 80
End: 2020-12-22

## 2020-12-22 NOTE — TELEPHONE ENCOUNTER
Called pt to give her the arrival time to the hospital for tomorrow.  No answer and unable to leave voice message.

## 2020-12-22 NOTE — TELEPHONE ENCOUNTER
Called to give pt her arrival time to the hospital at the [place] location.  She is to arrive at 8:30 and go through the main entrance and they will direct her where to go.  Also reminded her nothing to eat or drink after midnight tonight.  Voices understanding

## 2020-12-23 ENCOUNTER — ANESTHESIA EVENT (OUTPATIENT)
Dept: SURGERY | Facility: HOSPITAL | Age: 80
End: 2020-12-23
Payer: MEDICARE

## 2020-12-23 ENCOUNTER — HOSPITAL ENCOUNTER (OUTPATIENT)
Facility: HOSPITAL | Age: 80
Discharge: HOME | End: 2020-12-23
Attending: UROLOGY | Admitting: UROLOGY
Payer: MEDICARE

## 2020-12-23 ENCOUNTER — ANESTHESIA (OUTPATIENT)
Dept: SURGERY | Facility: HOSPITAL | Age: 80
End: 2020-12-23
Payer: MEDICARE

## 2020-12-23 ENCOUNTER — TELEPHONE (OUTPATIENT)
Dept: UROLOGY | Facility: CLINIC | Age: 80
End: 2020-12-23

## 2020-12-23 DIAGNOSIS — N39.41 URGE INCONTINENCE OF URINE: ICD-10-CM

## 2020-12-23 LAB — POCT GLUCOSE: 103 MG/DL (ref 70–110)

## 2020-12-23 PROCEDURE — 71000015 HC POSTOP RECOV 1ST HR: Mod: HCNC | Performed by: UROLOGY

## 2020-12-23 PROCEDURE — 52287 PR CYSTOURETHROSCOPY WITH INJ FOR CHEMODENERVATION: ICD-10-PCS | Mod: HCNC,,, | Performed by: UROLOGY

## 2020-12-23 PROCEDURE — 37000008 HC ANESTHESIA 1ST 15 MINUTES: Mod: HCNC | Performed by: UROLOGY

## 2020-12-23 PROCEDURE — 52287 CYSTOSCOPY CHEMODENERVATION: CPT | Mod: HCNC,,, | Performed by: UROLOGY

## 2020-12-23 PROCEDURE — 37000009 HC ANESTHESIA EA ADD 15 MINS: Mod: HCNC | Performed by: UROLOGY

## 2020-12-23 PROCEDURE — 36000707: Mod: HCNC | Performed by: UROLOGY

## 2020-12-23 PROCEDURE — 25000003 PHARM REV CODE 250: Mod: HCNC | Performed by: NURSE ANESTHETIST, CERTIFIED REGISTERED

## 2020-12-23 PROCEDURE — 63600175 PHARM REV CODE 636 W HCPCS: Mod: HCNC | Performed by: UROLOGY

## 2020-12-23 PROCEDURE — 71000033 HC RECOVERY, INTIAL HOUR: Mod: HCNC | Performed by: UROLOGY

## 2020-12-23 PROCEDURE — 63600175 PHARM REV CODE 636 W HCPCS: Mod: JG,HCNC | Performed by: UROLOGY

## 2020-12-23 PROCEDURE — 36000706: Mod: HCNC | Performed by: UROLOGY

## 2020-12-23 PROCEDURE — 63600175 PHARM REV CODE 636 W HCPCS: Mod: HCNC | Performed by: NURSE ANESTHETIST, CERTIFIED REGISTERED

## 2020-12-23 RX ORDER — CEFAZOLIN SODIUM 2 G/50ML
2 SOLUTION INTRAVENOUS
Status: COMPLETED | OUTPATIENT
Start: 2020-12-23 | End: 2020-12-23

## 2020-12-23 RX ORDER — FENTANYL CITRATE 50 UG/ML
25 INJECTION, SOLUTION INTRAMUSCULAR; INTRAVENOUS EVERY 5 MIN PRN
Status: DISCONTINUED | OUTPATIENT
Start: 2020-12-23 | End: 2020-12-23 | Stop reason: HOSPADM

## 2020-12-23 RX ORDER — ONDANSETRON 2 MG/ML
4 INJECTION INTRAMUSCULAR; INTRAVENOUS DAILY PRN
Status: DISCONTINUED | OUTPATIENT
Start: 2020-12-23 | End: 2020-12-23 | Stop reason: HOSPADM

## 2020-12-23 RX ORDER — CEFDINIR 300 MG/1
300 CAPSULE ORAL 2 TIMES DAILY
Qty: 6 CAPSULE | Refills: 0 | Status: SHIPPED | OUTPATIENT
Start: 2020-12-23 | End: 2020-12-26

## 2020-12-23 RX ORDER — SODIUM CHLORIDE, SODIUM LACTATE, POTASSIUM CHLORIDE, CALCIUM CHLORIDE 600; 310; 30; 20 MG/100ML; MG/100ML; MG/100ML; MG/100ML
INJECTION, SOLUTION INTRAVENOUS CONTINUOUS PRN
Status: DISCONTINUED | OUTPATIENT
Start: 2020-12-23 | End: 2020-12-23

## 2020-12-23 RX ORDER — LIDOCAINE HYDROCHLORIDE 10 MG/ML
INJECTION, SOLUTION EPIDURAL; INFILTRATION; INTRACAUDAL; PERINEURAL
Status: DISCONTINUED | OUTPATIENT
Start: 2020-12-23 | End: 2020-12-23

## 2020-12-23 RX ORDER — FENTANYL CITRATE 50 UG/ML
INJECTION, SOLUTION INTRAMUSCULAR; INTRAVENOUS
Status: DISCONTINUED | OUTPATIENT
Start: 2020-12-23 | End: 2020-12-23

## 2020-12-23 RX ORDER — ONDANSETRON 2 MG/ML
INJECTION INTRAMUSCULAR; INTRAVENOUS
Status: DISCONTINUED | OUTPATIENT
Start: 2020-12-23 | End: 2020-12-23

## 2020-12-23 RX ORDER — KETOROLAC TROMETHAMINE 30 MG/ML
15 INJECTION, SOLUTION INTRAMUSCULAR; INTRAVENOUS EVERY 8 HOURS PRN
Status: DISCONTINUED | OUTPATIENT
Start: 2020-12-23 | End: 2020-12-23 | Stop reason: HOSPADM

## 2020-12-23 RX ORDER — PHENAZOPYRIDINE HYDROCHLORIDE 200 MG/1
200 TABLET, FILM COATED ORAL 3 TIMES DAILY PRN
Qty: 15 TABLET | Refills: 0 | Status: SHIPPED | OUTPATIENT
Start: 2020-12-23 | End: 2022-03-03

## 2020-12-23 RX ORDER — PROPOFOL 10 MG/ML
VIAL (ML) INTRAVENOUS
Status: DISCONTINUED | OUTPATIENT
Start: 2020-12-23 | End: 2020-12-23

## 2020-12-23 RX ADMIN — PROPOFOL 25 MG: 10 INJECTION, EMULSION INTRAVENOUS at 11:12

## 2020-12-23 RX ADMIN — FENTANYL CITRATE 50 MCG: 50 INJECTION, SOLUTION INTRAMUSCULAR; INTRAVENOUS at 11:12

## 2020-12-23 RX ADMIN — PROPOFOL 20 MG: 10 INJECTION, EMULSION INTRAVENOUS at 11:12

## 2020-12-23 RX ADMIN — LIDOCAINE HYDROCHLORIDE 50 MG: 10 INJECTION, SOLUTION EPIDURAL; INFILTRATION; INTRACAUDAL; PERINEURAL at 11:12

## 2020-12-23 RX ADMIN — ONABOTULINUMTOXINA 100 UNITS: 100 INJECTION, POWDER, LYOPHILIZED, FOR SOLUTION INTRADERMAL; INTRAMUSCULAR at 11:12

## 2020-12-23 RX ADMIN — ONDANSETRON 4 MG: 2 INJECTION, SOLUTION INTRAMUSCULAR; INTRAVENOUS at 11:12

## 2020-12-23 RX ADMIN — SODIUM CHLORIDE, SODIUM LACTATE, POTASSIUM CHLORIDE, AND CALCIUM CHLORIDE: .6; .31; .03; .02 INJECTION, SOLUTION INTRAVENOUS at 11:12

## 2020-12-23 RX ADMIN — CEFAZOLIN SODIUM 2 G: 2 SOLUTION INTRAVENOUS at 11:12

## 2020-12-23 NOTE — INTERVAL H&P NOTE
The patient has been examined and the H&P has been reviewed:    I concur with the findings and no changes have occurred since H&P was written.    Anesthesia/Surgery risks, benefits and alternative options discussed and understood by patient/family.          Active Hospital Problems    Diagnosis  POA    Urge incontinence of urine [N39.41]  Yes      Resolved Hospital Problems   No resolved problems to display.

## 2020-12-23 NOTE — TRANSFER OF CARE
"Anesthesia Transfer of Care Note    Patient: Jasmine Velásquez    Procedure(s) Performed: Procedure(s) (LRB):  CYSTOSCOPY,WITH BOTULINUM TOXIN INJECTION (N/A)    Patient location: PACU    Anesthesia Type: general and MAC    Transport from OR: Transported from OR on room air with adequate spontaneous ventilation    Post pain: adequate analgesia    Post assessment: no apparent anesthetic complications    Post vital signs: stable    Level of consciousness: awake    Nausea/Vomiting: no nausea/vomiting    Complications: none    Transfer of care protocol was followed      Last vitals:   Visit Vitals  BP (!) 187/85 (BP Location: Right arm, Patient Position: Sitting)   Pulse 87   Temp 36.2 °C (97.2 °F) (Temporal)   Resp 16   Ht 5' 4" (1.626 m)   Wt 74.7 kg (164 lb 10.9 oz)   SpO2 96%   Breastfeeding No   BMI 28.27 kg/m²     "

## 2020-12-23 NOTE — OP NOTE
Date of Procedure: 12/23/2020    PREOP DIAGNOSIS:  Urge incontinence.    POSTOP DIAGNOSIS:  Urge incontinence.    PROCEDURES:      1. Cystoscopy with injection of botox.    2. 100 units of botox    SURGEON:  Quirino Carias M.D.    Assistants: None    Specimen: None    ANESTHESIA:  MAC.    BLOOD LOSS:  None.    FINDINGS:  20 injections given for a total of 100 units of botox, cystoscopy unremarkable.    PROCEDURE IN DETAIL:  Patient was brought to the operative suite and placed under general anesthesia and positioned into the dorsal lithotomy position.  After being sterilely prepped and draped a 21 [place] sheath cystoscope was inserted into a normal urethra.  Bladder was examined, no mucosal abnormalities.  Bilateral ureteral orifices are normal in size, shape, caliber and location.  Otherwise unremarkable cystoscopic examination.  We then began injections of botox, for a total of 20 injections.  These were divided into 5 vertical rows of 4 injections a piece.  Lateral rows were lateral to each ureteral orifice.  0.5 ml within each injection.  At the conclusion at total of 100 units of botox was injected within the bladder.  We then emptied the bladder and there was no evidence of bleeding, hemostasis was assessed and maintained.  Ureteral orifices were well away from injection sites.  Patient was transferred to the PACU in stable condition.  Patient will return for a nurse visit in two weeks with a pvr, to confirm that she is emptying her bladder.  She will follow up with me in 2 weeks with a pvr to assess.    COMPLICATIONS: None

## 2020-12-23 NOTE — ANESTHESIA POSTPROCEDURE EVALUATION
Anesthesia Post Evaluation    Patient: Jasmine Velásquez    Procedure(s) Performed: Procedure(s) (LRB):  CYSTOSCOPY,WITH BOTULINUM TOXIN INJECTION (N/A)    Final Anesthesia Type: MAC      Patient location during evaluation: PACU  Patient participation: Yes- Able to Participate  Level of consciousness: awake and alert  Post-procedure vital signs: reviewed and stable  Pain management: adequate  Airway patency: patent  LYDIA mitigation strategies: Extubation while patient is awake  PONV status at discharge: No PONV  Anesthetic complications: no      Cardiovascular status: hemodynamically stable  Respiratory status: spontaneous ventilation  Hydration status: euvolemic  Follow-up not needed.          Vitals Value Taken Time   /70 12/23/20 1213   Temp 36.4 °C (97.6 °F) 12/23/20 1210   Pulse 71 12/23/20 1213   Resp 61 12/23/20 1213   SpO2 100 % 12/23/20 1213   Vitals shown include unvalidated device data.      Event Time   Out of Recovery 12:12:57         Pain/Micah Score: Micah Score: 10 (12/23/2020 12:00 PM)

## 2020-12-23 NOTE — ANESTHESIA PREPROCEDURE EVALUATION
12/23/2020  Jasmine Velásquez is a 80 y.o., female.    Patient Active Problem List   Diagnosis    H/O aortic valve replacement with porcine valve    Gastroesophageal reflux disease    Diabetic polyneuropathy associated with type 2 diabetes mellitus    Essential hypertension    Hypothyroidism due to acquired atrophy of thyroid    Psychophysiological insomnia    Restless legs syndrome (RLS)    Diffusion capacity of lung (dl), decreased    Venous insufficiency of left lower extremity    Occult blood positive stool    Lumbar spondylosis    Iron deficiency anemia    Abnormal CT of the abdomen    Diabetes mellitus without complication    Gout    Hypertensive heart disease without congestive heart failure    Nonrheumatic aortic valve stenosis    Obesity    Occlusion of left carotid artery    Lumbar radiculopathy    Spondylosis without myelopathy or radiculopathy, lumbar region    Urge incontinence of urine    Stress incontinence    Nephrolithiasis     Past Surgical History:   Procedure Laterality Date    AORTIC VALVE REPLACEMENT  02/25/2014    Porcine valve    APPENDECTOMY      BACK SURGERY  1980    CAROTID ENDARTERECTOMY Left 2016    CATARACT EXTRACTION Bilateral     [facility]    COLONOSCOPY N/A 8/23/2016    Procedure: COLONOSCOPY;  Surgeon: Marcel Jacques MD;  Location: [facility];  Service: Endoscopy;  Laterality: N/A;    COLONOSCOPY N/A 5/24/2017    Procedure: COLONOSCOPY;  Surgeon: Jayy Rosa MD;  Location: [facility];  Service: Endoscopy;  Laterality: N/A;    COLONOSCOPY N/A 10/12/2017    Procedure: COLONOSCOPY;  Surgeon: Marcel Jacques MD;  Location: [facility];  Service: Endoscopy;  Laterality: N/A;    INJECTION OF ANESTHETIC AGENT AROUND MEDIAL BRANCH NERVES INNERVATING LUMBAR FACET JOINT Bilateral 10/29/2019    Procedure: Bilateral  L3-5 MBB;  Surgeon: Manuel Allen MD;  Location: [facility];  Service: Pain Management;  Laterality: Bilateral;    INJECTION OF ANESTHETIC AGENT AROUND MEDIAL BRANCH NERVES INNERVATING LUMBAR FACET JOINT Bilateral 11/22/2019    Procedure: Bilateral L3-5 MBB;  Surgeon: Quirino Durant MD;  Location: [facility];  Service: Pain Management;  Laterality: Bilateral;    RADIOFREQUENCY THERMOCOAGULATION Left 12/10/2019    Procedure: Left L3-5 Lumbar RFA;  Surgeon: Manuel Allen MD;  Location: [facility];  Service: Pain Management;  Laterality: Left;    RADIOFREQUENCY THERMOCOAGULATION Right 12/31/2019    Procedure: Right L3-5 Lumbar RFA;  Surgeon: Manuel Allen MD;  Location: [facility];  Service: Pain Management;  Laterality: Right;    TONSILLECTOMY         Anesthesia Evaluation    I have reviewed the Patient Summary Reports.    I have reviewed the Nursing Notes.    I have reviewed the Medications.     Review of Systems  Anesthesia Hx:  No problems with previous Anesthesia    Social:  Former Smoker    Hematology/Oncology:  Hematology Normal        Cardiovascular:   Hypertension H/O aortic valve replacement with porcine valve   Pulmonary:  Pulmonary Normal    Renal/:  Renal/ Normal     Hepatic/GI:   GERD    Musculoskeletal:   Lumbar spondylosis   Neurological:   CVA Diabetic polyneuropathy     Restless legs syndrome (RLS)   Endocrine:   Diabetes Hypothyroidism        Physical Exam  General:  Well nourished    Airway/Jaw/Neck:  Airway Findings: Mouth Opening: Normal Tongue: Normal  General Airway Assessment: Adult  Mallampati: II  TM Distance: 4 - 6 cm  Jaw/Neck Findings:  Neck ROM: Normal ROM      Dental:  Dental Findings: In tact   Chest/Lungs:  Chest/Lungs Findings: Clear to auscultation, Normal Respiratory Rate     Heart/Vascular:  Heart Findings: Rate: Normal  Rhythm: Regular Rhythm  Sounds: Normal        Mental Status:  Mental Status Findings:  Cooperative, Alert and Oriented          Anesthesia Plan  Type of Anesthesia, risks & benefits discussed:  Anesthesia Type:  MAC  Patient's Preference:   Intra-op Monitoring Plan: standard ASA monitors  Intra-op Monitoring Plan Comments:   Post Op Pain Control Plan: multimodal analgesia and per primary service following discharge from PACU  Post Op Pain Control Plan Comments:   Induction:   IV  Beta Blocker:  Patient is on a Beta-Blocker and has received one dose within the past 24 hours (No further documentation required).       Informed Consent: Patient understands risks and agrees with Anesthesia plan.  Questions answered. Anesthesia consent signed with patient.  ASA Score: 3     Day of Surgery Review of History & Physical:  There are no significant changes.          Ready For Surgery From Anesthesia Perspective.       Chemistry        Component Value Date/Time     12/18/2020 1500    K 4.3 12/18/2020 1500    CL 98 12/18/2020 1500    CO2 26 12/18/2020 1500    BUN 28 (H) 12/18/2020 1500    CREATININE 1.1 12/18/2020 1500     (H) 12/18/2020 1500        Component Value Date/Time    CALCIUM 8.8 12/18/2020 1500    ALKPHOS 57 10/06/2020 1450    AST 18 10/06/2020 1450    ALT 11 10/06/2020 1450    BILITOT 0.3 10/06/2020 1450    ESTGFRAFRICA 54.8 (A) 12/18/2020 1500    EGFRNONAA 47.5 (A) 12/18/2020 1500        Lab Results   Component Value Date    WBC 7.75 12/18/2020    HGB 12.6 12/18/2020    HCT 40.8 12/18/2020    MCV 94 12/18/2020     12/18/2020     Sinus rhythm with frequent Premature ventricular complexes   Right bundle branch block   Abnormal ECG   No previous ECGs available   Confirmed by BETH AVELAR MD ([id number]) on 12/18/2020 6:51:00 PM

## 2020-12-23 NOTE — DISCHARGE SUMMARY
[facility]  Discharge Note  Short Stay    Procedure(s) (LRB):  CYSTOSCOPY,WITH BOTULINUM TOXIN INJECTION (N/A)    OUTCOME: Patient tolerated treatment/procedure well without complication and is now ready for discharge.    DISPOSITION: Home or Self Care    FINAL DIAGNOSIS:  <principal problem not specified>    FOLLOWUP: In clinic    DISCHARGE INSTRUCTIONS:    Discharge Procedure Orders   Diet Adult Regular     Notify your health care provider if you experience any of the following:  temperature >100.4     Notify your health care provider if you experience any of the following:  persistent nausea and vomiting or diarrhea     Notify your health care provider if you experience any of the following:  severe uncontrolled pain     Activity as tolerated

## 2020-12-28 VITALS
HEIGHT: 64 IN | RESPIRATION RATE: 15 BRPM | BODY MASS INDEX: 28.12 KG/M2 | SYSTOLIC BLOOD PRESSURE: 155 MMHG | TEMPERATURE: 98 F | WEIGHT: 164.69 LBS | HEART RATE: 71 BPM | DIASTOLIC BLOOD PRESSURE: 70 MMHG | OXYGEN SATURATION: 100 %

## 2021-01-04 ENCOUNTER — OFFICE VISIT (OUTPATIENT)
Dept: FAMILY MEDICINE | Facility: CLINIC | Age: 81
End: 2021-01-04
Payer: MEDICARE

## 2021-01-04 VITALS
TEMPERATURE: 98 F | HEART RATE: 85 BPM | DIASTOLIC BLOOD PRESSURE: 70 MMHG | HEIGHT: 64 IN | SYSTOLIC BLOOD PRESSURE: 168 MMHG | OXYGEN SATURATION: 98 % | BODY MASS INDEX: 28.53 KG/M2 | WEIGHT: 167.13 LBS

## 2021-01-04 DIAGNOSIS — I77.6 VASCULITIS: Primary | ICD-10-CM

## 2021-01-04 DIAGNOSIS — I10 ESSENTIAL HYPERTENSION: ICD-10-CM

## 2021-01-04 PROCEDURE — 1101F PR PT FALLS ASSESS DOC 0-1 FALLS W/OUT INJ PAST YR: ICD-10-PCS | Mod: CPTII,S$GLB,, | Performed by: FAMILY MEDICINE

## 2021-01-04 PROCEDURE — 1126F PR PAIN SEVERITY QUANTIFIED, NO PAIN PRESENT: ICD-10-PCS | Mod: S$GLB,,, | Performed by: FAMILY MEDICINE

## 2021-01-04 PROCEDURE — 1101F PT FALLS ASSESS-DOCD LE1/YR: CPT | Mod: CPTII,S$GLB,, | Performed by: FAMILY MEDICINE

## 2021-01-04 PROCEDURE — 1159F PR MEDICATION LIST DOCUMENTED IN MEDICAL RECORD: ICD-10-PCS | Mod: S$GLB,,, | Performed by: FAMILY MEDICINE

## 2021-01-04 PROCEDURE — 3288F FALL RISK ASSESSMENT DOCD: CPT | Mod: CPTII,S$GLB,, | Performed by: FAMILY MEDICINE

## 2021-01-04 PROCEDURE — 3077F PR MOST RECENT SYSTOLIC BLOOD PRESSURE >= 140 MM HG: ICD-10-PCS | Mod: CPTII,S$GLB,, | Performed by: FAMILY MEDICINE

## 2021-01-04 PROCEDURE — 1159F MED LIST DOCD IN RCRD: CPT | Mod: S$GLB,,, | Performed by: FAMILY MEDICINE

## 2021-01-04 PROCEDURE — 99999 PR PBB SHADOW E&M-EST. PATIENT-LVL V: ICD-10-PCS | Mod: PBBFAC,HCNC,, | Performed by: FAMILY MEDICINE

## 2021-01-04 PROCEDURE — 99214 OFFICE O/P EST MOD 30 MIN: CPT | Mod: S$GLB,,, | Performed by: FAMILY MEDICINE

## 2021-01-04 PROCEDURE — 3077F SYST BP >= 140 MM HG: CPT | Mod: CPTII,S$GLB,, | Performed by: FAMILY MEDICINE

## 2021-01-04 PROCEDURE — 1126F AMNT PAIN NOTED NONE PRSNT: CPT | Mod: S$GLB,,, | Performed by: FAMILY MEDICINE

## 2021-01-04 PROCEDURE — 99999 PR PBB SHADOW E&M-EST. PATIENT-LVL V: CPT | Mod: PBBFAC,HCNC,, | Performed by: FAMILY MEDICINE

## 2021-01-04 PROCEDURE — 3288F PR FALLS RISK ASSESSMENT DOCUMENTED: ICD-10-PCS | Mod: CPTII,S$GLB,, | Performed by: FAMILY MEDICINE

## 2021-01-04 PROCEDURE — 99214 PR OFFICE/OUTPT VISIT, EST, LEVL IV, 30-39 MIN: ICD-10-PCS | Mod: S$GLB,,, | Performed by: FAMILY MEDICINE

## 2021-01-04 PROCEDURE — 3078F PR MOST RECENT DIASTOLIC BLOOD PRESSURE < 80 MM HG: ICD-10-PCS | Mod: CPTII,S$GLB,, | Performed by: FAMILY MEDICINE

## 2021-01-04 PROCEDURE — 3078F DIAST BP <80 MM HG: CPT | Mod: CPTII,S$GLB,, | Performed by: FAMILY MEDICINE

## 2021-01-07 ENCOUNTER — OFFICE VISIT (OUTPATIENT)
Dept: UROLOGY | Facility: CLINIC | Age: 81
End: 2021-01-07
Payer: MEDICARE

## 2021-01-07 VITALS
DIASTOLIC BLOOD PRESSURE: 62 MMHG | HEART RATE: 92 BPM | TEMPERATURE: 98 F | HEIGHT: 64 IN | WEIGHT: 167.75 LBS | BODY MASS INDEX: 28.64 KG/M2 | SYSTOLIC BLOOD PRESSURE: 138 MMHG

## 2021-01-07 DIAGNOSIS — N20.0 NEPHROLITHIASIS: ICD-10-CM

## 2021-01-07 DIAGNOSIS — N39.3 STRESS INCONTINENCE: ICD-10-CM

## 2021-01-07 DIAGNOSIS — N39.41 URGE INCONTINENCE OF URINE: Primary | ICD-10-CM

## 2021-01-07 PROCEDURE — 1101F PR PT FALLS ASSESS DOC 0-1 FALLS W/OUT INJ PAST YR: ICD-10-PCS | Mod: CPTII,S$GLB,, | Performed by: UROLOGY

## 2021-01-07 PROCEDURE — 99024 POSTOP FOLLOW-UP VISIT: CPT | Mod: S$GLB,,, | Performed by: UROLOGY

## 2021-01-07 PROCEDURE — 3288F FALL RISK ASSESSMENT DOCD: CPT | Mod: CPTII,S$GLB,, | Performed by: UROLOGY

## 2021-01-07 PROCEDURE — 1126F PR PAIN SEVERITY QUANTIFIED, NO PAIN PRESENT: ICD-10-PCS | Mod: S$GLB,,, | Performed by: UROLOGY

## 2021-01-07 PROCEDURE — 1101F PT FALLS ASSESS-DOCD LE1/YR: CPT | Mod: CPTII,S$GLB,, | Performed by: UROLOGY

## 2021-01-07 PROCEDURE — 99024 PR POST-OP FOLLOW-UP VISIT: ICD-10-PCS | Mod: S$GLB,,, | Performed by: UROLOGY

## 2021-01-07 PROCEDURE — 99999 PR PBB SHADOW E&M-EST. PATIENT-LVL IV: ICD-10-PCS | Mod: PBBFAC,,, | Performed by: UROLOGY

## 2021-01-07 PROCEDURE — 1126F AMNT PAIN NOTED NONE PRSNT: CPT | Mod: S$GLB,,, | Performed by: UROLOGY

## 2021-01-07 PROCEDURE — 3288F PR FALLS RISK ASSESSMENT DOCUMENTED: ICD-10-PCS | Mod: CPTII,S$GLB,, | Performed by: UROLOGY

## 2021-01-07 PROCEDURE — 99999 PR PBB SHADOW E&M-EST. PATIENT-LVL IV: CPT | Mod: PBBFAC,,, | Performed by: UROLOGY

## 2021-01-07 RX ORDER — TOLTERODINE 4 MG/1
4 CAPSULE, EXTENDED RELEASE ORAL DAILY
Qty: 30 CAPSULE | Refills: 11 | Status: SHIPPED | OUTPATIENT
Start: 2021-01-07 | End: 2021-03-11 | Stop reason: SDUPTHER

## 2021-01-14 ENCOUNTER — TELEPHONE (OUTPATIENT)
Dept: PAIN MEDICINE | Facility: CLINIC | Age: 81
End: 2021-01-14

## 2021-01-27 ENCOUNTER — OFFICE VISIT (OUTPATIENT)
Dept: FAMILY MEDICINE | Facility: CLINIC | Age: 81
End: 2021-01-27
Payer: MEDICARE

## 2021-01-27 VITALS
TEMPERATURE: 98 F | WEIGHT: 165.56 LBS | DIASTOLIC BLOOD PRESSURE: 72 MMHG | BODY MASS INDEX: 28.27 KG/M2 | SYSTOLIC BLOOD PRESSURE: 130 MMHG | HEIGHT: 64 IN | OXYGEN SATURATION: 97 % | HEART RATE: 71 BPM

## 2021-01-27 DIAGNOSIS — M75.82 ROTATOR CUFF TENDINITIS, LEFT: Primary | ICD-10-CM

## 2021-01-27 PROCEDURE — 3288F FALL RISK ASSESSMENT DOCD: CPT | Mod: CPTII,S$GLB,, | Performed by: FAMILY MEDICINE

## 2021-01-27 PROCEDURE — 3288F PR FALLS RISK ASSESSMENT DOCUMENTED: ICD-10-PCS | Mod: CPTII,S$GLB,, | Performed by: FAMILY MEDICINE

## 2021-01-27 PROCEDURE — 1159F PR MEDICATION LIST DOCUMENTED IN MEDICAL RECORD: ICD-10-PCS | Mod: S$GLB,,, | Performed by: FAMILY MEDICINE

## 2021-01-27 PROCEDURE — 3075F SYST BP GE 130 - 139MM HG: CPT | Mod: CPTII,S$GLB,, | Performed by: FAMILY MEDICINE

## 2021-01-27 PROCEDURE — 3075F PR MOST RECENT SYSTOLIC BLOOD PRESS GE 130-139MM HG: ICD-10-PCS | Mod: CPTII,S$GLB,, | Performed by: FAMILY MEDICINE

## 2021-01-27 PROCEDURE — 99213 PR OFFICE/OUTPT VISIT, EST, LEVL III, 20-29 MIN: ICD-10-PCS | Mod: S$GLB,,, | Performed by: FAMILY MEDICINE

## 2021-01-27 PROCEDURE — 1101F PR PT FALLS ASSESS DOC 0-1 FALLS W/OUT INJ PAST YR: ICD-10-PCS | Mod: CPTII,S$GLB,, | Performed by: FAMILY MEDICINE

## 2021-01-27 PROCEDURE — 1126F PR PAIN SEVERITY QUANTIFIED, NO PAIN PRESENT: ICD-10-PCS | Mod: S$GLB,,, | Performed by: FAMILY MEDICINE

## 2021-01-27 PROCEDURE — 1101F PT FALLS ASSESS-DOCD LE1/YR: CPT | Mod: CPTII,S$GLB,, | Performed by: FAMILY MEDICINE

## 2021-01-27 PROCEDURE — 1126F AMNT PAIN NOTED NONE PRSNT: CPT | Mod: S$GLB,,, | Performed by: FAMILY MEDICINE

## 2021-01-27 PROCEDURE — 3078F DIAST BP <80 MM HG: CPT | Mod: CPTII,S$GLB,, | Performed by: FAMILY MEDICINE

## 2021-01-27 PROCEDURE — 3078F PR MOST RECENT DIASTOLIC BLOOD PRESSURE < 80 MM HG: ICD-10-PCS | Mod: CPTII,S$GLB,, | Performed by: FAMILY MEDICINE

## 2021-01-27 PROCEDURE — 99999 PR PBB SHADOW E&M-EST. PATIENT-LVL IV: CPT | Mod: PBBFAC,,, | Performed by: FAMILY MEDICINE

## 2021-01-27 PROCEDURE — 99999 PR PBB SHADOW E&M-EST. PATIENT-LVL IV: ICD-10-PCS | Mod: PBBFAC,,, | Performed by: FAMILY MEDICINE

## 2021-01-27 PROCEDURE — 99213 OFFICE O/P EST LOW 20 MIN: CPT | Mod: S$GLB,,, | Performed by: FAMILY MEDICINE

## 2021-01-27 PROCEDURE — 1159F MED LIST DOCD IN RCRD: CPT | Mod: S$GLB,,, | Performed by: FAMILY MEDICINE

## 2021-01-31 ENCOUNTER — NURSE TRIAGE (OUTPATIENT)
Dept: ADMINISTRATIVE | Facility: CLINIC | Age: 81
End: 2021-01-31

## 2021-02-12 ENCOUNTER — OFFICE VISIT (OUTPATIENT)
Dept: UROLOGY | Facility: CLINIC | Age: 81
End: 2021-02-12
Payer: MEDICARE

## 2021-02-12 VITALS
BODY MASS INDEX: 28.8 KG/M2 | SYSTOLIC BLOOD PRESSURE: 122 MMHG | TEMPERATURE: 98 F | DIASTOLIC BLOOD PRESSURE: 72 MMHG | WEIGHT: 167.75 LBS

## 2021-02-12 DIAGNOSIS — N39.41 URGE INCONTINENCE OF URINE: Primary | ICD-10-CM

## 2021-02-12 DIAGNOSIS — N39.3 STRESS INCONTINENCE: ICD-10-CM

## 2021-02-12 DIAGNOSIS — N20.0 NEPHROLITHIASIS: ICD-10-CM

## 2021-02-12 LAB
BILIRUB SERPL-MCNC: NORMAL MG/DL
BLOOD URINE, POC: NORMAL
CLARITY, POC UA: CLEAR
COLOR, POC UA: YELLOW
GLUCOSE UR QL STRIP: NORMAL
KETONES UR QL STRIP: NORMAL
LEUKOCYTE ESTERASE URINE, POC: NORMAL
NITRITE, POC UA: NORMAL
PH, POC UA: 6
PROTEIN, POC: NORMAL
SPECIFIC GRAVITY, POC UA: 1.01
UROBILINOGEN, POC UA: NORMAL

## 2021-02-12 PROCEDURE — 81002 POCT URINE DIPSTICK WITHOUT MICROSCOPE: ICD-10-PCS | Mod: S$GLB,,, | Performed by: UROLOGY

## 2021-02-12 PROCEDURE — 1101F PT FALLS ASSESS-DOCD LE1/YR: CPT | Mod: CPTII,S$GLB,, | Performed by: UROLOGY

## 2021-02-12 PROCEDURE — 1126F PR PAIN SEVERITY QUANTIFIED, NO PAIN PRESENT: ICD-10-PCS | Mod: S$GLB,,, | Performed by: UROLOGY

## 2021-02-12 PROCEDURE — 1126F AMNT PAIN NOTED NONE PRSNT: CPT | Mod: S$GLB,,, | Performed by: UROLOGY

## 2021-02-12 PROCEDURE — 99999 PR PBB SHADOW E&M-EST. PATIENT-LVL IV: CPT | Mod: PBBFAC,,, | Performed by: UROLOGY

## 2021-02-12 PROCEDURE — 1101F PR PT FALLS ASSESS DOC 0-1 FALLS W/OUT INJ PAST YR: ICD-10-PCS | Mod: CPTII,S$GLB,, | Performed by: UROLOGY

## 2021-02-12 PROCEDURE — 3288F PR FALLS RISK ASSESSMENT DOCUMENTED: ICD-10-PCS | Mod: CPTII,S$GLB,, | Performed by: UROLOGY

## 2021-02-12 PROCEDURE — 81002 URINALYSIS NONAUTO W/O SCOPE: CPT | Mod: S$GLB,,, | Performed by: UROLOGY

## 2021-02-12 PROCEDURE — 99024 POSTOP FOLLOW-UP VISIT: CPT | Mod: S$GLB,,, | Performed by: UROLOGY

## 2021-02-12 PROCEDURE — 99024 PR POST-OP FOLLOW-UP VISIT: ICD-10-PCS | Mod: S$GLB,,, | Performed by: UROLOGY

## 2021-02-12 PROCEDURE — 3288F FALL RISK ASSESSMENT DOCD: CPT | Mod: CPTII,S$GLB,, | Performed by: UROLOGY

## 2021-02-12 PROCEDURE — 99999 PR PBB SHADOW E&M-EST. PATIENT-LVL IV: ICD-10-PCS | Mod: PBBFAC,,, | Performed by: UROLOGY

## 2021-02-18 ENCOUNTER — HOSPITAL ENCOUNTER (OUTPATIENT)
Facility: HOSPITAL | Age: 81
Discharge: HOME | End: 2021-02-18
Attending: PHYSICAL MEDICINE & REHABILITATION | Admitting: PHYSICAL MEDICINE & REHABILITATION
Payer: MEDICARE

## 2021-02-18 ENCOUNTER — TELEPHONE (OUTPATIENT)
Dept: PAIN MEDICINE | Facility: CLINIC | Age: 81
End: 2021-02-18

## 2021-02-18 VITALS
SYSTOLIC BLOOD PRESSURE: 104 MMHG | BODY MASS INDEX: 27.82 KG/M2 | TEMPERATURE: 99 F | HEIGHT: 64 IN | DIASTOLIC BLOOD PRESSURE: 52 MMHG | WEIGHT: 162.94 LBS | HEART RATE: 72 BPM | RESPIRATION RATE: 16 BRPM | OXYGEN SATURATION: 99 %

## 2021-02-18 DIAGNOSIS — M47.816 LUMBAR SPONDYLOSIS: Primary | ICD-10-CM

## 2021-02-18 LAB — POCT GLUCOSE: 102 MG/DL (ref 70–110)

## 2021-02-18 PROCEDURE — 64635 DESTROY LUMB/SAC FACET JNT: CPT | Performed by: PHYSICAL MEDICINE & REHABILITATION

## 2021-02-18 PROCEDURE — 99152 MOD SED SAME PHYS/QHP 5/>YRS: CPT | Mod: ,,, | Performed by: PHYSICAL MEDICINE & REHABILITATION

## 2021-02-18 PROCEDURE — 25000003 PHARM REV CODE 250: Performed by: PHYSICAL MEDICINE & REHABILITATION

## 2021-02-18 PROCEDURE — 64636 PR DESTROY L/S FACET JNT ADDL: ICD-10-PCS | Mod: RT,,, | Performed by: PHYSICAL MEDICINE & REHABILITATION

## 2021-02-18 PROCEDURE — 64635 PR DESTROY LUMB/SAC FACET JNT: ICD-10-PCS | Mod: RT,,, | Performed by: PHYSICAL MEDICINE & REHABILITATION

## 2021-02-18 PROCEDURE — 64636 DESTROY L/S FACET JNT ADDL: CPT | Performed by: PHYSICAL MEDICINE & REHABILITATION

## 2021-02-18 PROCEDURE — 82962 GLUCOSE BLOOD TEST: CPT | Performed by: PHYSICAL MEDICINE & REHABILITATION

## 2021-02-18 PROCEDURE — 99152 PR MOD CONSCIOUS SEDATION, SAME PHYS, 5+ YRS, FIRST 15 MIN: ICD-10-PCS | Mod: ,,, | Performed by: PHYSICAL MEDICINE & REHABILITATION

## 2021-02-18 PROCEDURE — 99152 MOD SED SAME PHYS/QHP 5/>YRS: CPT | Performed by: PHYSICAL MEDICINE & REHABILITATION

## 2021-02-18 PROCEDURE — 63600175 PHARM REV CODE 636 W HCPCS: Performed by: PHYSICAL MEDICINE & REHABILITATION

## 2021-02-18 PROCEDURE — 64636 DESTROY L/S FACET JNT ADDL: CPT | Mod: RT,,, | Performed by: PHYSICAL MEDICINE & REHABILITATION

## 2021-02-18 PROCEDURE — 64635 DESTROY LUMB/SAC FACET JNT: CPT | Mod: RT,,, | Performed by: PHYSICAL MEDICINE & REHABILITATION

## 2021-02-18 RX ORDER — BUPIVACAINE HYDROCHLORIDE 2.5 MG/ML
INJECTION, SOLUTION EPIDURAL; INFILTRATION; INTRACAUDAL
Status: DISCONTINUED | OUTPATIENT
Start: 2021-02-18 | End: 2021-02-18 | Stop reason: HOSPADM

## 2021-02-18 RX ORDER — MIDAZOLAM HYDROCHLORIDE 1 MG/ML
INJECTION, SOLUTION INTRAMUSCULAR; INTRAVENOUS
Status: DISCONTINUED | OUTPATIENT
Start: 2021-02-18 | End: 2021-02-18 | Stop reason: HOSPADM

## 2021-02-18 RX ORDER — FENTANYL CITRATE 50 UG/ML
INJECTION, SOLUTION INTRAMUSCULAR; INTRAVENOUS
Status: DISCONTINUED | OUTPATIENT
Start: 2021-02-18 | End: 2021-02-18 | Stop reason: HOSPADM

## 2021-02-18 RX ORDER — ONDANSETRON 2 MG/ML
4 INJECTION INTRAMUSCULAR; INTRAVENOUS ONCE AS NEEDED
Status: DISCONTINUED | OUTPATIENT
Start: 2021-02-18 | End: 2021-02-18 | Stop reason: HOSPADM

## 2021-03-10 RX ORDER — ONDANSETRON 2 MG/ML
4 INJECTION INTRAMUSCULAR; INTRAVENOUS ONCE AS NEEDED
Status: CANCELLED | OUTPATIENT
Start: 2021-03-10 | End: 2032-08-06

## 2021-03-11 ENCOUNTER — TELEPHONE (OUTPATIENT)
Dept: UROLOGY | Facility: CLINIC | Age: 81
End: 2021-03-11

## 2021-03-11 ENCOUNTER — HOSPITAL ENCOUNTER (OUTPATIENT)
Facility: HOSPITAL | Age: 81
Discharge: HOME | End: 2021-03-11
Attending: PHYSICAL MEDICINE & REHABILITATION | Admitting: PHYSICAL MEDICINE & REHABILITATION
Payer: MEDICARE

## 2021-03-11 VITALS
BODY MASS INDEX: 27.92 KG/M2 | TEMPERATURE: 98 F | HEIGHT: 64 IN | DIASTOLIC BLOOD PRESSURE: 57 MMHG | OXYGEN SATURATION: 94 % | SYSTOLIC BLOOD PRESSURE: 103 MMHG | WEIGHT: 163.56 LBS | RESPIRATION RATE: 14 BRPM | HEART RATE: 69 BPM

## 2021-03-11 DIAGNOSIS — N39.41 URGE INCONTINENCE OF URINE: ICD-10-CM

## 2021-03-11 DIAGNOSIS — M47.816 SPONDYLOSIS WITHOUT MYELOPATHY OR RADICULOPATHY, LUMBAR REGION: Primary | ICD-10-CM

## 2021-03-11 DIAGNOSIS — M47.816 LUMBAR SPONDYLOSIS: ICD-10-CM

## 2021-03-11 LAB — POCT GLUCOSE: 111 MG/DL (ref 70–110)

## 2021-03-11 PROCEDURE — 64636 PR DESTROY L/S FACET JNT ADDL: ICD-10-PCS | Mod: LT,,, | Performed by: PHYSICAL MEDICINE & REHABILITATION

## 2021-03-11 PROCEDURE — 64636 DESTROY L/S FACET JNT ADDL: CPT | Mod: LT,,, | Performed by: PHYSICAL MEDICINE & REHABILITATION

## 2021-03-11 PROCEDURE — 64635 DESTROY LUMB/SAC FACET JNT: CPT | Mod: LT,,, | Performed by: PHYSICAL MEDICINE & REHABILITATION

## 2021-03-11 PROCEDURE — 64635 DESTROY LUMB/SAC FACET JNT: CPT | Performed by: PHYSICAL MEDICINE & REHABILITATION

## 2021-03-11 PROCEDURE — 63600175 PHARM REV CODE 636 W HCPCS: Performed by: PHYSICAL MEDICINE & REHABILITATION

## 2021-03-11 PROCEDURE — 25000003 PHARM REV CODE 250: Performed by: PHYSICAL MEDICINE & REHABILITATION

## 2021-03-11 PROCEDURE — 64636 DESTROY L/S FACET JNT ADDL: CPT | Performed by: PHYSICAL MEDICINE & REHABILITATION

## 2021-03-11 PROCEDURE — 64635 PR DESTROY LUMB/SAC FACET JNT: ICD-10-PCS | Mod: LT,,, | Performed by: PHYSICAL MEDICINE & REHABILITATION

## 2021-03-11 PROCEDURE — 99152 MOD SED SAME PHYS/QHP 5/>YRS: CPT | Performed by: PHYSICAL MEDICINE & REHABILITATION

## 2021-03-11 RX ORDER — MIDAZOLAM HYDROCHLORIDE 1 MG/ML
INJECTION, SOLUTION INTRAMUSCULAR; INTRAVENOUS
Status: DISCONTINUED | OUTPATIENT
Start: 2021-03-11 | End: 2021-03-11 | Stop reason: HOSPADM

## 2021-03-11 RX ORDER — DICYCLOMINE HYDROCHLORIDE 20 MG/1
20 TABLET ORAL EVERY 6 HOURS
Qty: 360 TABLET | Refills: 1 | Status: SHIPPED | OUTPATIENT
Start: 2021-03-11 | End: 2022-03-03

## 2021-03-11 RX ORDER — TOLTERODINE 4 MG/1
4 CAPSULE, EXTENDED RELEASE ORAL DAILY
Qty: 30 CAPSULE | Refills: 11 | Status: SHIPPED | OUTPATIENT
Start: 2021-03-11 | End: 2022-03-03

## 2021-03-11 RX ORDER — FENTANYL CITRATE 50 UG/ML
INJECTION, SOLUTION INTRAMUSCULAR; INTRAVENOUS
Status: DISCONTINUED | OUTPATIENT
Start: 2021-03-11 | End: 2021-03-11 | Stop reason: HOSPADM

## 2021-03-11 RX ORDER — BUPIVACAINE HYDROCHLORIDE 2.5 MG/ML
INJECTION, SOLUTION EPIDURAL; INFILTRATION; INTRACAUDAL
Status: DISCONTINUED | OUTPATIENT
Start: 2021-03-11 | End: 2021-03-11 | Stop reason: HOSPADM

## 2021-03-30 ENCOUNTER — PATIENT OUTREACH (OUTPATIENT)
Dept: ADMINISTRATIVE | Facility: OTHER | Age: 81
End: 2021-03-30

## 2021-03-31 ENCOUNTER — OFFICE VISIT (OUTPATIENT)
Dept: PAIN MEDICINE | Facility: CLINIC | Age: 81
End: 2021-03-31
Payer: MEDICARE

## 2021-03-31 VITALS
HEART RATE: 68 BPM | BODY MASS INDEX: 27.96 KG/M2 | SYSTOLIC BLOOD PRESSURE: 134 MMHG | HEIGHT: 64 IN | DIASTOLIC BLOOD PRESSURE: 58 MMHG | WEIGHT: 163.81 LBS

## 2021-03-31 DIAGNOSIS — M46.1 SACROILIITIS: ICD-10-CM

## 2021-03-31 DIAGNOSIS — M47.816 LUMBAR SPONDYLOSIS: ICD-10-CM

## 2021-03-31 DIAGNOSIS — M51.36 DDD (DEGENERATIVE DISC DISEASE), LUMBAR: ICD-10-CM

## 2021-03-31 DIAGNOSIS — M54.16 BILATERAL LUMBAR RADICULOPATHY: Primary | ICD-10-CM

## 2021-03-31 PROCEDURE — 3288F FALL RISK ASSESSMENT DOCD: CPT | Mod: CPTII,S$GLB,, | Performed by: PHYSICIAN ASSISTANT

## 2021-03-31 PROCEDURE — 99499 RISK ADDL DX/OHS AUDIT: ICD-10-PCS | Mod: S$GLB,,, | Performed by: PHYSICIAN ASSISTANT

## 2021-03-31 PROCEDURE — 3075F SYST BP GE 130 - 139MM HG: CPT | Mod: CPTII,S$GLB,, | Performed by: PHYSICIAN ASSISTANT

## 2021-03-31 PROCEDURE — 3288F PR FALLS RISK ASSESSMENT DOCUMENTED: ICD-10-PCS | Mod: CPTII,S$GLB,, | Performed by: PHYSICIAN ASSISTANT

## 2021-03-31 PROCEDURE — 3075F PR MOST RECENT SYSTOLIC BLOOD PRESS GE 130-139MM HG: ICD-10-PCS | Mod: CPTII,S$GLB,, | Performed by: PHYSICIAN ASSISTANT

## 2021-03-31 PROCEDURE — 99214 PR OFFICE/OUTPT VISIT, EST, LEVL IV, 30-39 MIN: ICD-10-PCS | Mod: S$GLB,,, | Performed by: PHYSICIAN ASSISTANT

## 2021-03-31 PROCEDURE — 1101F PT FALLS ASSESS-DOCD LE1/YR: CPT | Mod: CPTII,S$GLB,, | Performed by: PHYSICIAN ASSISTANT

## 2021-03-31 PROCEDURE — 3078F DIAST BP <80 MM HG: CPT | Mod: CPTII,S$GLB,, | Performed by: PHYSICIAN ASSISTANT

## 2021-03-31 PROCEDURE — 99999 PR PBB SHADOW E&M-EST. PATIENT-LVL IV: ICD-10-PCS | Mod: PBBFAC,,, | Performed by: PHYSICIAN ASSISTANT

## 2021-03-31 PROCEDURE — 1125F PR PAIN SEVERITY QUANTIFIED, PAIN PRESENT: ICD-10-PCS | Mod: S$GLB,,, | Performed by: PHYSICIAN ASSISTANT

## 2021-03-31 PROCEDURE — 3078F PR MOST RECENT DIASTOLIC BLOOD PRESSURE < 80 MM HG: ICD-10-PCS | Mod: CPTII,S$GLB,, | Performed by: PHYSICIAN ASSISTANT

## 2021-03-31 PROCEDURE — 1125F AMNT PAIN NOTED PAIN PRSNT: CPT | Mod: S$GLB,,, | Performed by: PHYSICIAN ASSISTANT

## 2021-03-31 PROCEDURE — 99214 OFFICE O/P EST MOD 30 MIN: CPT | Mod: S$GLB,,, | Performed by: PHYSICIAN ASSISTANT

## 2021-03-31 PROCEDURE — 1101F PR PT FALLS ASSESS DOC 0-1 FALLS W/OUT INJ PAST YR: ICD-10-PCS | Mod: CPTII,S$GLB,, | Performed by: PHYSICIAN ASSISTANT

## 2021-03-31 PROCEDURE — 1159F PR MEDICATION LIST DOCUMENTED IN MEDICAL RECORD: ICD-10-PCS | Mod: S$GLB,,, | Performed by: PHYSICIAN ASSISTANT

## 2021-03-31 PROCEDURE — 99999 PR PBB SHADOW E&M-EST. PATIENT-LVL IV: CPT | Mod: PBBFAC,,, | Performed by: PHYSICIAN ASSISTANT

## 2021-03-31 PROCEDURE — 1159F MED LIST DOCD IN RCRD: CPT | Mod: S$GLB,,, | Performed by: PHYSICIAN ASSISTANT

## 2021-03-31 PROCEDURE — 99499 UNLISTED E&M SERVICE: CPT | Mod: S$GLB,,, | Performed by: PHYSICIAN ASSISTANT

## 2021-03-31 RX ORDER — GABAPENTIN 300 MG/1
300 CAPSULE ORAL 2 TIMES DAILY
Qty: 180 CAPSULE | Refills: 0 | Status: SHIPPED | OUTPATIENT
Start: 2021-03-31 | End: 2021-06-17

## 2021-04-05 ENCOUNTER — TELEPHONE (OUTPATIENT)
Dept: PAIN MEDICINE | Facility: CLINIC | Age: 81
End: 2021-04-05

## 2021-04-07 ENCOUNTER — HOSPITAL ENCOUNTER (OUTPATIENT)
Dept: RADIOLOGY | Facility: HOSPITAL | Age: 81
Discharge: HOME | End: 2021-04-07
Attending: PHYSICIAN ASSISTANT
Payer: MEDICARE

## 2021-04-07 DIAGNOSIS — M54.16 BILATERAL LUMBAR RADICULOPATHY: ICD-10-CM

## 2021-04-07 PROCEDURE — 72148 MRI LUMBAR SPINE W/O DYE: CPT | Mod: TC

## 2021-04-07 PROCEDURE — 72148 MRI LUMBAR SPINE WITHOUT CONTRAST: ICD-10-PCS | Mod: 26,,, | Performed by: RADIOLOGY

## 2021-04-07 PROCEDURE — 72148 MRI LUMBAR SPINE W/O DYE: CPT | Mod: 26,,, | Performed by: RADIOLOGY

## 2021-04-08 ENCOUNTER — TELEPHONE (OUTPATIENT)
Dept: PAIN MEDICINE | Facility: CLINIC | Age: 81
End: 2021-04-08

## 2021-04-08 DIAGNOSIS — N28.1 RENAL CYST: Primary | ICD-10-CM

## 2021-04-09 ENCOUNTER — TELEPHONE (OUTPATIENT)
Dept: PAIN MEDICINE | Facility: CLINIC | Age: 81
End: 2021-04-09

## 2021-04-09 ENCOUNTER — OFFICE VISIT (OUTPATIENT)
Dept: PAIN MEDICINE | Facility: CLINIC | Age: 81
End: 2021-04-09
Payer: MEDICARE

## 2021-04-09 ENCOUNTER — HOSPITAL ENCOUNTER (OUTPATIENT)
Dept: RADIOLOGY | Facility: HOSPITAL | Age: 81
Discharge: HOME | End: 2021-04-09
Attending: PHYSICAL MEDICINE & REHABILITATION
Payer: MEDICARE

## 2021-04-09 VITALS
WEIGHT: 160 LBS | SYSTOLIC BLOOD PRESSURE: 122 MMHG | HEART RATE: 68 BPM | BODY MASS INDEX: 27.31 KG/M2 | HEIGHT: 64 IN | DIASTOLIC BLOOD PRESSURE: 68 MMHG | RESPIRATION RATE: 18 BRPM

## 2021-04-09 DIAGNOSIS — M51.36 DISCOGENIC LOW BACK PAIN: ICD-10-CM

## 2021-04-09 DIAGNOSIS — M54.16 BILATERAL LUMBAR RADICULOPATHY: Primary | ICD-10-CM

## 2021-04-09 DIAGNOSIS — M51.36 DDD (DEGENERATIVE DISC DISEASE), LUMBAR: ICD-10-CM

## 2021-04-09 DIAGNOSIS — N28.1 RENAL CYST: ICD-10-CM

## 2021-04-09 PROCEDURE — 76770 US RETROPERITONEAL COMPLETE: ICD-10-PCS | Mod: 26,,, | Performed by: RADIOLOGY

## 2021-04-09 PROCEDURE — 1159F PR MEDICATION LIST DOCUMENTED IN MEDICAL RECORD: ICD-10-PCS | Mod: S$GLB,,, | Performed by: PHYSICIAN ASSISTANT

## 2021-04-09 PROCEDURE — 1125F PR PAIN SEVERITY QUANTIFIED, PAIN PRESENT: ICD-10-PCS | Mod: S$GLB,,, | Performed by: PHYSICIAN ASSISTANT

## 2021-04-09 PROCEDURE — 99214 PR OFFICE/OUTPT VISIT, EST, LEVL IV, 30-39 MIN: ICD-10-PCS | Mod: S$GLB,,, | Performed by: PHYSICIAN ASSISTANT

## 2021-04-09 PROCEDURE — 99499 UNLISTED E&M SERVICE: CPT | Mod: S$GLB,,, | Performed by: PHYSICIAN ASSISTANT

## 2021-04-09 PROCEDURE — 99499 RISK ADDL DX/OHS AUDIT: ICD-10-PCS | Mod: S$GLB,,, | Performed by: PHYSICIAN ASSISTANT

## 2021-04-09 PROCEDURE — 99214 OFFICE O/P EST MOD 30 MIN: CPT | Mod: S$GLB,,, | Performed by: PHYSICIAN ASSISTANT

## 2021-04-09 PROCEDURE — 99999 PR PBB SHADOW E&M-EST. PATIENT-LVL V: ICD-10-PCS | Mod: PBBFAC,,, | Performed by: PHYSICIAN ASSISTANT

## 2021-04-09 PROCEDURE — 76770 US EXAM ABDO BACK WALL COMP: CPT | Mod: TC

## 2021-04-09 PROCEDURE — 99999 PR PBB SHADOW E&M-EST. PATIENT-LVL V: CPT | Mod: PBBFAC,,, | Performed by: PHYSICIAN ASSISTANT

## 2021-04-09 PROCEDURE — 76770 US EXAM ABDO BACK WALL COMP: CPT | Mod: 26,,, | Performed by: RADIOLOGY

## 2021-04-09 PROCEDURE — 1125F AMNT PAIN NOTED PAIN PRSNT: CPT | Mod: S$GLB,,, | Performed by: PHYSICIAN ASSISTANT

## 2021-04-09 PROCEDURE — 1159F MED LIST DOCD IN RCRD: CPT | Mod: S$GLB,,, | Performed by: PHYSICIAN ASSISTANT

## 2021-04-12 ENCOUNTER — TELEPHONE (OUTPATIENT)
Dept: PAIN MEDICINE | Facility: CLINIC | Age: 81
End: 2021-04-12

## 2021-04-13 ENCOUNTER — TELEPHONE (OUTPATIENT)
Dept: FAMILY MEDICINE | Facility: CLINIC | Age: 81
End: 2021-04-13

## 2021-04-13 DIAGNOSIS — N20.0 NEPHROLITHIASIS: Primary | ICD-10-CM

## 2021-04-15 ENCOUNTER — LAB VISIT (OUTPATIENT)
Dept: LAB | Facility: HOSPITAL | Age: 81
End: 2021-04-15
Attending: FAMILY MEDICINE
Payer: MEDICARE

## 2021-04-15 ENCOUNTER — OFFICE VISIT (OUTPATIENT)
Dept: FAMILY MEDICINE | Facility: CLINIC | Age: 81
End: 2021-04-15
Payer: MEDICARE

## 2021-04-15 VITALS
SYSTOLIC BLOOD PRESSURE: 118 MMHG | HEART RATE: 71 BPM | BODY MASS INDEX: 27.64 KG/M2 | DIASTOLIC BLOOD PRESSURE: 78 MMHG | TEMPERATURE: 99 F | OXYGEN SATURATION: 94 % | HEIGHT: 64 IN | WEIGHT: 161.94 LBS

## 2021-04-15 DIAGNOSIS — E03.4 HYPOTHYROIDISM DUE TO ACQUIRED ATROPHY OF THYROID: ICD-10-CM

## 2021-04-15 DIAGNOSIS — N28.1 RENAL CYST: ICD-10-CM

## 2021-04-15 DIAGNOSIS — D50.9 IRON DEFICIENCY ANEMIA, UNSPECIFIED IRON DEFICIENCY ANEMIA TYPE: ICD-10-CM

## 2021-04-15 DIAGNOSIS — Z95.3 H/O AORTIC VALVE REPLACEMENT WITH PORCINE VALVE: ICD-10-CM

## 2021-04-15 DIAGNOSIS — I10 ESSENTIAL HYPERTENSION: ICD-10-CM

## 2021-04-15 DIAGNOSIS — E11.42 DIABETIC POLYNEUROPATHY ASSOCIATED WITH TYPE 2 DIABETES MELLITUS: ICD-10-CM

## 2021-04-15 DIAGNOSIS — I71.40 ABDOMINAL AORTIC ANEURYSM (AAA) 30 TO 34 MM IN DIAMETER: ICD-10-CM

## 2021-04-15 DIAGNOSIS — E11.42 DIABETIC POLYNEUROPATHY ASSOCIATED WITH TYPE 2 DIABETES MELLITUS: Primary | ICD-10-CM

## 2021-04-15 LAB
ALBUMIN SERPL BCP-MCNC: 4.3 G/DL (ref 3.5–5.2)
ALP SERPL-CCNC: 63 U/L (ref 55–135)
ALT SERPL W/O P-5'-P-CCNC: 12 U/L (ref 10–44)
ANION GAP SERPL CALC-SCNC: 12 MMOL/L (ref 8–16)
AST SERPL-CCNC: 19 U/L (ref 10–40)
BASOPHILS # BLD AUTO: 0.06 K/UL (ref 0–0.2)
BASOPHILS NFR BLD: 0.7 % (ref 0–1.9)
BILIRUB SERPL-MCNC: 0.4 MG/DL (ref 0.1–1)
BUN SERPL-MCNC: 16 MG/DL (ref 8–23)
CALCIUM SERPL-MCNC: 9.7 MG/DL (ref 8.7–10.5)
CHLORIDE SERPL-SCNC: 101 MMOL/L (ref 95–110)
CHOLEST SERPL-MCNC: 123 MG/DL (ref 120–199)
CHOLEST/HDLC SERPL: 2.6 {RATIO} (ref 2–5)
CO2 SERPL-SCNC: 25 MMOL/L (ref 23–29)
CREAT SERPL-MCNC: 1 MG/DL (ref 0.5–1.4)
DIFFERENTIAL METHOD: ABNORMAL
EOSINOPHIL # BLD AUTO: 0.1 K/UL (ref 0–0.5)
EOSINOPHIL NFR BLD: 1.4 % (ref 0–8)
ERYTHROCYTE [DISTWIDTH] IN BLOOD BY AUTOMATED COUNT: 15.9 % (ref 11.5–14.5)
EST. GFR  (AFRICAN AMERICAN): >60 ML/MIN/1.73 M^2
EST. GFR  (NON AFRICAN AMERICAN): 53.3 ML/MIN/1.73 M^2
ESTIMATED AVG GLUCOSE: 111 MG/DL (ref 68–131)
GLUCOSE SERPL-MCNC: 87 MG/DL (ref 70–110)
HBA1C MFR BLD: 5.5 % (ref 4–5.6)
HCT VFR BLD AUTO: 40.7 % (ref 37–48.5)
HDLC SERPL-MCNC: 47 MG/DL (ref 40–75)
HDLC SERPL: 38.2 % (ref 20–50)
HGB BLD-MCNC: 12.6 G/DL (ref 12–16)
IMM GRANULOCYTES # BLD AUTO: 0.05 K/UL (ref 0–0.04)
IMM GRANULOCYTES NFR BLD AUTO: 0.6 % (ref 0–0.5)
LDLC SERPL CALC-MCNC: 52.8 MG/DL (ref 63–159)
LYMPHOCYTES # BLD AUTO: 2.7 K/UL (ref 1–4.8)
LYMPHOCYTES NFR BLD: 31.3 % (ref 18–48)
MCH RBC QN AUTO: 27.9 PG (ref 27–31)
MCHC RBC AUTO-ENTMCNC: 31 G/DL (ref 32–36)
MCV RBC AUTO: 90 FL (ref 82–98)
MONOCYTES # BLD AUTO: 0.6 K/UL (ref 0.3–1)
MONOCYTES NFR BLD: 6.4 % (ref 4–15)
NEUTROPHILS # BLD AUTO: 5.2 K/UL (ref 1.8–7.7)
NEUTROPHILS NFR BLD: 59.6 % (ref 38–73)
NONHDLC SERPL-MCNC: 76 MG/DL
NRBC BLD-RTO: 0 /100 WBC
PLATELET # BLD AUTO: 240 K/UL (ref 150–450)
PMV BLD AUTO: 10.3 FL (ref 9.2–12.9)
POTASSIUM SERPL-SCNC: 4.4 MMOL/L (ref 3.5–5.1)
PROT SERPL-MCNC: 7.5 G/DL (ref 6–8.4)
RBC # BLD AUTO: 4.52 M/UL (ref 4–5.4)
SODIUM SERPL-SCNC: 138 MMOL/L (ref 136–145)
TRIGL SERPL-MCNC: 116 MG/DL (ref 30–150)
TSH SERPL DL<=0.005 MIU/L-ACNC: 1.99 UIU/ML (ref 0.4–4)
WBC # BLD AUTO: 8.7 K/UL (ref 3.9–12.7)

## 2021-04-15 PROCEDURE — 84443 ASSAY THYROID STIM HORMONE: CPT | Performed by: FAMILY MEDICINE

## 2021-04-15 PROCEDURE — 80061 LIPID PANEL: CPT | Mod: DBM | Performed by: FAMILY MEDICINE

## 2021-04-15 PROCEDURE — 99999 PR PBB SHADOW E&M-EST. PATIENT-LVL V: ICD-10-PCS | Mod: PBBFAC,,, | Performed by: FAMILY MEDICINE

## 2021-04-15 PROCEDURE — 1125F PR PAIN SEVERITY QUANTIFIED, PAIN PRESENT: ICD-10-PCS | Mod: S$GLB,,, | Performed by: FAMILY MEDICINE

## 2021-04-15 PROCEDURE — 1101F PT FALLS ASSESS-DOCD LE1/YR: CPT | Mod: CPTII,S$GLB,, | Performed by: FAMILY MEDICINE

## 2021-04-15 PROCEDURE — 99499 RISK ADDL DX/OHS AUDIT: ICD-10-PCS | Mod: S$GLB,,, | Performed by: FAMILY MEDICINE

## 2021-04-15 PROCEDURE — 36415 COLL VENOUS BLD VENIPUNCTURE: CPT | Mod: PO | Performed by: FAMILY MEDICINE

## 2021-04-15 PROCEDURE — 3288F PR FALLS RISK ASSESSMENT DOCUMENTED: ICD-10-PCS | Mod: CPTII,S$GLB,, | Performed by: FAMILY MEDICINE

## 2021-04-15 PROCEDURE — 1125F AMNT PAIN NOTED PAIN PRSNT: CPT | Mod: S$GLB,,, | Performed by: FAMILY MEDICINE

## 2021-04-15 PROCEDURE — 83036 HEMOGLOBIN GLYCOSYLATED A1C: CPT | Performed by: FAMILY MEDICINE

## 2021-04-15 PROCEDURE — 1159F PR MEDICATION LIST DOCUMENTED IN MEDICAL RECORD: ICD-10-PCS | Mod: S$GLB,,, | Performed by: FAMILY MEDICINE

## 2021-04-15 PROCEDURE — 3288F FALL RISK ASSESSMENT DOCD: CPT | Mod: CPTII,S$GLB,, | Performed by: FAMILY MEDICINE

## 2021-04-15 PROCEDURE — 80053 COMPREHEN METABOLIC PANEL: CPT | Performed by: FAMILY MEDICINE

## 2021-04-15 PROCEDURE — 99999 PR PBB SHADOW E&M-EST. PATIENT-LVL V: CPT | Mod: PBBFAC,,, | Performed by: FAMILY MEDICINE

## 2021-04-15 PROCEDURE — 99499 UNLISTED E&M SERVICE: CPT | Mod: S$GLB,,, | Performed by: FAMILY MEDICINE

## 2021-04-15 PROCEDURE — 99214 PR OFFICE/OUTPT VISIT, EST, LEVL IV, 30-39 MIN: ICD-10-PCS | Mod: S$GLB,,, | Performed by: FAMILY MEDICINE

## 2021-04-15 PROCEDURE — 85025 COMPLETE CBC W/AUTO DIFF WBC: CPT | Performed by: FAMILY MEDICINE

## 2021-04-15 PROCEDURE — 1101F PR PT FALLS ASSESS DOC 0-1 FALLS W/OUT INJ PAST YR: ICD-10-PCS | Mod: CPTII,S$GLB,, | Performed by: FAMILY MEDICINE

## 2021-04-15 PROCEDURE — 1159F MED LIST DOCD IN RCRD: CPT | Mod: S$GLB,,, | Performed by: FAMILY MEDICINE

## 2021-04-15 PROCEDURE — 99214 OFFICE O/P EST MOD 30 MIN: CPT | Mod: S$GLB,,, | Performed by: FAMILY MEDICINE

## 2021-04-16 ENCOUNTER — HOSPITAL ENCOUNTER (OUTPATIENT)
Facility: HOSPITAL | Age: 81
Discharge: HOME | End: 2021-04-16
Attending: PHYSICAL MEDICINE & REHABILITATION | Admitting: PHYSICAL MEDICINE & REHABILITATION
Payer: MEDICARE

## 2021-04-16 VITALS
HEART RATE: 65 BPM | RESPIRATION RATE: 15 BRPM | TEMPERATURE: 98 F | HEIGHT: 64 IN | BODY MASS INDEX: 27.72 KG/M2 | OXYGEN SATURATION: 96 % | DIASTOLIC BLOOD PRESSURE: 56 MMHG | SYSTOLIC BLOOD PRESSURE: 122 MMHG | WEIGHT: 162.38 LBS

## 2021-04-16 DIAGNOSIS — M54.16 LUMBAR RADICULOPATHY: Primary | ICD-10-CM

## 2021-04-16 LAB — POCT GLUCOSE: 104 MG/DL (ref 70–110)

## 2021-04-16 PROCEDURE — 62323 NJX INTERLAMINAR LMBR/SAC: CPT | Performed by: PHYSICAL MEDICINE & REHABILITATION

## 2021-04-16 PROCEDURE — 62323 NJX INTERLAMINAR LMBR/SAC: CPT | Mod: ,,, | Performed by: PHYSICAL MEDICINE & REHABILITATION

## 2021-04-16 PROCEDURE — 25500020 PHARM REV CODE 255: Performed by: PHYSICAL MEDICINE & REHABILITATION

## 2021-04-16 PROCEDURE — 25000003 PHARM REV CODE 250: Performed by: PHYSICAL MEDICINE & REHABILITATION

## 2021-04-16 PROCEDURE — 63600175 PHARM REV CODE 636 W HCPCS: Performed by: PHYSICAL MEDICINE & REHABILITATION

## 2021-04-16 PROCEDURE — 62323 PR INJ LUMBAR/SACRAL, W/IMAGING GUIDANCE: ICD-10-PCS | Mod: ,,, | Performed by: PHYSICAL MEDICINE & REHABILITATION

## 2021-04-16 RX ORDER — ONDANSETRON 2 MG/ML
4 INJECTION INTRAMUSCULAR; INTRAVENOUS ONCE AS NEEDED
Status: DISCONTINUED | OUTPATIENT
Start: 2021-04-16 | End: 2021-04-16 | Stop reason: HOSPADM

## 2021-04-16 RX ORDER — BUPIVACAINE HYDROCHLORIDE 2.5 MG/ML
INJECTION, SOLUTION EPIDURAL; INFILTRATION; INTRACAUDAL
Status: DISCONTINUED | OUTPATIENT
Start: 2021-04-16 | End: 2021-04-16 | Stop reason: HOSPADM

## 2021-04-16 RX ORDER — MIDAZOLAM HYDROCHLORIDE 1 MG/ML
INJECTION, SOLUTION INTRAMUSCULAR; INTRAVENOUS
Status: DISCONTINUED | OUTPATIENT
Start: 2021-04-16 | End: 2021-04-16 | Stop reason: HOSPADM

## 2021-04-16 RX ORDER — FENTANYL CITRATE 50 UG/ML
INJECTION, SOLUTION INTRAMUSCULAR; INTRAVENOUS
Status: DISCONTINUED | OUTPATIENT
Start: 2021-04-16 | End: 2021-04-16 | Stop reason: HOSPADM

## 2021-04-16 RX ORDER — METHYLPREDNISOLONE ACETATE 40 MG/ML
INJECTION, SUSPENSION INTRA-ARTICULAR; INTRALESIONAL; INTRAMUSCULAR; SOFT TISSUE
Status: DISCONTINUED | OUTPATIENT
Start: 2021-04-16 | End: 2021-04-16 | Stop reason: HOSPADM

## 2021-04-28 RX ORDER — LEVOTHYROXINE SODIUM 75 UG/1
TABLET ORAL
Qty: 90 TABLET | Refills: 0 | Status: SHIPPED | OUTPATIENT
Start: 2021-04-28 | End: 2021-06-22

## 2021-04-29 ENCOUNTER — OFFICE VISIT (OUTPATIENT)
Dept: FAMILY MEDICINE | Facility: CLINIC | Age: 81
End: 2021-04-29
Payer: MEDICARE

## 2021-04-29 VITALS
HEART RATE: 62 BPM | BODY MASS INDEX: 27.35 KG/M2 | TEMPERATURE: 97 F | SYSTOLIC BLOOD PRESSURE: 118 MMHG | DIASTOLIC BLOOD PRESSURE: 70 MMHG | OXYGEN SATURATION: 98 % | HEIGHT: 64 IN | WEIGHT: 160.19 LBS

## 2021-04-29 DIAGNOSIS — E11.42 DIABETIC POLYNEUROPATHY ASSOCIATED WITH TYPE 2 DIABETES MELLITUS: ICD-10-CM

## 2021-04-29 DIAGNOSIS — M47.816 SPONDYLOSIS WITHOUT MYELOPATHY OR RADICULOPATHY, LUMBAR REGION: ICD-10-CM

## 2021-04-29 DIAGNOSIS — M62.838 MUSCLE SPASM: Primary | ICD-10-CM

## 2021-04-29 PROCEDURE — 99999 PR PBB SHADOW E&M-EST. PATIENT-LVL IV: CPT | Mod: PBBFAC,,, | Performed by: NURSE PRACTITIONER

## 2021-04-29 PROCEDURE — 3288F FALL RISK ASSESSMENT DOCD: CPT | Mod: CPTII,S$GLB,, | Performed by: NURSE PRACTITIONER

## 2021-04-29 PROCEDURE — 3288F PR FALLS RISK ASSESSMENT DOCUMENTED: ICD-10-PCS | Mod: CPTII,S$GLB,, | Performed by: NURSE PRACTITIONER

## 2021-04-29 PROCEDURE — 1101F PT FALLS ASSESS-DOCD LE1/YR: CPT | Mod: CPTII,S$GLB,, | Performed by: NURSE PRACTITIONER

## 2021-04-29 PROCEDURE — 1159F PR MEDICATION LIST DOCUMENTED IN MEDICAL RECORD: ICD-10-PCS | Mod: S$GLB,,, | Performed by: NURSE PRACTITIONER

## 2021-04-29 PROCEDURE — 1125F PR PAIN SEVERITY QUANTIFIED, PAIN PRESENT: ICD-10-PCS | Mod: S$GLB,,, | Performed by: NURSE PRACTITIONER

## 2021-04-29 PROCEDURE — 1101F PR PT FALLS ASSESS DOC 0-1 FALLS W/OUT INJ PAST YR: ICD-10-PCS | Mod: CPTII,S$GLB,, | Performed by: NURSE PRACTITIONER

## 2021-04-29 PROCEDURE — 1125F AMNT PAIN NOTED PAIN PRSNT: CPT | Mod: S$GLB,,, | Performed by: NURSE PRACTITIONER

## 2021-04-29 PROCEDURE — 99213 PR OFFICE/OUTPT VISIT, EST, LEVL III, 20-29 MIN: ICD-10-PCS | Mod: S$GLB,,, | Performed by: NURSE PRACTITIONER

## 2021-04-29 PROCEDURE — 1159F MED LIST DOCD IN RCRD: CPT | Mod: S$GLB,,, | Performed by: NURSE PRACTITIONER

## 2021-04-29 PROCEDURE — 99999 PR PBB SHADOW E&M-EST. PATIENT-LVL IV: ICD-10-PCS | Mod: PBBFAC,,, | Performed by: NURSE PRACTITIONER

## 2021-04-29 PROCEDURE — 99213 OFFICE O/P EST LOW 20 MIN: CPT | Mod: S$GLB,,, | Performed by: NURSE PRACTITIONER

## 2021-04-29 RX ORDER — METHOCARBAMOL 500 MG/1
500 TABLET, FILM COATED ORAL 2 TIMES DAILY PRN
Qty: 30 TABLET | Refills: 0 | Status: SHIPPED | OUTPATIENT
Start: 2021-04-29 | End: 2021-05-09

## 2021-05-14 ENCOUNTER — OFFICE VISIT (OUTPATIENT)
Dept: UROLOGY | Facility: CLINIC | Age: 81
End: 2021-05-14
Payer: MEDICARE

## 2021-05-14 ENCOUNTER — OFFICE VISIT (OUTPATIENT)
Dept: PAIN MEDICINE | Facility: CLINIC | Age: 81
End: 2021-05-14
Payer: MEDICARE

## 2021-05-14 VITALS
HEIGHT: 64 IN | BODY MASS INDEX: 27.67 KG/M2 | WEIGHT: 162.06 LBS | HEART RATE: 61 BPM | DIASTOLIC BLOOD PRESSURE: 57 MMHG | SYSTOLIC BLOOD PRESSURE: 99 MMHG

## 2021-05-14 DIAGNOSIS — M54.16 BILATERAL LUMBAR RADICULOPATHY: Primary | ICD-10-CM

## 2021-05-14 DIAGNOSIS — M51.36 DDD (DEGENERATIVE DISC DISEASE), LUMBAR: ICD-10-CM

## 2021-05-14 DIAGNOSIS — N20.0 NEPHROLITHIASIS: ICD-10-CM

## 2021-05-14 DIAGNOSIS — M51.36 DISCOGENIC LOW BACK PAIN: ICD-10-CM

## 2021-05-14 DIAGNOSIS — N39.3 STRESS INCONTINENCE: ICD-10-CM

## 2021-05-14 DIAGNOSIS — N39.41 URGE INCONTINENCE OF URINE: Primary | ICD-10-CM

## 2021-05-14 PROCEDURE — 99214 OFFICE O/P EST MOD 30 MIN: CPT | Mod: S$GLB,,, | Performed by: PHYSICIAN ASSISTANT

## 2021-05-14 PROCEDURE — 3288F PR FALLS RISK ASSESSMENT DOCUMENTED: ICD-10-PCS | Mod: CPTII,S$GLB,, | Performed by: PHYSICIAN ASSISTANT

## 2021-05-14 PROCEDURE — 99999 PR PBB SHADOW E&M-EST. PATIENT-LVL IV: ICD-10-PCS | Mod: PBBFAC,,, | Performed by: PHYSICIAN ASSISTANT

## 2021-05-14 PROCEDURE — 3288F FALL RISK ASSESSMENT DOCD: CPT | Mod: CPTII,S$GLB,, | Performed by: PHYSICIAN ASSISTANT

## 2021-05-14 PROCEDURE — 1159F PR MEDICATION LIST DOCUMENTED IN MEDICAL RECORD: ICD-10-PCS | Mod: S$GLB,,, | Performed by: UROLOGY

## 2021-05-14 PROCEDURE — 99214 OFFICE O/P EST MOD 30 MIN: CPT | Mod: S$GLB,,, | Performed by: UROLOGY

## 2021-05-14 PROCEDURE — 1159F PR MEDICATION LIST DOCUMENTED IN MEDICAL RECORD: ICD-10-PCS | Mod: S$GLB,,, | Performed by: PHYSICIAN ASSISTANT

## 2021-05-14 PROCEDURE — 1125F PR PAIN SEVERITY QUANTIFIED, PAIN PRESENT: ICD-10-PCS | Mod: S$GLB,,, | Performed by: PHYSICIAN ASSISTANT

## 2021-05-14 PROCEDURE — 99999 PR PBB SHADOW E&M-EST. PATIENT-LVL III: CPT | Mod: PBBFAC,,, | Performed by: UROLOGY

## 2021-05-14 PROCEDURE — 1125F AMNT PAIN NOTED PAIN PRSNT: CPT | Mod: S$GLB,,, | Performed by: PHYSICIAN ASSISTANT

## 2021-05-14 PROCEDURE — 1159F MED LIST DOCD IN RCRD: CPT | Mod: S$GLB,,, | Performed by: PHYSICIAN ASSISTANT

## 2021-05-14 PROCEDURE — 99999 PR PBB SHADOW E&M-EST. PATIENT-LVL III: ICD-10-PCS | Mod: PBBFAC,,, | Performed by: UROLOGY

## 2021-05-14 PROCEDURE — 99214 PR OFFICE/OUTPT VISIT, EST, LEVL IV, 30-39 MIN: ICD-10-PCS | Mod: S$GLB,,, | Performed by: PHYSICIAN ASSISTANT

## 2021-05-14 PROCEDURE — 1101F PT FALLS ASSESS-DOCD LE1/YR: CPT | Mod: CPTII,S$GLB,, | Performed by: PHYSICIAN ASSISTANT

## 2021-05-14 PROCEDURE — 99214 PR OFFICE/OUTPT VISIT, EST, LEVL IV, 30-39 MIN: ICD-10-PCS | Mod: S$GLB,,, | Performed by: UROLOGY

## 2021-05-14 PROCEDURE — 99999 PR PBB SHADOW E&M-EST. PATIENT-LVL IV: CPT | Mod: PBBFAC,,, | Performed by: PHYSICIAN ASSISTANT

## 2021-05-14 PROCEDURE — 1159F MED LIST DOCD IN RCRD: CPT | Mod: S$GLB,,, | Performed by: UROLOGY

## 2021-05-14 PROCEDURE — 1101F PR PT FALLS ASSESS DOC 0-1 FALLS W/OUT INJ PAST YR: ICD-10-PCS | Mod: CPTII,S$GLB,, | Performed by: PHYSICIAN ASSISTANT

## 2021-07-12 ENCOUNTER — TELEPHONE (OUTPATIENT)
Dept: FAMILY MEDICINE | Facility: CLINIC | Age: 81
End: 2021-07-12

## 2021-07-12 DIAGNOSIS — N28.1 COMPLEX RENAL CYST: Primary | ICD-10-CM

## 2021-08-25 RX ORDER — METFORMIN HYDROCHLORIDE 1000 MG/1
TABLET ORAL
Qty: 180 TABLET | Refills: 1 | Status: SHIPPED | OUTPATIENT
Start: 2021-08-25 | End: 2022-02-14

## 2021-08-25 RX ORDER — METHOCARBAMOL 500 MG/1
500 TABLET, FILM COATED ORAL 3 TIMES DAILY
Qty: 90 TABLET | Refills: 1 | Status: SHIPPED | OUTPATIENT
Start: 2021-08-25 | End: 2021-10-22

## 2021-08-25 RX ORDER — GABAPENTIN 300 MG/1
300 CAPSULE ORAL DAILY
Qty: 90 CAPSULE | Refills: 1 | Status: SHIPPED | OUTPATIENT
Start: 2021-08-25 | End: 2021-10-18

## 2021-08-25 RX ORDER — ALLOPURINOL 300 MG/1
300 TABLET ORAL DAILY
Qty: 90 TABLET | Refills: 1 | Status: SHIPPED | OUTPATIENT
Start: 2021-08-25 | End: 2022-02-14

## 2021-08-25 RX ORDER — METOPROLOL TARTRATE 50 MG/1
50 TABLET ORAL 2 TIMES DAILY
Qty: 180 TABLET | Refills: 1 | Status: SHIPPED | OUTPATIENT
Start: 2021-08-25 | End: 2022-01-21

## 2021-09-22 ENCOUNTER — OFFICE VISIT (OUTPATIENT)
Dept: FAMILY MEDICINE | Facility: CLINIC | Age: 81
End: 2021-09-22
Payer: MEDICARE

## 2021-09-22 ENCOUNTER — OFFICE VISIT (OUTPATIENT)
Dept: OBSTETRICS AND GYNECOLOGY | Facility: CLINIC | Age: 81
End: 2021-09-22
Payer: MEDICARE

## 2021-09-22 VITALS
OXYGEN SATURATION: 98 % | DIASTOLIC BLOOD PRESSURE: 66 MMHG | HEART RATE: 100 BPM | BODY MASS INDEX: 27.75 KG/M2 | TEMPERATURE: 98 F | HEIGHT: 64 IN | WEIGHT: 162.56 LBS | SYSTOLIC BLOOD PRESSURE: 138 MMHG

## 2021-09-22 VITALS
BODY MASS INDEX: 27.82 KG/M2 | DIASTOLIC BLOOD PRESSURE: 70 MMHG | HEIGHT: 64 IN | WEIGHT: 162.94 LBS | SYSTOLIC BLOOD PRESSURE: 116 MMHG

## 2021-09-22 DIAGNOSIS — N81.10 VAGINAL PROLAPSE WITHOUT UTERINE PROLAPSE: Primary | ICD-10-CM

## 2021-09-22 DIAGNOSIS — N81.4 CYSTOCELE WITH UTERINE PROLAPSE: Primary | ICD-10-CM

## 2021-09-22 PROCEDURE — 1101F PT FALLS ASSESS-DOCD LE1/YR: CPT | Mod: HCNC,CPTII,S$GLB, | Performed by: FAMILY MEDICINE

## 2021-09-22 PROCEDURE — 99204 PR OFFICE/OUTPT VISIT, NEW, LEVL IV, 45-59 MIN: ICD-10-PCS | Mod: HCNC,S$GLB,, | Performed by: OBSTETRICS & GYNECOLOGY

## 2021-09-22 PROCEDURE — 1126F PR PAIN SEVERITY QUANTIFIED, NO PAIN PRESENT: ICD-10-PCS | Mod: HCNC,CPTII,S$GLB, | Performed by: OBSTETRICS & GYNECOLOGY

## 2021-09-22 PROCEDURE — 1125F PR PAIN SEVERITY QUANTIFIED, PAIN PRESENT: ICD-10-PCS | Mod: HCNC,CPTII,S$GLB, | Performed by: FAMILY MEDICINE

## 2021-09-22 PROCEDURE — 3078F PR MOST RECENT DIASTOLIC BLOOD PRESSURE < 80 MM HG: ICD-10-PCS | Mod: HCNC,CPTII,S$GLB, | Performed by: FAMILY MEDICINE

## 2021-09-22 PROCEDURE — 99204 OFFICE O/P NEW MOD 45 MIN: CPT | Mod: HCNC,S$GLB,, | Performed by: OBSTETRICS & GYNECOLOGY

## 2021-09-22 PROCEDURE — 3288F FALL RISK ASSESSMENT DOCD: CPT | Mod: HCNC,CPTII,S$GLB, | Performed by: FAMILY MEDICINE

## 2021-09-22 PROCEDURE — 1101F PR PT FALLS ASSESS DOC 0-1 FALLS W/OUT INJ PAST YR: ICD-10-PCS | Mod: HCNC,CPTII,S$GLB, | Performed by: FAMILY MEDICINE

## 2021-09-22 PROCEDURE — 1160F PR REVIEW ALL MEDS BY PRESCRIBER/CLIN PHARMACIST DOCUMENTED: ICD-10-PCS | Mod: HCNC,CPTII,S$GLB, | Performed by: OBSTETRICS & GYNECOLOGY

## 2021-09-22 PROCEDURE — 3075F SYST BP GE 130 - 139MM HG: CPT | Mod: HCNC,CPTII,S$GLB, | Performed by: FAMILY MEDICINE

## 2021-09-22 PROCEDURE — 99999 PR PBB SHADOW E&M-EST. PATIENT-LVL V: CPT | Mod: PBBFAC,HCNC,, | Performed by: FAMILY MEDICINE

## 2021-09-22 PROCEDURE — 3074F PR MOST RECENT SYSTOLIC BLOOD PRESSURE < 130 MM HG: ICD-10-PCS | Mod: HCNC,CPTII,S$GLB, | Performed by: OBSTETRICS & GYNECOLOGY

## 2021-09-22 PROCEDURE — 99999 PR PBB SHADOW E&M-EST. PATIENT-LVL IV: ICD-10-PCS | Mod: PBBFAC,HCNC,, | Performed by: OBSTETRICS & GYNECOLOGY

## 2021-09-22 PROCEDURE — 1125F AMNT PAIN NOTED PAIN PRSNT: CPT | Mod: HCNC,CPTII,S$GLB, | Performed by: FAMILY MEDICINE

## 2021-09-22 PROCEDURE — 99213 OFFICE O/P EST LOW 20 MIN: CPT | Mod: HCNC,S$GLB,, | Performed by: FAMILY MEDICINE

## 2021-09-22 PROCEDURE — 1159F MED LIST DOCD IN RCRD: CPT | Mod: HCNC,CPTII,S$GLB, | Performed by: OBSTETRICS & GYNECOLOGY

## 2021-09-22 PROCEDURE — 1159F PR MEDICATION LIST DOCUMENTED IN MEDICAL RECORD: ICD-10-PCS | Mod: HCNC,CPTII,S$GLB, | Performed by: OBSTETRICS & GYNECOLOGY

## 2021-09-22 PROCEDURE — 1159F MED LIST DOCD IN RCRD: CPT | Mod: HCNC,CPTII,S$GLB, | Performed by: FAMILY MEDICINE

## 2021-09-22 PROCEDURE — 3078F PR MOST RECENT DIASTOLIC BLOOD PRESSURE < 80 MM HG: ICD-10-PCS | Mod: HCNC,CPTII,S$GLB, | Performed by: OBSTETRICS & GYNECOLOGY

## 2021-09-22 PROCEDURE — 3074F SYST BP LT 130 MM HG: CPT | Mod: HCNC,CPTII,S$GLB, | Performed by: OBSTETRICS & GYNECOLOGY

## 2021-09-22 PROCEDURE — 3078F DIAST BP <80 MM HG: CPT | Mod: HCNC,CPTII,S$GLB, | Performed by: OBSTETRICS & GYNECOLOGY

## 2021-09-22 PROCEDURE — 1160F RVW MEDS BY RX/DR IN RCRD: CPT | Mod: HCNC,CPTII,S$GLB, | Performed by: OBSTETRICS & GYNECOLOGY

## 2021-09-22 PROCEDURE — 3078F DIAST BP <80 MM HG: CPT | Mod: HCNC,CPTII,S$GLB, | Performed by: FAMILY MEDICINE

## 2021-09-22 PROCEDURE — 1159F PR MEDICATION LIST DOCUMENTED IN MEDICAL RECORD: ICD-10-PCS | Mod: HCNC,CPTII,S$GLB, | Performed by: FAMILY MEDICINE

## 2021-09-22 PROCEDURE — 3075F PR MOST RECENT SYSTOLIC BLOOD PRESS GE 130-139MM HG: ICD-10-PCS | Mod: HCNC,CPTII,S$GLB, | Performed by: FAMILY MEDICINE

## 2021-09-22 PROCEDURE — 99213 PR OFFICE/OUTPT VISIT, EST, LEVL III, 20-29 MIN: ICD-10-PCS | Mod: HCNC,S$GLB,, | Performed by: FAMILY MEDICINE

## 2021-09-22 PROCEDURE — 1126F AMNT PAIN NOTED NONE PRSNT: CPT | Mod: HCNC,CPTII,S$GLB, | Performed by: OBSTETRICS & GYNECOLOGY

## 2021-09-22 PROCEDURE — 3288F PR FALLS RISK ASSESSMENT DOCUMENTED: ICD-10-PCS | Mod: HCNC,CPTII,S$GLB, | Performed by: FAMILY MEDICINE

## 2021-09-22 PROCEDURE — 99999 PR PBB SHADOW E&M-EST. PATIENT-LVL V: ICD-10-PCS | Mod: PBBFAC,HCNC,, | Performed by: FAMILY MEDICINE

## 2021-09-22 PROCEDURE — 99999 PR PBB SHADOW E&M-EST. PATIENT-LVL IV: CPT | Mod: PBBFAC,HCNC,, | Performed by: OBSTETRICS & GYNECOLOGY

## 2021-10-07 ENCOUNTER — OFFICE VISIT (OUTPATIENT)
Dept: FAMILY MEDICINE | Facility: CLINIC | Age: 81
End: 2021-10-07
Payer: MEDICARE

## 2021-10-07 ENCOUNTER — TELEPHONE (OUTPATIENT)
Dept: FAMILY MEDICINE | Facility: CLINIC | Age: 81
End: 2021-10-07

## 2021-10-07 VITALS
BODY MASS INDEX: 27.63 KG/M2 | HEART RATE: 70 BPM | TEMPERATURE: 98 F | SYSTOLIC BLOOD PRESSURE: 122 MMHG | OXYGEN SATURATION: 97 % | WEIGHT: 161.81 LBS | HEIGHT: 64 IN | DIASTOLIC BLOOD PRESSURE: 68 MMHG

## 2021-10-07 DIAGNOSIS — R30.0 DYSURIA: Primary | ICD-10-CM

## 2021-10-07 PROCEDURE — 1159F MED LIST DOCD IN RCRD: CPT | Mod: HCNC,CPTII,S$GLB, | Performed by: NURSE PRACTITIONER

## 2021-10-07 PROCEDURE — 90694 FLU VACCINE - QUADRIVALENT - ADJUVANTED: ICD-10-PCS | Mod: HCNC,S$GLB,, | Performed by: NURSE PRACTITIONER

## 2021-10-07 PROCEDURE — 3288F FALL RISK ASSESSMENT DOCD: CPT | Mod: HCNC,CPTII,S$GLB, | Performed by: NURSE PRACTITIONER

## 2021-10-07 PROCEDURE — 81001 URINALYSIS AUTO W/SCOPE: CPT | Mod: HCNC | Performed by: NURSE PRACTITIONER

## 2021-10-07 PROCEDURE — 3288F PR FALLS RISK ASSESSMENT DOCUMENTED: ICD-10-PCS | Mod: HCNC,CPTII,S$GLB, | Performed by: NURSE PRACTITIONER

## 2021-10-07 PROCEDURE — 99213 PR OFFICE/OUTPT VISIT, EST, LEVL III, 20-29 MIN: ICD-10-PCS | Mod: HCNC,25,S$GLB, | Performed by: NURSE PRACTITIONER

## 2021-10-07 PROCEDURE — 3078F DIAST BP <80 MM HG: CPT | Mod: HCNC,CPTII,S$GLB, | Performed by: NURSE PRACTITIONER

## 2021-10-07 PROCEDURE — G0008 FLU VACCINE - QUADRIVALENT - ADJUVANTED: ICD-10-PCS | Mod: HCNC,S$GLB,, | Performed by: NURSE PRACTITIONER

## 2021-10-07 PROCEDURE — 1101F PT FALLS ASSESS-DOCD LE1/YR: CPT | Mod: HCNC,CPTII,S$GLB, | Performed by: NURSE PRACTITIONER

## 2021-10-07 PROCEDURE — 1101F PR PT FALLS ASSESS DOC 0-1 FALLS W/OUT INJ PAST YR: ICD-10-PCS | Mod: HCNC,CPTII,S$GLB, | Performed by: NURSE PRACTITIONER

## 2021-10-07 PROCEDURE — 3078F PR MOST RECENT DIASTOLIC BLOOD PRESSURE < 80 MM HG: ICD-10-PCS | Mod: HCNC,CPTII,S$GLB, | Performed by: NURSE PRACTITIONER

## 2021-10-07 PROCEDURE — 99999 PR PBB SHADOW E&M-EST. PATIENT-LVL IV: CPT | Mod: PBBFAC,HCNC,, | Performed by: NURSE PRACTITIONER

## 2021-10-07 PROCEDURE — 3074F SYST BP LT 130 MM HG: CPT | Mod: HCNC,CPTII,S$GLB, | Performed by: NURSE PRACTITIONER

## 2021-10-07 PROCEDURE — 3074F PR MOST RECENT SYSTOLIC BLOOD PRESSURE < 130 MM HG: ICD-10-PCS | Mod: HCNC,CPTII,S$GLB, | Performed by: NURSE PRACTITIONER

## 2021-10-07 PROCEDURE — 99213 OFFICE O/P EST LOW 20 MIN: CPT | Mod: HCNC,25,S$GLB, | Performed by: NURSE PRACTITIONER

## 2021-10-07 PROCEDURE — G0008 ADMIN INFLUENZA VIRUS VAC: HCPCS | Mod: HCNC,S$GLB,, | Performed by: NURSE PRACTITIONER

## 2021-10-07 PROCEDURE — 99999 PR PBB SHADOW E&M-EST. PATIENT-LVL IV: ICD-10-PCS | Mod: PBBFAC,HCNC,, | Performed by: NURSE PRACTITIONER

## 2021-10-07 PROCEDURE — 90694 VACC AIIV4 NO PRSRV 0.5ML IM: CPT | Mod: HCNC,S$GLB,, | Performed by: NURSE PRACTITIONER

## 2021-10-07 PROCEDURE — 1159F PR MEDICATION LIST DOCUMENTED IN MEDICAL RECORD: ICD-10-PCS | Mod: HCNC,CPTII,S$GLB, | Performed by: NURSE PRACTITIONER

## 2021-10-07 RX ORDER — SULFAMETHOXAZOLE AND TRIMETHOPRIM 800; 160 MG/1; MG/1
1 TABLET ORAL 2 TIMES DAILY
Qty: 20 TABLET | Refills: 0 | Status: SHIPPED | OUTPATIENT
Start: 2021-10-07 | End: 2021-10-18

## 2021-10-08 LAB
BILIRUB UR QL STRIP: NEGATIVE
CLARITY UR REFRACT.AUTO: CLEAR
COLOR UR AUTO: YELLOW
GLUCOSE UR QL STRIP: NEGATIVE
HGB UR QL STRIP: NEGATIVE
KETONES UR QL STRIP: NEGATIVE
LEUKOCYTE ESTERASE UR QL STRIP: ABNORMAL
MICROSCOPIC COMMENT: ABNORMAL
NITRITE UR QL STRIP: NEGATIVE
PH UR STRIP: 6 [PH] (ref 5–8)
PROT UR QL STRIP: NEGATIVE
RBC #/AREA URNS AUTO: 0 /HPF (ref 0–4)
SP GR UR STRIP: 1 (ref 1–1.03)
URN SPEC COLLECT METH UR: ABNORMAL
WBC #/AREA URNS AUTO: 4 /HPF (ref 0–5)
WBC CLUMPS UR QL AUTO: ABNORMAL

## 2021-10-15 ENCOUNTER — HOSPITAL ENCOUNTER (OUTPATIENT)
Dept: RADIOLOGY | Facility: HOSPITAL | Age: 81
Discharge: HOME | End: 2021-10-15
Attending: FAMILY MEDICINE
Payer: MEDICARE

## 2021-10-15 DIAGNOSIS — N20.0 NEPHROLITHIASIS: ICD-10-CM

## 2021-10-15 PROCEDURE — 76770 US EXAM ABDO BACK WALL COMP: CPT | Mod: TC,HCNC

## 2021-10-15 PROCEDURE — 76770 US RETROPERITONEAL COMPLETE: ICD-10-PCS | Mod: 26,HCNC,, | Performed by: RADIOLOGY

## 2021-10-15 PROCEDURE — 76770 US EXAM ABDO BACK WALL COMP: CPT | Mod: 26,HCNC,, | Performed by: RADIOLOGY

## 2021-10-18 ENCOUNTER — OFFICE VISIT (OUTPATIENT)
Dept: FAMILY MEDICINE | Facility: CLINIC | Age: 81
End: 2021-10-18
Payer: MEDICARE

## 2021-10-18 ENCOUNTER — PATIENT OUTREACH (OUTPATIENT)
Dept: ADMINISTRATIVE | Facility: OTHER | Age: 81
End: 2021-10-18

## 2021-10-18 ENCOUNTER — TELEPHONE (OUTPATIENT)
Dept: FAMILY MEDICINE | Facility: CLINIC | Age: 81
End: 2021-10-18

## 2021-10-18 VITALS
HEART RATE: 66 BPM | BODY MASS INDEX: 27.92 KG/M2 | OXYGEN SATURATION: 98 % | SYSTOLIC BLOOD PRESSURE: 126 MMHG | HEIGHT: 64 IN | DIASTOLIC BLOOD PRESSURE: 72 MMHG | WEIGHT: 163.56 LBS | TEMPERATURE: 98 F

## 2021-10-18 DIAGNOSIS — Z95.3 H/O AORTIC VALVE REPLACEMENT WITH PORCINE VALVE: Primary | ICD-10-CM

## 2021-10-18 DIAGNOSIS — E11.42 DIABETIC POLYNEUROPATHY ASSOCIATED WITH TYPE 2 DIABETES MELLITUS: Primary | ICD-10-CM

## 2021-10-18 DIAGNOSIS — M54.16 LUMBAR RADICULOPATHY: ICD-10-CM

## 2021-10-18 DIAGNOSIS — E03.4 HYPOTHYROIDISM DUE TO ACQUIRED ATROPHY OF THYROID: ICD-10-CM

## 2021-10-18 DIAGNOSIS — I11.9 HYPERTENSIVE HEART DISEASE WITHOUT CONGESTIVE HEART FAILURE: ICD-10-CM

## 2021-10-18 DIAGNOSIS — E11.9 DIABETIC EYE EXAM: ICD-10-CM

## 2021-10-18 DIAGNOSIS — Z01.00 DIABETIC EYE EXAM: ICD-10-CM

## 2021-10-18 DIAGNOSIS — D50.9 IRON DEFICIENCY ANEMIA, UNSPECIFIED IRON DEFICIENCY ANEMIA TYPE: ICD-10-CM

## 2021-10-18 PROCEDURE — 1160F RVW MEDS BY RX/DR IN RCRD: CPT | Mod: HCNC,CPTII,S$GLB, | Performed by: FAMILY MEDICINE

## 2021-10-18 PROCEDURE — 3288F PR FALLS RISK ASSESSMENT DOCUMENTED: ICD-10-PCS | Mod: HCNC,CPTII,S$GLB, | Performed by: FAMILY MEDICINE

## 2021-10-18 PROCEDURE — 99214 OFFICE O/P EST MOD 30 MIN: CPT | Mod: HCNC,S$GLB,, | Performed by: FAMILY MEDICINE

## 2021-10-18 PROCEDURE — 3078F DIAST BP <80 MM HG: CPT | Mod: HCNC,CPTII,S$GLB, | Performed by: FAMILY MEDICINE

## 2021-10-18 PROCEDURE — 1101F PR PT FALLS ASSESS DOC 0-1 FALLS W/OUT INJ PAST YR: ICD-10-PCS | Mod: HCNC,CPTII,S$GLB, | Performed by: FAMILY MEDICINE

## 2021-10-18 PROCEDURE — 1126F PR PAIN SEVERITY QUANTIFIED, NO PAIN PRESENT: ICD-10-PCS | Mod: HCNC,CPTII,S$GLB, | Performed by: FAMILY MEDICINE

## 2021-10-18 PROCEDURE — 99214 PR OFFICE/OUTPT VISIT, EST, LEVL IV, 30-39 MIN: ICD-10-PCS | Mod: HCNC,S$GLB,, | Performed by: FAMILY MEDICINE

## 2021-10-18 PROCEDURE — 99999 PR PBB SHADOW E&M-EST. PATIENT-LVL V: ICD-10-PCS | Mod: PBBFAC,HCNC,, | Performed by: FAMILY MEDICINE

## 2021-10-18 PROCEDURE — 1159F PR MEDICATION LIST DOCUMENTED IN MEDICAL RECORD: ICD-10-PCS | Mod: HCNC,CPTII,S$GLB, | Performed by: FAMILY MEDICINE

## 2021-10-18 PROCEDURE — 1126F AMNT PAIN NOTED NONE PRSNT: CPT | Mod: HCNC,CPTII,S$GLB, | Performed by: FAMILY MEDICINE

## 2021-10-18 PROCEDURE — 1159F MED LIST DOCD IN RCRD: CPT | Mod: HCNC,CPTII,S$GLB, | Performed by: FAMILY MEDICINE

## 2021-10-18 PROCEDURE — 1160F PR REVIEW ALL MEDS BY PRESCRIBER/CLIN PHARMACIST DOCUMENTED: ICD-10-PCS | Mod: HCNC,CPTII,S$GLB, | Performed by: FAMILY MEDICINE

## 2021-10-18 PROCEDURE — 3074F SYST BP LT 130 MM HG: CPT | Mod: HCNC,CPTII,S$GLB, | Performed by: FAMILY MEDICINE

## 2021-10-18 PROCEDURE — 3074F PR MOST RECENT SYSTOLIC BLOOD PRESSURE < 130 MM HG: ICD-10-PCS | Mod: HCNC,CPTII,S$GLB, | Performed by: FAMILY MEDICINE

## 2021-10-18 PROCEDURE — 1101F PT FALLS ASSESS-DOCD LE1/YR: CPT | Mod: HCNC,CPTII,S$GLB, | Performed by: FAMILY MEDICINE

## 2021-10-18 PROCEDURE — 3078F PR MOST RECENT DIASTOLIC BLOOD PRESSURE < 80 MM HG: ICD-10-PCS | Mod: HCNC,CPTII,S$GLB, | Performed by: FAMILY MEDICINE

## 2021-10-18 PROCEDURE — 99999 PR PBB SHADOW E&M-EST. PATIENT-LVL V: CPT | Mod: PBBFAC,HCNC,, | Performed by: FAMILY MEDICINE

## 2021-10-18 PROCEDURE — 3288F FALL RISK ASSESSMENT DOCD: CPT | Mod: HCNC,CPTII,S$GLB, | Performed by: FAMILY MEDICINE

## 2021-10-18 RX ORDER — GABAPENTIN 300 MG/1
300 CAPSULE ORAL 3 TIMES DAILY
Qty: 90 CAPSULE | Refills: 3 | Status: SHIPPED | OUTPATIENT
Start: 2021-10-18 | End: 2021-11-16 | Stop reason: SDUPTHER

## 2021-10-19 ENCOUNTER — OFFICE VISIT (OUTPATIENT)
Dept: OPHTHALMOLOGY | Facility: CLINIC | Age: 81
End: 2021-10-19
Payer: MEDICARE

## 2021-10-19 ENCOUNTER — OFFICE VISIT (OUTPATIENT)
Dept: PAIN MEDICINE | Facility: CLINIC | Age: 81
End: 2021-10-19
Payer: MEDICARE

## 2021-10-19 ENCOUNTER — LAB VISIT (OUTPATIENT)
Dept: LAB | Facility: HOSPITAL | Age: 81
End: 2021-10-19
Attending: FAMILY MEDICINE
Payer: MEDICARE

## 2021-10-19 VITALS
BODY MASS INDEX: 27.89 KG/M2 | SYSTOLIC BLOOD PRESSURE: 131 MMHG | WEIGHT: 163.38 LBS | HEIGHT: 64 IN | RESPIRATION RATE: 17 BRPM | DIASTOLIC BLOOD PRESSURE: 62 MMHG | HEART RATE: 76 BPM

## 2021-10-19 DIAGNOSIS — Z01.00 DIABETIC EYE EXAM: ICD-10-CM

## 2021-10-19 DIAGNOSIS — M54.16 BILATERAL LUMBAR RADICULOPATHY: ICD-10-CM

## 2021-10-19 DIAGNOSIS — M79.2 NEUROPATHIC PAIN: ICD-10-CM

## 2021-10-19 DIAGNOSIS — E03.4 HYPOTHYROIDISM DUE TO ACQUIRED ATROPHY OF THYROID: ICD-10-CM

## 2021-10-19 DIAGNOSIS — E11.42 DIABETIC POLYNEUROPATHY ASSOCIATED WITH TYPE 2 DIABETES MELLITUS: ICD-10-CM

## 2021-10-19 DIAGNOSIS — M54.16 LUMBAR RADICULOPATHY: ICD-10-CM

## 2021-10-19 DIAGNOSIS — Z96.1 PSEUDOPHAKIA OF BOTH EYES: ICD-10-CM

## 2021-10-19 DIAGNOSIS — M51.36 DISCOGENIC LOW BACK PAIN: Primary | ICD-10-CM

## 2021-10-19 DIAGNOSIS — H52.7 REFRACTIVE ERRORS: ICD-10-CM

## 2021-10-19 DIAGNOSIS — E11.3299 BDR (BACKGROUND DIABETIC RETINOPATHY): Primary | ICD-10-CM

## 2021-10-19 DIAGNOSIS — E11.9 DIABETIC EYE EXAM: ICD-10-CM

## 2021-10-19 LAB
ALBUMIN SERPL BCP-MCNC: 3.9 G/DL (ref 3.5–5.2)
ALP SERPL-CCNC: 97 U/L (ref 55–135)
ALT SERPL W/O P-5'-P-CCNC: 10 U/L (ref 10–44)
ANION GAP SERPL CALC-SCNC: 13 MMOL/L (ref 8–16)
AST SERPL-CCNC: 21 U/L (ref 10–40)
BASOPHILS # BLD AUTO: 0.06 K/UL (ref 0–0.2)
BASOPHILS NFR BLD: 0.9 % (ref 0–1.9)
BILIRUB SERPL-MCNC: 0.5 MG/DL (ref 0.1–1)
BUN SERPL-MCNC: 16 MG/DL (ref 8–23)
CALCIUM SERPL-MCNC: 10.4 MG/DL (ref 8.7–10.5)
CHLORIDE SERPL-SCNC: 99 MMOL/L (ref 95–110)
CHOLEST SERPL-MCNC: 118 MG/DL (ref 120–199)
CHOLEST/HDLC SERPL: 2.3 {RATIO} (ref 2–5)
CO2 SERPL-SCNC: 22 MMOL/L (ref 23–29)
CREAT SERPL-MCNC: 0.8 MG/DL (ref 0.5–1.4)
DIFFERENTIAL METHOD: ABNORMAL
EOSINOPHIL # BLD AUTO: 0.2 K/UL (ref 0–0.5)
EOSINOPHIL NFR BLD: 2.4 % (ref 0–8)
ERYTHROCYTE [DISTWIDTH] IN BLOOD BY AUTOMATED COUNT: 15.2 % (ref 11.5–14.5)
EST. GFR  (AFRICAN AMERICAN): >60 ML/MIN/1.73 M^2
EST. GFR  (NON AFRICAN AMERICAN): >60 ML/MIN/1.73 M^2
ESTIMATED AVG GLUCOSE: 105 MG/DL (ref 68–131)
GLUCOSE SERPL-MCNC: 95 MG/DL (ref 70–110)
HBA1C MFR BLD: 5.3 % (ref 4–5.6)
HCT VFR BLD AUTO: 36 % (ref 37–48.5)
HDLC SERPL-MCNC: 52 MG/DL (ref 40–75)
HDLC SERPL: 44.1 % (ref 20–50)
HGB BLD-MCNC: 11.7 G/DL (ref 12–16)
IMM GRANULOCYTES # BLD AUTO: 0.03 K/UL (ref 0–0.04)
IMM GRANULOCYTES NFR BLD AUTO: 0.5 % (ref 0–0.5)
LDLC SERPL CALC-MCNC: 47.6 MG/DL (ref 63–159)
LYMPHOCYTES # BLD AUTO: 1.6 K/UL (ref 1–4.8)
LYMPHOCYTES NFR BLD: 24.8 % (ref 18–48)
MCH RBC QN AUTO: 28.9 PG (ref 27–31)
MCHC RBC AUTO-ENTMCNC: 32.5 G/DL (ref 32–36)
MCV RBC AUTO: 89 FL (ref 82–98)
MONOCYTES # BLD AUTO: 0.5 K/UL (ref 0.3–1)
MONOCYTES NFR BLD: 7 % (ref 4–15)
NEUTROPHILS # BLD AUTO: 4.3 K/UL (ref 1.8–7.7)
NEUTROPHILS NFR BLD: 64.4 % (ref 38–73)
NONHDLC SERPL-MCNC: 66 MG/DL
NRBC BLD-RTO: 0 /100 WBC
PLATELET # BLD AUTO: 216 K/UL (ref 150–450)
PMV BLD AUTO: 10.2 FL (ref 9.2–12.9)
POTASSIUM SERPL-SCNC: 4.7 MMOL/L (ref 3.5–5.1)
PROT SERPL-MCNC: 7.1 G/DL (ref 6–8.4)
RBC # BLD AUTO: 4.05 M/UL (ref 4–5.4)
SODIUM SERPL-SCNC: 134 MMOL/L (ref 136–145)
TRIGL SERPL-MCNC: 92 MG/DL (ref 30–150)
TSH SERPL DL<=0.005 MIU/L-ACNC: 2.08 UIU/ML (ref 0.4–4)
WBC # BLD AUTO: 6.61 K/UL (ref 3.9–12.7)

## 2021-10-19 PROCEDURE — 99999 PR PBB SHADOW E&M-EST. PATIENT-LVL IV: ICD-10-PCS | Mod: PBBFAC,HCNC,, | Performed by: PHYSICIAN ASSISTANT

## 2021-10-19 PROCEDURE — 1160F PR REVIEW ALL MEDS BY PRESCRIBER/CLIN PHARMACIST DOCUMENTED: ICD-10-PCS | Mod: HCNC,CPTII,S$GLB, | Performed by: PHYSICIAN ASSISTANT

## 2021-10-19 PROCEDURE — 83036 HEMOGLOBIN GLYCOSYLATED A1C: CPT | Mod: HCNC | Performed by: FAMILY MEDICINE

## 2021-10-19 PROCEDURE — 84443 ASSAY THYROID STIM HORMONE: CPT | Mod: HCNC | Performed by: FAMILY MEDICINE

## 2021-10-19 PROCEDURE — 80053 COMPREHEN METABOLIC PANEL: CPT | Mod: HCNC | Performed by: FAMILY MEDICINE

## 2021-10-19 PROCEDURE — 3288F PR FALLS RISK ASSESSMENT DOCUMENTED: ICD-10-PCS | Mod: HCNC,CPTII,S$GLB, | Performed by: PHYSICIAN ASSISTANT

## 2021-10-19 PROCEDURE — 3078F DIAST BP <80 MM HG: CPT | Mod: HCNC,CPTII,S$GLB, | Performed by: PHYSICIAN ASSISTANT

## 2021-10-19 PROCEDURE — 1101F PR PT FALLS ASSESS DOC 0-1 FALLS W/OUT INJ PAST YR: ICD-10-PCS | Mod: HCNC,CPTII,S$GLB, | Performed by: PHYSICIAN ASSISTANT

## 2021-10-19 PROCEDURE — 1101F PT FALLS ASSESS-DOCD LE1/YR: CPT | Mod: HCNC,CPTII,S$GLB, | Performed by: PHYSICIAN ASSISTANT

## 2021-10-19 PROCEDURE — 99999 PR PBB SHADOW E&M-EST. PATIENT-LVL III: ICD-10-PCS | Mod: PBBFAC,HCNC,, | Performed by: OPTOMETRIST

## 2021-10-19 PROCEDURE — 3075F SYST BP GE 130 - 139MM HG: CPT | Mod: HCNC,CPTII,S$GLB, | Performed by: PHYSICIAN ASSISTANT

## 2021-10-19 PROCEDURE — 1159F PR MEDICATION LIST DOCUMENTED IN MEDICAL RECORD: ICD-10-PCS | Mod: HCNC,CPTII,S$GLB, | Performed by: PHYSICIAN ASSISTANT

## 2021-10-19 PROCEDURE — 1159F MED LIST DOCD IN RCRD: CPT | Mod: HCNC,CPTII,S$GLB, | Performed by: OPTOMETRIST

## 2021-10-19 PROCEDURE — 99999 PR PBB SHADOW E&M-EST. PATIENT-LVL III: CPT | Mod: PBBFAC,HCNC,, | Performed by: OPTOMETRIST

## 2021-10-19 PROCEDURE — 3078F PR MOST RECENT DIASTOLIC BLOOD PRESSURE < 80 MM HG: ICD-10-PCS | Mod: HCNC,CPTII,S$GLB, | Performed by: PHYSICIAN ASSISTANT

## 2021-10-19 PROCEDURE — 99999 PR PBB SHADOW E&M-EST. PATIENT-LVL IV: CPT | Mod: PBBFAC,HCNC,, | Performed by: PHYSICIAN ASSISTANT

## 2021-10-19 PROCEDURE — 85025 COMPLETE CBC W/AUTO DIFF WBC: CPT | Mod: HCNC | Performed by: FAMILY MEDICINE

## 2021-10-19 PROCEDURE — 99214 PR OFFICE/OUTPT VISIT, EST, LEVL IV, 30-39 MIN: ICD-10-PCS | Mod: HCNC,S$GLB,, | Performed by: PHYSICIAN ASSISTANT

## 2021-10-19 PROCEDURE — 3288F FALL RISK ASSESSMENT DOCD: CPT | Mod: HCNC,CPTII,S$GLB, | Performed by: PHYSICIAN ASSISTANT

## 2021-10-19 PROCEDURE — 1125F PR PAIN SEVERITY QUANTIFIED, PAIN PRESENT: ICD-10-PCS | Mod: HCNC,CPTII,S$GLB, | Performed by: PHYSICIAN ASSISTANT

## 2021-10-19 PROCEDURE — 2022F DILAT RTA XM EVC RTNOPTHY: CPT | Mod: HCNC,CPTII,S$GLB, | Performed by: OPTOMETRIST

## 2021-10-19 PROCEDURE — 92014 PR EYE EXAM, EST PATIENT,COMPREHESV: ICD-10-PCS | Mod: HCNC,S$GLB,, | Performed by: OPTOMETRIST

## 2021-10-19 PROCEDURE — 1159F PR MEDICATION LIST DOCUMENTED IN MEDICAL RECORD: ICD-10-PCS | Mod: HCNC,CPTII,S$GLB, | Performed by: OPTOMETRIST

## 2021-10-19 PROCEDURE — 2022F PR DILATED RETINAL EYE EXAM WITH INTERP/REVIEW: ICD-10-PCS | Mod: HCNC,CPTII,S$GLB, | Performed by: OPTOMETRIST

## 2021-10-19 PROCEDURE — 3075F PR MOST RECENT SYSTOLIC BLOOD PRESS GE 130-139MM HG: ICD-10-PCS | Mod: HCNC,CPTII,S$GLB, | Performed by: PHYSICIAN ASSISTANT

## 2021-10-19 PROCEDURE — 80061 LIPID PANEL: CPT | Mod: HCNC | Performed by: FAMILY MEDICINE

## 2021-10-19 PROCEDURE — 1159F MED LIST DOCD IN RCRD: CPT | Mod: HCNC,CPTII,S$GLB, | Performed by: PHYSICIAN ASSISTANT

## 2021-10-19 PROCEDURE — 99214 OFFICE O/P EST MOD 30 MIN: CPT | Mod: HCNC,S$GLB,, | Performed by: PHYSICIAN ASSISTANT

## 2021-10-19 PROCEDURE — 1160F RVW MEDS BY RX/DR IN RCRD: CPT | Mod: HCNC,CPTII,S$GLB, | Performed by: PHYSICIAN ASSISTANT

## 2021-10-19 PROCEDURE — 92014 COMPRE OPH EXAM EST PT 1/>: CPT | Mod: HCNC,S$GLB,, | Performed by: OPTOMETRIST

## 2021-10-19 PROCEDURE — 36415 COLL VENOUS BLD VENIPUNCTURE: CPT | Mod: HCNC,PO | Performed by: FAMILY MEDICINE

## 2021-10-19 PROCEDURE — 1125F AMNT PAIN NOTED PAIN PRSNT: CPT | Mod: HCNC,CPTII,S$GLB, | Performed by: PHYSICIAN ASSISTANT

## 2021-10-19 PROCEDURE — 92015 DETERMINE REFRACTIVE STATE: CPT | Mod: HCNC,S$GLB,, | Performed by: OPTOMETRIST

## 2021-10-19 PROCEDURE — 92015 PR REFRACTION: ICD-10-PCS | Mod: HCNC,S$GLB,, | Performed by: OPTOMETRIST

## 2021-10-19 RX ORDER — LIDOCAINE 50 MG/G
PATCH TOPICAL
Qty: 90 PATCH | Refills: 0 | Status: ON HOLD | OUTPATIENT
Start: 2021-10-19 | End: 2022-06-14

## 2021-10-26 ENCOUNTER — OFFICE VISIT (OUTPATIENT)
Dept: OPHTHALMOLOGY | Facility: CLINIC | Age: 81
End: 2021-10-26
Payer: MEDICARE

## 2021-10-26 DIAGNOSIS — E11.3311 CONTROLLED TYPE 2 DIABETES MELLITUS WITH RIGHT EYE AFFECTED BY MODERATE NONPROLIFERATIVE RETINOPATHY AND MACULAR EDEMA, WITHOUT LONG-TERM CURRENT USE OF INSULIN: Primary | ICD-10-CM

## 2021-10-26 DIAGNOSIS — Z96.1 PSEUDOPHAKIA OF BOTH EYES: ICD-10-CM

## 2021-10-26 PROBLEM — E11.3299 BDR (BACKGROUND DIABETIC RETINOPATHY): Status: ACTIVE | Noted: 2021-10-26

## 2021-10-26 PROCEDURE — 1159F MED LIST DOCD IN RCRD: CPT | Mod: HCNC,CPTII,S$GLB, | Performed by: OPHTHALMOLOGY

## 2021-10-26 PROCEDURE — 99999 PR PBB SHADOW E&M-EST. PATIENT-LVL III: CPT | Mod: PBBFAC,HCNC,, | Performed by: OPHTHALMOLOGY

## 2021-10-26 PROCEDURE — 1126F PR PAIN SEVERITY QUANTIFIED, NO PAIN PRESENT: ICD-10-PCS | Mod: HCNC,CPTII,S$GLB, | Performed by: OPHTHALMOLOGY

## 2021-10-26 PROCEDURE — 1159F PR MEDICATION LIST DOCUMENTED IN MEDICAL RECORD: ICD-10-PCS | Mod: HCNC,CPTII,S$GLB, | Performed by: OPHTHALMOLOGY

## 2021-10-26 PROCEDURE — 92134 CPTRZ OPH DX IMG PST SGM RTA: CPT | Mod: HCNC,S$GLB,, | Performed by: OPHTHALMOLOGY

## 2021-10-26 PROCEDURE — 99999 PR PBB SHADOW E&M-EST. PATIENT-LVL III: ICD-10-PCS | Mod: PBBFAC,HCNC,, | Performed by: OPHTHALMOLOGY

## 2021-10-26 PROCEDURE — 92134 POSTERIOR SEGMENT OCT RETINA (OCULAR COHERENCE TOMOGRAPHY)-BOTH EYES: ICD-10-PCS | Mod: HCNC,S$GLB,, | Performed by: OPHTHALMOLOGY

## 2021-10-26 PROCEDURE — 99203 PR OFFICE/OUTPT VISIT, NEW, LEVL III, 30-44 MIN: ICD-10-PCS | Mod: HCNC,S$GLB,, | Performed by: OPHTHALMOLOGY

## 2021-10-26 PROCEDURE — 99203 OFFICE O/P NEW LOW 30 MIN: CPT | Mod: HCNC,S$GLB,, | Performed by: OPHTHALMOLOGY

## 2021-10-26 PROCEDURE — 1160F RVW MEDS BY RX/DR IN RCRD: CPT | Mod: HCNC,CPTII,S$GLB, | Performed by: OPHTHALMOLOGY

## 2021-10-26 PROCEDURE — 1160F PR REVIEW ALL MEDS BY PRESCRIBER/CLIN PHARMACIST DOCUMENTED: ICD-10-PCS | Mod: HCNC,CPTII,S$GLB, | Performed by: OPHTHALMOLOGY

## 2021-10-26 PROCEDURE — 1126F AMNT PAIN NOTED NONE PRSNT: CPT | Mod: HCNC,CPTII,S$GLB, | Performed by: OPHTHALMOLOGY

## 2021-11-03 ENCOUNTER — HOSPITAL ENCOUNTER (OUTPATIENT)
Facility: HOSPITAL | Age: 81
Discharge: HOME | End: 2021-11-03
Attending: PHYSICAL MEDICINE & REHABILITATION | Admitting: PHYSICAL MEDICINE & REHABILITATION
Payer: MEDICARE

## 2021-11-03 VITALS
RESPIRATION RATE: 14 BRPM | SYSTOLIC BLOOD PRESSURE: 113 MMHG | HEIGHT: 64 IN | OXYGEN SATURATION: 95 % | DIASTOLIC BLOOD PRESSURE: 58 MMHG | TEMPERATURE: 98 F | BODY MASS INDEX: 27.67 KG/M2 | HEART RATE: 74 BPM | WEIGHT: 162.06 LBS

## 2021-11-03 DIAGNOSIS — M54.16 LUMBAR RADICULOPATHY: Primary | ICD-10-CM

## 2021-11-03 LAB — POCT GLUCOSE: 115 MG/DL (ref 70–110)

## 2021-11-03 PROCEDURE — 25500020 PHARM REV CODE 255: Mod: HCNC | Performed by: PHYSICAL MEDICINE & REHABILITATION

## 2021-11-03 PROCEDURE — 63600175 PHARM REV CODE 636 W HCPCS: Mod: HCNC | Performed by: PHYSICAL MEDICINE & REHABILITATION

## 2021-11-03 PROCEDURE — 25000003 PHARM REV CODE 250: Mod: HCNC | Performed by: PHYSICAL MEDICINE & REHABILITATION

## 2021-11-03 PROCEDURE — 64483 NJX AA&/STRD TFRM EPI L/S 1: CPT | Mod: 50,HCNC | Performed by: PHYSICAL MEDICINE & REHABILITATION

## 2021-11-03 PROCEDURE — 64483 NJX AA&/STRD TFRM EPI L/S 1: CPT | Mod: 50,HCNC,, | Performed by: PHYSICAL MEDICINE & REHABILITATION

## 2021-11-03 PROCEDURE — 64483 PR EPIDURAL INJ, ANES/STEROID, TRANSFORAMINAL, LUMB/SACR, SNGL LEVL: ICD-10-PCS | Mod: 50,HCNC,, | Performed by: PHYSICAL MEDICINE & REHABILITATION

## 2021-11-03 PROCEDURE — 82962 GLUCOSE BLOOD TEST: CPT | Mod: HCNC | Performed by: PHYSICAL MEDICINE & REHABILITATION

## 2021-11-03 RX ORDER — FENTANYL CITRATE 50 UG/ML
INJECTION, SOLUTION INTRAMUSCULAR; INTRAVENOUS
Status: DISCONTINUED | OUTPATIENT
Start: 2021-11-03 | End: 2021-11-03 | Stop reason: HOSPADM

## 2021-11-03 RX ORDER — ONDANSETRON 2 MG/ML
4 INJECTION INTRAMUSCULAR; INTRAVENOUS ONCE AS NEEDED
Status: DISCONTINUED | OUTPATIENT
Start: 2021-11-03 | End: 2021-11-03 | Stop reason: HOSPADM

## 2021-11-03 RX ORDER — BUPIVACAINE HYDROCHLORIDE 2.5 MG/ML
INJECTION, SOLUTION EPIDURAL; INFILTRATION; INTRACAUDAL
Status: DISCONTINUED | OUTPATIENT
Start: 2021-11-03 | End: 2021-11-03 | Stop reason: HOSPADM

## 2021-11-03 RX ORDER — MIDAZOLAM HYDROCHLORIDE 1 MG/ML
INJECTION, SOLUTION INTRAMUSCULAR; INTRAVENOUS
Status: DISCONTINUED | OUTPATIENT
Start: 2021-11-03 | End: 2021-11-03 | Stop reason: HOSPADM

## 2021-11-03 RX ORDER — METHYLPREDNISOLONE ACETATE 40 MG/ML
INJECTION, SUSPENSION INTRA-ARTICULAR; INTRALESIONAL; INTRAMUSCULAR; SOFT TISSUE
Status: DISCONTINUED | OUTPATIENT
Start: 2021-11-03 | End: 2021-11-03 | Stop reason: HOSPADM

## 2021-11-16 ENCOUNTER — TELEPHONE (OUTPATIENT)
Dept: OBSTETRICS AND GYNECOLOGY | Facility: CLINIC | Age: 81
End: 2021-11-16
Payer: MEDICARE

## 2021-11-16 RX ORDER — GABAPENTIN 300 MG/1
300 CAPSULE ORAL 3 TIMES DAILY
Qty: 270 CAPSULE | Refills: 1 | Status: SHIPPED | OUTPATIENT
Start: 2021-11-16 | End: 2022-04-18

## 2021-11-30 ENCOUNTER — LAB VISIT (OUTPATIENT)
Dept: LAB | Facility: HOSPITAL | Age: 81
End: 2021-11-30
Attending: FAMILY MEDICINE
Payer: MEDICARE

## 2021-11-30 DIAGNOSIS — D64.9 ANEMIA, UNSPECIFIED TYPE: ICD-10-CM

## 2021-11-30 LAB
BASOPHILS # BLD AUTO: 0.06 K/UL (ref 0–0.2)
BASOPHILS NFR BLD: 0.8 % (ref 0–1.9)
DIFFERENTIAL METHOD: ABNORMAL
EOSINOPHIL # BLD AUTO: 0.1 K/UL (ref 0–0.5)
EOSINOPHIL NFR BLD: 1.5 % (ref 0–8)
ERYTHROCYTE [DISTWIDTH] IN BLOOD BY AUTOMATED COUNT: 15.5 % (ref 11.5–14.5)
HCT VFR BLD AUTO: 38.4 % (ref 37–48.5)
HGB BLD-MCNC: 12.1 G/DL (ref 12–16)
IMM GRANULOCYTES # BLD AUTO: 0.08 K/UL (ref 0–0.04)
IMM GRANULOCYTES NFR BLD AUTO: 1.1 % (ref 0–0.5)
LYMPHOCYTES # BLD AUTO: 1.6 K/UL (ref 1–4.8)
LYMPHOCYTES NFR BLD: 21.1 % (ref 18–48)
MCH RBC QN AUTO: 28.9 PG (ref 27–31)
MCHC RBC AUTO-ENTMCNC: 31.5 G/DL (ref 32–36)
MCV RBC AUTO: 92 FL (ref 82–98)
MONOCYTES # BLD AUTO: 0.6 K/UL (ref 0.3–1)
MONOCYTES NFR BLD: 8 % (ref 4–15)
NEUTROPHILS # BLD AUTO: 5 K/UL (ref 1.8–7.7)
NEUTROPHILS NFR BLD: 67.5 % (ref 38–73)
NRBC BLD-RTO: 0 /100 WBC
PLATELET # BLD AUTO: 201 K/UL (ref 150–450)
PMV BLD AUTO: 10 FL (ref 9.2–12.9)
RBC # BLD AUTO: 4.18 M/UL (ref 4–5.4)
WBC # BLD AUTO: 7.34 K/UL (ref 3.9–12.7)

## 2021-11-30 PROCEDURE — 36415 COLL VENOUS BLD VENIPUNCTURE: CPT | Mod: HCNC,PO | Performed by: FAMILY MEDICINE

## 2021-11-30 PROCEDURE — 85025 COMPLETE CBC W/AUTO DIFF WBC: CPT | Mod: HCNC | Performed by: FAMILY MEDICINE

## 2021-12-02 RX ORDER — LEVOTHYROXINE SODIUM 75 UG/1
TABLET ORAL
Qty: 90 TABLET | Refills: 3 | Status: SHIPPED | OUTPATIENT
Start: 2021-12-02 | End: 2022-10-03

## 2021-12-09 ENCOUNTER — OFFICE VISIT (OUTPATIENT)
Dept: PAIN MEDICINE | Facility: CLINIC | Age: 81
End: 2021-12-09
Payer: MEDICARE

## 2021-12-09 VITALS
HEART RATE: 62 BPM | RESPIRATION RATE: 18 BRPM | SYSTOLIC BLOOD PRESSURE: 121 MMHG | DIASTOLIC BLOOD PRESSURE: 64 MMHG | WEIGHT: 159.38 LBS | BODY MASS INDEX: 27.21 KG/M2 | HEIGHT: 64 IN

## 2021-12-09 DIAGNOSIS — M54.16 LUMBAR RADICULOPATHY: Primary | ICD-10-CM

## 2021-12-09 DIAGNOSIS — M51.36 DISCOGENIC LOW BACK PAIN: ICD-10-CM

## 2021-12-09 DIAGNOSIS — M79.2 NEUROPATHIC PAIN: ICD-10-CM

## 2021-12-09 DIAGNOSIS — M51.36 DDD (DEGENERATIVE DISC DISEASE), LUMBAR: ICD-10-CM

## 2021-12-09 PROCEDURE — 99214 OFFICE O/P EST MOD 30 MIN: CPT | Mod: HCNC,S$GLB,, | Performed by: PHYSICIAN ASSISTANT

## 2021-12-09 PROCEDURE — 99214 PR OFFICE/OUTPT VISIT, EST, LEVL IV, 30-39 MIN: ICD-10-PCS | Mod: HCNC,S$GLB,, | Performed by: PHYSICIAN ASSISTANT

## 2021-12-09 PROCEDURE — 99999 PR PBB SHADOW E&M-EST. PATIENT-LVL V: ICD-10-PCS | Mod: PBBFAC,HCNC,, | Performed by: PHYSICIAN ASSISTANT

## 2021-12-09 PROCEDURE — 99999 PR PBB SHADOW E&M-EST. PATIENT-LVL V: CPT | Mod: PBBFAC,HCNC,, | Performed by: PHYSICIAN ASSISTANT

## 2021-12-10 ENCOUNTER — TELEPHONE (OUTPATIENT)
Dept: FAMILY MEDICINE | Facility: CLINIC | Age: 81
End: 2021-12-10
Payer: MEDICARE

## 2021-12-15 RX ORDER — AMLODIPINE BESYLATE 5 MG/1
TABLET ORAL
Qty: 90 TABLET | Refills: 3 | Status: SHIPPED | OUTPATIENT
Start: 2021-12-15 | End: 2022-07-28

## 2021-12-15 RX ORDER — FUROSEMIDE 40 MG/1
TABLET ORAL
Qty: 90 TABLET | Refills: 3 | Status: SHIPPED | OUTPATIENT
Start: 2021-12-15 | End: 2022-07-28

## 2021-12-15 RX ORDER — LOSARTAN POTASSIUM 100 MG/1
TABLET ORAL
Qty: 90 TABLET | Refills: 3 | Status: SHIPPED | OUTPATIENT
Start: 2021-12-15 | End: 2022-10-03

## 2021-12-17 ENCOUNTER — TELEPHONE (OUTPATIENT)
Dept: FAMILY MEDICINE | Facility: CLINIC | Age: 81
End: 2021-12-17
Payer: MEDICARE

## 2022-01-07 ENCOUNTER — TELEPHONE (OUTPATIENT)
Dept: PAIN MEDICINE | Facility: CLINIC | Age: 82
End: 2022-01-07
Payer: MEDICARE

## 2022-01-07 NOTE — TELEPHONE ENCOUNTER
Called patient to inform that she needs to contact her PCP for Ropinirole refill. Patient appreciated the call.    ----- Message from Sirisha Kelley PA-C sent at 1/7/2022  9:12 AM CST -----  Contact: Jasmine  This medication is from PCP.  ----- Message -----  From: Rosemarie Mcgarry  Sent: 1/7/2022   8:45 AM CST  To: Warren CARVAJAL Staff    Jasmine would like a call back at [phone], Regards to if she can double up on taken Ropinirole 1 mg and if so she needs a new prescription for it.    Thanks  Td

## 2022-01-12 NOTE — TELEPHONE ENCOUNTER
No new care gaps identified.  Powered by [employer] by [employer]. Reference number: [id number].   1/12/2022 2:19:25 PM CST

## 2022-01-19 DIAGNOSIS — G25.81 RESTLESS LEGS SYNDROME (RLS): Primary | ICD-10-CM

## 2022-01-19 RX ORDER — ROPINIROLE 2 MG/1
2 TABLET, FILM COATED ORAL NIGHTLY
Qty: 90 TABLET | Refills: 1 | Status: SHIPPED | OUTPATIENT
Start: 2022-01-19 | End: 2022-04-06

## 2022-01-19 NOTE — TELEPHONE ENCOUNTER
----- Message from Lubna Mars sent at 1/19/2022 10:14 AM CST -----  Contact: Jordenyasmin Ferraro called regarding if she can get 2 mg of her rOPINIRole (REQUIP) 1 MG tablet, please give her a call back at [phone] (home)         [facility] - [place], [place] - [address]  [address] [address]  Phone: [phone] Fax: [phone]      Thanks  kb

## 2022-01-20 ENCOUNTER — TELEPHONE (OUTPATIENT)
Dept: ADMINISTRATIVE | Facility: HOSPITAL | Age: 82
End: 2022-01-20
Payer: MEDICARE

## 2022-01-21 NOTE — TELEPHONE ENCOUNTER
Refill Routing Note   Medication(s) are not appropriate for processing by [facility] for the following reason(s):      - Drug-Disease Interaction (Drug-Disease: metoprolol tartrate and Venous insufficiency of left lower extremity)    [facility] action(s):  Defer Medication-related problems identified: Drug-disease interaction        --->Care Gap information included in message below if applicable.   Medication reconciliation completed: No   Automatic Epic Generated Protocol Data:        Requested Prescriptions   Pending Prescriptions Disp Refills    metoprolol tartrate (LOPRESSOR) 50 MG tablet [Pharmacy Med Name: METOPROLOL TARTRATE 50 MG Tablet] 180 tablet 2     Sig: TAKE 1 TABLET TWICE DAILY       Cardiovascular:  Beta Blockers Passed - 1/12/2022  2:18 PM        Passed - Patient is at least 18 years old        Passed - Last BP in normal range within 360 days     BP Readings from Last 1 Encounters:   12/09/21 121/64               Passed - Last Heart Rate in normal range within 360 days     Pulse Readings from Last 1 Encounters:   12/09/21 62              Passed - Valid encounter within last 15 months     Recent Visits  Date Type Provider Dept   10/18/21 Office Visit Angely Roberts MD [facility]   09/22/21 Office Visit Angely Roberts MD [facility]   04/15/21 Office Visit Angely Roberts MD [facility]   01/27/21 Office Visit Angely Roberts MD [facility]   10/06/20 Office Visit Angely Roberts MD [facility]   07/15/20 Office Visit Angely Roberts MD [facility]   05/19/20 Office Visit Angely Roberts MD [facility]   02/19/20 Office Visit Angely Roberts MD [facility]   Showing recent visits within past 720 days and meeting all other requirements  Future Appointments  No visits were found meeting these conditions.  Showing future appointments within next 150 days and meeting all other requirements      Future Appointments              In 1 month BRODERICK  Cody Bunn MD [facility] - Ophthalmology 3rd Fl, [facility]    In 2 months MD Marti Mott [facility] - Family Medicine, Marti Saucedo    In 4 months MANDY [facility] - Interventional Pain, [facility]                      Appointments  past 12m or future 3m with PCP    Date Provider   Last Visit   10/18/2021 Angely Roberts MD   Next Visit   1/19/2022 Angely Roberts MD   ED visits in past 90 days: 0        Note composed:10:31 AM 01/21/2022

## 2022-01-23 RX ORDER — METOPROLOL TARTRATE 50 MG/1
TABLET ORAL
Qty: 180 TABLET | Refills: 2 | Status: SHIPPED | OUTPATIENT
Start: 2022-01-23 | End: 2022-08-26

## 2022-01-23 NOTE — TELEPHONE ENCOUNTER
Refill Authorization Note   Jasmine Velásquez  is requesting a refill authorization.  Brief Assessment and Rationale for Refill:  Approve     Medication Therapy Plan:       Medication Reconciliation Completed: No   Comments:   --->Care Gap information included below if applicable.   Orders Placed This Encounter    metoprolol tartrate (LOPRESSOR) 50 MG tablet      Requested Prescriptions   Signed Prescriptions Disp Refills    metoprolol tartrate (LOPRESSOR) 50 MG tablet 180 tablet 2     Sig: TAKE 1 TABLET TWICE DAILY       Cardiovascular:  Beta Blockers Passed - 1/21/2022 10:32 AM        Passed - Patient is at least 18 years old        Passed - Last BP in normal range within 360 days     BP Readings from Last 1 Encounters:   12/09/21 121/64               Passed - Last Heart Rate in normal range within 360 days     Pulse Readings from Last 1 Encounters:   12/09/21 62              Passed - Valid encounter within last 15 months     Recent Visits  Date Type Provider Dept   10/18/21 Office Visit Angely Roberts MD [facility]   09/22/21 Office Visit Angely Roberts MD [facility]   04/15/21 Office Visit Angely Roberts MD [facility]   01/27/21 Office Visit Angely Roberts MD [facility]   10/06/20 Office Visit Angely Roberts MD [facility]   07/15/20 Office Visit Angely Roberts MD [facility]   05/19/20 Office Visit Angely Roberts MD [facility]   02/19/20 Office Visit Angely Roberts MD [facility]   Showing recent visits within past 720 days and meeting all other requirements  Future Appointments  No visits were found meeting these conditions.  Showing future appointments within next 150 days and meeting all other requirements      Future Appointments              In 1 month BRODERICK Bunn MD [facility] - Ophthalmology [facility], [facility]    In 2 months MD Marti Mott  - Family Medicine, Marti Saucedo    In 4 months Sirisha Kelley PA-C  [facility] - [facility], [facility]                    Appointments  past 12m or future 3m with PCP    Date Provider   Last Visit   10/18/2021 Angely Roberts MD   Next Visit   1/19/2022 Angely Roberts MD   ED visits in past 90 days: 0     Note composed:8:13 AM 01/23/2022

## 2022-02-17 ENCOUNTER — TELEPHONE (OUTPATIENT)
Dept: ADMINISTRATIVE | Facility: HOSPITAL | Age: 82
End: 2022-02-17
Payer: MEDICARE

## 2022-02-20 NOTE — TELEPHONE ENCOUNTER
----- Message from Maris Orta sent at 8/30/2017  9:04 AM CDT -----  1. What is the name of the medication you are requesting? Metformin HCL  2. What is the dose? 1000 mgs  3. How do you take the medication? Orally, topically, etc? orally  4. How often do you take this medication? Twice a day  5. Do you need a 30 day or 90 day supply? 90 day  6. How many refills are you requesting? Left up to provider  7. What is your preferred pharmacy and location of the pharmacy? [phone]  8. Who can we contact with further questions? Patient [phone]    1. What is the name of the medication you are requesting? Allopurinol  2. What is the dose? 300 mgs  3. How do you take the medication? Orally, topically, etc? orally  4. How often do you take this medication? Once a day  5. Do you need a 30 day or 90 day supply? 90 day  6. How many refills are you requesting? Left up to provider  7. What is your preferred pharmacy and location of the pharmacy? [facility] [phone]  8. Who can we contact with further questions? Patient [phone]                                                                             long   · Creatinine at baseline    Will continue to monitor

## 2022-02-22 NOTE — TELEPHONE ENCOUNTER
No new care gaps identified.  Powered by [employer] by [employer]. Reference number: [id number].   2/21/2022 8:17:15 PM CST

## 2022-02-23 RX ORDER — LOVASTATIN 40 MG/1
40 TABLET ORAL NIGHTLY
Qty: 90 TABLET | Refills: 2 | Status: SHIPPED | OUTPATIENT
Start: 2022-02-23 | End: 2022-07-28

## 2022-02-23 NOTE — TELEPHONE ENCOUNTER
Refill Authorization Note   Jasmine Velásquez  is requesting a refill authorization.  Brief Assessment and Rationale for Refill:  Approve     Medication Therapy Plan:       Medication Reconciliation Completed: No   Comments:   --->Care Gap information included below if applicable.   Orders Placed This Encounter    lovastatin (MEVACOR) 40 MG tablet      Requested Prescriptions   Signed Prescriptions Disp Refills    lovastatin (MEVACOR) 40 MG tablet 90 tablet 2     Sig: Take 1 tablet (40 mg total) by mouth every evening.       Cardiovascular:  Antilipid - Statins Passed - 2/21/2022  8:16 PM        Passed - Patient is at least 18 years old        Passed - Valid encounter within last 15 months     Recent Visits  Date Type Provider Dept   10/18/21 Office Visit Angely Roberts MD [facility]   09/22/21 Office Visit Angely Roberts MD [facility]   04/15/21 Office Visit Angely Roberts MD [facility]   01/27/21 Office Visit Angely Roberts MD [facility]   10/06/20 Office Visit Angely Roberts MD [facility]   07/15/20 Office Visit Angely Roberts MD [facility]   05/19/20 Office Visit Angely Roberts MD [facility]   Showing recent visits within past 720 days and meeting all other requirements  Future Appointments  No visits were found meeting these conditions.  Showing future appointments within next 150 days and meeting all other requirements      Future Appointments              In 5 days BRODERICK Bunn MD [facility] - Ophthalmology [facility], [facility]    In 1 week VINCE Gallo - Family Medicine, Marti Saucedo    In 1 month MD Marti Mott [facility]    In 3 months MANDY Corley - Interventional Pain, [facility]                Passed - ALT is 131 or below and within 360 days     ALT   Date Value Ref Range Status   10/19/2021 10 10 - 44 U/L Final   04/15/2021 12 10 - 44 U/L Final   10/06/2020  11 10 - 44 U/L Final              Passed - AST is 119 or below and within 360 days     AST   Date Value Ref Range Status   10/19/2021 21 10 - 40 U/L Final   04/15/2021 19 10 - 40 U/L Final   10/06/2020 18 10 - 40 U/L Final              Passed - Total Cholesterol within 360 days     Lab Results   Component Value Date    CHOL 118 (L) 10/19/2021    CHOL 123 04/15/2021    CHOL 127 10/06/2020              Passed - LDL within 360 days     LDL Cholesterol   Date Value Ref Range Status   10/19/2021 47.6 (L) 63.0 - 159.0 mg/dL Final     Comment:     The National Cholesterol Education Program (NCEP) has set the  following guidelines (reference values) for LDL Cholesterol:  Optimal.......................<130 mg/dL  Borderline High...............130-159 mg/dL  High..........................160-189 mg/dL  Very High.....................>190 mg/dL              Passed - HDL within 360 days     HDL   Date Value Ref Range Status   10/19/2021 52 40 - 75 mg/dL Final     Comment:     The National Cholesterol Education Program (NCEP) has set the  following guidelines (reference values) for HDL Cholesterol:  Low...............<40 mg/dL  Optimal...........>60 mg/dL              Passed - Triglycerides within 360 days     Lab Results   Component Value Date    TRIG 92 10/19/2021    TRIG 116 04/15/2021    TRIG 163 (H) 10/06/2020                  Appointments  past 12m or future 3m with PCP    Date Provider   Last Visit   10/18/2021 Angely Roberts MD   Next Visit   4/18/2022 Angely Roberts MD   ED visits in past 90 days: 0     Note composed:12:49 PM 02/23/2022

## 2022-02-28 ENCOUNTER — OFFICE VISIT (OUTPATIENT)
Dept: OPHTHALMOLOGY | Facility: CLINIC | Age: 82
End: 2022-02-28
Payer: MEDICARE

## 2022-02-28 DIAGNOSIS — E11.3311 CONTROLLED TYPE 2 DIABETES MELLITUS WITH RIGHT EYE AFFECTED BY MODERATE NONPROLIFERATIVE RETINOPATHY AND MACULAR EDEMA, WITHOUT LONG-TERM CURRENT USE OF INSULIN: Primary | ICD-10-CM

## 2022-02-28 DIAGNOSIS — H43.811 PVD (POSTERIOR VITREOUS DETACHMENT), RIGHT EYE: ICD-10-CM

## 2022-02-28 DIAGNOSIS — Z01.00 DIABETIC EYE EXAM: ICD-10-CM

## 2022-02-28 DIAGNOSIS — Z96.1 PSEUDOPHAKIA OF BOTH EYES: ICD-10-CM

## 2022-02-28 DIAGNOSIS — E11.9 DIABETES MELLITUS TYPE 2 WITHOUT RETINOPATHY: ICD-10-CM

## 2022-02-28 DIAGNOSIS — E11.9 DIABETIC EYE EXAM: ICD-10-CM

## 2022-02-28 PROCEDURE — 1159F MED LIST DOCD IN RCRD: CPT | Mod: HCNC,CPTII,S$GLB, | Performed by: OPHTHALMOLOGY

## 2022-02-28 PROCEDURE — 99999 PR PBB SHADOW E&M-EST. PATIENT-LVL III: ICD-10-PCS | Mod: PBBFAC,HCNC,, | Performed by: OPHTHALMOLOGY

## 2022-02-28 PROCEDURE — 1126F PR PAIN SEVERITY QUANTIFIED, NO PAIN PRESENT: ICD-10-PCS | Mod: HCNC,CPTII,S$GLB, | Performed by: OPHTHALMOLOGY

## 2022-02-28 PROCEDURE — 1159F PR MEDICATION LIST DOCUMENTED IN MEDICAL RECORD: ICD-10-PCS | Mod: HCNC,CPTII,S$GLB, | Performed by: OPHTHALMOLOGY

## 2022-02-28 PROCEDURE — 2024F 7 FLD RTA PHOTO EVC RTNOPTHY: CPT | Mod: HCNC,CPTII,S$GLB, | Performed by: OPHTHALMOLOGY

## 2022-02-28 PROCEDURE — 99999 PR PBB SHADOW E&M-EST. PATIENT-LVL III: CPT | Mod: PBBFAC,HCNC,, | Performed by: OPHTHALMOLOGY

## 2022-02-28 PROCEDURE — 1126F AMNT PAIN NOTED NONE PRSNT: CPT | Mod: HCNC,CPTII,S$GLB, | Performed by: OPHTHALMOLOGY

## 2022-02-28 PROCEDURE — 92134 POSTERIOR SEGMENT OCT RETINA (OCULAR COHERENCE TOMOGRAPHY)-BOTH EYES: ICD-10-PCS | Mod: HCNC,S$GLB,, | Performed by: OPHTHALMOLOGY

## 2022-02-28 PROCEDURE — 99213 OFFICE O/P EST LOW 20 MIN: CPT | Mod: HCNC,S$GLB,, | Performed by: OPHTHALMOLOGY

## 2022-02-28 PROCEDURE — 92134 CPTRZ OPH DX IMG PST SGM RTA: CPT | Mod: HCNC,S$GLB,, | Performed by: OPHTHALMOLOGY

## 2022-02-28 PROCEDURE — 99213 PR OFFICE/OUTPT VISIT, EST, LEVL III, 20-29 MIN: ICD-10-PCS | Mod: HCNC,S$GLB,, | Performed by: OPHTHALMOLOGY

## 2022-02-28 PROCEDURE — 2024F PR 7 FIELD PHOTOS WITH INTERP/ REVIEW: ICD-10-PCS | Mod: HCNC,CPTII,S$GLB, | Performed by: OPHTHALMOLOGY

## 2022-02-28 PROCEDURE — 1160F RVW MEDS BY RX/DR IN RCRD: CPT | Mod: HCNC,CPTII,S$GLB, | Performed by: OPHTHALMOLOGY

## 2022-02-28 PROCEDURE — 1160F PR REVIEW ALL MEDS BY PRESCRIBER/CLIN PHARMACIST DOCUMENTED: ICD-10-PCS | Mod: HCNC,CPTII,S$GLB, | Performed by: OPHTHALMOLOGY

## 2022-02-28 NOTE — PROGRESS NOTES
===============================  Date today is 2/28/2022  Jasmine Velásquez is a 81 y.o. female  Last visit [facility]: :10/26/2021   Last visit eye dept. 10/26/2021    Corrected distance visual acuity was 20/50 in the right eye and 20/30 in the left eye.  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 Check OU daily.   Gave my office and cell phone number.    .      ===============================

## 2022-03-02 NOTE — TELEPHONE ENCOUNTER
No new care gaps identified.  Powered by [employer] by [employer]. Reference number: [id number].   3/02/2022 3:24:20 PM CST

## 2022-03-03 ENCOUNTER — OFFICE VISIT (OUTPATIENT)
Dept: FAMILY MEDICINE | Facility: CLINIC | Age: 82
End: 2022-03-03
Payer: MEDICARE

## 2022-03-03 VITALS
BODY MASS INDEX: 27.28 KG/M2 | RESPIRATION RATE: 20 BRPM | HEART RATE: 81 BPM | SYSTOLIC BLOOD PRESSURE: 122 MMHG | OXYGEN SATURATION: 97 % | TEMPERATURE: 97 F | WEIGHT: 159.81 LBS | DIASTOLIC BLOOD PRESSURE: 64 MMHG | HEIGHT: 64 IN

## 2022-03-03 DIAGNOSIS — N81.4 CYSTOCELE WITH UTERINE PROLAPSE: ICD-10-CM

## 2022-03-03 DIAGNOSIS — E78.5 HYPERLIPIDEMIA ASSOCIATED WITH TYPE 2 DIABETES MELLITUS: ICD-10-CM

## 2022-03-03 DIAGNOSIS — E03.4 HYPOTHYROIDISM DUE TO ACQUIRED ATROPHY OF THYROID: ICD-10-CM

## 2022-03-03 DIAGNOSIS — G25.81 RESTLESS LEGS SYNDROME (RLS): Chronic | ICD-10-CM

## 2022-03-03 DIAGNOSIS — N18.30 CKD STAGE 3 DUE TO TYPE 2 DIABETES MELLITUS: ICD-10-CM

## 2022-03-03 DIAGNOSIS — I87.2 VENOUS INSUFFICIENCY OF LEFT LOWER EXTREMITY: ICD-10-CM

## 2022-03-03 DIAGNOSIS — Z00.00 ENCOUNTER FOR PREVENTIVE HEALTH EXAMINATION: Primary | ICD-10-CM

## 2022-03-03 DIAGNOSIS — E11.42 DIABETIC POLYNEUROPATHY ASSOCIATED WITH TYPE 2 DIABETES MELLITUS: ICD-10-CM

## 2022-03-03 DIAGNOSIS — K21.9 GASTROESOPHAGEAL REFLUX DISEASE, UNSPECIFIED WHETHER ESOPHAGITIS PRESENT: ICD-10-CM

## 2022-03-03 DIAGNOSIS — Z95.3 H/O AORTIC VALVE REPLACEMENT WITH PORCINE VALVE: ICD-10-CM

## 2022-03-03 DIAGNOSIS — M46.1 SACROILIITIS: ICD-10-CM

## 2022-03-03 DIAGNOSIS — I71.40 ABDOMINAL AORTIC ANEURYSM (AAA) 30 TO 34 MM IN DIAMETER: ICD-10-CM

## 2022-03-03 DIAGNOSIS — E11.3311 CONTROLLED TYPE 2 DIABETES MELLITUS WITH RIGHT EYE AFFECTED BY MODERATE NONPROLIFERATIVE RETINOPATHY AND MACULAR EDEMA, WITHOUT LONG-TERM CURRENT USE OF INSULIN: ICD-10-CM

## 2022-03-03 DIAGNOSIS — L98.9 SKIN LESION: ICD-10-CM

## 2022-03-03 DIAGNOSIS — M54.16 LUMBAR RADICULOPATHY: ICD-10-CM

## 2022-03-03 DIAGNOSIS — I70.0 AORTIC ATHEROSCLEROSIS: ICD-10-CM

## 2022-03-03 DIAGNOSIS — N28.1 RENAL CYST: ICD-10-CM

## 2022-03-03 DIAGNOSIS — E11.69 HYPERLIPIDEMIA ASSOCIATED WITH TYPE 2 DIABETES MELLITUS: ICD-10-CM

## 2022-03-03 DIAGNOSIS — E11.3299 BDR (BACKGROUND DIABETIC RETINOPATHY): ICD-10-CM

## 2022-03-03 DIAGNOSIS — E11.22 CKD STAGE 3 DUE TO TYPE 2 DIABETES MELLITUS: ICD-10-CM

## 2022-03-03 DIAGNOSIS — I11.9 HYPERTENSIVE HEART DISEASE WITHOUT CONGESTIVE HEART FAILURE: ICD-10-CM

## 2022-03-03 DIAGNOSIS — N39.3 STRESS INCONTINENCE: ICD-10-CM

## 2022-03-03 PROBLEM — N39.41 URGE INCONTINENCE OF URINE: Status: RESOLVED | Noted: 2020-11-17 | Resolved: 2022-03-03

## 2022-03-03 PROBLEM — M10.9 GOUT: Status: RESOLVED | Noted: 2019-11-08 | Resolved: 2022-03-03

## 2022-03-03 PROBLEM — E11.9 DIABETES MELLITUS WITHOUT COMPLICATION: Status: RESOLVED | Noted: 2019-11-08 | Resolved: 2022-03-03

## 2022-03-03 PROBLEM — R93.5 ABNORMAL CT OF THE ABDOMEN: Status: RESOLVED | Noted: 2017-10-12 | Resolved: 2022-03-03

## 2022-03-03 PROCEDURE — 99999 PR PBB SHADOW E&M-EST. PATIENT-LVL V: ICD-10-PCS | Mod: PBBFAC,HCNC,, | Performed by: NURSE PRACTITIONER

## 2022-03-03 PROCEDURE — 1125F PR PAIN SEVERITY QUANTIFIED, PAIN PRESENT: ICD-10-PCS | Mod: HCNC,CPTII,S$GLB, | Performed by: NURSE PRACTITIONER

## 2022-03-03 PROCEDURE — 3288F PR FALLS RISK ASSESSMENT DOCUMENTED: ICD-10-PCS | Mod: HCNC,CPTII,S$GLB, | Performed by: NURSE PRACTITIONER

## 2022-03-03 PROCEDURE — 99499 RISK ADDL DX/OHS AUDIT: ICD-10-PCS | Mod: S$GLB,,, | Performed by: NURSE PRACTITIONER

## 2022-03-03 PROCEDURE — G0439 PR MEDICARE ANNUAL WELLNESS SUBSEQUENT VISIT: ICD-10-PCS | Mod: HCNC,S$GLB,, | Performed by: NURSE PRACTITIONER

## 2022-03-03 PROCEDURE — 99499 UNLISTED E&M SERVICE: CPT | Mod: S$GLB,,, | Performed by: NURSE PRACTITIONER

## 2022-03-03 PROCEDURE — 1159F PR MEDICATION LIST DOCUMENTED IN MEDICAL RECORD: ICD-10-PCS | Mod: HCNC,CPTII,S$GLB, | Performed by: NURSE PRACTITIONER

## 2022-03-03 PROCEDURE — 1160F RVW MEDS BY RX/DR IN RCRD: CPT | Mod: HCNC,CPTII,S$GLB, | Performed by: NURSE PRACTITIONER

## 2022-03-03 PROCEDURE — 3078F PR MOST RECENT DIASTOLIC BLOOD PRESSURE < 80 MM HG: ICD-10-PCS | Mod: HCNC,CPTII,S$GLB, | Performed by: NURSE PRACTITIONER

## 2022-03-03 PROCEDURE — 1125F AMNT PAIN NOTED PAIN PRSNT: CPT | Mod: HCNC,CPTII,S$GLB, | Performed by: NURSE PRACTITIONER

## 2022-03-03 PROCEDURE — 1101F PR PT FALLS ASSESS DOC 0-1 FALLS W/OUT INJ PAST YR: ICD-10-PCS | Mod: HCNC,CPTII,S$GLB, | Performed by: NURSE PRACTITIONER

## 2022-03-03 PROCEDURE — 1170F PR FUNCTIONAL STATUS ASSESSED: ICD-10-PCS | Mod: HCNC,CPTII,S$GLB, | Performed by: NURSE PRACTITIONER

## 2022-03-03 PROCEDURE — 1101F PT FALLS ASSESS-DOCD LE1/YR: CPT | Mod: HCNC,CPTII,S$GLB, | Performed by: NURSE PRACTITIONER

## 2022-03-03 PROCEDURE — 99999 PR PBB SHADOW E&M-EST. PATIENT-LVL V: CPT | Mod: PBBFAC,HCNC,, | Performed by: NURSE PRACTITIONER

## 2022-03-03 PROCEDURE — 1170F FXNL STATUS ASSESSED: CPT | Mod: HCNC,CPTII,S$GLB, | Performed by: NURSE PRACTITIONER

## 2022-03-03 PROCEDURE — 3074F PR MOST RECENT SYSTOLIC BLOOD PRESSURE < 130 MM HG: ICD-10-PCS | Mod: HCNC,CPTII,S$GLB, | Performed by: NURSE PRACTITIONER

## 2022-03-03 PROCEDURE — 3078F DIAST BP <80 MM HG: CPT | Mod: HCNC,CPTII,S$GLB, | Performed by: NURSE PRACTITIONER

## 2022-03-03 PROCEDURE — 1160F PR REVIEW ALL MEDS BY PRESCRIBER/CLIN PHARMACIST DOCUMENTED: ICD-10-PCS | Mod: HCNC,CPTII,S$GLB, | Performed by: NURSE PRACTITIONER

## 2022-03-03 PROCEDURE — G0439 PPPS, SUBSEQ VISIT: HCPCS | Mod: HCNC,S$GLB,, | Performed by: NURSE PRACTITIONER

## 2022-03-03 PROCEDURE — 3288F FALL RISK ASSESSMENT DOCD: CPT | Mod: HCNC,CPTII,S$GLB, | Performed by: NURSE PRACTITIONER

## 2022-03-03 PROCEDURE — 3074F SYST BP LT 130 MM HG: CPT | Mod: HCNC,CPTII,S$GLB, | Performed by: NURSE PRACTITIONER

## 2022-03-03 PROCEDURE — 1159F MED LIST DOCD IN RCRD: CPT | Mod: HCNC,CPTII,S$GLB, | Performed by: NURSE PRACTITIONER

## 2022-03-03 NOTE — PATIENT INSTRUCTIONS
Counseling and Referral of Other Preventative  (Italic type indicates deductible and co-insurance are waived)    Patient Name: Jasmine Velásquez  Today's Date: 3/3/2022    Health Maintenance       Date Due Completion Date    Foot Exam 06/18/2020 6/18/2019 (Done)    Override on 6/18/2019: Done    Override on 5/2/2018: Done    Override on 3/14/2017: Done    Diabetes Urine Screening 09/17/2020 9/17/2019    COVID-19 Vaccine (4 - Booster for Moderna series) 04/10/2022 11/10/2021    Hemoglobin A1c 04/19/2022 10/19/2021    DEXA Scan 10/03/2022 10/3/2019    Colonoscopy 10/12/2022 10/12/2017    Lipid Panel 10/19/2022 10/19/2021    Eye Exam 10/19/2022 10/19/2021    Override on 7/10/2020: Done    TETANUS VACCINE 02/02/2026 2/2/2016        Orders Placed This Encounter   Procedures    Ambulatory referral/consult to Dermatology     The following information is provided to all patients.  This information is to help you find resources for any of the problems found today that may be affecting your health:                Living healthy guide: www.[facility].[place].gov      Understanding Diabetes: www.diabetes.org      Eating healthy: www.cdc.gov/healthyweight      CDC home safety checklist: www.cdc.gov/steadi/patient.html      Agency on Aging: www.goea.[place].[facility]      Alcoholics anonymous (AA): www.aa.org      Physical Activity: www.randi.nih.gov/gd2syct      Tobacco use: www.quitwithusla.org

## 2022-03-03 NOTE — PROGRESS NOTES
"  Jasmine Velásquez presented for a  Medicare AWV and comprehensive Health Risk Assessment today. The following components were reviewed and updated:    · Medical history  · Family History  · Social history  · Allergies and Current Medications  · Health Risk Assessment  · Health Maintenance  · Care Team         ** See Completed Assessments for Annual Wellness Visit within the encounter summary.**         The following assessments were completed:  · Living Situation  · CAGE  · Depression Screening  · Timed Get Up and Go  · Whisper Test  · Cognitive Function Screening  · Nutrition Screening  · ADL Screening  · PAQ Screening        Vitals:    03/03/22 1050   BP: 122/64   Pulse: 81   Resp: 20   Temp: 97 °F (36.1 °C)   TempSrc: Temporal   SpO2: 97%   Weight: 72.5 kg (159 lb 13.3 oz)   Height: 5' 4" (1.626 m)     Body mass index is 27.44 kg/m².  Physical Exam  Constitutional:       General: She is not in acute distress.     Appearance: Normal appearance. She is well-developed. She is not ill-appearing or toxic-appearing.   HENT:      Head: Normocephalic and atraumatic.      Right Ear: External ear normal.      Left Ear: External ear normal.      Nose: Nose normal.      Mouth/Throat:      Mouth: Mucous membranes are moist.      Pharynx: No posterior oropharyngeal erythema.   Eyes:      General: No scleral icterus.        Right eye: No discharge.         Left eye: No discharge.      Extraocular Movements: Extraocular movements intact.      Conjunctiva/sclera: Conjunctivae normal.      Pupils: Pupils are equal, round, and reactive to light.   Neck:      Thyroid: No thyromegaly.      Vascular: No carotid bruit.      Trachea: No tracheal deviation.   Cardiovascular:      Rate and Rhythm: Normal rate and regular rhythm.      Pulses: Normal pulses.      Heart sounds: Normal heart sounds. No murmur heard.    No friction rub. No gallop.   Pulmonary:      Effort: Pulmonary effort is normal. No respiratory distress.      Breath " sounds: Normal breath sounds. No wheezing, rhonchi or rales.   Chest:      Chest wall: No tenderness.   Breasts:      Right: No supraclavicular adenopathy.      Left: No supraclavicular adenopathy.       Abdominal:      General: Bowel sounds are normal. There is no distension.      Palpations: Abdomen is soft. There is no mass.      Tenderness: There is no abdominal tenderness. There is no guarding or rebound.      Hernia: No hernia is present.   Musculoskeletal:         General: No tenderness. Normal range of motion.      Cervical back: Normal range of motion and neck supple. No edema or tenderness. Normal range of motion.   Lymphadenopathy:      Head:      Right side of head: No tonsillar adenopathy.      Left side of head: No tonsillar adenopathy.      Cervical: No cervical adenopathy.      Upper Body:      Right upper body: No supraclavicular adenopathy.      Left upper body: No supraclavicular adenopathy.   Skin:     General: Skin is warm and dry.      Findings: No lesion or rash.      Comments: Dime-size dark pink raised skin lesion to anterior chest. States has been there for months, no dc or changes to it.   Neurological:      Mental Status: She is alert and oriented to person, place, and time.      Deep Tendon Reflexes: Reflexes are normal and symmetric.      Comments: Protective Sensation (w/ 10 gram monofilament):  Right: Intact  Left: Intact    Visual Inspection:  Normal -  Bilateral    Pedal Pulses:   Right: Present  Left: Present    Posterior tibialis:   Right:Present  Left: Present   Psychiatric:         Mood and Affect: Mood normal.         Behavior: Behavior normal.               Diagnoses and health risks identified today and associated recommendations/orders:    1. Encounter for preventive health examination  Screenings performed, as noted above.  Personal preventative testing needs reviewed.     2. Skin lesion  - Ambulatory referral/consult to Dermatology; Future    3.  Sacroiliitis  Monitored/treated on meds, continue the same tx, stable, follow up with pcp, sees pain management    4. 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 Abdominal aortic aneurysm (AAA) 30 to 34 mm in diameter  Monitored/treated on meds, continue the same tx, stable, follow up with pcp    6. Hyperlipidemia associated with type 2 diabetes mellitus  Monitored/treated on meds, continue the same tx, stable, follow up with pcp    7. Diabetic polyneuropathy associated with type 2 diabetes mellitus  Monitored/treated on meds, continue the same tx, stable, follow up with pcp    8. CKD stage 3 due to type 2 diabetes mellitus  Monitored/treated on meds, continue the same tx, stable, follow up with pcp    9. BDR (background diabetic retinopathy)  Monitored/treated on meds, continue the same tx, stable, follow up with pcp and eye doctor    10. Gastroesophageal reflux disease, unspecified whether esophagitis present  Monitored/treated on meds, continue the same tx, stable, follow up with pcp    11.  Aortic athersclerosis  Monitored/treated on meds, continue the same tx, stable, follow up with pcp    12. Cystocele with uterine prolapse  Monitored/treated on meds, continue the same tx, stable, follow up with pcp and urology    13. Stress incontinence  Monitored/treated on meds, continue the same tx, stable, follow up with pcp and urology    14. Renal cyst  Monitored/treated on meds, continue the same tx, stable, follow up with pcp and urology    15. Hypertensive heart disease without congestive heart failure  Monitored/treated on meds, continue the same tx, stable, follow up with pcp     16. Venous insufficiency of left lower extremity  Monitored/treated on meds, continue the same tx, stable, follow up with pcp      17. H/O aortic valve replacement with porcine valve  Monitored/treated on meds, continue the same tx, stable, follow up with pcp and cardiology, Dr Hernandez    18. Restless legs syndrome (RLS)  Monitored/treated on meds, continue the same tx, stable, follow up with pcp    19. Lumbar radiculopathy  Monitored/treated on meds, continue the same tx, stable, follow up with pcp, sees pain management    20. Hypothyroidism due to acquired atrophy of thyroid  Monitored/treated on meds, continue the same tx, stable, follow up with pcp          Provided Othella with a 5-10 year written screening schedule and personal prevention plan. Recommendations were developed using the USPSTF age appropriate recommendations. Education, counseling, and referrals were provided as needed. After Visit Summary printed and given to patient which includes a list of additional screenings\tests needed.    No follow-ups on file.    Axel Fagan, VINCE  I offered to discuss advanced care planning, including how to pick a person who would make decisions for you if you were unable to make them for yourself, called a health care power of , and what kind of decisions you might make such as use of life sustaining treatments such as ventilators and tube feeding when faced with a life limiting illness recorded on a living will that they will need to know. (How you want to be cared for as you near the end of your natural life)     X Patient is interested in learning more about how to make advanced directives.  I provided them paperwork and offered to discuss this with them.

## 2022-03-07 RX ORDER — TRAZODONE HYDROCHLORIDE 50 MG/1
TABLET ORAL
Qty: 90 TABLET | Refills: 3 | Status: SHIPPED | OUTPATIENT
Start: 2022-03-07

## 2022-03-07 NOTE — TELEPHONE ENCOUNTER
This Rx Request does not qualify for assessment with the OR.  Please review protocol details and the Care Due Message for extra clinical information    Reasons Rx Request may be deferred:  Labs/Vitals overdue  Labs/Vitals abnormal  Patient has been seen in the ED/Hospital since the last PCP visit  Medication is non-delegated  Provider is a non-participating provider    165.1

## 2022-03-23 ENCOUNTER — OFFICE VISIT (OUTPATIENT)
Dept: DERMATOLOGY | Facility: CLINIC | Age: 82
End: 2022-03-23
Payer: MEDICARE

## 2022-03-23 DIAGNOSIS — D49.2 SKIN NEOPLASM: Primary | ICD-10-CM

## 2022-03-23 DIAGNOSIS — L98.9 SKIN LESION: ICD-10-CM

## 2022-03-23 DIAGNOSIS — L82.1 SEBORRHEIC KERATOSES: ICD-10-CM

## 2022-03-23 PROCEDURE — 1101F PT FALLS ASSESS-DOCD LE1/YR: CPT | Mod: CPTII,S$GLB,, | Performed by: DERMATOLOGY

## 2022-03-23 PROCEDURE — 1160F RVW MEDS BY RX/DR IN RCRD: CPT | Mod: CPTII,S$GLB,, | Performed by: DERMATOLOGY

## 2022-03-23 PROCEDURE — 3288F PR FALLS RISK ASSESSMENT DOCUMENTED: ICD-10-PCS | Mod: CPTII,S$GLB,, | Performed by: DERMATOLOGY

## 2022-03-23 PROCEDURE — 1126F AMNT PAIN NOTED NONE PRSNT: CPT | Mod: CPTII,S$GLB,, | Performed by: DERMATOLOGY

## 2022-03-23 PROCEDURE — 88305 TISSUE EXAM BY PATHOLOGIST: ICD-10-PCS | Mod: 26,,, | Performed by: PATHOLOGY

## 2022-03-23 PROCEDURE — 1159F MED LIST DOCD IN RCRD: CPT | Mod: CPTII,S$GLB,, | Performed by: DERMATOLOGY

## 2022-03-23 PROCEDURE — 1159F PR MEDICATION LIST DOCUMENTED IN MEDICAL RECORD: ICD-10-PCS | Mod: CPTII,S$GLB,, | Performed by: DERMATOLOGY

## 2022-03-23 PROCEDURE — 99999 PR PBB SHADOW E&M-EST. PATIENT-LVL III: ICD-10-PCS | Mod: PBBFAC,,, | Performed by: DERMATOLOGY

## 2022-03-23 PROCEDURE — 99999 PR PBB SHADOW E&M-EST. PATIENT-LVL III: CPT | Mod: PBBFAC,,, | Performed by: DERMATOLOGY

## 2022-03-23 PROCEDURE — 1160F PR REVIEW ALL MEDS BY PRESCRIBER/CLIN PHARMACIST DOCUMENTED: ICD-10-PCS | Mod: CPTII,S$GLB,, | Performed by: DERMATOLOGY

## 2022-03-23 PROCEDURE — 1126F PR PAIN SEVERITY QUANTIFIED, NO PAIN PRESENT: ICD-10-PCS | Mod: CPTII,S$GLB,, | Performed by: DERMATOLOGY

## 2022-03-23 PROCEDURE — 88305 TISSUE EXAM BY PATHOLOGIST: CPT | Mod: 26,,, | Performed by: PATHOLOGY

## 2022-03-23 PROCEDURE — 88305 TISSUE EXAM BY PATHOLOGIST: CPT | Performed by: PATHOLOGY

## 2022-03-23 PROCEDURE — 99203 PR OFFICE/OUTPT VISIT, NEW, LEVL III, 30-44 MIN: ICD-10-PCS | Mod: 25,S$GLB,, | Performed by: DERMATOLOGY

## 2022-03-23 PROCEDURE — 1101F PR PT FALLS ASSESS DOC 0-1 FALLS W/OUT INJ PAST YR: ICD-10-PCS | Mod: CPTII,S$GLB,, | Performed by: DERMATOLOGY

## 2022-03-23 PROCEDURE — 99203 OFFICE O/P NEW LOW 30 MIN: CPT | Mod: 25,S$GLB,, | Performed by: DERMATOLOGY

## 2022-03-23 PROCEDURE — 3288F FALL RISK ASSESSMENT DOCD: CPT | Mod: CPTII,S$GLB,, | Performed by: DERMATOLOGY

## 2022-03-23 NOTE — PROGRESS NOTES
Subjective:       Patient ID:  Jasmine Velásquez is a 81 y.o. female who presents for   Chief Complaint   Patient presents with    Lesion     chest    Spots and/or Floaters     cheek     81F with no history of skin cancer presents to clinic today for:    1) spot  Chest  Red  Has been applying alcohol, won't heal  Burns    2) growths  Brown  Right cheek  Present months to years, constant  No prior treatment      Review of Systems   Constitutional: Negative for malaise.   Skin: Negative for itching.        Objective:    Physical Exam   Constitutional: She appears well-developed and well-nourished. No distress.   Neurological: She is alert and oriented to person, place, and time.   Psychiatric: She has a normal mood and affect.   Skin:   Areas Examined (abnormalities noted in diagram):   Head / Face Inspection Performed  Neck Inspection Performed  Chest / Axilla Inspection Performed  RUE Inspected  LUE Inspection Performed                   Diagram Legend     Erythematous scaling macule/papule c/w actinic keratosis       Vascular papule c/w angioma      Pigmented verrucoid papule/plaque c/w seborrheic keratosis      Yellow umbilicated papule c/w sebaceous hyperplasia      Irregularly shaped tan macule c/w lentigo     1-2 mm smooth white papules consistent with Milia      Movable subcutaneous cyst with punctum c/w epidermal inclusion cyst      Subcutaneous movable cyst c/w pilar cyst      Firm pink to brown papule c/w dermatofibroma      Pedunculated fleshy papule(s) c/w skin tag(s)      Evenly pigmented macule c/w junctional nevus     Mildly variegated pigmented, slightly irregular-bordered macule c/w mildly atypical nevus      Flesh colored to evenly pigmented papule c/w intradermal nevus       Pink pearly papule/plaque c/w basal cell carcinoma      Erythematous hyperkeratotic cursted plaque c/w SCC      Surgical scar with no sign of skin cancer recurrence      Open and closed comedones       Inflammatory papules and pustules      Verrucoid papule consistent consistent with wart     Erythematous eczematous patches and plaques     Dystrophic onycholytic nail with subungual debris c/w onychomycosis     Umbilicated papule    Erythematous-base heme-crusted tan verrucoid plaque consistent with inflamed seborrheic keratosis     Erythematous Silvery Scaling Plaque c/w Psoriasis     See annotation      Assessment / Plan:      Pathology Orders:     Normal Orders This Visit    Specimen to Pathology, Dermatology     Questions:    Procedure Type: Dermatology and skin neoplasms    Number of Specimens: 1    ------------------------: -------------------------    Spec 1 Procedure: Biopsy    Spec 1 Clinical Impression: BCC    Spec 1 Source: mid chest    Clinical Information: erythematous plaque    Release to patient: Immediate      Skin lesion  -     Ambulatory referral/consult to Dermatology  Skin neoplasm  -     SHAVE BIOPSY  -     Specimen to Pathology, Dermatology  Shave biopsy procedure note:    Shave biopsy performed after verbal consent including risk of infection, scar, recurrence, need for additional treatment of site. Area prepped with alcohol, anesthetized with approximately 1.0cc of 1% lidocaine with epinephrine. Lesional tissue shaved with razor blade. Hemostasis achieved with application of aluminum chloride followed by hyfrecation. No complications. Dressing applied. Wound care explained.      Seborrheic keratoses  These are benign inherited growths without a malignant potential. Reassurance given to patient. No treatment is necessary.              Follow up for based on biopsy results.

## 2022-03-23 NOTE — PATIENT INSTRUCTIONS

## 2022-03-29 NOTE — TELEPHONE ENCOUNTER
----- Message from Janay Berry sent at 3/29/2022  1:50 PM CDT -----    .Type:  RX Refill Request    Who Called: ROLANDO KATZ [[id number]]  Refill or New Rx:refill  RX Name and Strength: methocarbamoL (ROBAXIN) 500 MG Tab  How is the patient currently taking it? (ex. 1XDay):  Is this a 30 day or 90 day RX:  Preferred Pharmacy with phone number:  Local or Mail Order: mail  Ordering Provider: Alejandra  Would the patient rather a call back or a response via [facility]? call  Best Call Back Number: [phone] (home)   Additional Information: Please fax to [phone]    [facility] - [place], [place] - [address] [facility] [facility]  [address] [address]  Phone: [phone] Fax: [phone]

## 2022-03-31 LAB
FINAL PATHOLOGIC DIAGNOSIS: NORMAL
GROSS: NORMAL
Lab: NORMAL
MICROSCOPIC EXAM: NORMAL

## 2022-03-31 RX ORDER — METHOCARBAMOL 500 MG/1
500 TABLET, FILM COATED ORAL 3 TIMES DAILY
Qty: 270 TABLET | Refills: 1 | Status: SHIPPED | OUTPATIENT
Start: 2022-03-31 | End: 2022-08-11

## 2022-03-31 NOTE — TELEPHONE ENCOUNTER
No new care gaps identified.  Powered by [employer]. Reference number: [id number].   3/31/2022 11:13:02 AM CDT

## 2022-04-06 ENCOUNTER — TELEPHONE (OUTPATIENT)
Dept: DERMATOLOGY | Facility: CLINIC | Age: 82
End: 2022-04-06
Payer: MEDICARE

## 2022-04-18 ENCOUNTER — HOSPITAL ENCOUNTER (OUTPATIENT)
Dept: RADIOLOGY | Facility: HOSPITAL | Age: 82
Discharge: HOME | End: 2022-04-18
Attending: FAMILY MEDICINE
Payer: MEDICARE

## 2022-04-18 ENCOUNTER — OFFICE VISIT (OUTPATIENT)
Dept: FAMILY MEDICINE | Facility: CLINIC | Age: 82
End: 2022-04-18
Payer: MEDICARE

## 2022-04-18 ENCOUNTER — TELEPHONE (OUTPATIENT)
Dept: OBSTETRICS AND GYNECOLOGY | Facility: CLINIC | Age: 82
End: 2022-04-18
Payer: MEDICARE

## 2022-04-18 VITALS
SYSTOLIC BLOOD PRESSURE: 122 MMHG | HEIGHT: 64 IN | DIASTOLIC BLOOD PRESSURE: 68 MMHG | WEIGHT: 159.63 LBS | TEMPERATURE: 98 F | BODY MASS INDEX: 27.25 KG/M2 | HEART RATE: 69 BPM

## 2022-04-18 DIAGNOSIS — M54.32 LEFT SIDED SCIATICA: ICD-10-CM

## 2022-04-18 DIAGNOSIS — M54.32 LEFT SIDED SCIATICA: Primary | ICD-10-CM

## 2022-04-18 DIAGNOSIS — G25.81 RESTLESS LEGS SYNDROME (RLS): ICD-10-CM

## 2022-04-18 PROCEDURE — 3074F PR MOST RECENT SYSTOLIC BLOOD PRESSURE < 130 MM HG: ICD-10-PCS | Mod: CPTII,S$GLB,, | Performed by: FAMILY MEDICINE

## 2022-04-18 PROCEDURE — 99999 PR PBB SHADOW E&M-EST. PATIENT-LVL V: ICD-10-PCS | Mod: PBBFAC,,, | Performed by: FAMILY MEDICINE

## 2022-04-18 PROCEDURE — 1160F PR REVIEW ALL MEDS BY PRESCRIBER/CLIN PHARMACIST DOCUMENTED: ICD-10-PCS | Mod: CPTII,S$GLB,, | Performed by: FAMILY MEDICINE

## 2022-04-18 PROCEDURE — 72100 XR LUMBAR SPINE AP AND LATERAL: ICD-10-PCS | Mod: 26,,, | Performed by: RADIOLOGY

## 2022-04-18 PROCEDURE — 3288F PR FALLS RISK ASSESSMENT DOCUMENTED: ICD-10-PCS | Mod: CPTII,S$GLB,, | Performed by: FAMILY MEDICINE

## 2022-04-18 PROCEDURE — 3288F FALL RISK ASSESSMENT DOCD: CPT | Mod: CPTII,S$GLB,, | Performed by: FAMILY MEDICINE

## 2022-04-18 PROCEDURE — 1160F RVW MEDS BY RX/DR IN RCRD: CPT | Mod: CPTII,S$GLB,, | Performed by: FAMILY MEDICINE

## 2022-04-18 PROCEDURE — 72100 X-RAY EXAM L-S SPINE 2/3 VWS: CPT | Mod: TC,FY,PO

## 2022-04-18 PROCEDURE — 1125F AMNT PAIN NOTED PAIN PRSNT: CPT | Mod: CPTII,S$GLB,, | Performed by: FAMILY MEDICINE

## 2022-04-18 PROCEDURE — 1159F PR MEDICATION LIST DOCUMENTED IN MEDICAL RECORD: ICD-10-PCS | Mod: CPTII,S$GLB,, | Performed by: FAMILY MEDICINE

## 2022-04-18 PROCEDURE — 72100 X-RAY EXAM L-S SPINE 2/3 VWS: CPT | Mod: 26,,, | Performed by: RADIOLOGY

## 2022-04-18 PROCEDURE — 3078F DIAST BP <80 MM HG: CPT | Mod: CPTII,S$GLB,, | Performed by: FAMILY MEDICINE

## 2022-04-18 PROCEDURE — 99214 PR OFFICE/OUTPT VISIT, EST, LEVL IV, 30-39 MIN: ICD-10-PCS | Mod: S$GLB,,, | Performed by: FAMILY MEDICINE

## 2022-04-18 PROCEDURE — 1159F MED LIST DOCD IN RCRD: CPT | Mod: CPTII,S$GLB,, | Performed by: FAMILY MEDICINE

## 2022-04-18 PROCEDURE — 99999 PR PBB SHADOW E&M-EST. PATIENT-LVL V: CPT | Mod: PBBFAC,,, | Performed by: FAMILY MEDICINE

## 2022-04-18 PROCEDURE — 3074F SYST BP LT 130 MM HG: CPT | Mod: CPTII,S$GLB,, | Performed by: FAMILY MEDICINE

## 2022-04-18 PROCEDURE — 1101F PT FALLS ASSESS-DOCD LE1/YR: CPT | Mod: CPTII,S$GLB,, | Performed by: FAMILY MEDICINE

## 2022-04-18 PROCEDURE — 3078F PR MOST RECENT DIASTOLIC BLOOD PRESSURE < 80 MM HG: ICD-10-PCS | Mod: CPTII,S$GLB,, | Performed by: FAMILY MEDICINE

## 2022-04-18 PROCEDURE — 1101F PR PT FALLS ASSESS DOC 0-1 FALLS W/OUT INJ PAST YR: ICD-10-PCS | Mod: CPTII,S$GLB,, | Performed by: FAMILY MEDICINE

## 2022-04-18 PROCEDURE — 1125F PR PAIN SEVERITY QUANTIFIED, PAIN PRESENT: ICD-10-PCS | Mod: CPTII,S$GLB,, | Performed by: FAMILY MEDICINE

## 2022-04-18 PROCEDURE — 99214 OFFICE O/P EST MOD 30 MIN: CPT | Mod: S$GLB,,, | Performed by: FAMILY MEDICINE

## 2022-04-18 RX ORDER — MELOXICAM 7.5 MG/1
7.5 TABLET ORAL DAILY
Qty: 30 TABLET | Refills: 0 | Status: SHIPPED | OUTPATIENT
Start: 2022-04-18

## 2022-04-18 RX ORDER — ROPINIROLE 2 MG/1
1 TABLET, FILM COATED ORAL 3 TIMES DAILY
Qty: 270 TABLET | Refills: 1 | Status: SHIPPED | OUTPATIENT
Start: 2022-04-18

## 2022-04-18 RX ORDER — GABAPENTIN 300 MG/1
CAPSULE ORAL
Qty: 270 CAPSULE | Refills: 1 | Status: SHIPPED | OUTPATIENT
Start: 2022-04-18 | End: 2022-05-10

## 2022-04-18 NOTE — PROGRESS NOTES
Chief Complaint:    No chief complaint on file.      History of Present Illness:  Patient with RLS, background diabetic retinopathy, h/o aortic valve replacement with porcine valve, AAA 30 to 34 mm in diameter, HLD with DM2, CKD stage 3, EWA, and GERD presents today for a six month follow-up      Reports L leg pain that radiates from her hip down her leg. Worsening for the past month. Has to sit in her recliner because she can't lay down. Walking for a bit relieves her pain. Denies numbness or tingling.   Takes requip, wants stronger prescription so she doesn't have to take it twice a day.     Reports burning with urination. Will follow up with Dr. Garcia for it.   hypothyroidism stable on medication due for labs     ROS:  Review of Systems   Constitutional: Negative for activity change, chills, fatigue, fever and unexpected weight change.   HENT: Negative for congestion, ear discharge, ear pain, hearing loss, postnasal drip and rhinorrhea.    Eyes: Negative for pain and visual disturbance.   Respiratory: Negative for cough, chest tightness and shortness of breath.    Cardiovascular: Negative for chest pain and palpitations.   Gastrointestinal: Negative for abdominal pain, diarrhea and vomiting.   Endocrine: Negative for heat intolerance.   Genitourinary: Positive for dysuria. Negative for flank pain, frequency and hematuria.   Musculoskeletal: Positive for arthralgias and myalgias. Negative for back pain, gait problem and neck pain.   Skin: Negative for color change and rash.   Neurological: Negative for dizziness, tremors, seizures, numbness and headaches.   Psychiatric/Behavioral: Negative for agitation, hallucinations, self-injury, sleep disturbance and suicidal ideas. The patient is not nervous/anxious.    All other systems reviewed and are negative.      Past Medical History:   Diagnosis Date    Abnormal CT of the abdomen 10/12/2017    Angiodysplasia of cecum     Arthritis     Diabetes mellitus  "without complication 2019    Diabetes mellitus, type 2 2006    BS doesn't check 06/15/2016    Diabetic retinopathy     DM (diabetes mellitus)     BS doesn't check 2018    DM (diabetes mellitus)     BS doesn't check 07/10/2020    DM (diabetes mellitus)     BS doesn't check 10/19/2021    GERD (gastroesophageal reflux disease)     Gout 2019    Hyperlipidemia     Hypertension     Hypothyroidism     Neuropathy     Stroke     TIA X3 "YEARS AND YEARS AGO"       Social History:  Social History     Socioeconomic History    Marital status:    Tobacco Use    Smoking status: Former Smoker     Packs/day: 1.00     Years: 25.00     Pack years: 25.00     Quit date: 2002     Years since quittin.9    Smokeless tobacco: Never Used   Substance and Sexual Activity    Alcohol use: Never     Alcohol/week: 0.0 standard drinks    Drug use: No    Sexual activity: Not Currently     Social Determinants of Health     Financial Resource Strain: Low Risk     Difficulty of Paying Living Expenses: Not hard at all   Food Insecurity: No Food Insecurity    Worried About Running Out of Food in the Last Year: Never true    Ran Out of Food in the Last Year: Never true   Transportation Needs: No Transportation Needs    Lack of Transportation (Medical): No    Lack of Transportation (Non-Medical): No   Physical Activity: Inactive    Days of Exercise per Week: 0 days    Minutes of Exercise per Session: 0 min   Stress: Stress Concern Present    Feeling of Stress : To some extent   Social Connections: Moderately Isolated    Frequency of Communication with Friends and Family: More than three times a week    Frequency of Social Gatherings with Friends and Family: More than three times a week    Attends Moravian Services: Never    Active Member of Clubs or Organizations: No    Attends Club or Organization Meetings: Never    Marital Status:    Housing Stability: Unknown    Unable " "to Pay for Housing in the Last Year: No    Unstable Housing in the Last Year: No       Family History:   family history includes COPD in her sister; Cancer (age of onset: 46) in her mother; Diabetes in her father; Heart disease in her father and mother; Hypertension in her father.    Health Maintenance   Topic Date Due    Hemoglobin A1c  04/19/2022    DEXA Scan  10/03/2022    Colonoscopy  10/12/2022    Lipid Panel  10/19/2022    Eye Exam  10/19/2022    Foot Exam  03/03/2023    TETANUS VACCINE  02/02/2026       Physical Exam:    Vital Signs  Temp: 97.5 °F (36.4 °C)  Pulse: 69  BP: 122/68  Pain Score:   8  Pain Loc: Leg  Height and Weight  Height: 5' 4" (162.6 cm)  Weight: 72.4 kg (159 lb 9.8 oz)  BSA (Calculated - sq m): 1.81 sq meters  BMI (Calculated): 27.4  Weight in (lb) to have BMI = 25: 145.3]    Body mass index is 27.4 kg/m².    Physical Exam  Vitals and nursing note reviewed.   Constitutional:       General: She is not in acute distress.     Appearance: Normal appearance. She is well-developed. She is not toxic-appearing.   HENT:      Head: Normocephalic and atraumatic.      Right Ear: Tympanic membrane normal.      Left Ear: Tympanic membrane normal.   Eyes:      Extraocular Movements: Extraocular movements intact.      Conjunctiva/sclera: Conjunctivae normal.      Pupils: Pupils are equal, round, and reactive to light.   Neck:      Thyroid: No thyromegaly.      Vascular: No JVD.   Cardiovascular:      Rate and Rhythm: Normal rate and regular rhythm.      Heart sounds: Normal heart sounds. No murmur heard.  Pulmonary:      Effort: Pulmonary effort is normal. No respiratory distress.      Breath sounds: Normal breath sounds. No wheezing, rhonchi or rales.   Chest:      Chest wall: No tenderness.   Abdominal:      General: Bowel sounds are normal. There is no distension.      Palpations: Abdomen is soft. There is no mass.      Tenderness: There is no abdominal tenderness. There is no guarding. "   Musculoskeletal:         General: Normal range of motion.      Cervical back: Normal range of motion and neck supple.      Right hip: No tenderness.      Left hip: Tenderness present. Normal strength.      Right lower leg: No edema.      Left lower leg: No edema.        Legs:       Comments: Straight leg test negative on the left   Lymphadenopathy:      Cervical: No cervical adenopathy.   Skin:     General: Skin is warm and dry.      Capillary Refill: Capillary refill takes less than 2 seconds.      Findings: No rash.   Neurological:      General: No focal deficit present.      Mental Status: She is alert and oriented to person, place, and time.      Cranial Nerves: No cranial nerve deficit.      Coordination: Coordination normal.      Gait: Gait is intact.      Deep Tendon Reflexes: Reflexes are normal and symmetric.   Psychiatric:         Mood and Affect: Mood normal.         Behavior: Behavior normal.         Thought Content: Thought content normal.         Judgment: Judgment normal.           Assessment:      ICD-10-CM ICD-9-CM   1. Left sided sciatica  M54.32 724.3   2. Restless legs syndrome (RLS)  G25.81 333.94         Plan:       Order x-ray lumbar spine for left sided sciatica. Refer to pain clinic. Recommend 7.5 mg Meloxicam, take with food.   Declines referral to physical therapy.  Follow up with Dr. Garcia cystocele  Dosage for Requip changed to 2 in the evening and 1 at night as she requested  Follow up with Dr. Bui tomorrow 04/19 at 1:00 PM  Six-month follow-up  Orders Placed This Encounter   Procedures    X-Ray Lumbar Spine AP And Lateral    Ambulatory referral/consult to Pain Clinic       Current Outpatient Medications   Medication Sig Dispense Refill    allopurinoL (ZYLOPRIM) 300 MG tablet TAKE 1 TABLET EVERY DAY 90 tablet 1    amLODIPine (NORVASC) 5 MG tablet TAKE 1 TABLET EVERY DAY 90 tablet 3    aspirin (ECOTRIN) 81 MG EC tablet Take 81 mg by mouth once daily.      biotin 1 mg Cap  Take by mouth.      cholecalciferol, vitamin D3, (VITAMIN D3) 1,000 unit capsule Take 1,000 Units by mouth.      fish oil-omega-3 fatty acids 300-1,000 mg capsule Take 2 g by mouth once daily.      furosemide (LASIX) 40 MG tablet TAKE 1 TABLET EVERY DAY 90 tablet 3    gabapentin (NEURONTIN) 300 MG capsule TAKE 1 CAPSULE THREE TIMES DAILY 270 capsule 1    levothyroxine (SYNTHROID) 75 MCG tablet TAKE 1 TABLET EVERY DAY BEFORE BREAKFAST 90 tablet 3    LIDOcaine (LIDODERM) 5 % Use 1-3 patches per day over recommended area. Remove & Discard patch within 12 hours or as directed by MD. 90 patch 0    losartan (COZAAR) 100 MG tablet TAKE 1 TABLET EVERY DAY 90 tablet 3    lovastatin (MEVACOR) 40 MG tablet Take 1 tablet (40 mg total) by mouth every evening. 90 tablet 2    metFORMIN (GLUCOPHAGE) 1000 MG tablet TAKE 1 TABLET TWICE DAILY 180 tablet 1    methocarbamoL (ROBAXIN) 500 MG Tab Take 1 tablet (500 mg total) by mouth 3 (three) times daily. 270 tablet 1    metoprolol tartrate (LOPRESSOR) 50 MG tablet TAKE 1 TABLET TWICE DAILY 180 tablet 2    mupirocin (BACTROBAN) 2 % ointment Apply topically 3 (three) times daily. 1 Tube 1    traZODone (DESYREL) 50 MG tablet TAKE 1 TABLET EVERY EVENING 90 tablet 3    meloxicam (MOBIC) 7.5 MG tablet Take 1 tablet (7.5 mg total) by mouth once daily. 30 tablet 0    rOPINIRole (REQUIP) 2 MG tablet Take 0.5 tablets (1 mg total) by mouth 3 (three) times daily. 270 tablet 1    triamcinolone acetonide 0.1% (KENALOG) 0.1 % cream Apply topically 2 (two) times daily. for 10 days 1 Tube 1     No current facility-administered medications for this visit.       Medications Discontinued During This Encounter   Medication Reason    rOPINIRole (REQUIP) 2 MG tablet        No follow-ups on file.      Angely Roberts MD  Scribe Attestation:   Melissa MCELROY, am scribing for, and in the presence of, Dr.Arif Roberts I performed the above scribed service and the documentation accurately describes  the services I performed. I attest to the accuracy of the note.    I, Dr. Angely Roberts, reviewed documentation as scribed above. I personally performed the services described in this documentation.  I agree that the record reflects my personal performance and is accurate and complete. Angely Roberts MD.  10/04/2021

## 2022-04-18 NOTE — TELEPHONE ENCOUNTER
----- Message from Payton Soto sent at 4/18/2022  3:06 PM CDT -----  Contact: Patient, [phone]  Calling to speak with the nurse regarding she is ready to schedule surgery. Please call her. Thanks.

## 2022-04-18 NOTE — TELEPHONE ENCOUNTER
Care Due:                  Date            Visit Type   Department     Provider  --------------------------------------------------------------------------------                                EP -                              PRIMARY      [facility]  Last Visit: 10-      CARE ([facility])   MEDICINE       Angely Roberts  Next Visit: None Scheduled  None         None Found                                                            Last  Test          Frequency    Reason                     Performed    Due Date  --------------------------------------------------------------------------------    HBA1C.......  6 months...  metFORMIN................  10-   04-    Uric Acid...  12 months..  allopurinoL..............  Not Found    Overdue    Powered by [employer]. Reference number: [id number].   4/18/2022 8:51:13 AM CDT

## 2022-04-19 ENCOUNTER — PROCEDURE VISIT (OUTPATIENT)
Dept: DERMATOLOGY | Facility: CLINIC | Age: 82
End: 2022-04-19
Payer: MEDICARE

## 2022-04-19 ENCOUNTER — TELEPHONE (OUTPATIENT)
Dept: OBSTETRICS AND GYNECOLOGY | Facility: CLINIC | Age: 82
End: 2022-04-19
Payer: MEDICARE

## 2022-04-19 DIAGNOSIS — N81.4 CYSTOCELE WITH UTERINE PROLAPSE: Primary | ICD-10-CM

## 2022-04-19 DIAGNOSIS — D04.5 SQUAMOUS CELL CARCINOMA IN SITU OF SKIN OF TRUNK: Primary | ICD-10-CM

## 2022-04-19 DIAGNOSIS — Z01.818 PREOP TESTING: ICD-10-CM

## 2022-04-19 PROCEDURE — 99499 UNLISTED E&M SERVICE: CPT | Mod: S$GLB,,, | Performed by: DERMATOLOGY

## 2022-04-19 PROCEDURE — 11603 PR EXC SKIN MALIG 2.1-3 CM TRUNK,ARM,LEG: ICD-10-PCS | Mod: S$GLB,,, | Performed by: DERMATOLOGY

## 2022-04-19 PROCEDURE — 88305 TISSUE EXAM BY PATHOLOGIST: CPT | Mod: 26,,, | Performed by: PATHOLOGY

## 2022-04-19 PROCEDURE — 11603 EXC TR-EXT MAL+MARG 2.1-3 CM: CPT | Mod: S$GLB,,, | Performed by: DERMATOLOGY

## 2022-04-19 PROCEDURE — 88305 TISSUE EXAM BY PATHOLOGIST: CPT | Performed by: PATHOLOGY

## 2022-04-19 PROCEDURE — 99499 NO LOS: ICD-10-PCS | Mod: S$GLB,,, | Performed by: DERMATOLOGY

## 2022-04-19 PROCEDURE — 12034 PR LAYR CLOS WND TRUNK,ARM,LEG 7.6-12.5 CM: ICD-10-PCS | Mod: 51,S$GLB,, | Performed by: DERMATOLOGY

## 2022-04-19 PROCEDURE — 88305 TISSUE EXAM BY PATHOLOGIST: ICD-10-PCS | Mod: 26,,, | Performed by: PATHOLOGY

## 2022-04-19 PROCEDURE — 12034 INTMD RPR S/TR/EXT 7.6-12.5: CPT | Mod: 51,S$GLB,, | Performed by: DERMATOLOGY

## 2022-04-19 NOTE — PROGRESS NOTES
Infiltrated the marked site with 12 ml of lidocaine 1% with epinephrine to the mid chest during the Excision per order from Dr. Pablo. Patient experienced no adverse reactions post administration.    Lot #[id number]  NDC # [id number]

## 2022-04-19 NOTE — PROCEDURES
PROCEDURE: Elliptical excision with intermediate layered repair    ANESTHETIC: 12 cc 1% Lidocaine with Epinephrine 1:100,000    SURGICAL PREP: Betadine    SURGEON: Keshawn Pablo MD    ASSISTANTS: RONNELL Salinas RN     PREOPERATIVE DIAGNOSIS: SCCis    POSTOPERATIVE DIAGNOSIS: same    PATHOLOGIC DIAGNOSIS: Pending    LOCATION: mid-chest    INITIAL LESION SIZE: 1.7 x 1.3 cm    EXCISED DIAMETER: 2.7 x 2.3 cm    PREPARATION:  The diagnosis, procedure, alternatives, benefits and risks, including but not limited to: drug reactions, pain, scar or cosmetic defect, local sensation disturbances, and/or recurrence of present condition were explained to the patient. The patient elected to proceed.    PROCEDURE:  The area of the mid chest was prepped, draped, and anesthetized in the usual sterile fashion. Lesional tissue was carefully marked prior to administration of the anesthesia. An elliptical excision was drawn around the lesion with margins. A fusiform elliptical excision was done with a #15 blade carried down completely through the dermis into the subcutaneous tissues. Then, with a combination of blunt and sharp dissection, the lesion was removed.  The specimen was marked at the 12 o'clock position with suture and submitted for histologic evaluation. The operative site was widely undermined in the subcutaneous tissue plane. Then, electrocoagulation was used to obtain hemostasis. Blood loss was minimal. The wound was then approximated in a layered fashion with subcutaneous and intradermal 3-0 PDS sutures. The wound was then superficially closed with running subcuticular 4-0 PDS sutures.    The patient tolerated the procedure well.    The area was cleaned and dressed appropriately and the patient was given wound care instructions, as well as an appointment for follow-up evaluation.    LENGTH OF REPAIR: 8.1 cm    RTC 3 months    Keshawn Pablo MD  Department of Dermatology, Mohs Surgery  1:01 PM  4/19/2022

## 2022-04-19 NOTE — PATIENT INSTRUCTIONS
It was a pleasure to see you today in clinic. Here is some helpful information regarding instructions following your surgery.      Stitches: will dissolve on their own    Follow up:   * If you have a problem or concern post-operatively, please call ahead to schedule an appointment. We may not be able to accommodate a walk-in appointment *     Scars may take 3 months or longer to mature. If you have concerns about your scar at that point, please call our office to schedule a follow up appointment. There are options available to help improve the appearance.     Please continue wound care below once a day for 2 weeks:    WOUND CARE INSTRUCTIONS   *Washing your hands before touching your bandage and surgical site is extremely important to prevent post-operative infection*    1. Leave your pressure bandage on for 48 hours. You will not need to perform any wound care until this bandage is removed. Do NOT get bandage wet.     2. When you initially begin wound care, you may let the water hit the pressure bandage to loosen it from your skin.     3. Wash your hands thoroughly before starting wound care.     4. After 48 hours, begin cleaning the surgery site once daily with gentle soap (such as Cetaphil, Cerave or Dove). Use a Q-tip with the soap and water on it. Roll the Q-tip along incision line.     5. Dry the area with a fresh cotton tipped applicator/Q-tip.     6. Apply a generous amount of vaseline where you see the sutures. Avoid using Neosporin, or any bacterial ointment as this is likely to cause an allergic reaction to the site.     7. Cut a non-stick bandage to fit then use paper tape to hold in place.     8. You will be using gentle soap and water, vaseline and a bandage once daily for 2 weeks.     9. After surgery, you may restart all of your medications that were stopped (if applicable).     *If your site is on your forehead, or near your eye, you will want to use ice packs. Please apply ice packs every hour for  20 minutes while awake. Sleep elevated for the first two nights following surgery*     *For surgical areas on your arms/legs, try to keep the area elevated above the level of your heart as much as possible. Frequent gentle rubbing of your fingers or toes in that area will prevent numbness and stiffness*    *If located on your arm/hand, we ask that you do not lift anything heavier than a gallon of milk for two weeks*    *For surgical areas on your head/neck, do not bend over or stoop. Do not drop your head, as this increases blood to the surgical area and can induce bleeding*       BATHING: Begin bathing once pressure bandage comes off. Do not let direct water pressure hit the surgery site. It is okay if it gets wet, just let the water roll over.   PAIN: You may take Tylenol only for pain in the first 24 hours following surgery. Do not take any aspirin, Ibuprofen, Motrin or Aleve as this may increase your risk for bleeding for the first 24 hours. Significant pain/discomfort is unusual and should be reported to our office.   BLEEDING: A mild amount of blood on the bandage is expected. Blood soaking through the bandage is not normal. If this occurs, apply uninterrupted pressure for 20 minutes by the clock. If this does not stop the bleeding, hold pressure for 20 minutes with an ice pack. If it does not stop after ice, please call our office for additional assistance.       Normal office hour number: [phone]    After hours Dermatologist on call: [phone]

## 2022-04-22 ENCOUNTER — TELEPHONE (OUTPATIENT)
Dept: OBSTETRICS AND GYNECOLOGY | Facility: CLINIC | Age: 82
End: 2022-04-22
Payer: MEDICARE

## 2022-04-22 NOTE — TELEPHONE ENCOUNTER
----- Message from Lisy Hall sent at 4/22/2022 12:29 PM CDT -----  Who Called: Trevor Torres    What is the reqeust in detail: Requesting call back to process a prior authorization for patient's upcoming surgery. Please advise.     Can the clinic reply by [facility]? N/A    Best Call Back Number: [phone]    Additional Information:

## 2022-04-25 LAB
FINAL PATHOLOGIC DIAGNOSIS: NORMAL
GROSS: NORMAL
Lab: NORMAL
MICROSCOPIC EXAM: NORMAL

## 2022-04-27 ENCOUNTER — TELEPHONE (OUTPATIENT)
Dept: DERMATOLOGY | Facility: CLINIC | Age: 82
End: 2022-04-27
Payer: MEDICARE

## 2022-04-27 NOTE — TELEPHONE ENCOUNTER
Patient notified of results and that NFT required. Pt verbalized understanding.      ----- Message from Keshawn Pablo MD sent at 4/26/2022 10:05 AM CDT -----  Please call patient and inform her that the skin cancer was completely removed with clear margins. NFT required. Thanks, NF

## 2022-05-03 NOTE — PROGRESS NOTES
Chief Complaint:    Chief Complaint   Patient presents with    Shoulder Pain       History of Present Illness:  Complaining of left shoulder pain going on for long time to hurts to move certain way.  No trauma      ROS:  Review of Systems    Past Medical History:   Diagnosis Date    Angiodysplasia of cecum     Diabetes mellitus, type 2     BS doesn't check 06/15/2016    DM (diabetes mellitus)     BS doesn't check 2018    DM (diabetes mellitus)     BS doesn't check 07/10/2020    GERD (gastroesophageal reflux disease)     Hyperlipidemia     Hypertension     Hypothyroidism        Social History:  Social History     Socioeconomic History    Marital status:      Spouse name: Not on file    Number of children: Not on file    Years of education: Not on file    Highest education level: Not on file   Occupational History    Not on file   Social Needs    Financial resource strain: Not on file    Food insecurity     Worry: Not on file     Inability: Not on file    Transportation needs     Medical: Not on file     Non-medical: Not on file   Tobacco Use    Smoking status: Former Smoker     Packs/day: 1.00     Years: 25.00     Pack years: 25.00     Quit date: 2002     Years since quittin.1    Smokeless tobacco: Never Used   Substance and Sexual Activity    Alcohol use: No     Alcohol/week: 0.0 standard drinks    Drug use: No    Sexual activity: Not on file   Lifestyle    Physical activity     Days per week: Not on file     Minutes per session: Not on file    Stress: Not on file   Relationships    Social connections     Talks on phone: Not on file     Gets together: Not on file     Attends [employer] service: Not on file     Active member of club or organization: Not on file     Attends meetings of clubs or organizations: Not on file     Relationship status: Not on file   Other Topics Concern    Not on file   Social History Narrative    Not on file       Family  This is a historical note converted from [employer]. Formatting and pictures may have been removed.  Please reference [employer] for original formatting and attached multimedia. Chief Complaint  F/U Rt foot Fx.  History of Present Illness  51-year-old female presenting for follow-up. ?She has history of a right fifth metatarsal fracture that was concerning for delayed union versus nonunion. ?She underwent open reduction and internal fixation?on August 8, 2017. ?She was last seen?November 27, 2017. ?At that time she was prescribed exogen. ?She has not been back since.??She got her exogen?and has been faithful using it. ?She says she is walking on her foot full-time. ?She says she has occasional pain in her foot but otherwise is doing great.  ?  She does have some complaints of bilateral knee pain which she says is related to arthritis. ?She is gotten injections for this before but not in several months. ?She is interested in getting another one, but is unable to today because she has engagements this afternoon for which he must leave.  Review of Systems  Constitutional: No fevers, chills or night sweats  Eye: No blurry vision  ENMT: No sore throat or cough  Respiratory:?No shortness of breath  Cardiovascular: No chest pain  Gastrointestinal:?No abdominal pain, nausea or vomiting  Genitourinary:?No dysuria  Hema/Lymph:?No abnormal bleeding or bruising  Endocrine:?No signs of hyper or hypothyroidism  Immunologic:?No rash  Musculoskeletal:?See HPI  Integumentary:?No lesions  Neurologic:?See HPI  All Other ROS:?Negative except as noted above  Physical Exam  Vitals & Measurements  HT:?154.94?cm? HT:?154.94?cm? WT:?130.5?kg? WT:?130.5?kg? BMI:?54.36?  General: No acute distress  HEENT:?Normocephalic atraumatic  Cardiovascular:?Regular rate and rhythm for peripheral pulses  Respiratory: Normal work of breathing on room air  GI:?Nondistended  Psych:?Appropriate affect  Neuro:?GCS 15  ?   Right foot:?Incision along lateral aspect of  "History:   family history includes COPD in her sister; Cancer (age of onset: 46) in her mother; Diabetes in her father; Heart disease in her father and mother; Hypertension in her father.    Health Maintenance   Topic Date Due    Hemoglobin A1c  03/17/2020    Foot Exam  06/18/2020    Lipid Panel  09/17/2020    Urine Microalbumin  02/19/2021    Eye Exam  07/10/2021    Aspirin/Antiplatelet Therapy  07/15/2021    DEXA SCAN  10/03/2022    Colonoscopy  10/12/2022    TETANUS VACCINE  02/02/2026    Pneumococcal Vaccine (65+ Low/Medium Risk)  Completed       Physical Exam:    Vital Signs  Temp: 98.8 °F (37.1 °C)  Temp src: Temporal  Pulse: 76  SpO2: 96 %  BP: 136/60  BP Location: Left arm  Patient Position: Sitting  Pain Score: 0-No pain  Height and Weight  Height: 5' 4" (162.6 cm)  Weight: 75.4 kg (166 lb 1.9 oz)  BSA (Calculated - sq m): 1.84 sq meters  BMI (Calculated): 28.5  Weight in (lb) to have BMI = 25: 145.3]    Body mass index is 28.51 kg/m².    Physical Exam  Musculoskeletal:      Left shoulder: She exhibits decreased range of motion and tenderness.      Comments: impingment sign is neg           Assessment:      ICD-10-CM ICD-9-CM   1. Rotator cuff tendinitis, left  M75.82 726.10         Plan:       Offered steroid injection, risk of steroid injection discussed with the patient which include but not limited to, injection given the subacromial tendon and the posterior lateral rotator cuff she tolerated procedure well.  1.  Infection  2.  Bleeding  3.  Tendon disruption  4.  Elevation of blood sugar  5.  Fat atrophy  6.  Worsening pain and other unforeseen complication.  Treatment alternatives were also discussed, patient likes to proceed with the steroid injection.  Depo-Medrol 40 mg and lidocaine 2% without epi Cc was used for injection, and the area was identified prepped and injection done sterile condition, patient tolerated procedure well.   refused pt  Handout on exercises given        No orders " foot is well-healed. ?No erythema, no fluctuance and no drainage.? She does have some mild decreased sensation over the incision. ?She is mildly tender palpation over the incision.? Otherwise she is neurovascularly intact. ?She has normal range of motion in her foot and subtalar joint  Assessment/Plan  Foot fracture, right  ?AP, oblique and lateral x-rays of her right foot were obtained in clinic today. ?Previous screw is seen. ?No competition with the hardware. ?The fracture does appear to have filled in with some?new bone since her last?film from November.? Clinically, she is walking and only has intermittent pain.? At this point her fracture is healed. ?She does not need to follow-up with us unless there is an issue.? We will have her follow up with the?[facility] clinic for possible injections.   Problem List/Past Medical History  Ongoing  Arthritis  Depression  Diabetes  GERD (gastroesophageal reflux disease)  HTN (hypertension)  Hyperlipidaemia  Metatarsal bone fracture  Historical  No qualifying data  Procedure/Surgical History  Bone Graft (None) (08/08/2017), Open treatment of metatarsal fracture, includes internal fixation, when performed, each (08/08/2017), ORIF Metatarsal (Right) (08/08/2017), Reposition Right Metatarsal with Internal Fixation Device, Open Approach (08/08/2017), Colonoscopy (None) (07/13/2017), COLORECTAL CANCER SCREENING; COLONOSCOPY ON INDIVIDUAL AT HIGH RISK (07/13/2017), Inspection of Lower Intestinal Tract, Via Natural or Artificial Opening Endoscopic (07/13/2017), Cholecystectomy Laparoscopic (None) (07/06/2016), Laparoscopy, surgical; cholecystectomy (07/06/2016), Resection of Gallbladder, Percutaneous Endoscopic Approach (07/06/2016), Bladder neck operations, Colonoscopy, flexible; diagnostic, including collection of specimen(s) by brushing or washing, when performed (separate procedure), Hemorrhoidectomy, Hysterectomy.  Medications  Actos 45 mg oral tablet, 45 mg= 1 tab(s),  Oral, Daily  buPROPion 150 mg/24 hours (XL) oral tablet, extended release, 300 mg, Oral, q24hr,? ?Not taking  Bupropion Hcl Xl 300 Mg Tablet, 300 mg= 1 tab(s), Oral, Daily,? ?Not Taking, Completed Rx  Crestor 20 mg oral tablet, 20 mg= 1 tab(s), Oral, At Bedtime  CYCLOBENZAPR TAB 10MG  DICLOFENAC GEL 1%, 1 gm= 1 gm, TOP, QID  DULOXETINE 60MG CAP, 60 mg= 1 cap(s), Oral, Daily,? ?Not Taking, Completed Rx  FUROSEMIDE TAB 40MG, 40 mg= 1 tab(s), Oral, Daily  GABAPENTIN TAB 800MG, 800 mg= 1 tab(s), Oral, TID  Gemfibrozil 600 Mg Tablet, 600 mg= 1 tab(s), Oral, BID  glimepiride, 4 mg, Oral, BID,? ?Not taking  Glimepiride 4 Mg Tablet, 4 mg= 1 tab(s), Oral, BID  hydrochlorothiazide-valsartan 12.5 mg-80 mg oral tablet, 1 tab(s), Oral, Daily  KETOTIF FUM IGNACIO 0.025%OP, 1 drop(s), OPTH, BID  lamoTRIgine 100 mg oral tablet, 100 mg= 1 tab(s), Oral, BID  Lyrica 75 mg oral capsule, 75 mg= 1 cap(s), Oral, TID,? ?Not Taking, Completed Rx  metformin 500 mg oral tablet, 500 mg= 1 tab(s), Oral, BID  NAPROXEN TAB 500MG, 500 mg= 1 tab(s), Oral, BID  omeprazole 40 mg oral DR capsule, 40 mg= 1 cap(s), Oral, Daily  Polyeth Glyc 3350 Nf Pow,? ?Not taking  Premarin 0.45 mg oral tablet, 0.45 mg= 1 tab(s), Oral, Daily  Tramadol Hcl 50mg Tab, 50 mg= 1 tab(s), Oral, TID  TRINTELLIX TAB 10MG, 10 mg= 1 tab(s), Oral, Daily  Versed, 2 mg= 2 mL, IV Push, Once  Allergies  No Known Allergies  Social History  Alcohol  Never, 07/05/2016  Employment/School  Unemployed, 07/10/2017  Home/Environment  Lives with Children. Living situation: Home with assistance., 07/10/2017  Substance Abuse  Never, 07/05/2016  Tobacco  Former smoker Use:., 07/02/2018  Family History  Acute myocardial infarction.: Father.  Metastatic cancer: Brother.  Health Maintenance  Health Maintenance  ???Pending?(in the next year)  ??? ??Due?  ??? ? ? ?Alcohol Misuse Screening due??07/02/18??and every 1??year(s)  ??? ? ? ?Cervical Cancer Screening due??07/02/18??and every 5??year(s)  ??? ? ?  of the defined types were placed in this encounter.      Current Outpatient Medications   Medication Sig Dispense Refill    allopurinol (ZYLOPRIM) 300 MG tablet TAKE 1 TABLET (300 MG TOTAL) BY MOUTH ONCE DAILY. 90 tablet 2    amLODIPine (NORVASC) 5 MG tablet TAKE ONE TABLET BY MOUTH DAILY 30 tablet 8    aspirin (ECOTRIN) 81 MG EC tablet Take 81 mg by mouth once daily.      biotin 1 mg Cap Take by mouth.      cholecalciferol, vitamin D3, (VITAMIN D3) 1,000 unit capsule Take 1,000 Units by mouth.      dicyclomine (BENTYL) 20 mg tablet TAKE 1 TABLET (20 MG TOTAL) BY MOUTH EVERY 6 (SIX) HOURS. 30 tablet 0    fish oil-omega-3 fatty acids 300-1,000 mg capsule Take 2 g by mouth once daily.      furosemide (LASIX) 40 MG tablet Take 40 mg by mouth once daily.      gabapentin (NEURONTIN) 300 MG capsule Take 1 capsule (300 mg total) by mouth 3 (three) times daily. 90 capsule 2    levothyroxine (SYNTHROID) 75 MCG tablet TAKE 1 TABLET (75 MCG TOTAL) BY MOUTH BEFORE BREAKFAST. 90 tablet 0    lovastatin (MEVACOR) 40 MG tablet TAKE 1 TABLET (40 MG TOTAL) BY MOUTH EVERY EVENING. 30 tablet 6    metFORMIN (GLUCOPHAGE) 1000 MG tablet TAKE ONE TABLET BY MOUTH TWICE DAILY WITH FOOD 180 tablet 2    metoprolol tartrate (LOPRESSOR) 50 MG tablet Take 1 tablet by mouth once daily.      mupirocin (BACTROBAN) 2 % ointment Apply topically 3 (three) times daily. 1 Tube 1    rOPINIRole (REQUIP) 1 MG tablet Take 1 tablet (1 mg total) by mouth every evening. 30 tablet 11    traZODone (DESYREL) 50 MG tablet TAKE 1 TABLET (50 MG TOTAL) BY MOUTH EVERY EVENING. 30 tablet 3    triamcinolone acetonide 0.1% (KENALOG) 0.1 % cream Apply topically 2 (two) times daily. for 10 days 1 Tube 1     No current facility-administered medications for this visit.        There are no discontinued medications.    No follow-ups on file.      Angely Roberts MD                 ?Lipid Screening due??07/02/18??Variable frequency  ??? ? ? ?Tetanus Vaccine due??07/02/18??and every 10??year(s)  ??? ??Due In Future?  ??? ? ? ?Blood Pressure Screening not due until??08/08/18??and every 1??year(s)  ??? ? ? ?Depression Screening not due until??08/25/18??and every 1??year(s)  ??? ? ? ?Influenza Vaccine not due until??10/02/18??and every 1??year(s)  ???Satisfied?(in the past 1 year)  ??? ??Satisfied?  ??? ? ? ?Blood Pressure Screening on??08/08/17.??Satisfied by Tadeo Landeros PT  ??? ? ? ?Body Mass Index Check on??07/02/18.??Satisfied by Morales Yu  ??? ? ? ?Breast Cancer Screening on??03/09/18.??Satisfied by Madison Saeed  ??? ? ? ?Colorectal Screening on??07/13/17.  ??? ? ? ?Depression Screening on??08/25/17.??Satisfied by Morales Yu  ??? ? ? ?Diabetes Screening on??08/02/17.??Satisfied by Sarah Blake  ??? ? ? ?Obesity Screening on??07/02/18.??Satisfied by Morales Yu  ??? ? ? ?Tobacco Use Screening on??07/02/18.??Satisfied by Morales Yu  ?  ?      Natalia Gomes?s?medical history, post-op exam findings right foot, diagnosis, and treatment outlined by??Latoya?has been reviewed.??Treatment plan is determined to be reasonable and appropriate.?I was present during the evaluation. ?X-rays right foot?reviewed.

## 2022-05-05 ENCOUNTER — TELEPHONE (OUTPATIENT)
Dept: PAIN MEDICINE | Facility: CLINIC | Age: 82
End: 2022-05-05
Payer: MEDICARE

## 2022-05-05 NOTE — TELEPHONE ENCOUNTER
----- Message from Rosanne Coello LPN sent at 5/4/2022  5:00 PM CDT -----  Contact: self  Dr Roberts saw her last week for sciatic nerve pain, he put in a referral for pain management but she already sees Dr Durant , she has a follow up appointment with Ms Kelley in June. Is there anyway you all can get her in soon so she can get scheduled for JOSE G   ----- Message -----  From: Barbie Ponce  Sent: 5/4/2022  12:31 PM CDT  To: Alejandra Au Staff    Jasmine Velásquez would like a call back at [phone], in regards to the back injections that as supposed to be scheduled for her.

## 2022-05-05 NOTE — TELEPHONE ENCOUNTER
Reached out to patient to [place] [facility].   Pt understand. All questions answered.     Cole Rudolph  Medical Assistant

## 2022-05-09 ENCOUNTER — TELEPHONE (OUTPATIENT)
Dept: PAIN MEDICINE | Facility: CLINIC | Age: 82
End: 2022-05-09
Payer: MEDICARE
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
## 2022-05-10 ENCOUNTER — OFFICE VISIT (OUTPATIENT)
Dept: PAIN MEDICINE | Facility: CLINIC | Age: 82
End: 2022-05-10
Payer: MEDICARE

## 2022-05-10 VITALS
SYSTOLIC BLOOD PRESSURE: 118 MMHG | WEIGHT: 159.5 LBS | HEART RATE: 74 BPM | DIASTOLIC BLOOD PRESSURE: 64 MMHG | RESPIRATION RATE: 17 BRPM | BODY MASS INDEX: 27.23 KG/M2 | HEIGHT: 64 IN

## 2022-05-10 DIAGNOSIS — M51.36 DISCOGENIC LOW BACK PAIN: ICD-10-CM

## 2022-05-10 DIAGNOSIS — M51.36 DDD (DEGENERATIVE DISC DISEASE), LUMBAR: ICD-10-CM

## 2022-05-10 DIAGNOSIS — M54.16 LEFT LUMBAR RADICULOPATHY: Primary | ICD-10-CM

## 2022-05-10 DIAGNOSIS — M54.59 DISCOGENIC LUMBAR PAIN: ICD-10-CM

## 2022-05-10 DIAGNOSIS — M54.32 LEFT SIDED SCIATICA: ICD-10-CM

## 2022-05-10 PROCEDURE — 1159F MED LIST DOCD IN RCRD: CPT | Mod: CPTII,S$GLB,, | Performed by: PHYSICIAN ASSISTANT

## 2022-05-10 PROCEDURE — 1160F PR REVIEW ALL MEDS BY PRESCRIBER/CLIN PHARMACIST DOCUMENTED: ICD-10-PCS | Mod: CPTII,S$GLB,, | Performed by: PHYSICIAN ASSISTANT

## 2022-05-10 PROCEDURE — 99999 PR PBB SHADOW E&M-EST. PATIENT-LVL V: ICD-10-PCS | Mod: PBBFAC,,, | Performed by: PHYSICIAN ASSISTANT

## 2022-05-10 PROCEDURE — 3078F PR MOST RECENT DIASTOLIC BLOOD PRESSURE < 80 MM HG: ICD-10-PCS | Mod: CPTII,S$GLB,, | Performed by: PHYSICIAN ASSISTANT

## 2022-05-10 PROCEDURE — 1101F PT FALLS ASSESS-DOCD LE1/YR: CPT | Mod: CPTII,S$GLB,, | Performed by: PHYSICIAN ASSISTANT

## 2022-05-10 PROCEDURE — 3078F DIAST BP <80 MM HG: CPT | Mod: CPTII,S$GLB,, | Performed by: PHYSICIAN ASSISTANT

## 2022-05-10 PROCEDURE — 1101F PR PT FALLS ASSESS DOC 0-1 FALLS W/OUT INJ PAST YR: ICD-10-PCS | Mod: CPTII,S$GLB,, | Performed by: PHYSICIAN ASSISTANT

## 2022-05-10 PROCEDURE — 99214 OFFICE O/P EST MOD 30 MIN: CPT | Mod: S$GLB,,, | Performed by: PHYSICIAN ASSISTANT

## 2022-05-10 PROCEDURE — 3074F PR MOST RECENT SYSTOLIC BLOOD PRESSURE < 130 MM HG: ICD-10-PCS | Mod: CPTII,S$GLB,, | Performed by: PHYSICIAN ASSISTANT

## 2022-05-10 PROCEDURE — 3288F FALL RISK ASSESSMENT DOCD: CPT | Mod: CPTII,S$GLB,, | Performed by: PHYSICIAN ASSISTANT

## 2022-05-10 PROCEDURE — 99999 PR PBB SHADOW E&M-EST. PATIENT-LVL V: CPT | Mod: PBBFAC,,, | Performed by: PHYSICIAN ASSISTANT

## 2022-05-10 PROCEDURE — 1160F RVW MEDS BY RX/DR IN RCRD: CPT | Mod: CPTII,S$GLB,, | Performed by: PHYSICIAN ASSISTANT

## 2022-05-10 PROCEDURE — 99214 PR OFFICE/OUTPT VISIT, EST, LEVL IV, 30-39 MIN: ICD-10-PCS | Mod: S$GLB,,, | Performed by: PHYSICIAN ASSISTANT

## 2022-05-10 PROCEDURE — 1159F PR MEDICATION LIST DOCUMENTED IN MEDICAL RECORD: ICD-10-PCS | Mod: CPTII,S$GLB,, | Performed by: PHYSICIAN ASSISTANT

## 2022-05-10 PROCEDURE — 1125F PR PAIN SEVERITY QUANTIFIED, PAIN PRESENT: ICD-10-PCS | Mod: CPTII,S$GLB,, | Performed by: PHYSICIAN ASSISTANT

## 2022-05-10 PROCEDURE — 3288F PR FALLS RISK ASSESSMENT DOCUMENTED: ICD-10-PCS | Mod: CPTII,S$GLB,, | Performed by: PHYSICIAN ASSISTANT

## 2022-05-10 PROCEDURE — 3074F SYST BP LT 130 MM HG: CPT | Mod: CPTII,S$GLB,, | Performed by: PHYSICIAN ASSISTANT

## 2022-05-10 PROCEDURE — 1125F AMNT PAIN NOTED PAIN PRSNT: CPT | Mod: CPTII,S$GLB,, | Performed by: PHYSICIAN ASSISTANT

## 2022-05-10 RX ORDER — GABAPENTIN 300 MG/1
CAPSULE ORAL
Qty: 360 CAPSULE | Refills: 0 | Status: SHIPPED | OUTPATIENT
Start: 2022-05-10 | End: 2022-05-26 | Stop reason: SDUPTHER

## 2022-05-13 ENCOUNTER — TELEPHONE (OUTPATIENT)
Dept: PAIN MEDICINE | Facility: CLINIC | Age: 82
End: 2022-05-13
Payer: MEDICARE

## 2022-05-13 NOTE — TELEPHONE ENCOUNTER
----- Message from Rukhsana Dowd sent at 5/13/2022  1:00 PM CDT -----  Regarding: clarify  Contact: Trevor Velasco/C[facility]  Calling for direction clarification on the Gabapentin.   [phone]

## 2022-05-20 NOTE — TELEPHONE ENCOUNTER
Refill Routing Note   Medication(s) are not appropriate for processing by [facility] for the following reason(s):      - Required laboratory values are outdated    [facility] action(s):  Defer Medication-related problems identified: Requires labs        Medication reconciliation completed: No     Appointments  past 12m or future 3m with PCP    Date Provider   Last Visit   4/18/2022 Angely Roberts MD   Next Visit   Visit date not found Angely Roberts MD   ED visits in past 90 days: 0        Note composed:4:35 PM 05/20/2022

## 2022-05-20 NOTE — PRE ADMISSION SCREENING
Pre op instructions reviewed with patient per phone:    To confirm, Your surgeon has instructed you:  Surgery is scheduled 5/17/22at 11:50am.      Please report to [facility] 1st floor main lobby by 10:00am.   Pre admit office to call afternoon prior to surgery with final arrival time    INSTRUCTIONS IMPORTANT!!!  ¨ Do not eat, drink, or smoke after 12 midnight prior to surgery, including water. OK to brush teeth, no gum, candy or mints!    ¨ Take only these medicines with a small swallow of water-morning of surgery.  Allopurinol  Amlodipine  Levothyroxine  Metorolol      -------   Do not shave legs/underarms 3 days prior to surgery  ____   1 visitor is  allowed in the pre operative area, no one under the age of 16,and must adhere to social distancing guidelines.  1 visitor/family member is allowed to              visit non covid  in-patients in their room    ____   Family/caregivers will be updated re pt status via text/cell phone    ____  Do not wear makeup, including mascara.  ____  No powder, lotions or creams to surgical area.  ____  Please remove all jewelry, including piercings and leave at home.  ____  No money or valuables needed. Please leave at home.  ____  Please bring identification and insurance information to hospital.  ____  If going home the same day, arrange for a ride home. You will not be able to   drive if Anesthesia was used.  ____  Children, under 12 years old, must remain in the waiting room with an adult.  They are not allowed in patient areas.  ____  Wear loose fitting clothing. Allow for dressings, bandages.  ____  Stop Aspirin, Ibuprofen, Motrin and Aleve at least 5-7 days before surgery, unless otherwise instructed by your doctor, or the nurse.   You MAY use Tylenol/acetaminophen until day of surgery.  ____  If you take diabetic medication, do not take am of surgery unless instructed by   Doctor.  ____ Stop taking any Fish Oil supplement or any Vitamins that  contain Vitamin E at least 5 days prior to surgery.          Bathing Instructions-- The night before surgery and the morning prior to coming to the hospital:   -Do not shave the surgical area.   -Shower and wash your hair and body as usual with anti-bacterial  soap and shampoo.   -Rinse your hair and body completely.   -Rinse your body thoroughly.   -Dry with clean, soft towel.  Do not use lotion, cream, deodorant, or powders on   the surgical site.    Surgical Site Infection    Prevention of surgical site infections:     -Keep incisions clean and dry.   -Do not soak/submerge incisions in water until completely healed.   -Do not apply lotions, powders, creams, or deodorants to site.   -Always make sure hands are cleaned with antibacterial soap/ alcohol-based   prior to touching the surgical site.  (This includes doctors, nurses, staff, and yourself.)    Signs and symptoms:   -Redness and pain around the area where you had surgery   -Drainage of cloudy fluid from your surgical wound   -Fever over 100.4  I have read or had read and explained to me, and understand the above information.

## 2022-05-20 NOTE — TELEPHONE ENCOUNTER
No new care gaps identified.  [facility] Embedded Care Gaps. Reference number: [id number]. 5/20/2022   7:33:37 AM CDT

## 2022-05-22 ENCOUNTER — TELEPHONE (OUTPATIENT)
Dept: FAMILY MEDICINE | Facility: CLINIC | Age: 82
End: 2022-05-22
Payer: MEDICARE

## 2022-05-22 DIAGNOSIS — I71.40 ABDOMINAL AORTIC ANEURYSM (AAA) WITHOUT RUPTURE: Primary | ICD-10-CM

## 2022-05-22 RX ORDER — ALLOPURINOL 300 MG/1
TABLET ORAL
Qty: 90 TABLET | Refills: 1 | Status: SHIPPED | OUTPATIENT
Start: 2022-05-22

## 2022-05-22 RX ORDER — METFORMIN HYDROCHLORIDE 1000 MG/1
TABLET ORAL
Qty: 180 TABLET | Refills: 1 | Status: SHIPPED | OUTPATIENT
Start: 2022-05-22

## 2022-05-24 ENCOUNTER — OFFICE VISIT (OUTPATIENT)
Dept: OBSTETRICS AND GYNECOLOGY | Facility: CLINIC | Age: 82
End: 2022-05-24
Payer: MEDICARE

## 2022-05-24 ENCOUNTER — HOSPITAL ENCOUNTER (OUTPATIENT)
Dept: CARDIOLOGY | Facility: HOSPITAL | Age: 82
Discharge: HOME | End: 2022-05-24
Attending: OBSTETRICS & GYNECOLOGY
Payer: MEDICARE

## 2022-05-24 ENCOUNTER — HOSPITAL ENCOUNTER (OUTPATIENT)
Dept: RADIOLOGY | Facility: HOSPITAL | Age: 82
Discharge: HOME | End: 2022-05-24
Attending: OBSTETRICS & GYNECOLOGY
Payer: MEDICARE

## 2022-05-24 VITALS
WEIGHT: 158.94 LBS | HEIGHT: 64 IN | BODY MASS INDEX: 27.13 KG/M2 | SYSTOLIC BLOOD PRESSURE: 150 MMHG | DIASTOLIC BLOOD PRESSURE: 70 MMHG

## 2022-05-24 DIAGNOSIS — Z01.818 PREOP TESTING: ICD-10-CM

## 2022-05-24 DIAGNOSIS — N81.4 CYSTOCELE WITH UTERINE PROLAPSE: Primary | ICD-10-CM

## 2022-05-24 PROBLEM — N39.3 STRESS INCONTINENCE: Status: RESOLVED | Noted: 2020-11-17 | Resolved: 2022-05-24

## 2022-05-24 PROBLEM — M46.1 SACROILIITIS: Status: RESOLVED | Noted: 2022-03-03 | Resolved: 2022-05-24

## 2022-05-24 PROCEDURE — 93010 EKG 12-LEAD: ICD-10-PCS | Mod: ,,, | Performed by: INTERNAL MEDICINE

## 2022-05-24 PROCEDURE — 1159F PR MEDICATION LIST DOCUMENTED IN MEDICAL RECORD: ICD-10-PCS | Mod: CPTII,S$GLB,, | Performed by: OBSTETRICS & GYNECOLOGY

## 2022-05-24 PROCEDURE — 3078F DIAST BP <80 MM HG: CPT | Mod: CPTII,S$GLB,, | Performed by: OBSTETRICS & GYNECOLOGY

## 2022-05-24 PROCEDURE — 99499 NO LOS: ICD-10-PCS | Mod: S$GLB,,, | Performed by: OBSTETRICS & GYNECOLOGY

## 2022-05-24 PROCEDURE — 3288F FALL RISK ASSESSMENT DOCD: CPT | Mod: CPTII,S$GLB,, | Performed by: OBSTETRICS & GYNECOLOGY

## 2022-05-24 PROCEDURE — 1126F AMNT PAIN NOTED NONE PRSNT: CPT | Mod: CPTII,S$GLB,, | Performed by: OBSTETRICS & GYNECOLOGY

## 2022-05-24 PROCEDURE — 3288F PR FALLS RISK ASSESSMENT DOCUMENTED: ICD-10-PCS | Mod: CPTII,S$GLB,, | Performed by: OBSTETRICS & GYNECOLOGY

## 2022-05-24 PROCEDURE — 1101F PT FALLS ASSESS-DOCD LE1/YR: CPT | Mod: CPTII,S$GLB,, | Performed by: OBSTETRICS & GYNECOLOGY

## 2022-05-24 PROCEDURE — 3077F SYST BP >= 140 MM HG: CPT | Mod: CPTII,S$GLB,, | Performed by: OBSTETRICS & GYNECOLOGY

## 2022-05-24 PROCEDURE — 1126F PR PAIN SEVERITY QUANTIFIED, NO PAIN PRESENT: ICD-10-PCS | Mod: CPTII,S$GLB,, | Performed by: OBSTETRICS & GYNECOLOGY

## 2022-05-24 PROCEDURE — 93005 ELECTROCARDIOGRAM TRACING: CPT

## 2022-05-24 PROCEDURE — 71045 X-RAY EXAM CHEST 1 VIEW: CPT | Mod: TC

## 2022-05-24 PROCEDURE — 3078F PR MOST RECENT DIASTOLIC BLOOD PRESSURE < 80 MM HG: ICD-10-PCS | Mod: CPTII,S$GLB,, | Performed by: OBSTETRICS & GYNECOLOGY

## 2022-05-24 PROCEDURE — 71045 X-RAY EXAM CHEST 1 VIEW: CPT | Mod: 26,,, | Performed by: RADIOLOGY

## 2022-05-24 PROCEDURE — 1159F MED LIST DOCD IN RCRD: CPT | Mod: CPTII,S$GLB,, | Performed by: OBSTETRICS & GYNECOLOGY

## 2022-05-24 PROCEDURE — 99499 UNLISTED E&M SERVICE: CPT | Mod: S$GLB,,, | Performed by: OBSTETRICS & GYNECOLOGY

## 2022-05-24 PROCEDURE — 3077F PR MOST RECENT SYSTOLIC BLOOD PRESSURE >= 140 MM HG: ICD-10-PCS | Mod: CPTII,S$GLB,, | Performed by: OBSTETRICS & GYNECOLOGY

## 2022-05-24 PROCEDURE — 99999 PR PBB SHADOW E&M-EST. PATIENT-LVL III: CPT | Mod: PBBFAC,,, | Performed by: OBSTETRICS & GYNECOLOGY

## 2022-05-24 PROCEDURE — 71045 XR CHEST 1 VIEW PRE-OP: ICD-10-PCS | Mod: 26,,, | Performed by: RADIOLOGY

## 2022-05-24 PROCEDURE — 93010 ELECTROCARDIOGRAM REPORT: CPT | Mod: ,,, | Performed by: INTERNAL MEDICINE

## 2022-05-24 PROCEDURE — 99999 PR PBB SHADOW E&M-EST. PATIENT-LVL III: ICD-10-PCS | Mod: PBBFAC,,, | Performed by: OBSTETRICS & GYNECOLOGY

## 2022-05-24 PROCEDURE — 1101F PR PT FALLS ASSESS DOC 0-1 FALLS W/OUT INJ PAST YR: ICD-10-PCS | Mod: CPTII,S$GLB,, | Performed by: OBSTETRICS & GYNECOLOGY

## 2022-05-24 RX ORDER — ACETAMINOPHEN 500 MG
1000 TABLET ORAL
Status: DISCONTINUED | OUTPATIENT
Start: 2022-05-24 | End: 2022-05-28 | Stop reason: HOSPADM

## 2022-05-24 RX ORDER — SODIUM CHLORIDE, SODIUM LACTATE, POTASSIUM CHLORIDE, CALCIUM CHLORIDE 600; 310; 30; 20 MG/100ML; MG/100ML; MG/100ML; MG/100ML
INJECTION, SOLUTION INTRAVENOUS CONTINUOUS
Status: CANCELLED | OUTPATIENT
Start: 2022-05-24

## 2022-05-24 NOTE — H&P (VIEW-ONLY)
"  Subjective:       Patient ID: Jasmine Velásquez is a 81 y.o. female.    Chief Complaint:  pre op  Pelvic pressure for vaginal and uterine prolapse     History of Present Illness  HPI  Having symptoms from large cystocele with uterine prolapse .      GYN & OB History  No LMP recorded. Patient is postmenopausal.   Date of Last Pap: No result found    OB History    Para Term  AB Living   6 6 6         SAB IAB Ectopic Multiple Live Births                  # Outcome Date GA Lbr Alfredito/2nd Weight Sex Delivery Anes PTL Lv   6 Term            5 Term            4 Term            3 Term            2 Term            1 Term              Past Medical History:   Diagnosis Date    Abnormal CT of the abdomen 10/12/2017    Angiodysplasia of cecum     Arthritis     Cancer     skin lesion    CHF (congestive heart failure)     TOLD SHE HAS A "BAD HEART" BY Dr. Garcia    Diabetes mellitus without complication 2019    Diabetes mellitus, type 2     BS doesn't check 06/15/2016    Diabetic retinopathy     DM (diabetes mellitus)     BS doesn't check 2018    DM (diabetes mellitus)     BS doesn't check 07/10/2020    DM (diabetes mellitus)     BS doesn't check 10/19/2021    GERD (gastroesophageal reflux disease)     Gout 2019    Hyperlipidemia     Hypertension     Hypothyroidism     Neuropathy     Stroke     TIA X3 "YEARS AND YEARS AGO"     Past Surgical History:   Procedure Laterality Date    AORTIC VALVE REPLACEMENT  2014    Porcine valve    APPENDECTOMY      BACK SURGERY      CAROTID ENDARTERECTOMY Left 2016    CATARACT EXTRACTION Bilateral     [facility]    COLONOSCOPY N/A 2016    Procedure: COLONOSCOPY;  Surgeon: Marcel Jacques MD;  Location: [facility];  Service: Endoscopy;  Laterality: N/A;    COLONOSCOPY N/A 2017    Procedure: COLONOSCOPY;  Surgeon: Jayy Rosa MD;  Location: [facility];  Service: " Endoscopy;  Laterality: N/A;    COLONOSCOPY N/A 10/12/2017    Procedure: COLONOSCOPY;  Surgeon: Marcel Jacques MD;  Location: [facility];  Service: Endoscopy;  Laterality: N/A;    EPIDURAL STEROID INJECTION N/A 04/16/2021    Procedure: Lumbar L4/5 IL JOSE G;  Surgeon: Quirino Durant MD;  Location: [facility];  Service: Pain Management;  Laterality: N/A;    INJECTION OF ANESTHETIC AGENT AROUND MEDIAL BRANCH NERVES INNERVATING LUMBAR FACET JOINT Bilateral 10/29/2019    Procedure: Bilateral L3-5 MBB;  Surgeon: Manuel Allen MD;  Location: [facility];  Service: Pain Management;  Laterality: Bilateral;    INJECTION OF ANESTHETIC AGENT AROUND MEDIAL BRANCH NERVES INNERVATING LUMBAR FACET JOINT Bilateral 11/22/2019    Procedure: Bilateral L3-5 MBB;  Surgeon: Quirino Durant MD;  Location: [facility];  Service: Pain Management;  Laterality: Bilateral;    RADIOFREQUENCY THERMOCOAGULATION Left 12/10/2019    Procedure: Left L3-5 Lumbar RFA;  Surgeon: Manuel Allen MD;  Location: [facility];  Service: Pain Management;  Laterality: Left;    RADIOFREQUENCY THERMOCOAGULATION Right 12/31/2019    Procedure: Right L3-5 Lumbar RFA;  Surgeon: Manuel Allen MD;  Location: [facility];  Service: Pain Management;  Laterality: Right;    RADIOFREQUENCY THERMOCOAGULATION Right 02/18/2021    Procedure: Right L3-5 Lumbar RFA;  Surgeon: Quirino Durant MD;  Location: [facility];  Service: Pain Management;  Laterality: Right;    RADIOFREQUENCY THERMOCOAGULATION Left 03/11/2021    Procedure: Left L3-5 Lumbar RFA;  Surgeon: Quirino Durant MD;  Location: [facility];  Service: Pain Management;  Laterality: Left;    SELECTIVE INJECTION OF ANESTHETIC AGENT AROUND LUMBAR SPINAL NERVE ROOT BY TRANSFORAMINAL APPROACH Bilateral 11/03/2021    Procedure: Bilateral L4/5 TF JOSE G;  Surgeon: Quirino Durant MD;  Location: [facility];  Service: Pain Management;  Laterality: Bilateral;    SKIN LESION EXCISION       cancer lesion removed twice    TONSILLECTOMY       Family History   Problem Relation Age of Onset    Cancer Mother 46        colon    Heart disease Mother     Heart disease Father     Hypertension Father     Diabetes Father     COPD Sister      Social History     Tobacco Use    Smoking status: Former Smoker     Packs/day: 1.00     Years: 25.00     Pack years: 25.00     Quit date: 2002     Years since quittin.0    Smokeless tobacco: Never Used   Substance Use Topics    Alcohol use: Never     Alcohol/week: 0.0 standard drinks    Drug use: No     (Not in a hospital admission)    Review of patient's allergies indicates:   Allergen Reactions    Codeine Hives    Ace inhibitors Other (See Comments)     COUGH     Current Outpatient Medications   Medication Sig    allopurinoL (ZYLOPRIM) 300 MG tablet TAKE 1 TABLET EVERY DAY    amLODIPine (NORVASC) 5 MG tablet TAKE 1 TABLET EVERY DAY    aspirin (ECOTRIN) 81 MG EC tablet Take 81 mg by mouth once daily.    biotin 1 mg Cap Take 1 tablet by mouth once daily at 6am.    cholecalciferol, vitamin D3, (VITAMIN D3) 1,000 unit capsule Take 1,000 Units by mouth.    fish oil-omega-3 fatty acids 300-1,000 mg capsule Take 2 g by mouth once daily.    furosemide (LASIX) 40 MG tablet TAKE 1 TABLET EVERY DAY    gabapentin (NEURONTIN) 300 MG capsule Take 1 capsule (300 mg total) by mouth once daily AND 1 capsule (300 mg total) with lunch AND 2 capsules (600 mg total) every evening. (Patient taking differently: Take 1 capsule (300 mg total) by mouth once daily AND 1 capsule (300 mg total) with lunch AND 2 capsules (600 mg total) every evening.  1100, 1500 AND 1800 HRS. DAILY)    levothyroxine (SYNTHROID) 75 MCG tablet TAKE 1 TABLET EVERY DAY BEFORE BREAKFAST (Patient taking differently: Take 75 mcg by mouth before breakfast.)    LIDOcaine (LIDODERM) 5 % Use 1-3 patches per day over recommended area. Remove & Discard patch within 12 hours or as directed by  MD.    losartan (COZAAR) 100 MG tablet TAKE 1 TABLET EVERY DAY (Patient taking differently: Take 100 mg by mouth once daily.)    lovastatin (MEVACOR) 40 MG tablet Take 1 tablet (40 mg total) by mouth every evening.    meloxicam (MOBIC) 7.5 MG tablet Take 1 tablet (7.5 mg total) by mouth once daily.    metFORMIN (GLUCOPHAGE) 1000 MG tablet TAKE 1 TABLET TWICE DAILY    methocarbamoL (ROBAXIN) 500 MG Tab Take 1 tablet (500 mg total) by mouth 3 (three) times daily. (Patient taking differently: Take 500 mg by mouth 3 (three) times daily.)    metoprolol tartrate (LOPRESSOR) 50 MG tablet TAKE 1 TABLET TWICE DAILY    mupirocin (BACTROBAN) 2 % ointment Apply topically 3 (three) times daily.    rOPINIRole (REQUIP) 2 MG tablet Take 0.5 tablets (1 mg total) by mouth 3 (three) times daily. (Patient taking differently: Take 1 mg by mouth 3 (three) times daily.)    traZODone (DESYREL) 50 MG tablet TAKE 1 TABLET EVERY EVENING (Patient taking differently: Take 50 mg by mouth nightly as needed.)    triamcinolone acetonide 0.1% (KENALOG) 0.1 % cream Apply topically 2 (two) times daily. for 10 days     No current facility-administered medications for this visit.     Review of Systems  Review of Systems   Constitutional: Negative for activity change, appetite change, chills, fatigue, fever and unexpected weight change.   HENT: Negative for mouth sores.    Eyes: Negative for visual disturbance.   Respiratory: Negative for cough and shortness of breath.    Cardiovascular: Negative for chest pain and palpitations.   Gastrointestinal: Negative for abdominal pain, bloating, blood in stool, constipation, diarrhea and nausea.   Genitourinary: Negative for bladder incontinence, decreased libido, dyspareunia, dysuria, frequency, genital sores, hematuria, pelvic pain, urgency, vaginal discharge, urinary incontinence, postcoital bleeding, postmenopausal bleeding and vaginal odor.   Musculoskeletal: Negative for back pain, joint swelling  and myalgias.   Integumentary:  Negative for rash, breast mass, nipple discharge and breast skin changes.   Neurological: Negative for syncope, numbness and headaches.   Hematological: Negative for adenopathy. Does not bruise/bleed easily.   Psychiatric/Behavioral: Negative for depression and sleep disturbance. The patient is not nervous/anxious.    Breast: Negative for mass, mastodynia, nipple discharge and skin changes          Objective:    Physical Exam:   Constitutional: She is oriented to person, place, and time. Vital signs are normal. She appears well-developed and well-nourished. No distress.    HENT:   Head: Normocephalic and atraumatic.   Right Ear: External ear normal.   Left Ear: External ear normal.   Nose: Nose normal.    Eyes: Pupils are equal, round, and reactive to light. Conjunctivae are normal.    Neck: Trachea normal. No JVD present. No thyroid mass and no thyromegaly present.    Cardiovascular: Normal rate and regular rhythm.     Pulmonary/Chest: Effort normal and breath sounds normal. No respiratory distress.        Abdominal: Soft. Bowel sounds are normal. She exhibits no distension and no mass. There is no abdominal tenderness. No hernia. Hernia confirmed negative in the ventral area, confirmed negative in the right inguinal area and confirmed negative in the left inguinal area.     Genitourinary:    Vagina, uterus and rectum normal.   Rectum:      No external hemorrhoid or abnormal anal tone.   Labial bartholins normal.There is no rash, tenderness or lesion on the right labia. There is no rash, tenderness or lesion on the left labia. Cervix is normal. Right adnexum displays no mass, no tenderness and no fullness. Left adnexum displays no mass, no tenderness and no fullness. There is cystocele (grade 3 ) in the vagina. No  no vaginal discharge, tenderness, bleeding, rectocele or unspecified prolapse of vaginal walls in the vagina.    No foreign body in the vagina.   Cervix exhibits no motion  tenderness, no discharge and no friability. Uterus is uterine prolapse (grade 2). Uterus is not deviated, not enlarged and not tender.           Musculoskeletal: Normal range of motion.       Neurological: She is alert and oriented to person, place, and time. She has normal reflexes.    Skin: Skin is warm and dry. She is not diaphoretic.    Psychiatric: She has a normal mood and affect. Her speech is normal and behavior is normal. Thought content normal.          Assessment:        1. Cystocele with uterine prolapse                Plan:      Jasmine was seen today for pre op.    Diagnoses and all orders for this visit:    Cystocele with uterine prolapse  Comments:  TvH with anterior vaginal repair

## 2022-05-24 NOTE — H&P
"  Subjective:       Patient ID: Jasmine Velásquez is a 81 y.o. female.    Chief Complaint:  pre op  Pelvic pressure for vaginal and uterine prolapse     History of Present Illness  HPI  Having symptoms from large cystocele with uterine prolapse .      GYN & OB History  No LMP recorded. Patient is postmenopausal.   Date of Last Pap: No result found    OB History    Para Term  AB Living   6 6 6         SAB IAB Ectopic Multiple Live Births                  # Outcome Date GA Lbr Alfredito/2nd Weight Sex Delivery Anes PTL Lv   6 Term            5 Term            4 Term            3 Term            2 Term            1 Term              Past Medical History:   Diagnosis Date    Abnormal CT of the abdomen 10/12/2017    Angiodysplasia of cecum     Arthritis     Cancer     skin lesion    CHF (congestive heart failure)     TOLD SHE HAS A "BAD HEART" BY Dr. Garcia    Diabetes mellitus without complication 2019    Diabetes mellitus, type 2     BS doesn't check 06/15/2016    Diabetic retinopathy     DM (diabetes mellitus)     BS doesn't check 2018    DM (diabetes mellitus)     BS doesn't check 07/10/2020    DM (diabetes mellitus)     BS doesn't check 10/19/2021    GERD (gastroesophageal reflux disease)     Gout 2019    Hyperlipidemia     Hypertension     Hypothyroidism     Neuropathy     Stroke     TIA X3 "YEARS AND YEARS AGO"     Past Surgical History:   Procedure Laterality Date    AORTIC VALVE REPLACEMENT  2014    Porcine valve    APPENDECTOMY      BACK SURGERY      CAROTID ENDARTERECTOMY Left 2016    CATARACT EXTRACTION Bilateral     [facility]    COLONOSCOPY N/A 2016    Procedure: COLONOSCOPY;  Surgeon: Marcel Jacques MD;  Location: [facility];  Service: Endoscopy;  Laterality: N/A;    COLONOSCOPY N/A 2017    Procedure: COLONOSCOPY;  Surgeon: Jayy Rosa MD;  Location: [facility];  Service: " Endoscopy;  Laterality: N/A;    COLONOSCOPY N/A 10/12/2017    Procedure: COLONOSCOPY;  Surgeon: Marcel Jacques MD;  Location: [facility];  Service: Endoscopy;  Laterality: N/A;    EPIDURAL STEROID INJECTION N/A 04/16/2021    Procedure: Lumbar L4/5 IL JOSE G;  Surgeon: Quirino Durant MD;  Location: [facility];  Service: Pain Management;  Laterality: N/A;    INJECTION OF ANESTHETIC AGENT AROUND MEDIAL BRANCH NERVES INNERVATING LUMBAR FACET JOINT Bilateral 10/29/2019    Procedure: Bilateral L3-5 MBB;  Surgeon: Manuel Allen MD;  Location: [facility];  Service: Pain Management;  Laterality: Bilateral;    INJECTION OF ANESTHETIC AGENT AROUND MEDIAL BRANCH NERVES INNERVATING LUMBAR FACET JOINT Bilateral 11/22/2019    Procedure: Bilateral L3-5 MBB;  Surgeon: Quirino Durant MD;  Location: [facility];  Service: Pain Management;  Laterality: Bilateral;    RADIOFREQUENCY THERMOCOAGULATION Left 12/10/2019    Procedure: Left L3-5 Lumbar RFA;  Surgeon: Manuel Allen MD;  Location: [facility];  Service: Pain Management;  Laterality: Left;    RADIOFREQUENCY THERMOCOAGULATION Right 12/31/2019    Procedure: Right L3-5 Lumbar RFA;  Surgeon: Manuel Allen MD;  Location: [facility];  Service: Pain Management;  Laterality: Right;    RADIOFREQUENCY THERMOCOAGULATION Right 02/18/2021    Procedure: Right L3-5 Lumbar RFA;  Surgeon: Quirino Durant MD;  Location: [facility];  Service: Pain Management;  Laterality: Right;    RADIOFREQUENCY THERMOCOAGULATION Left 03/11/2021    Procedure: Left L3-5 Lumbar RFA;  Surgeon: Quirino Durant MD;  Location: [facility];  Service: Pain Management;  Laterality: Left;    SELECTIVE INJECTION OF ANESTHETIC AGENT AROUND LUMBAR SPINAL NERVE ROOT BY TRANSFORAMINAL APPROACH Bilateral 11/03/2021    Procedure: Bilateral L4/5 TF JOSE G;  Surgeon: Quirino Durant MD;  Location: [facility];  Service: Pain Management;  Laterality: Bilateral;    SKIN LESION EXCISION       cancer lesion removed twice    TONSILLECTOMY       Family History   Problem Relation Age of Onset    Cancer Mother 46        colon    Heart disease Mother     Heart disease Father     Hypertension Father     Diabetes Father     COPD Sister      Social History     Tobacco Use    Smoking status: Former Smoker     Packs/day: 1.00     Years: 25.00     Pack years: 25.00     Quit date: 2002     Years since quittin.0    Smokeless tobacco: Never Used   Substance Use Topics    Alcohol use: Never     Alcohol/week: 0.0 standard drinks    Drug use: No     (Not in a hospital admission)    Review of patient's allergies indicates:   Allergen Reactions    Codeine Hives    Ace inhibitors Other (See Comments)     COUGH     Current Outpatient Medications   Medication Sig    allopurinoL (ZYLOPRIM) 300 MG tablet TAKE 1 TABLET EVERY DAY    amLODIPine (NORVASC) 5 MG tablet TAKE 1 TABLET EVERY DAY    aspirin (ECOTRIN) 81 MG EC tablet Take 81 mg by mouth once daily.    biotin 1 mg Cap Take 1 tablet by mouth once daily at 6am.    cholecalciferol, vitamin D3, (VITAMIN D3) 1,000 unit capsule Take 1,000 Units by mouth.    fish oil-omega-3 fatty acids 300-1,000 mg capsule Take 2 g by mouth once daily.    furosemide (LASIX) 40 MG tablet TAKE 1 TABLET EVERY DAY    gabapentin (NEURONTIN) 300 MG capsule Take 1 capsule (300 mg total) by mouth once daily AND 1 capsule (300 mg total) with lunch AND 2 capsules (600 mg total) every evening. (Patient taking differently: Take 1 capsule (300 mg total) by mouth once daily AND 1 capsule (300 mg total) with lunch AND 2 capsules (600 mg total) every evening.  1100, 1500 AND 1800 HRS. DAILY)    levothyroxine (SYNTHROID) 75 MCG tablet TAKE 1 TABLET EVERY DAY BEFORE BREAKFAST (Patient taking differently: Take 75 mcg by mouth before breakfast.)    LIDOcaine (LIDODERM) 5 % Use 1-3 patches per day over recommended area. Remove & Discard patch within 12 hours or as directed by  MD.    losartan (COZAAR) 100 MG tablet TAKE 1 TABLET EVERY DAY (Patient taking differently: Take 100 mg by mouth once daily.)    lovastatin (MEVACOR) 40 MG tablet Take 1 tablet (40 mg total) by mouth every evening.    meloxicam (MOBIC) 7.5 MG tablet Take 1 tablet (7.5 mg total) by mouth once daily.    metFORMIN (GLUCOPHAGE) 1000 MG tablet TAKE 1 TABLET TWICE DAILY    methocarbamoL (ROBAXIN) 500 MG Tab Take 1 tablet (500 mg total) by mouth 3 (three) times daily. (Patient taking differently: Take 500 mg by mouth 3 (three) times daily.)    metoprolol tartrate (LOPRESSOR) 50 MG tablet TAKE 1 TABLET TWICE DAILY    mupirocin (BACTROBAN) 2 % ointment Apply topically 3 (three) times daily.    rOPINIRole (REQUIP) 2 MG tablet Take 0.5 tablets (1 mg total) by mouth 3 (three) times daily. (Patient taking differently: Take 1 mg by mouth 3 (three) times daily.)    traZODone (DESYREL) 50 MG tablet TAKE 1 TABLET EVERY EVENING (Patient taking differently: Take 50 mg by mouth nightly as needed.)    triamcinolone acetonide 0.1% (KENALOG) 0.1 % cream Apply topically 2 (two) times daily. for 10 days     No current facility-administered medications for this visit.     Review of Systems  Review of Systems   Constitutional: Negative for activity change, appetite change, chills, fatigue, fever and unexpected weight change.   HENT: Negative for mouth sores.    Eyes: Negative for visual disturbance.   Respiratory: Negative for cough and shortness of breath.    Cardiovascular: Negative for chest pain and palpitations.   Gastrointestinal: Negative for abdominal pain, bloating, blood in stool, constipation, diarrhea and nausea.   Genitourinary: Negative for bladder incontinence, decreased libido, dyspareunia, dysuria, frequency, genital sores, hematuria, pelvic pain, urgency, vaginal discharge, urinary incontinence, postcoital bleeding, postmenopausal bleeding and vaginal odor.   Musculoskeletal: Negative for back pain, joint swelling  and myalgias.   Integumentary:  Negative for rash, breast mass, nipple discharge and breast skin changes.   Neurological: Negative for syncope, numbness and headaches.   Hematological: Negative for adenopathy. Does not bruise/bleed easily.   Psychiatric/Behavioral: Negative for depression and sleep disturbance. The patient is not nervous/anxious.    Breast: Negative for mass, mastodynia, nipple discharge and skin changes          Objective:    Physical Exam:   Constitutional: She is oriented to person, place, and time. Vital signs are normal. She appears well-developed and well-nourished. No distress.    HENT:   Head: Normocephalic and atraumatic.   Right Ear: External ear normal.   Left Ear: External ear normal.   Nose: Nose normal.    Eyes: Pupils are equal, round, and reactive to light. Conjunctivae are normal.    Neck: Trachea normal. No JVD present. No thyroid mass and no thyromegaly present.    Cardiovascular: Normal rate and regular rhythm.     Pulmonary/Chest: Effort normal and breath sounds normal. No respiratory distress.        Abdominal: Soft. Bowel sounds are normal. She exhibits no distension and no mass. There is no abdominal tenderness. No hernia. Hernia confirmed negative in the ventral area, confirmed negative in the right inguinal area and confirmed negative in the left inguinal area.     Genitourinary:    Vagina, uterus and rectum normal.   Rectum:      No external hemorrhoid or abnormal anal tone.   Labial bartholins normal.There is no rash, tenderness or lesion on the right labia. There is no rash, tenderness or lesion on the left labia. Cervix is normal. Right adnexum displays no mass, no tenderness and no fullness. Left adnexum displays no mass, no tenderness and no fullness. There is cystocele (grade 3 ) in the vagina. No  no vaginal discharge, tenderness, bleeding, rectocele or unspecified prolapse of vaginal walls in the vagina.    No foreign body in the vagina.   Cervix exhibits no motion  tenderness, no discharge and no friability. Uterus is uterine prolapse (grade 2). Uterus is not deviated, not enlarged and not tender.           Musculoskeletal: Normal range of motion.       Neurological: She is alert and oriented to person, place, and time. She has normal reflexes.    Skin: Skin is warm and dry. She is not diaphoretic.    Psychiatric: She has a normal mood and affect. Her speech is normal and behavior is normal. Thought content normal.          Assessment:        1. Cystocele with uterine prolapse                Plan:      Jasmine was seen today for pre op.    Diagnoses and all orders for this visit:    Cystocele with uterine prolapse  Comments:  TvH with anterior vaginal repair

## 2022-05-26 ENCOUNTER — TELEPHONE (OUTPATIENT)
Dept: PREADMISSION TESTING | Facility: HOSPITAL | Age: 82
End: 2022-05-26
Payer: MEDICARE

## 2022-05-26 DIAGNOSIS — M54.32 LEFT SIDED SCIATICA: ICD-10-CM

## 2022-05-26 RX ORDER — GABAPENTIN 300 MG/1
CAPSULE ORAL
Qty: 360 CAPSULE | Refills: 0 | Status: SHIPPED | OUTPATIENT
Start: 2022-05-26

## 2022-05-26 NOTE — TELEPHONE ENCOUNTER
Called and spoke with Pt about the following:    Please arrive to [facility] ([facility] main lobby at 10:00am on 5/27/22 for your scheduled procedure. NOTHING to eat or drink after midnight, except medications instructed by the Pre-admit Nurse. Pt verbalized understanding.

## 2022-05-27 ENCOUNTER — ANESTHESIA EVENT (OUTPATIENT)
Dept: SURGERY | Facility: HOSPITAL | Age: 82
End: 2022-05-27
Payer: MEDICARE

## 2022-05-27 ENCOUNTER — ANESTHESIA (OUTPATIENT)
Dept: SURGERY | Facility: HOSPITAL | Age: 82
End: 2022-05-27
Payer: MEDICARE

## 2022-05-27 ENCOUNTER — HOSPITAL ENCOUNTER (OUTPATIENT)
Facility: HOSPITAL | Age: 82
LOS: 1 days | Discharge: HOME | End: 2022-05-28
Attending: OBSTETRICS & GYNECOLOGY | Admitting: OBSTETRICS & GYNECOLOGY
Payer: MEDICARE

## 2022-05-27 DIAGNOSIS — Z90.710 STATUS POST VAGINAL HYSTERECTOMY: Primary | ICD-10-CM

## 2022-05-27 DIAGNOSIS — N81.4 CYSTOCELE WITH UTERINE PROLAPSE: ICD-10-CM

## 2022-05-27 PROBLEM — E11.9 TYPE 2 DIABETES MELLITUS WITHOUT COMPLICATION, WITHOUT LONG-TERM CURRENT USE OF INSULIN: Status: ACTIVE | Noted: 2022-05-27

## 2022-05-27 LAB
POCT GLUCOSE: 110 MG/DL (ref 70–110)
POCT GLUCOSE: 144 MG/DL (ref 70–110)
POCT GLUCOSE: 200 MG/DL (ref 70–110)

## 2022-05-27 PROCEDURE — 88307 TISSUE EXAM BY PATHOLOGIST: CPT | Mod: 26,,, | Performed by: PATHOLOGY

## 2022-05-27 PROCEDURE — 37000008 HC ANESTHESIA 1ST 15 MINUTES: Performed by: OBSTETRICS & GYNECOLOGY

## 2022-05-27 PROCEDURE — 25000003 PHARM REV CODE 250: Performed by: ANESTHESIOLOGY

## 2022-05-27 PROCEDURE — 71000039 HC RECOVERY, EACH ADD'L HOUR: Performed by: OBSTETRICS & GYNECOLOGY

## 2022-05-27 PROCEDURE — 36000709 HC OR TIME LEV III EA ADD 15 MIN: Performed by: OBSTETRICS & GYNECOLOGY

## 2022-05-27 PROCEDURE — 25000003 PHARM REV CODE 250: Performed by: OBSTETRICS & GYNECOLOGY

## 2022-05-27 PROCEDURE — 57260 CMBN ANT PST COLPRHY: CPT | Mod: AS,51,, | Performed by: PHYSICIAN ASSISTANT

## 2022-05-27 PROCEDURE — 63600175 PHARM REV CODE 636 W HCPCS: Performed by: ANESTHESIOLOGY

## 2022-05-27 PROCEDURE — 71000033 HC RECOVERY, INTIAL HOUR: Performed by: OBSTETRICS & GYNECOLOGY

## 2022-05-27 PROCEDURE — 57260 PR COMBINED ANT/POST COLPORRHAPHY: ICD-10-PCS | Mod: AS,51,, | Performed by: PHYSICIAN ASSISTANT

## 2022-05-27 PROCEDURE — 36000708 HC OR TIME LEV III 1ST 15 MIN: Performed by: OBSTETRICS & GYNECOLOGY

## 2022-05-27 PROCEDURE — 57260 CMBN ANT PST COLPRHY: CPT | Mod: 51,,, | Performed by: OBSTETRICS & GYNECOLOGY

## 2022-05-27 PROCEDURE — 58260 VAGINAL HYSTERECTOMY: CPT | Mod: AS,,, | Performed by: PHYSICIAN ASSISTANT

## 2022-05-27 PROCEDURE — 58260 VAGINAL HYSTERECTOMY: CPT | Mod: ,,, | Performed by: OBSTETRICS & GYNECOLOGY

## 2022-05-27 PROCEDURE — 63600175 PHARM REV CODE 636 W HCPCS: Performed by: OBSTETRICS & GYNECOLOGY

## 2022-05-27 PROCEDURE — 36415 COLL VENOUS BLD VENIPUNCTURE: CPT | Performed by: OBSTETRICS & GYNECOLOGY

## 2022-05-27 PROCEDURE — 63600175 PHARM REV CODE 636 W HCPCS

## 2022-05-27 PROCEDURE — 57260 PR COMBINED ANT/POST COLPORRHAPHY: ICD-10-PCS | Mod: 51,,, | Performed by: OBSTETRICS & GYNECOLOGY

## 2022-05-27 PROCEDURE — 88307 PR  SURG PATH,LEVEL V: ICD-10-PCS | Mod: 26,,, | Performed by: PATHOLOGY

## 2022-05-27 PROCEDURE — 88307 TISSUE EXAM BY PATHOLOGIST: CPT | Performed by: PATHOLOGY

## 2022-05-27 PROCEDURE — 83036 HEMOGLOBIN GLYCOSYLATED A1C: CPT | Performed by: OBSTETRICS & GYNECOLOGY

## 2022-05-27 PROCEDURE — 37000009 HC ANESTHESIA EA ADD 15 MINS: Performed by: OBSTETRICS & GYNECOLOGY

## 2022-05-27 PROCEDURE — 58260 PR VAGINAL HYSTERECTOMY,UTERUS 250 GMS/<: ICD-10-PCS | Mod: AS,,, | Performed by: PHYSICIAN ASSISTANT

## 2022-05-27 PROCEDURE — 25000003 PHARM REV CODE 250

## 2022-05-27 PROCEDURE — 94799 UNLISTED PULMONARY SVC/PX: CPT

## 2022-05-27 PROCEDURE — 25000003 PHARM REV CODE 250: Performed by: NURSE ANESTHETIST, CERTIFIED REGISTERED

## 2022-05-27 PROCEDURE — 99900035 HC TECH TIME PER 15 MIN (STAT)

## 2022-05-27 PROCEDURE — 58260 PR VAGINAL HYSTERECTOMY,UTERUS 250 GMS/<: ICD-10-PCS | Mod: ,,, | Performed by: OBSTETRICS & GYNECOLOGY

## 2022-05-27 PROCEDURE — 63600175 PHARM REV CODE 636 W HCPCS: Performed by: NURSE ANESTHETIST, CERTIFIED REGISTERED

## 2022-05-27 RX ORDER — GLUCAGON 1 MG
1 KIT INJECTION
Status: DISCONTINUED | OUTPATIENT
Start: 2022-05-27 | End: 2022-05-28 | Stop reason: HOSPADM

## 2022-05-27 RX ORDER — LOSARTAN POTASSIUM 50 MG/1
100 TABLET ORAL DAILY
Status: DISCONTINUED | OUTPATIENT
Start: 2022-05-28 | End: 2022-05-28 | Stop reason: HOSPADM

## 2022-05-27 RX ORDER — MORPHINE SULFATE 2 MG/ML
2 INJECTION, SOLUTION INTRAMUSCULAR; INTRAVENOUS
Status: DISCONTINUED | OUTPATIENT
Start: 2022-05-27 | End: 2022-05-28 | Stop reason: HOSPADM

## 2022-05-27 RX ORDER — AMLODIPINE BESYLATE 5 MG/1
5 TABLET ORAL DAILY
Status: DISCONTINUED | OUTPATIENT
Start: 2022-05-28 | End: 2022-05-28 | Stop reason: HOSPADM

## 2022-05-27 RX ORDER — GLUCAGON 1 MG
1 KIT INJECTION
Status: DISCONTINUED | OUTPATIENT
Start: 2022-05-27 | End: 2022-05-27 | Stop reason: SDUPTHER

## 2022-05-27 RX ORDER — IBUPROFEN 200 MG
16 TABLET ORAL
Status: DISCONTINUED | OUTPATIENT
Start: 2022-05-27 | End: 2022-05-27 | Stop reason: SDUPTHER

## 2022-05-27 RX ORDER — DIPHENHYDRAMINE HYDROCHLORIDE 50 MG/ML
25 INJECTION INTRAMUSCULAR; INTRAVENOUS EVERY 6 HOURS PRN
Status: DISCONTINUED | OUTPATIENT
Start: 2022-05-27 | End: 2022-05-27 | Stop reason: HOSPADM

## 2022-05-27 RX ORDER — MEPERIDINE HYDROCHLORIDE 25 MG/ML
12.5 INJECTION INTRAMUSCULAR; INTRAVENOUS; SUBCUTANEOUS ONCE
Status: DISCONTINUED | OUTPATIENT
Start: 2022-05-27 | End: 2022-05-27 | Stop reason: HOSPADM

## 2022-05-27 RX ORDER — HYDROMORPHONE HYDROCHLORIDE 2 MG/ML
0.2 INJECTION, SOLUTION INTRAMUSCULAR; INTRAVENOUS; SUBCUTANEOUS EVERY 5 MIN PRN
Status: DISCONTINUED | OUTPATIENT
Start: 2022-05-27 | End: 2022-05-27 | Stop reason: HOSPADM

## 2022-05-27 RX ORDER — IBUPROFEN 200 MG
24 TABLET ORAL
Status: DISCONTINUED | OUTPATIENT
Start: 2022-05-27 | End: 2022-05-27 | Stop reason: SDUPTHER

## 2022-05-27 RX ORDER — KETOROLAC TROMETHAMINE 30 MG/ML
15 INJECTION, SOLUTION INTRAMUSCULAR; INTRAVENOUS EVERY 8 HOURS PRN
Status: DISCONTINUED | OUTPATIENT
Start: 2022-05-27 | End: 2022-05-27 | Stop reason: HOSPADM

## 2022-05-27 RX ORDER — ACETAMINOPHEN 10 MG/ML
INJECTION, SOLUTION INTRAVENOUS
Status: DISCONTINUED | OUTPATIENT
Start: 2022-05-27 | End: 2022-05-27

## 2022-05-27 RX ORDER — SODIUM CHLORIDE, SODIUM LACTATE, POTASSIUM CHLORIDE, CALCIUM CHLORIDE 600; 310; 30; 20 MG/100ML; MG/100ML; MG/100ML; MG/100ML
INJECTION, SOLUTION INTRAVENOUS CONTINUOUS
Status: DISCONTINUED | OUTPATIENT
Start: 2022-05-27 | End: 2022-05-27

## 2022-05-27 RX ORDER — DEXAMETHASONE SODIUM PHOSPHATE 4 MG/ML
INJECTION, SOLUTION INTRA-ARTICULAR; INTRALESIONAL; INTRAMUSCULAR; INTRAVENOUS; SOFT TISSUE
Status: DISCONTINUED | OUTPATIENT
Start: 2022-05-27 | End: 2022-05-27

## 2022-05-27 RX ORDER — ROPINIROLE 1 MG/1
1 TABLET, FILM COATED ORAL 3 TIMES DAILY
Status: DISCONTINUED | OUTPATIENT
Start: 2022-05-27 | End: 2022-05-28 | Stop reason: HOSPADM

## 2022-05-27 RX ORDER — ONDANSETRON 8 MG/1
8 TABLET, ORALLY DISINTEGRATING ORAL EVERY 8 HOURS PRN
Status: DISCONTINUED | OUTPATIENT
Start: 2022-05-27 | End: 2022-05-28 | Stop reason: HOSPADM

## 2022-05-27 RX ORDER — ALBUTEROL SULFATE 0.83 MG/ML
2.5 SOLUTION RESPIRATORY (INHALATION) EVERY 4 HOURS PRN
Status: DISCONTINUED | OUTPATIENT
Start: 2022-05-27 | End: 2022-05-27

## 2022-05-27 RX ORDER — IBUPROFEN 200 MG
16 TABLET ORAL
Status: DISCONTINUED | OUTPATIENT
Start: 2022-05-27 | End: 2022-05-28 | Stop reason: HOSPADM

## 2022-05-27 RX ORDER — INSULIN ASPART 100 [IU]/ML
1-10 INJECTION, SOLUTION INTRAVENOUS; SUBCUTANEOUS
Status: DISCONTINUED | OUTPATIENT
Start: 2022-05-27 | End: 2022-05-28 | Stop reason: HOSPADM

## 2022-05-27 RX ORDER — ONDANSETRON 2 MG/ML
4 INJECTION INTRAMUSCULAR; INTRAVENOUS DAILY PRN
Status: DISCONTINUED | OUTPATIENT
Start: 2022-05-27 | End: 2022-05-27 | Stop reason: HOSPADM

## 2022-05-27 RX ORDER — VASOPRESSIN 20 [USP'U]/ML
INJECTION, SOLUTION INTRAMUSCULAR; SUBCUTANEOUS
Status: DISCONTINUED | OUTPATIENT
Start: 2022-05-27 | End: 2022-05-27 | Stop reason: HOSPADM

## 2022-05-27 RX ORDER — FUROSEMIDE 40 MG/1
40 TABLET ORAL DAILY
Status: DISCONTINUED | OUTPATIENT
Start: 2022-05-28 | End: 2022-05-28 | Stop reason: HOSPADM

## 2022-05-27 RX ORDER — SODIUM CHLORIDE 0.9 % (FLUSH) 0.9 %
3 SYRINGE (ML) INJECTION
Status: DISCONTINUED | OUTPATIENT
Start: 2022-05-27 | End: 2022-05-27

## 2022-05-27 RX ORDER — PROPOFOL 10 MG/ML
VIAL (ML) INTRAVENOUS
Status: DISCONTINUED | OUTPATIENT
Start: 2022-05-27 | End: 2022-05-27

## 2022-05-27 RX ORDER — IBUPROFEN 200 MG
24 TABLET ORAL
Status: DISCONTINUED | OUTPATIENT
Start: 2022-05-27 | End: 2022-05-28 | Stop reason: HOSPADM

## 2022-05-27 RX ORDER — LABETALOL HYDROCHLORIDE 5 MG/ML
10 INJECTION, SOLUTION INTRAVENOUS ONCE
Status: COMPLETED | OUTPATIENT
Start: 2022-05-27 | End: 2022-05-27

## 2022-05-27 RX ORDER — ONDANSETRON 2 MG/ML
INJECTION INTRAMUSCULAR; INTRAVENOUS
Status: DISCONTINUED | OUTPATIENT
Start: 2022-05-27 | End: 2022-05-27

## 2022-05-27 RX ORDER — LEVOTHYROXINE SODIUM 75 UG/1
75 TABLET ORAL
Status: DISCONTINUED | OUTPATIENT
Start: 2022-05-28 | End: 2022-05-28 | Stop reason: HOSPADM

## 2022-05-27 RX ORDER — DIPHENHYDRAMINE HCL 25 MG
25 CAPSULE ORAL EVERY 4 HOURS PRN
Status: DISCONTINUED | OUTPATIENT
Start: 2022-05-27 | End: 2022-05-28 | Stop reason: HOSPADM

## 2022-05-27 RX ORDER — CEFAZOLIN SODIUM 2 G/50ML
2 SOLUTION INTRAVENOUS
Status: DISCONTINUED | OUTPATIENT
Start: 2022-05-27 | End: 2022-05-27 | Stop reason: HOSPADM

## 2022-05-27 RX ORDER — LIDOCAINE HCL/PF 100 MG/5ML
SYRINGE (ML) INTRAVENOUS
Status: DISCONTINUED | OUTPATIENT
Start: 2022-05-27 | End: 2022-05-27

## 2022-05-27 RX ORDER — ROCURONIUM BROMIDE 10 MG/ML
INJECTION, SOLUTION INTRAVENOUS
Status: DISCONTINUED | OUTPATIENT
Start: 2022-05-27 | End: 2022-05-27

## 2022-05-27 RX ORDER — OXYCODONE HYDROCHLORIDE 5 MG/1
5 TABLET ORAL
Status: DISCONTINUED | OUTPATIENT
Start: 2022-05-27 | End: 2022-05-27 | Stop reason: HOSPADM

## 2022-05-27 RX ORDER — ALLOPURINOL 300 MG/1
300 TABLET ORAL DAILY
Status: DISCONTINUED | OUTPATIENT
Start: 2022-05-28 | End: 2022-05-28 | Stop reason: HOSPADM

## 2022-05-27 RX ORDER — ACETAMINOPHEN 325 MG/1
650 TABLET ORAL EVERY 4 HOURS PRN
Status: DISCONTINUED | OUTPATIENT
Start: 2022-05-27 | End: 2022-05-28 | Stop reason: HOSPADM

## 2022-05-27 RX ORDER — METOPROLOL TARTRATE 50 MG/1
50 TABLET ORAL 2 TIMES DAILY
Status: DISCONTINUED | OUTPATIENT
Start: 2022-05-27 | End: 2022-05-28 | Stop reason: HOSPADM

## 2022-05-27 RX ORDER — FENTANYL CITRATE/PF 250MCG/5ML
SYRINGE (ML) INTRAVENOUS
Status: DISCONTINUED | OUTPATIENT
Start: 2022-05-27 | End: 2022-05-27

## 2022-05-27 RX ORDER — SODIUM CHLORIDE 9 MG/ML
INJECTION, SOLUTION INTRAVENOUS CONTINUOUS
Status: DISCONTINUED | OUTPATIENT
Start: 2022-05-27 | End: 2022-05-28 | Stop reason: HOSPADM

## 2022-05-27 RX ORDER — PROCHLORPERAZINE EDISYLATE 5 MG/ML
5 INJECTION INTRAMUSCULAR; INTRAVENOUS EVERY 6 HOURS PRN
Status: DISCONTINUED | OUTPATIENT
Start: 2022-05-27 | End: 2022-05-28 | Stop reason: HOSPADM

## 2022-05-27 RX ORDER — GABAPENTIN 300 MG/1
300 CAPSULE ORAL 3 TIMES DAILY
Status: DISCONTINUED | OUTPATIENT
Start: 2022-05-27 | End: 2022-05-28 | Stop reason: HOSPADM

## 2022-05-27 RX ORDER — PROCHLORPERAZINE EDISYLATE 5 MG/ML
5 INJECTION INTRAMUSCULAR; INTRAVENOUS EVERY 30 MIN PRN
Status: DISCONTINUED | OUTPATIENT
Start: 2022-05-27 | End: 2022-05-27 | Stop reason: HOSPADM

## 2022-05-27 RX ORDER — METOPROLOL TARTRATE 25 MG/1
25 TABLET, FILM COATED ORAL
Status: COMPLETED | OUTPATIENT
Start: 2022-05-27 | End: 2022-05-27

## 2022-05-27 RX ORDER — TRAZODONE HYDROCHLORIDE 50 MG/1
50 TABLET ORAL NIGHTLY
Status: DISCONTINUED | OUTPATIENT
Start: 2022-05-27 | End: 2022-05-28 | Stop reason: HOSPADM

## 2022-05-27 RX ADMIN — HYDROMORPHONE HYDROCHLORIDE 0.2 MG: 2 INJECTION, SOLUTION INTRAMUSCULAR; INTRAVENOUS; SUBCUTANEOUS at 03:05

## 2022-05-27 RX ADMIN — PROPOFOL 20 MG: 10 INJECTION, EMULSION INTRAVENOUS at 02:05

## 2022-05-27 RX ADMIN — HYDROMORPHONE HYDROCHLORIDE 0.2 MG: 2 INJECTION, SOLUTION INTRAMUSCULAR; INTRAVENOUS; SUBCUTANEOUS at 04:05

## 2022-05-27 RX ADMIN — ROCURONIUM BROMIDE 50 MG: 10 INJECTION, SOLUTION INTRAVENOUS at 01:05

## 2022-05-27 RX ADMIN — SUGAMMADEX 200 MG: 100 INJECTION, SOLUTION INTRAVENOUS at 03:05

## 2022-05-27 RX ADMIN — CEFAZOLIN SODIUM 2 G: 2 SOLUTION INTRAVENOUS at 01:05

## 2022-05-27 RX ADMIN — INSULIN ASPART 1 UNITS: 100 INJECTION, SOLUTION INTRAVENOUS; SUBCUTANEOUS at 11:05

## 2022-05-27 RX ADMIN — PROPOFOL 90 MG: 10 INJECTION, EMULSION INTRAVENOUS at 01:05

## 2022-05-27 RX ADMIN — SODIUM CHLORIDE: 0.9 INJECTION, SOLUTION INTRAVENOUS at 05:05

## 2022-05-27 RX ADMIN — ROPINIROLE HYDROCHLORIDE 1 MG: 1 TABLET, FILM COATED ORAL at 08:05

## 2022-05-27 RX ADMIN — ACETAMINOPHEN 1000 MG: 10 INJECTION, SOLUTION INTRAVENOUS at 02:05

## 2022-05-27 RX ADMIN — MORPHINE SULFATE 2 MG: 2 INJECTION, SOLUTION INTRAMUSCULAR; INTRAVENOUS at 10:05

## 2022-05-27 RX ADMIN — LABETALOL HYDROCHLORIDE 10 MG: 5 INJECTION INTRAVENOUS at 03:05

## 2022-05-27 RX ADMIN — SODIUM CHLORIDE, SODIUM LACTATE, POTASSIUM CHLORIDE, AND CALCIUM CHLORIDE: .6; .31; .03; .02 INJECTION, SOLUTION INTRAVENOUS at 03:05

## 2022-05-27 RX ADMIN — Medication 50 MCG: at 01:05

## 2022-05-27 RX ADMIN — DEXAMETHASONE SODIUM PHOSPHATE 4 MG: 4 INJECTION, SOLUTION INTRAMUSCULAR; INTRAVENOUS at 01:05

## 2022-05-27 RX ADMIN — METOPROLOL TARTRATE 50 MG: 50 TABLET, FILM COATED ORAL at 08:05

## 2022-05-27 RX ADMIN — ONDANSETRON 8 MG: 8 TABLET, ORALLY DISINTEGRATING ORAL at 07:05

## 2022-05-27 RX ADMIN — TRAZODONE HYDROCHLORIDE 50 MG: 50 TABLET ORAL at 08:05

## 2022-05-27 RX ADMIN — OXYCODONE HYDROCHLORIDE 5 MG: 5 TABLET ORAL at 04:05

## 2022-05-27 RX ADMIN — LIDOCAINE HYDROCHLORIDE 80 MG: 20 INJECTION, SOLUTION INTRAVENOUS at 01:05

## 2022-05-27 RX ADMIN — GABAPENTIN 300 MG: 300 CAPSULE ORAL at 08:05

## 2022-05-27 RX ADMIN — ONDANSETRON 4 MG: 2 INJECTION, SOLUTION INTRAMUSCULAR; INTRAVENOUS at 02:05

## 2022-05-27 RX ADMIN — SODIUM CHLORIDE, SODIUM LACTATE, POTASSIUM CHLORIDE, AND CALCIUM CHLORIDE: .6; .31; .03; .02 INJECTION, SOLUTION INTRAVENOUS at 01:05

## 2022-05-27 RX ADMIN — METOPROLOL TARTRATE 25 MG: 25 TABLET, FILM COATED ORAL at 10:05

## 2022-05-27 NOTE — H&P
[facility])  Obstetrics & Gynecology  Pre op note     Patient Name: Jasmine Velásquez  MRN: [id number]  Admission Date: 5/27/2022  Primary Care Provider: Angely Roberts MD    Subjective:     Chief Complaint/Reason for Admission: Pelvic pressure     History of Present Illness:  Having vaginal pressure from pelvic relaxation of the uterus and anterior uterine wall with no loss or urine or defecation issues for surgical repair       No new subjective & objective note has been filed under this hospital service since the last note was generated.    Assessment/Plan:     Type 2 diabetes mellitus without complication, without long-term current use of insulin  Manage glucose perioperatively    Cystocele with uterine prolapse  Total vaginal hysterectomy with anterior vaginal repair         F Aayush Garcia MD  Obstetrics & Gynecology  [facility])

## 2022-05-27 NOTE — TELEPHONE ENCOUNTER
Spoke with pt regarding aortic u/s appt, pt states she is about to have surgery today unable to schedule at the moment will reach out next week to schedule.

## 2022-05-27 NOTE — ANESTHESIA POSTPROCEDURE EVALUATION
Anesthesia Post Evaluation    Patient: Jasmine Velásquez    Procedure(s) Performed: Procedure(s) (LRB):  HYSTERECTOMY,VAGINAL,WITH COMBINED ANTER COLPORRHAPHY (N/A)    Final Anesthesia Type: general      Patient location during evaluation: PACU  Patient participation: Yes- Able to Participate  Level of consciousness: awake and alert  Post-procedure vital signs: reviewed and stable  Pain management: adequate  Airway patency: patent  LYDIA mitigation strategies: Verification of full reversal of neuromuscular block  PONV status at discharge: No PONV  Anesthetic complications: no      Cardiovascular status: hemodynamically stable  Respiratory status: spontaneous ventilation  Hydration status: euvolemic  Follow-up not needed.          Vitals Value Taken Time   /84 05/27/22 1622   Temp 36.9 °C (98.4 °F) 05/27/22 1517   Pulse 79 05/27/22 1626   Resp 11 05/27/22 1626   SpO2 92 % 05/27/22 1626   Vitals shown include unvalidated device data.      No case tracking events are documented in the log.      Pain/Micah Score: Pain Rating Prior to Med Admin: 5 (5/27/2022  4:22 PM)  Micah Score: 9 (5/27/2022  4:15 PM)

## 2022-05-27 NOTE — TRANSFER OF CARE
Anesthesia Transfer of Care Note    Patient: Jasmine Velásquez    Procedure(s) Performed: Procedure(s) (LRB):  HYSTERECTOMY,VAGINAL,WITH COMBINED ANTER COLPORRHAPHY (N/A)    Patient location: PACU    Anesthesia Type: general    Transport from OR: Transported from OR on room air with adequate spontaneous ventilation    Post pain: adequate analgesia    Post assessment: no apparent anesthetic complications    Post vital signs: stable    Level of consciousness: awake, alert and oriented    Nausea/Vomiting: no nausea/vomiting    Complications: none    Transfer of care protocol was followed      Last vitals:   Visit Vitals  BP (!) 177/79 (BP Location: Right arm, Patient Position: Sitting)   Pulse 83   Temp 36.5 °C (97.7 °F) (Temporal)   Resp 16   SpO2 99%   Breastfeeding No

## 2022-05-27 NOTE — ANESTHESIA PREPROCEDURE EVALUATION
05/27/2022  Jasmine Velásquez is a 81 y.o., female.      Pre-op Assessment    I have reviewed the Patient Summary Reports.    I have reviewed the NPO Status.   I have reviewed the Medications.     Review of Systems  Anesthesia Hx:  No problems with previous Anesthesia  Denies Family Hx of Anesthesia complications.   Denies Personal Hx of Anesthesia complications.   Social:  Former Smoker    Hematology/Oncology:  Hematology Normal   Oncology Normal     EENT/Dental:EENT/Dental Normal   Cardiovascular:   Exercise tolerance: good Hypertension Valvular problems/Murmurs, AS CHF PVD ECG has been reviewed. Porcine AVR with residual AS.  Gradient 35  CEA  RBBB  Saw her cardiologist six months ago.  No change in her cardiac status since then   Pulmonary:  Pulmonary Normal    Renal/:   Chronic Renal Disease, CRI renal calculi Uterine prolapse   Cystocele    Hepatic/GI:   GERD    Musculoskeletal:   Arthritis     Neurological:   CVA   Peripheral Neuropathy    Endocrine:   Diabetes Hypothyroidism    Dermatological:  Skin Normal    Psych:  Psychiatric Normal           Physical Exam  General: Alert and Oriented    Airway:  Mallampati: II   Mouth Opening: Normal  TM Distance: Normal  Tongue: Normal  Neck ROM: Normal ROM        Anesthesia Plan  Type of Anesthesia, risks & benefits discussed:    Anesthesia Type: Gen ETT  Intra-op Monitoring Plan: Standard ASA Monitors  Induction:  IV  Informed Consent: Informed consent signed with the Patient and all parties understand the risks and agree with anesthesia plan.  All questions answered.   ASA Score: 3  Day of Surgery Review of History & Physical: I have interviewed and examined the patient. I have reviewed the patient's H&P dated:     Ready For Surgery From Anesthesia Perspective.     .       I advised Je Araiza of the results and scheduled her for the first blood sugar check. I have tried twice to reach the lab at [facility], but the lines rings with no answer.   I will keep trying to add the A1C

## 2022-05-27 NOTE — OP NOTE
Critical access hospital - Surgery ([facility])  Surgery Department  Operative Note    SUMMARY     Date of Procedure: 5/27/2022     Procedure: Total vaginal hysterectomy with anterior and posterior vaginal repair     Surgeon(s) and Role:     * KOURTNEY Garcia MD - Primary    Assisting Surgeon: Cira NUNEZ     Pre-Operative Diagnosis: Cystocele with uterine prolapse [N81.4]    Post-Operative Diagnosis: Post-Op Diagnosis Codes:     * Cystocele with uterine prolapse [N81.4]      Rectocele     Anesthesia: General    Operative Findings (including complications, if any):   Normal size uterus, atrophic right and left ovary, grade 3 cystocele, grade 1 rectocele with perineal relaxation     Description of Technical Procedures:      TVH:  Vaginal prep done with Betadine solution.  Bladder drained with straight urethral  catheter. Sterile drapes applied with patient in high stirrups.               Weighted speculum placed in the vagina, anterior and posterior lip of the cervix grasped with Leyhee tenaculums .                 Dilute Pitressin solution injected around the cervix  10 cc total.                Incision made 1 to 2 cm above the cervical os, carried around the cervix.                The vaginal mucosa is dissected off of the anterior and posterior surface with sharp and blunt dissection                Posterior cul de sac entered with sharp dissection and the Duckbill speculum is placed into the posterior cul-de-sac.  Entrance into the anterior cul-de-sac accomplished with sharp dissection while retracting the bladder anteriorly with a right angle retractor which is held in place.                Curved Jyothi clamps used with clamp cut tie technique to control the blood supply to the uterus and cervix.  Zero Vicryl suture used on all pedicles.  Angles held and the anterior and posterior margins.                Uterus removed intact.                Lee catheter is place and the anterior peritoneal edge is identified and held  with Celia Clamp in the midline.                 The fallopian tubes and ovaries are visualized and  Noted to be atrophic                Zero Vicryl suture used to close the vaginal cuff by placing 0 Vicryl suture at each angle incorporating the anterior peritoneal angle and the held pedicles   Final closure of the cuff is done using the held angle suture, starting at each angle, running lock toward the center including ant and post peritoneum and post cuff. Suture tied in the midline    The anterior vaginal repair is started by grasping the edges of the anterior vaginal cuff on either side of the midline and dissecting the vaginal mucosa off the underlying tissue and incising the mucosa in the midline toward the urethral meatus stopping 1.5 cm below the meatus.  The edges of the vaginal mucosa are held on tension with Lizette Clamps along the incision line.  After the mucosa is dissected free, the cystocele is reduced with interrupted 2 zero Vicryl suture   The vaginal mucosa is trimmed and closed in the midline with interrupted 2 zero Vicryl suture to the end of the incision line   .                The posterior vaginal cuff suture is then passed through both sides of the anterior cuff and tied to allow closure in the midline of the cuff               Posterior vaginal repair done by making transverse incision at level of the hymen with  Scissors.  The vaginal mucosa is dissected off the underlying tissue to the level of the vaginal cuff      Zero Vicryl running suture used to bring the levator remnant across the anterior rectum.  Vaginal mucosa is trimmed and closed with running 2 zero Vicry to the perineal body.  Zero Vicryl suture used to close and build up the perineal body.  Perineal closure with interrupted 2 zero Vicryl                 The vaginal cuff is inspected for bleeding and Ioodofoam packing is place  and the patient is awaken and taken to recovery with Lee catheter in place                     Significant Surgical Tasks Conducted by the Assistant(s), if Applicable:     Estimated Blood Loss (EBL): 50 mL           Implants: * No implants in log *    Specimens:      Uterus, cervix            Condition: Good    Disposition: PACU - hemodynamically stable.    Attestation: I was present and scrubbed for the entire procedure.

## 2022-05-27 NOTE — ANESTHESIA PROCEDURE NOTES
Intubation    Date/Time: 5/27/2022 1:31 PM  Performed by: Jono Crump CRNA  Authorized by: Meng Bridges MD     Intubation:     Induction:  Intravenous    Intubated:  Postinduction    Attempts:  1    Attempted By:  Student (Emerson SANTORO)    Method of Intubation:  Direct    Blade:  Enciso 2    Laryngeal View Grade: Grade I - full view of cords      Difficult Airway Encountered?: No      Complications:  None    Airway Device:  Oral endotracheal tube    Airway Device Size:  7.5    Style/Cuff Inflation:  Cuffed (inflated to minimal occlusive pressure)    Tube secured:  21    Secured at:  The lips    Placement Verified By:  Capnometry    Complicating Factors:  None    Findings Post-Intubation:  BS equal bilateral and atraumatic/condition of teeth unchanged

## 2022-05-27 NOTE — HPI
Having vaginal pressure from pelvic relaxation of the uterus and anterior uterine wall with no loss or urine or defecation issues for surgical repair

## 2022-05-28 VITALS
HEART RATE: 69 BPM | OXYGEN SATURATION: 95 % | SYSTOLIC BLOOD PRESSURE: 124 MMHG | BODY MASS INDEX: 27.36 KG/M2 | DIASTOLIC BLOOD PRESSURE: 58 MMHG | TEMPERATURE: 98 F | RESPIRATION RATE: 18 BRPM | HEIGHT: 64 IN | WEIGHT: 160.25 LBS

## 2022-05-28 LAB
ESTIMATED AVG GLUCOSE: 108 MG/DL (ref 68–131)
HBA1C MFR BLD: 5.4 % (ref 4–5.6)
POCT GLUCOSE: 123 MG/DL (ref 70–110)

## 2022-05-28 PROCEDURE — 99900035 HC TECH TIME PER 15 MIN (STAT)

## 2022-05-28 PROCEDURE — 63600175 PHARM REV CODE 636 W HCPCS: Performed by: OBSTETRICS & GYNECOLOGY

## 2022-05-28 PROCEDURE — 94799 UNLISTED PULMONARY SVC/PX: CPT

## 2022-05-28 PROCEDURE — 94761 N-INVAS EAR/PLS OXIMETRY MLT: CPT

## 2022-05-28 PROCEDURE — 25000003 PHARM REV CODE 250: Performed by: OBSTETRICS & GYNECOLOGY

## 2022-05-28 RX ORDER — IBUPROFEN 600 MG/1
600 TABLET ORAL EVERY 6 HOURS PRN
Qty: 20 TABLET | Refills: 0 | Status: SHIPPED | OUTPATIENT
Start: 2022-05-28 | End: 2022-06-02

## 2022-05-28 RX ORDER — OXYCODONE HYDROCHLORIDE 5 MG/1
5 TABLET ORAL EVERY 6 HOURS PRN
Qty: 8 TABLET | Refills: 0 | Status: SHIPPED | OUTPATIENT
Start: 2022-05-28

## 2022-05-28 RX ADMIN — SODIUM CHLORIDE: 0.9 INJECTION, SOLUTION INTRAVENOUS at 04:05

## 2022-05-28 RX ADMIN — METOPROLOL TARTRATE 50 MG: 50 TABLET, FILM COATED ORAL at 08:05

## 2022-05-28 RX ADMIN — LEVOTHYROXINE SODIUM 75 MCG: 75 TABLET ORAL at 06:05

## 2022-05-28 RX ADMIN — MORPHINE SULFATE 2 MG: 2 INJECTION, SOLUTION INTRAMUSCULAR; INTRAVENOUS at 04:05

## 2022-05-28 RX ADMIN — FUROSEMIDE 40 MG: 40 TABLET ORAL at 08:05

## 2022-05-28 RX ADMIN — GABAPENTIN 300 MG: 300 CAPSULE ORAL at 08:05

## 2022-05-28 RX ADMIN — AMLODIPINE BESYLATE 5 MG: 5 TABLET ORAL at 08:05

## 2022-05-28 RX ADMIN — ALLOPURINOL 300 MG: 300 TABLET ORAL at 08:05

## 2022-05-28 RX ADMIN — ROPINIROLE HYDROCHLORIDE 1 MG: 1 TABLET, FILM COATED ORAL at 08:05

## 2022-05-28 RX ADMIN — LOSARTAN POTASSIUM 100 MG: 50 TABLET, FILM COATED ORAL at 08:05

## 2022-05-28 NOTE — ASSESSMENT & PLAN NOTE
Manage glucose perioperatively  05/28/2022  accuck stable on Sliding scale insulin while in hospital

## 2022-05-28 NOTE — PROGRESS NOTES
[facility] Surg  Obstetrics & Gynecology  Progress Note    Patient Name: Jasmine Velásquez  MRN: [id number]  Admission Date: 5/27/2022  Primary Care Provider: Angely Roberts MD  Principal Problem: Cystocele with uterine prolapse    Subjective:     HPI:  Having vaginal pressure from pelvic relaxation of the uterus and anterior uterine wall with no loss or urine or defecation issues for surgical repair       Interval History:   Patient reports no problems overnight, minimal vaginal spotting no prob void; tolerating regular diet, +ambulation ; minimal pain  Scheduled Meds:   allopurinoL  300 mg Oral Daily    amLODIPine  5 mg Oral Daily    furosemide  40 mg Oral Daily    gabapentin  300 mg Oral TID    levothyroxine  75 mcg Oral Before breakfast    losartan  100 mg Oral Daily    metoprolol tartrate  50 mg Oral BID    rOPINIRole  1 mg Oral TID    traZODone  50 mg Oral QHS     Continuous Infusions:   sodium chloride 0.9% 100 mL/hr at 05/28/22 0625     PRN Meds:acetaminophen, dextrose 10%, dextrose 10%, diphenhydrAMINE, glucagon (human recombinant), glucose, glucose, insulin aspart U-100, morphine, ondansetron, prochlorperazine    Review of patient's allergies indicates:   Allergen Reactions    Codeine Hives    Ace inhibitors Other (See Comments)     COUGH       Objective:     Vital Signs (Most Recent):  Temp: 97.8 °F (36.6 °C) (05/28/22 0719)  Pulse: 69 (05/28/22 0719)  Resp: 18 (05/28/22 0719)  BP: (!) 124/58 (05/28/22 0719)  SpO2: 95 % (05/28/22 0719)   Vital Signs (24h Range):  Temp:  [97.7 °F (36.5 °C)-98.7 °F (37.1 °C)] 97.8 °F (36.6 °C)  Pulse:  [69-89] 69  Resp:  [10-18] 18  SpO2:  [84 %-100 %] 95 %  BP: (124-198)/(58-90) 124/58     Weight: 72.7 kg (160 lb 4.4 oz)  Body mass index is 27.51 kg/m².  No LMP recorded. Patient is postmenopausal.    I&O (Last 24H):    Intake/Output Summary (Last 24 hours) at 5/28/2022 0955  Last data filed at 5/28/2022 0625  Gross per 24 hour   Intake 2534.35 ml    Output 2850 ml   Net -315.65 ml         Laboratory:  Recent Lab Results         05/27/22  2030   05/27/22  1538   05/27/22  1036        POCT Glucose 200   144   110             I have personallly reviewed all pertinent lab results from the last 24 hours.    Diagnostic Results:  Labs: Reviewed    Physical Exam:   Constitutional: She is oriented to person, place, and time. She appears well-developed.    HENT:   Head: Normocephalic.    Eyes: Pupils are equal, round, and reactive to light.     Cardiovascular:  Normal rate and regular rhythm.             Pulmonary/Chest: Effort normal and breath sounds normal.        Abdominal: Soft. Bowel sounds are normal. She exhibits no abdominal incision.             Musculoskeletal: Normal range of motion.       Neurological: She is alert and oriented to person, place, and time.    Skin: Skin is warm and dry.    Psychiatric: She has a normal mood and affect. Her behavior is normal. Judgment and thought content normal.       Assessment/Plan:     Status post vaginal hysterectomy  05/28/2022  Pod #1  S/p tvh a&p repair  Afebrile  +void, +tolerating diet, +ambulation  Scant vaginal bleeding  Stable for discharge    Type 2 diabetes mellitus without complication, without long-term current use of insulin  Manage glucose perioperatively  05/28/2022  accuck stable on Sliding scale insulin while in hospital        Trang Salguero MD  Obstetrics & Gynecology  [facility]

## 2022-05-28 NOTE — NURSING
Lee catheter removed per MD order, 2500ml clear yellow urine emptied. Vaginal packing removed per MD order with small amount of serosang drainage noted to packing and peripad, pericare completed, mesh panties and pad applied. Pt. Tolerated well. Instructed to call when she feels the need to void.

## 2022-05-28 NOTE — PLAN OF CARE
Remains injury free. Pain managed with oral medication. Ambulates without difficulty. Voids without difficulty. Tolerating diet. Stable for discharge.

## 2022-05-28 NOTE — ASSESSMENT & PLAN NOTE
05/28/2022  Pod #1  S/p tvh a&p repair  Afebrile  +void, +tolerating diet, +ambulation  Scant vaginal bleeding  Stable for discharge

## 2022-05-28 NOTE — SUBJECTIVE & OBJECTIVE
Interval History:   Patient reports no problems overnight, minimal vaginal spotting no prob void; tolerating regular diet, +ambulation ; minimal pain  Scheduled Meds:   allopurinoL  300 mg Oral Daily    amLODIPine  5 mg Oral Daily    furosemide  40 mg Oral Daily    gabapentin  300 mg Oral TID    levothyroxine  75 mcg Oral Before breakfast    losartan  100 mg Oral Daily    metoprolol tartrate  50 mg Oral BID    rOPINIRole  1 mg Oral TID    traZODone  50 mg Oral QHS     Continuous Infusions:   sodium chloride 0.9% 100 mL/hr at 05/28/22 0625     PRN Meds:acetaminophen, dextrose 10%, dextrose 10%, diphenhydrAMINE, glucagon (human recombinant), glucose, glucose, insulin aspart U-100, morphine, ondansetron, prochlorperazine    Review of patient's allergies indicates:   Allergen Reactions    Codeine Hives    Ace inhibitors Other (See Comments)     COUGH       Objective:     Vital Signs (Most Recent):  Temp: 97.8 °F (36.6 °C) (05/28/22 0719)  Pulse: 69 (05/28/22 0719)  Resp: 18 (05/28/22 0719)  BP: (!) 124/58 (05/28/22 0719)  SpO2: 95 % (05/28/22 0719)   Vital Signs (24h Range):  Temp:  [97.7 °F (36.5 °C)-98.7 °F (37.1 °C)] 97.8 °F (36.6 °C)  Pulse:  [69-89] 69  Resp:  [10-18] 18  SpO2:  [84 %-100 %] 95 %  BP: (124-198)/(58-90) 124/58     Weight: 72.7 kg (160 lb 4.4 oz)  Body mass index is 27.51 kg/m².  No LMP recorded. Patient is postmenopausal.    I&O (Last 24H):    Intake/Output Summary (Last 24 hours) at 5/28/2022 0955  Last data filed at 5/28/2022 0625  Gross per 24 hour   Intake 2534.35 ml   Output 2850 ml   Net -315.65 ml         Laboratory:  Recent Lab Results         05/27/22  2030   05/27/22  1538   05/27/22  1036        POCT Glucose 200   144   110             I have personallly reviewed all pertinent lab results from the last 24 hours.    Diagnostic Results:  Labs: Reviewed    Physical Exam:   Constitutional: She is oriented to person, place, and time. She appears well-developed.    HENT:   Head: Normocephalic.     Eyes: Pupils are equal, round, and reactive to light.     Cardiovascular:  Normal rate and regular rhythm.             Pulmonary/Chest: Effort normal and breath sounds normal.        Abdominal: Soft. Bowel sounds are normal. She exhibits no abdominal incision.             Musculoskeletal: Normal range of motion.       Neurological: She is alert and oriented to person, place, and time.    Skin: Skin is warm and dry.    Psychiatric: She has a normal mood and affect. Her behavior is normal. Judgment and thought content normal.

## 2022-05-28 NOTE — DISCHARGE SUMMARY
[facility] Surg  Obstetrics & Gynecology  Discharge Summary    Patient Name: Jasmine Velásquez  MRN: [id number]  Admission Date: 5/27/2022  Hospital Length of Stay: 1 days  Discharge Date and Time:  05/28/2022 10:07 AM  Attending Physician: KOURTNEY Garcia MD   Discharging Provider: Trang Salguero MD  Primary Care Provider: Angely Roberts MD    HPI:  Having vaginal pressure from pelvic relaxation of the uterus and anterior uterine wall with no loss or urine or defecation issues for surgical repair       Hospital Course:  05/28/2022 -s/p tvh a&p repair, stable for discharge pod#1      Goals of Care Treatment Preferences:  Code Status: Full Code      Procedure(s) (LRB):  HYSTERECTOMY,VAGINAL,WITH COMBINED ANTER COLPORRHAPHY (N/A)         Significant Diagnostic Studies: Labs: All labs within the past 24 hours have been reviewed      Pending Diagnostic Studies:     Procedure Component Value Units Date/Time    Hemoglobin A1c if not done in past 3 months [[id number]] Collected: 05/27/22 1810    Order Status: Sent Lab Status: In process Updated: 05/27/22 1810    Specimen: Blood     Specimen to Pathology, Surgery Gynecology and Obstetrics [[id number]] Collected: 05/27/22 1527    Order Status: Sent Lab Status: In process Updated: 05/27/22 1528    Specimen: Tissue         Final Active Diagnoses:    Diagnosis Date Noted POA    PRINCIPAL PROBLEM:  Status post vaginal hysterectomy [Z90.710] 05/27/2022 No    Type 2 diabetes mellitus without complication, without long-term current use of insulin [E11.9] 05/27/2022 Yes      Problems Resolved During this Admission:    Diagnosis Date Noted Date Resolved POA    Cystocele with uterine prolapse [N81.4] 09/22/2021 05/27/2022 Yes        Discharged Condition: good    Disposition: Home or Self Care    Follow Up:   Follow-up Information     JESI Garcia MD Follow up in 4 week(s).    Specialties: Obstetrics, Obstetrics and Gynecology  Why: prefers [facility] and am  appt  Contact information:  [id number] [address] [facility]  [place] [address]  [phone]                       Patient Instructions:      Pelvic Rest   Order Comments: X 6wks--no tampons, intercourse, douching     Medications:  Reconciled Home Medications:      Medication List      START taking these medications    ibuprofen 600 MG tablet  Commonly known as: ADVIL,MOTRIN  Take 1 tablet (600 mg total) by mouth every 6 (six) hours as needed for Pain.     oxyCODONE 5 MG immediate release tablet  Commonly known as: ROXICODONE  Take 1 tablet (5 mg total) by mouth every 6 (six) hours as needed.        CHANGE how you take these medications    levothyroxine 75 MCG tablet  Commonly known as: SYNTHROID  TAKE 1 TABLET EVERY DAY BEFORE BREAKFAST  What changed: See the new instructions.     traZODone 50 MG tablet  Commonly known as: DESYREL  TAKE 1 TABLET EVERY EVENING  What changed:   · when to take this  · reasons to take this        CONTINUE taking these medications    allopurinoL 300 MG tablet  Commonly known as: ZYLOPRIM  TAKE 1 TABLET EVERY DAY     amLODIPine 5 MG tablet  Commonly known as: NORVASC  TAKE 1 TABLET EVERY DAY     aspirin 81 MG EC tablet  Commonly known as: ECOTRIN  Take 81 mg by mouth once daily.     biotin 1 mg Cap  Take 1 tablet by mouth once daily at 6am.     cholecalciferol (vitamin D3) 25 mcg (1,000 unit) capsule  Commonly known as: VITAMIN D3  Take 1,000 Units by mouth.     fish oil-omega-3 fatty acids 300-1,000 mg capsule  Take 2 g by mouth once daily.     furosemide 40 MG tablet  Commonly known as: LASIX  TAKE 1 TABLET EVERY DAY     gabapentin 300 MG capsule  Commonly known as: NEURONTIN  Take 1 capsule (300 mg total) by mouth once daily AND 1 capsule (300 mg total) with lunch AND 2 capsules (600 mg total) every evening.  1100, 1500 AND 1800 HRS. DAILY     LIDOcaine 5 %  Commonly known as: LIDODERM  Use 1-3 patches per day over recommended area. Remove & Discard patch within 12 hours or as directed by  MD.     losartan 100 MG tablet  Commonly known as: COZAAR  TAKE 1 TABLET EVERY DAY     lovastatin 40 MG tablet  Commonly known as: MEVACOR  Take 1 tablet (40 mg total) by mouth every evening.     meloxicam 7.5 MG tablet  Commonly known as: MOBIC  Take 1 tablet (7.5 mg total) by mouth once daily.     metFORMIN 1000 MG tablet  Commonly known as: GLUCOPHAGE  TAKE 1 TABLET TWICE DAILY     methocarbamoL 500 MG Tab  Commonly known as: ROBAXIN  Take 1 tablet (500 mg total) by mouth 3 (three) times daily.     metoprolol tartrate 50 MG tablet  Commonly known as: LOPRESSOR  TAKE 1 TABLET TWICE DAILY     mupirocin 2 % ointment  Commonly known as: BACTROBAN  Apply topically 3 (three) times daily.     rOPINIRole 2 MG tablet  Commonly known as: REQUIP  Take 0.5 tablets (1 mg total) by mouth 3 (three) times daily.     triamcinolone acetonide 0.1% 0.1 % cream  Commonly known as: KENALOG  Apply topically 2 (two) times daily. for 10 days            Trang Salguero MD  Obstetrics & Gynecology  [facility]

## 2022-05-28 NOTE — PLAN OF CARE
[facility]  Discharge Final Note    Primary Care Provider: Angely Roberts MD    Expected Discharge Date: 5/28/2022    Final Discharge Note (most recent)     Final Note - 05/28/22 1118        Final Note    Assessment Type Final Discharge Note     Anticipated Discharge Disposition Home or Self Care        Post-Acute Status    Discharge Delays None known at this time                 Important Message from Medicare             Contact Info     F Aayush Garcia MD   Specialty: Obstetrics, Obstetrics and Gynecology    [address]  [place] [address]   Phone: [phone]       Next Steps: Follow up in 4 week(s)    Instructions: prefers [facility] and [facility] appt        Pt has no discharge needs at the time of chart review.

## 2022-06-03 LAB
FINAL PATHOLOGIC DIAGNOSIS: NORMAL
GROSS: NORMAL
Lab: NORMAL

## 2022-06-06 NOTE — PRE-PROCEDURE INSTRUCTIONS
Spoke with patient regarding procedure scheduled on 6.14    Arrival time 0850    Has patient been sick with fever or on antibiotics within the last 7 days? No    Does the patient have any open wounds, sores or rashes? No    Does the patient have any recent fractures? no    Has patient received a vaccination within the last 7 days? No    Received the COVID vaccination? yes    Has the patient stopped all medications as directed? Hold dm meds am of procedure. Continue asa per md.    Does patient have a pacemaker and or defibrillator? no    Does the patient have a ride to and from procedure and someone reliable to remain with patient?  caro    Is the patient diabetic? yes    Does the patient have sleep apnea? Or use O2 at home? No and no     Is the patient receiving sedation? yes    Is the patient instructed to remain NPO beginning at midnight the night before their procedure? yes    Procedure location confirmed with patient? Yes    Covid- Denies signs/symptoms. Instructed to notify PAT/MD if any changes.

## 2022-06-08 ENCOUNTER — TELEPHONE (OUTPATIENT)
Dept: FAMILY MEDICINE | Facility: CLINIC | Age: 82
End: 2022-06-08
Payer: MEDICARE

## 2022-06-08 DIAGNOSIS — Z95.3 H/O AORTIC VALVE REPLACEMENT WITH PORCINE VALVE: Primary | ICD-10-CM

## 2022-06-08 NOTE — TELEPHONE ENCOUNTER
----- Message from Stevenson Choi sent at 6/8/2022 10:33 AM CDT -----  Contact: self  Pt would like to consult with nurse regarding a referral for Dr. Gomez, appt is schedule for 6/10.  Please contact Jasmine Velásquez @ [phone]. Thanks/As     Sounds fine, needs to see me first. Thanks!

## 2022-06-14 ENCOUNTER — HOSPITAL ENCOUNTER (OUTPATIENT)
Facility: HOSPITAL | Age: 82
Discharge: HOME | End: 2022-06-14
Attending: PHYSICAL MEDICINE & REHABILITATION | Admitting: PHYSICAL MEDICINE & REHABILITATION
Payer: MEDICARE

## 2022-06-14 VITALS
RESPIRATION RATE: 16 BRPM | SYSTOLIC BLOOD PRESSURE: 122 MMHG | OXYGEN SATURATION: 95 % | TEMPERATURE: 98 F | HEART RATE: 62 BPM | HEIGHT: 64 IN | BODY MASS INDEX: 27.01 KG/M2 | WEIGHT: 158.19 LBS | DIASTOLIC BLOOD PRESSURE: 59 MMHG

## 2022-06-14 DIAGNOSIS — M54.16 LUMBAR RADICULOPATHY: Primary | ICD-10-CM

## 2022-06-14 LAB — POCT GLUCOSE: 89 MG/DL (ref 70–110)

## 2022-06-14 PROCEDURE — 64483 NJX AA&/STRD TFRM EPI L/S 1: CPT | Mod: LT,,, | Performed by: PHYSICAL MEDICINE & REHABILITATION

## 2022-06-14 PROCEDURE — 82962 GLUCOSE BLOOD TEST: CPT | Performed by: PHYSICAL MEDICINE & REHABILITATION

## 2022-06-14 PROCEDURE — 64484 NJX AA&/STRD TFRM EPI L/S EA: CPT | Mod: LT,,, | Performed by: PHYSICAL MEDICINE & REHABILITATION

## 2022-06-14 PROCEDURE — 64483 PR EPIDURAL INJ, ANES/STEROID, TRANSFORAMINAL, LUMB/SACR, SNGL LEVL: ICD-10-PCS | Mod: LT,,, | Performed by: PHYSICAL MEDICINE & REHABILITATION

## 2022-06-14 PROCEDURE — 64484 NJX AA&/STRD TFRM EPI L/S EA: CPT | Performed by: PHYSICAL MEDICINE & REHABILITATION

## 2022-06-14 PROCEDURE — 25500020 PHARM REV CODE 255: Performed by: PHYSICAL MEDICINE & REHABILITATION

## 2022-06-14 PROCEDURE — 63600175 PHARM REV CODE 636 W HCPCS: Performed by: PHYSICAL MEDICINE & REHABILITATION

## 2022-06-14 PROCEDURE — 64484 PRA INJECT ANES/STEROID FORAMEN LUMBAR/SACRAL W IMG GUIDE ,EA ADD LEVEL: ICD-10-PCS | Mod: LT,,, | Performed by: PHYSICAL MEDICINE & REHABILITATION

## 2022-06-14 PROCEDURE — 64483 NJX AA&/STRD TFRM EPI L/S 1: CPT | Performed by: PHYSICAL MEDICINE & REHABILITATION

## 2022-06-14 PROCEDURE — 25000003 PHARM REV CODE 250: Performed by: PHYSICAL MEDICINE & REHABILITATION

## 2022-06-14 RX ORDER — ONDANSETRON 2 MG/ML
4 INJECTION INTRAMUSCULAR; INTRAVENOUS ONCE AS NEEDED
Status: ACTIVE | OUTPATIENT
Start: 2022-06-14 | End: 2033-11-09

## 2022-06-14 RX ORDER — BUPIVACAINE HYDROCHLORIDE 2.5 MG/ML
INJECTION, SOLUTION EPIDURAL; INFILTRATION; INTRACAUDAL
Status: DISCONTINUED | OUTPATIENT
Start: 2022-06-14 | End: 2022-06-14 | Stop reason: HOSPADM

## 2022-06-14 RX ORDER — METHYLPREDNISOLONE ACETATE 40 MG/ML
INJECTION, SUSPENSION INTRA-ARTICULAR; INTRALESIONAL; INTRAMUSCULAR; SOFT TISSUE
Status: DISCONTINUED | OUTPATIENT
Start: 2022-06-14 | End: 2022-06-14 | Stop reason: HOSPADM

## 2022-06-14 RX ORDER — MIDAZOLAM HYDROCHLORIDE 1 MG/ML
INJECTION, SOLUTION INTRAMUSCULAR; INTRAVENOUS
Status: DISCONTINUED | OUTPATIENT
Start: 2022-06-14 | End: 2022-06-14 | Stop reason: HOSPADM

## 2022-06-14 RX ORDER — FENTANYL CITRATE 50 UG/ML
INJECTION, SOLUTION INTRAMUSCULAR; INTRAVENOUS
Status: DISCONTINUED | OUTPATIENT
Start: 2022-06-14 | End: 2022-06-14 | Stop reason: HOSPADM

## 2022-06-14 NOTE — DISCHARGE SUMMARY
Discharge Note  Short Stay      SUMMARY     Admit Date: 6/14/2022    Attending Physician: Quirino Durant MD        Discharge Physician: Quirino Durant MD        Discharge Date: 6/14/2022 9:43 AM    Procedure(s) (LRB):  left L4/5 +/- L3/4 TF JOSE G (Left)    Final Diagnosis: Left lumbar radiculopathy [M54.16]    Disposition: Home or self care    Patient Instructions:   Current Discharge Medication List      CONTINUE these medications which have NOT CHANGED    Details   allopurinoL (ZYLOPRIM) 300 MG tablet TAKE 1 TABLET EVERY DAY  Qty: 90 tablet, Refills: 1      amLODIPine (NORVASC) 5 MG tablet TAKE 1 TABLET EVERY DAY  Qty: 90 tablet, Refills: 3    Comments: Please inactivate all prior scripts with same name and strength including on holds.      aspirin (ECOTRIN) 81 MG EC tablet Take 81 mg by mouth once daily.      furosemide (LASIX) 40 MG tablet TAKE 1 TABLET EVERY DAY  Qty: 90 tablet, Refills: 3    Comments: Please inactivate all prior scripts with same name and strength including on holds.      gabapentin (NEURONTIN) 300 MG capsule Take 1 capsule (300 mg total) by mouth once daily AND 1 capsule (300 mg total) with lunch AND 2 capsules (600 mg total) every evening.  1100, 1500 AND 1800 HRS. DAILY  Qty: 360 capsule, Refills: 0    Comments: Dr. Oniel Aiken - [facility]# [id number]  Associated Diagnoses: Left sided sciatica      levothyroxine (SYNTHROID) 75 MCG tablet TAKE 1 TABLET EVERY DAY BEFORE BREAKFAST  Qty: 90 tablet, Refills: 3      losartan (COZAAR) 100 MG tablet TAKE 1 TABLET EVERY DAY  Qty: 90 tablet, Refills: 3    Comments: Please inactivate all prior scripts with same name and strength including on holds.      lovastatin (MEVACOR) 40 MG tablet Take 1 tablet (40 mg total) by mouth every evening.  Qty: 90 tablet, Refills: 2      meloxicam (MOBIC) 7.5 MG tablet Take 1 tablet (7.5 mg total) by mouth once daily.  Qty: 30 tablet, Refills: 0      metFORMIN (GLUCOPHAGE) 1000 MG tablet TAKE 1 TABLET TWICE DAILY  Qty:  180 tablet, Refills: 1      methocarbamoL (ROBAXIN) 500 MG Tab Take 1 tablet (500 mg total) by mouth 3 (three) times daily.  Qty: 270 tablet, Refills: 1      metoprolol tartrate (LOPRESSOR) 50 MG tablet TAKE 1 TABLET TWICE DAILY  Qty: 180 tablet, Refills: 2      oxyCODONE (ROXICODONE) 5 MG immediate release tablet Take 1 tablet (5 mg total) by mouth every 6 (six) hours as needed.  Qty: 8 tablet, Refills: 0    Comments: Quantity prescribed more than 7 day supply? No      rOPINIRole (REQUIP) 2 MG tablet Take 0.5 tablets (1 mg total) by mouth 3 (three) times daily.  Qty: 270 tablet, Refills: 1    Associated Diagnoses: Restless legs syndrome (RLS)      traZODone (DESYREL) 50 MG tablet TAKE 1 TABLET EVERY EVENING  Qty: 90 tablet, Refills: 3      biotin 1 mg Cap Take 1 tablet by mouth once daily at 6am.      cholecalciferol, vitamin D3, (VITAMIN D3) 1,000 unit capsule Take 1,000 Units by mouth.      fish oil-omega-3 fatty acids 300-1,000 mg capsule Take 2 g by mouth once daily.      LIDOcaine (LIDODERM) 5 % Use 1-3 patches per day over recommended area. Remove & Discard patch within 12 hours or as directed by MD.  Qty: 90 patch, Refills: 0    Associated Diagnoses: Discogenic low back pain; Bilateral lumbar radiculopathy      mupirocin (BACTROBAN) 2 % ointment Apply topically 3 (three) times daily.  Qty: 1 Tube, Refills: 1      triamcinolone acetonide 0.1% (KENALOG) 0.1 % cream Apply topically 2 (two) times daily. for 10 days  Qty: 1 Tube, Refills: 1                 Discharge Diagnosis: Left lumbar radiculopathy [M54.16]  Condition on Discharge: Stable with no complications to procedure   Diet on Discharge: Same as before.  Activity: as per instruction sheet.  Discharge to: Home with a responsible adult.  Follow up: 2-4 weeks       Please call the office at [phone] if you experience any weakness or loss of sensation, fever > 101.5, pain uncontrolled with oral medications, persistent nausea/vomiting/or diarrhea,  redness or drainage from the incisions, or any other worrisome concerns. If physician on call was not reached or could not communicate with our office for any reason please go to the nearest emergency department

## 2022-06-14 NOTE — DISCHARGE INSTRUCTIONS

## 2022-06-14 NOTE — H&P
"HPI  Patient presenting for Procedure(s) (LRB):  left L4/5 + L3/4 TF JOSE G (Left)     Patient on Anti-coagulation Yes    No health changes since previous encounter    Past Medical History:   Diagnosis Date    Abnormal CT of the abdomen 10/12/2017    Angiodysplasia of cecum     Arthritis     Cancer 2022    skin lesion    CHF (congestive heart failure)     TOLD SHE HAS A "BAD HEART" BY Dr. Garcia    Diabetes mellitus without complication 11/08/2019    Diabetes mellitus, type 2 2006    BS doesn't check 06/15/2016    Diabetic retinopathy     DM (diabetes mellitus) 2006    BS doesn't check 06/29/2018    DM (diabetes mellitus) 2006    BS doesn't check 07/10/2020    DM (diabetes mellitus) 2006    BS doesn't check 10/19/2021    GERD (gastroesophageal reflux disease)     Gout 11/08/2019    Hyperlipidemia     Hypertension     Hypothyroidism     Neuropathy     Stroke     TIA X3 "YEARS AND YEARS AGO"     Past Surgical History:   Procedure Laterality Date    AORTIC VALVE REPLACEMENT  02/25/2014    Porcine valve    APPENDECTOMY      BACK SURGERY  1980    CAROTID ENDARTERECTOMY Left 2016    CATARACT EXTRACTION Bilateral     [facility]    COLONOSCOPY N/A 08/23/2016    Procedure: COLONOSCOPY;  Surgeon: Marcel Jacques MD;  Location: [facility];  Service: Endoscopy;  Laterality: N/A;    COLONOSCOPY N/A 05/24/2017    Procedure: COLONOSCOPY;  Surgeon: Jayy Rosa MD;  Location: [facility];  Service: Endoscopy;  Laterality: N/A;    COLONOSCOPY N/A 10/12/2017    Procedure: COLONOSCOPY;  Surgeon: Marcel Jacques MD;  Location: [facility];  Service: Endoscopy;  Laterality: N/A;    EPIDURAL STEROID INJECTION N/A 04/16/2021    Procedure: Lumbar L4/5 IL JOSE G;  Surgeon: Quirino Durant MD;  Location: [facility];  Service: Pain Management;  Laterality: N/A;    HYSTERECTOMY, VAGINAL, WITH COMBINED ANTEROPOSTERIOR COLPORRHAPHY ANDN N/A 5/27/2022    Procedure: HYSTERECTOMY,VAGINAL,WITH " COMBINED ANTER COLPORRHAPHY;  Surgeon: KOURTNEY Garcia MD;  Location[facility];  Service: OB/GYN;  Laterality: N/A;  NO SSLF DONE    INJECTION OF ANESTHETIC AGENT AROUND MEDIAL BRANCH NERVES INNERVATING LUMBAR FACET JOINT Bilateral 10/29/2019    Procedure: Bilateral L3-5 MBB;  Surgeon: Manuel Allen MD;  Location: [facility];  Service: Pain Management;  Laterality: Bilateral;    INJECTION OF ANESTHETIC AGENT AROUND MEDIAL BRANCH NERVES INNERVATING LUMBAR FACET JOINT Bilateral 11/22/2019    Procedure: Bilateral L3-5 MBB;  Surgeon: Quirino Durant MD;  Location: [facility];  Service: Pain Management;  Laterality: Bilateral;    RADIOFREQUENCY THERMOCOAGULATION Left 12/10/2019    Procedure: Left L3-5 Lumbar RFA;  Surgeon: Manuel Allen MD;  Location: [facility];  Service: Pain Management;  Laterality: Left;    RADIOFREQUENCY THERMOCOAGULATION Right 12/31/2019    Procedure: Right L3-5 Lumbar RFA;  Surgeon: Manuel Allen MD;  Location: [facility];  Service: Pain Management;  Laterality: Right;    RADIOFREQUENCY THERMOCOAGULATION Right 02/18/2021    Procedure: Right L3-5 Lumbar RFA;  Surgeon: Quirino Durant MD;  Location: [facility];  Service: Pain Management;  Laterality: Right;    RADIOFREQUENCY THERMOCOAGULATION Left 03/11/2021    Procedure: Left L3-5 Lumbar RFA;  Surgeon: Quirino Durant MD;  Location: [facility];  Service: Pain Management;  Laterality: Left;    SELECTIVE INJECTION OF ANESTHETIC AGENT AROUND LUMBAR SPINAL NERVE ROOT BY TRANSFORAMINAL APPROACH Bilateral 11/03/2021    Procedure: Bilateral L4/5 TF JOSE G;  Surgeon: Quirino Durant MD;  Location: [facility];  Service: Pain Management;  Laterality: Bilateral;    SKIN LESION EXCISION  2022    cancer lesion removed twice    TONSILLECTOMY       Review of patient's allergies indicates:   Allergen Reactions    Codeine Hives    Ace inhibitors Other (See Comments)     COUGH        No current facility-administered  medications on file prior to encounter.     Current Outpatient Medications on File Prior to Encounter   Medication Sig Dispense Refill    amLODIPine (NORVASC) 5 MG tablet TAKE 1 TABLET EVERY DAY 90 tablet 3    aspirin (ECOTRIN) 81 MG EC tablet Take 81 mg by mouth once daily.      furosemide (LASIX) 40 MG tablet TAKE 1 TABLET EVERY DAY 90 tablet 3    levothyroxine (SYNTHROID) 75 MCG tablet TAKE 1 TABLET EVERY DAY BEFORE BREAKFAST (Patient taking differently: Take 75 mcg by mouth before breakfast.) 90 tablet 3    losartan (COZAAR) 100 MG tablet TAKE 1 TABLET EVERY DAY (Patient taking differently: Take 100 mg by mouth once daily.) 90 tablet 3    lovastatin (MEVACOR) 40 MG tablet Take 1 tablet (40 mg total) by mouth every evening. 90 tablet 2    meloxicam (MOBIC) 7.5 MG tablet Take 1 tablet (7.5 mg total) by mouth once daily. 30 tablet 0    methocarbamoL (ROBAXIN) 500 MG Tab Take 1 tablet (500 mg total) by mouth 3 (three) times daily. 270 tablet 1    metoprolol tartrate (LOPRESSOR) 50 MG tablet TAKE 1 TABLET TWICE DAILY 180 tablet 2    oxyCODONE (ROXICODONE) 5 MG immediate release tablet Take 1 tablet (5 mg total) by mouth every 6 (six) hours as needed. 8 tablet 0    rOPINIRole (REQUIP) 2 MG tablet Take 0.5 tablets (1 mg total) by mouth 3 (three) times daily. 270 tablet 1    traZODone (DESYREL) 50 MG tablet TAKE 1 TABLET EVERY EVENING (Patient taking differently: Take 50 mg by mouth nightly as needed.) 90 tablet 3    biotin 1 mg Cap Take 1 tablet by mouth once daily at 6am.      cholecalciferol, vitamin D3, (VITAMIN D3) 1,000 unit capsule Take 1,000 Units by mouth.      fish oil-omega-3 fatty acids 300-1,000 mg capsule Take 2 g by mouth once daily.      LIDOcaine (LIDODERM) 5 % Use 1-3 patches per day over recommended area. Remove & Discard patch within 12 hours or as directed by MD. 90 patch 0    mupirocin (BACTROBAN) 2 % ointment Apply topically 3 (three) times daily. 1 Tube 1    triamcinolone  "acetonide 0.1% (KENALOG) 0.1 % cream Apply topically 2 (two) times daily. for 10 days 1 Tube 1        PMHx, PSHx, Allergies, Medications reviewed in epic    ROS negative except pain complaints in HPI    OBJECTIVE:    BP (!) 150/67 (BP Location: Right arm, Patient Position: Lying)   Pulse 69   Temp 97.5 °F (36.4 °C) (Temporal)   Resp 18   Ht 5' 4" (1.626 m)   Wt 71.7 kg (158 lb 2.9 oz)   SpO2 97%   Breastfeeding No   BMI 27.15 kg/m²     PHYSICAL EXAMINATION:    GENERAL: Well appearing, in no acute distress, alert and oriented x3.  PSYCH:  Mood and affect appropriate.  SKIN: Skin color, texture, turgor normal, no rashes or lesions which will impact the procedure.  CV: RRR with palpation of the radial artery.  PULM: No evidence of respiratory difficulty, symmetric chest rise. Clear to auscultation.  NEURO: Cranial nerves grossly intact.    Plan:    Proceed with procedure as planned Procedure(s) (LRB):  left L4/5 + L3/4 TF JOSE G (Left)    Quirino Durant MD  06/14/2022            "

## 2022-06-14 NOTE — OP NOTE
INFORMED CONSENT: The procedure, risks, benefits and options were discussed with patient. There are no contraindications to the procedure. The patient expressed understanding and agreed to proceed. The personnel performing the procedure was discussed.    06/14/2022    Surgeon: Quirino Durant MD    Assistants: None    Sedation: Conscious sedation provided by M.D    The patient was monitored with continuous pulse oximetry, EKG, and intermittent blood pressure monitors, immediately prior to administration of sedation.  The patient was hemodynamically stable throughout the entire process was responsive to voice, and breathing spontaneously.  Supplemental O2 was provided at 2L/min via nasal cannula.  Patient was comfortable for the duration of the procedure.     There was a total of 2mg IV Midazolam and 50mcg Fentanyl titrated for the procedure    Total sedation time was >10minutes and <20minutes      PROCEDURE:    1)  Left  L3-4 TRANSFORAMINAL EPIDURAL STEROID INJECTION    2)  Left  L4-5 TRANSFORAMINAL EPIDURAL STEROID INJECTION    Pre Procedure diagnosis:    Left  L3 and L4  Left lumbar radiculopathy [M54.16]    Post-Procedure diagnosis:   same    Complications: None    Specimens: None      DESCRIPTION OF PROCEDURE: The patient was brought to the procedure room. IV access was obtained prior to the procedure. The patient was positioned prone on the fluoroscopy table. Continuous hemodynamic monitoring was initiated including blood pressure, EKG, and pulse oximetry. . The skin was prepped with chlorhexidine and draped in a sterile fashion. Skin anesthesia was achieved using a total of 10mL of lidocaine, 5mL over each respective injection site.     The  L3-4 and L4-5  transforaminal spaces were identified with fluoroscopy in the  AP, oblique, and lateral views.  A 22 gauge spinal quinke needle was then advanced into the area of the trans foraminal spaces at the above levels with confirmation of proper needle position  using AP, oblique, and lateral fluoroscopic views. Once the needle tip was in the area of the transforaminal space, and there was no blood, CSF or paraesthesias,  1 mL of Omnipaque 300mg/ml was injected on each side for a total of 2 mL.  Fluoroscopic imaging in the AP and lateral views revealed a clear outline of the spinal nerve with proximal spread of agent through the neural foramen into the epidural space. A total combination of 1 mL of Bupivicaine 0.25% and 40 mg depo medrol was injected into each level for a total of 4mL of injected medications with displacement of the contrast dye confirming that the medication went into the area of the transforaminal spaces at each level. A sterile dressing was applied.   Patient tolerated the procedure well.    Patient was taken back to the recovery room for further observation.     The patient was discharged to home in stable condition

## 2022-07-06 ENCOUNTER — OFFICE VISIT (OUTPATIENT)
Dept: OBSTETRICS AND GYNECOLOGY | Facility: CLINIC | Age: 82
End: 2022-07-06
Payer: MEDICARE

## 2022-07-06 VITALS
DIASTOLIC BLOOD PRESSURE: 70 MMHG | SYSTOLIC BLOOD PRESSURE: 128 MMHG | WEIGHT: 156.06 LBS | BODY MASS INDEX: 26.79 KG/M2

## 2022-07-06 DIAGNOSIS — Z90.710 STATUS POST VAGINAL HYSTERECTOMY: Primary | ICD-10-CM

## 2022-07-06 PROCEDURE — 3288F PR FALLS RISK ASSESSMENT DOCUMENTED: ICD-10-PCS | Mod: CPTII,S$GLB,, | Performed by: OBSTETRICS & GYNECOLOGY

## 2022-07-06 PROCEDURE — 3288F FALL RISK ASSESSMENT DOCD: CPT | Mod: CPTII,S$GLB,, | Performed by: OBSTETRICS & GYNECOLOGY

## 2022-07-06 PROCEDURE — 1101F PR PT FALLS ASSESS DOC 0-1 FALLS W/OUT INJ PAST YR: ICD-10-PCS | Mod: CPTII,S$GLB,, | Performed by: OBSTETRICS & GYNECOLOGY

## 2022-07-06 PROCEDURE — 1126F PR PAIN SEVERITY QUANTIFIED, NO PAIN PRESENT: ICD-10-PCS | Mod: CPTII,S$GLB,, | Performed by: OBSTETRICS & GYNECOLOGY

## 2022-07-06 PROCEDURE — 3074F PR MOST RECENT SYSTOLIC BLOOD PRESSURE < 130 MM HG: ICD-10-PCS | Mod: CPTII,S$GLB,, | Performed by: OBSTETRICS & GYNECOLOGY

## 2022-07-06 PROCEDURE — 1159F PR MEDICATION LIST DOCUMENTED IN MEDICAL RECORD: ICD-10-PCS | Mod: CPTII,S$GLB,, | Performed by: OBSTETRICS & GYNECOLOGY

## 2022-07-06 PROCEDURE — 99999 PR PBB SHADOW E&M-EST. PATIENT-LVL III: ICD-10-PCS | Mod: PBBFAC,,, | Performed by: OBSTETRICS & GYNECOLOGY

## 2022-07-06 PROCEDURE — 99024 POSTOP FOLLOW-UP VISIT: CPT | Mod: S$GLB,,, | Performed by: OBSTETRICS & GYNECOLOGY

## 2022-07-06 PROCEDURE — 3078F DIAST BP <80 MM HG: CPT | Mod: CPTII,S$GLB,, | Performed by: OBSTETRICS & GYNECOLOGY

## 2022-07-06 PROCEDURE — 1101F PT FALLS ASSESS-DOCD LE1/YR: CPT | Mod: CPTII,S$GLB,, | Performed by: OBSTETRICS & GYNECOLOGY

## 2022-07-06 PROCEDURE — 99024 PR POST-OP FOLLOW-UP VISIT: ICD-10-PCS | Mod: S$GLB,,, | Performed by: OBSTETRICS & GYNECOLOGY

## 2022-07-06 PROCEDURE — 3078F PR MOST RECENT DIASTOLIC BLOOD PRESSURE < 80 MM HG: ICD-10-PCS | Mod: CPTII,S$GLB,, | Performed by: OBSTETRICS & GYNECOLOGY

## 2022-07-06 PROCEDURE — 99999 PR PBB SHADOW E&M-EST. PATIENT-LVL III: CPT | Mod: PBBFAC,,, | Performed by: OBSTETRICS & GYNECOLOGY

## 2022-07-06 PROCEDURE — 3074F SYST BP LT 130 MM HG: CPT | Mod: CPTII,S$GLB,, | Performed by: OBSTETRICS & GYNECOLOGY

## 2022-07-06 PROCEDURE — 1159F MED LIST DOCD IN RCRD: CPT | Mod: CPTII,S$GLB,, | Performed by: OBSTETRICS & GYNECOLOGY

## 2022-07-06 PROCEDURE — 1126F AMNT PAIN NOTED NONE PRSNT: CPT | Mod: CPTII,S$GLB,, | Performed by: OBSTETRICS & GYNECOLOGY

## 2022-07-06 NOTE — PROGRESS NOTES
Subjective:      Jasmine Velásquez is a 81 y.o. female who presents to the clinic 4 weeks status post vaginal hysterectomy, anterior colporrhaphy and posterior colporrhaphy for pelvic relaxation. Eating a regular diet without difficulty. Bowel movements are normal. The patient is not having any pain.    The following portions of the patient's history were reviewed and updated as appropriate: allergies, current medications, past family history, past medical history, past social history, past surgical history and problem list.    Review of Systems  Pertinent items are noted in HPI.      Objective:     /70   Wt 70.8 kg (156 lb 1.4 oz)   BMI 26.79 kg/m²     Assessment:      Doing well postoperatively.  Operative findings again reviewed. Pathology report discussed.      Plan:      1. Continue any current medications.  2. Wound care discussed.  3. Activity restrictions: none  4. Anticipated return to work: not applicable.  5. Follow up: prn

## 2022-07-13 ENCOUNTER — PES CALL (OUTPATIENT)
Dept: ADMINISTRATIVE | Facility: CLINIC | Age: 82
End: 2022-07-13
Payer: MEDICARE

## 2022-07-19 ENCOUNTER — OFFICE VISIT (OUTPATIENT)
Dept: FAMILY MEDICINE | Facility: CLINIC | Age: 82
End: 2022-07-19
Payer: MEDICARE

## 2022-07-19 VITALS
HEIGHT: 64 IN | BODY MASS INDEX: 27.04 KG/M2 | WEIGHT: 158.38 LBS | OXYGEN SATURATION: 94 % | SYSTOLIC BLOOD PRESSURE: 100 MMHG | DIASTOLIC BLOOD PRESSURE: 70 MMHG | HEART RATE: 68 BPM

## 2022-07-19 DIAGNOSIS — S51.811A SKIN TEAR OF FOREARM WITHOUT COMPLICATION, RIGHT, INITIAL ENCOUNTER: Primary | ICD-10-CM

## 2022-07-19 PROCEDURE — 3288F FALL RISK ASSESSMENT DOCD: CPT | Mod: CPTII,S$GLB,, | Performed by: FAMILY MEDICINE

## 2022-07-19 PROCEDURE — 99213 PR OFFICE/OUTPT VISIT, EST, LEVL III, 20-29 MIN: ICD-10-PCS | Mod: S$GLB,,, | Performed by: FAMILY MEDICINE

## 2022-07-19 PROCEDURE — 1159F PR MEDICATION LIST DOCUMENTED IN MEDICAL RECORD: ICD-10-PCS | Mod: CPTII,S$GLB,, | Performed by: FAMILY MEDICINE

## 2022-07-19 PROCEDURE — 3078F DIAST BP <80 MM HG: CPT | Mod: CPTII,S$GLB,, | Performed by: FAMILY MEDICINE

## 2022-07-19 PROCEDURE — 3074F PR MOST RECENT SYSTOLIC BLOOD PRESSURE < 130 MM HG: ICD-10-PCS | Mod: CPTII,S$GLB,, | Performed by: FAMILY MEDICINE

## 2022-07-19 PROCEDURE — 1126F AMNT PAIN NOTED NONE PRSNT: CPT | Mod: CPTII,S$GLB,, | Performed by: FAMILY MEDICINE

## 2022-07-19 PROCEDURE — 3288F PR FALLS RISK ASSESSMENT DOCUMENTED: ICD-10-PCS | Mod: CPTII,S$GLB,, | Performed by: FAMILY MEDICINE

## 2022-07-19 PROCEDURE — 3074F SYST BP LT 130 MM HG: CPT | Mod: CPTII,S$GLB,, | Performed by: FAMILY MEDICINE

## 2022-07-19 PROCEDURE — 99999 PR PBB SHADOW E&M-EST. PATIENT-LVL IV: ICD-10-PCS | Mod: PBBFAC,,, | Performed by: FAMILY MEDICINE

## 2022-07-19 PROCEDURE — 99213 OFFICE O/P EST LOW 20 MIN: CPT | Mod: S$GLB,,, | Performed by: FAMILY MEDICINE

## 2022-07-19 PROCEDURE — 1101F PT FALLS ASSESS-DOCD LE1/YR: CPT | Mod: CPTII,S$GLB,, | Performed by: FAMILY MEDICINE

## 2022-07-19 PROCEDURE — 1159F MED LIST DOCD IN RCRD: CPT | Mod: CPTII,S$GLB,, | Performed by: FAMILY MEDICINE

## 2022-07-19 PROCEDURE — 1126F PR PAIN SEVERITY QUANTIFIED, NO PAIN PRESENT: ICD-10-PCS | Mod: CPTII,S$GLB,, | Performed by: FAMILY MEDICINE

## 2022-07-19 PROCEDURE — 1101F PR PT FALLS ASSESS DOC 0-1 FALLS W/OUT INJ PAST YR: ICD-10-PCS | Mod: CPTII,S$GLB,, | Performed by: FAMILY MEDICINE

## 2022-07-19 PROCEDURE — 3078F PR MOST RECENT DIASTOLIC BLOOD PRESSURE < 80 MM HG: ICD-10-PCS | Mod: CPTII,S$GLB,, | Performed by: FAMILY MEDICINE

## 2022-07-19 PROCEDURE — 99999 PR PBB SHADOW E&M-EST. PATIENT-LVL IV: CPT | Mod: PBBFAC,,, | Performed by: FAMILY MEDICINE

## 2022-07-19 NOTE — PROGRESS NOTES
"  Chief Complaint:    Chief Complaint   Patient presents with    infection on arm       History of Present Illness:  Patient with RLS presents today for an infection on her arm     Has a scratch on her R arm that happened two days. The surrounding area became red and swollen. She's been using honey and the redness/swelling is going down.        ROS:  Review of Systems   Constitutional: Negative for appetite change, chills and fever.   HENT: Negative for congestion, ear pain, postnasal drip, rhinorrhea, sinus pressure and sinus pain.    Eyes: Negative for pain.   Respiratory: Negative for cough, chest tightness and shortness of breath.    Cardiovascular: Negative for chest pain and palpitations.   Gastrointestinal: Negative for abdominal pain, blood in stool, constipation, diarrhea and nausea.   Genitourinary: Negative for difficulty urinating, dysuria, flank pain and hematuria.   Musculoskeletal: Negative for arthralgias and myalgias.   Skin: Positive for wound. Negative for pallor.   Neurological: Negative for dizziness, tremors, speech difficulty, light-headedness and headaches.   Hematological: Bruises/bleeds easily.   Psychiatric/Behavioral: Negative for behavioral problems, dysphoric mood and sleep disturbance. The patient is not nervous/anxious.    All other systems reviewed and are negative.      Past Medical History:   Diagnosis Date    Abnormal CT of the abdomen 10/12/2017    Angiodysplasia of cecum     Arthritis     Cancer 2022    skin lesion    CHF (congestive heart failure)     TOLD SHE HAS A "BAD HEART" BY Dr. Garcia    Diabetes mellitus without complication 11/08/2019    Diabetes mellitus, type 2 2006    BS doesn't check 06/15/2016    Diabetic retinopathy     DM (diabetes mellitus) 2006    BS doesn't check 06/29/2018    DM (diabetes mellitus) 2006    BS doesn't check 07/10/2020    DM (diabetes mellitus) 2006    BS doesn't check 10/19/2021    GERD (gastroesophageal reflux disease)     " "Gout 2019    Hyperlipidemia     Hypertension     Hypothyroidism     Neuropathy     Stroke     TIA X3 "YEARS AND YEARS AGO"       Social History:  Social History     Socioeconomic History    Marital status:    Tobacco Use    Smoking status: Former Smoker     Packs/day: 1.00     Years: 25.00     Pack years: 25.00     Quit date: 2002     Years since quittin.1    Smokeless tobacco: Never Used   Substance and Sexual Activity    Alcohol use: Never     Alcohol/week: 0.0 standard drinks    Drug use: No    Sexual activity: Not Currently     Social Determinants of Health     Financial Resource Strain: Low Risk     Difficulty of Paying Living Expenses: Not hard at all   Food Insecurity: No Food Insecurity    Worried About Running Out of Food in the Last Year: Never true    Ran Out of Food in the Last Year: Never true   Transportation Needs: No Transportation Needs    Lack of Transportation (Medical): No    Lack of Transportation (Non-Medical): No   Physical Activity: Inactive    Days of Exercise per Week: 0 days    Minutes of Exercise per Session: 0 min   Stress: Stress Concern Present    Feeling of Stress : To some extent   Social Connections: Moderately Isolated    Frequency of Communication with Friends and Family: More than three times a week    Frequency of Social Gatherings with Friends and Family: More than three times a week    Attends Faith Services: Never    Active Member of Clubs or Organizations: No    Attends Club or Organization Meetings: Never    Marital Status:    Housing Stability: Unknown    Unable to Pay for Housing in the Last Year: No    Unstable Housing in the Last Year: No       Family History:   family history includes COPD in her sister; Cancer (age of onset: 46) in her mother; Diabetes in her father; Heart disease in her father and mother; Hypertension in her father.    Health Maintenance   Topic Date Due    Colonoscopy  10/12/2022    DESTINY " "Scan  10/03/2022    Lipid Panel  10/19/2022    Eye Exam  10/19/2022    Hemoglobin A1c  11/27/2022    Foot Exam  03/03/2023    TETANUS VACCINE  02/02/2026       Physical Exam:    Vital Signs  Pulse: 68  SpO2: (!) 94 %  BP: 100/70  BP Location: Left arm  Patient Position: Sitting  Pain Score: 0-No pain  Height and Weight  Height: 5' 4" (162.6 cm)  Weight: 71.9 kg (158 lb 6.4 oz)  BSA (Calculated - sq m): 1.8 sq meters  BMI (Calculated): 27.2  Weight in (lb) to have BMI = 25: 145.3]    Body mass index is 27.19 kg/m².    Physical Exam  Vitals and nursing note reviewed.   Constitutional:       Appearance: Normal appearance. She is not toxic-appearing.   HENT:      Head: Normocephalic and atraumatic.      Right Ear: Tympanic membrane normal.      Left Ear: Tympanic membrane normal.   Eyes:      Extraocular Movements: Extraocular movements intact.      Pupils: Pupils are equal, round, and reactive to light.   Cardiovascular:      Rate and Rhythm: Normal rate and regular rhythm.      Heart sounds: Normal heart sounds.   Pulmonary:      Effort: Pulmonary effort is normal.      Breath sounds: Normal breath sounds. No wheezing, rhonchi or rales.   Abdominal:      General: Bowel sounds are normal. There is no distension.      Palpations: Abdomen is soft.      Tenderness: There is no abdominal tenderness.   Musculoskeletal:         General: Normal range of motion.      Cervical back: Normal range of motion.   Skin:     General: Skin is warm and dry.      Capillary Refill: Capillary refill takes less than 2 seconds.      Findings: Abrasion (R arm) present.          Neurological:      General: No focal deficit present.      Mental Status: She is alert and oriented to person, place, and time.   Psychiatric:         Mood and Affect: Mood normal.         Behavior: Behavior normal.         Judgment: Judgment normal.           Assessment:      ICD-10-CM ICD-9-CM   1. Skin tear of forearm without complication, right, initial " encounter  S51.811A 881.00         Plan:       Healing well, local antibiotic dressing  No orders of the defined types were placed in this encounter.      Current Outpatient Medications   Medication Sig Dispense Refill    allopurinoL (ZYLOPRIM) 300 MG tablet TAKE 1 TABLET EVERY DAY 90 tablet 1    amLODIPine (NORVASC) 5 MG tablet TAKE 1 TABLET EVERY DAY 90 tablet 3    aspirin (ECOTRIN) 81 MG EC tablet Take 81 mg by mouth once daily.      biotin 1 mg Cap Take 1 tablet by mouth once daily at 6am.      cholecalciferol, vitamin D3, (VITAMIN D3) 1,000 unit capsule Take 1,000 Units by mouth.      fish oil-omega-3 fatty acids 300-1,000 mg capsule Take 2 g by mouth once daily.      furosemide (LASIX) 40 MG tablet TAKE 1 TABLET EVERY DAY 90 tablet 3    gabapentin (NEURONTIN) 300 MG capsule Take 1 capsule (300 mg total) by mouth once daily AND 1 capsule (300 mg total) with lunch AND 2 capsules (600 mg total) every evening.  1100, 1500 AND 1800 HRS. DAILY 360 capsule 0    levothyroxine (SYNTHROID) 75 MCG tablet TAKE 1 TABLET EVERY DAY BEFORE BREAKFAST (Patient taking differently: Take 75 mcg by mouth before breakfast.) 90 tablet 3    LIDOcaine (LIDODERM) 5 % APPLY 1-3 PATCHES PER DAY OVER RECOMMENDED AREA. REMOVE AND DISCARD PATCH WITHIN 12 HOURS OR AS DIRECTED BY MD. 90 patch 0    losartan (COZAAR) 100 MG tablet TAKE 1 TABLET EVERY DAY (Patient taking differently: Take 100 mg by mouth once daily.) 90 tablet 3    lovastatin (MEVACOR) 40 MG tablet Take 1 tablet (40 mg total) by mouth every evening. 90 tablet 2    meloxicam (MOBIC) 7.5 MG tablet Take 1 tablet (7.5 mg total) by mouth once daily. 30 tablet 0    metFORMIN (GLUCOPHAGE) 1000 MG tablet TAKE 1 TABLET TWICE DAILY 180 tablet 1    methocarbamoL (ROBAXIN) 500 MG Tab Take 1 tablet (500 mg total) by mouth 3 (three) times daily. 270 tablet 1    metoprolol tartrate (LOPRESSOR) 50 MG tablet TAKE 1 TABLET TWICE DAILY 180 tablet 2    mupirocin (BACTROBAN) 2 %  ointment Apply topically 3 (three) times daily. 1 Tube 1    oxyCODONE (ROXICODONE) 5 MG immediate release tablet Take 1 tablet (5 mg total) by mouth every 6 (six) hours as needed. 8 tablet 0    rOPINIRole (REQUIP) 2 MG tablet Take 0.5 tablets (1 mg total) by mouth 3 (three) times daily. 270 tablet 1    traZODone (DESYREL) 50 MG tablet TAKE 1 TABLET EVERY EVENING (Patient taking differently: Take 50 mg by mouth nightly as needed.) 90 tablet 3    triamcinolone acetonide 0.1% (KENALOG) 0.1 % cream Apply topically 2 (two) times daily. for 10 days 1 Tube 1     No current facility-administered medications for this visit.     Facility-Administered Medications Ordered in Other Visits   Medication Dose Route Frequency Provider Last Rate Last Admin    ondansetron injection 4 mg  4 mg Intravenous Once PRN Quirino Durant MD           There are no discontinued medications.    No follow-ups on file.      Angely Roberts MD  Scribe Attestation:   I, Melissa Lee, am scribing for, and in the presence of, Dr.Arif Roberts I performed the above scribed service and the documentation accurately describes the services I performed. I attest to the accuracy of the note.    I, Dr. Angely Roberts, reviewed documentation as scribed above. I performed the services described in this documentation.  I agree that the record reflects my personal performance and is accurate and complete. Angely Roberts MD.  07/19/2022

## 2022-10-27 ENCOUNTER — IMMUNIZATION (OUTPATIENT)
Dept: FAMILY MEDICINE | Facility: CLINIC | Age: 82
End: 2022-10-27
Payer: MEDICARE

## 2022-10-27 PROCEDURE — 90694 FLU VACCINE - QUADRIVALENT - ADJUVANTED: ICD-10-PCS | Mod: S$GLB,,, | Performed by: FAMILY MEDICINE

## 2022-10-27 PROCEDURE — 90694 VACC AIIV4 NO PRSRV 0.5ML IM: CPT | Mod: S$GLB,,, | Performed by: FAMILY MEDICINE

## 2022-10-27 PROCEDURE — G0008 FLU VACCINE - QUADRIVALENT - ADJUVANTED: ICD-10-PCS | Mod: S$GLB,,, | Performed by: FAMILY MEDICINE

## 2022-10-27 PROCEDURE — G0008 ADMIN INFLUENZA VIRUS VAC: HCPCS | Mod: S$GLB,,, | Performed by: FAMILY MEDICINE

## 2022-11-09 DIAGNOSIS — Z78.0 MENOPAUSE: ICD-10-CM

## 2023-01-05 ENCOUNTER — PES CALL (OUTPATIENT)
Dept: ADMINISTRATIVE | Facility: CLINIC | Age: 83
End: 2023-01-05
Payer: MEDICARE

## 2024-01-31 DIAGNOSIS — Z78.0 MENOPAUSE: ICD-10-CM

## (undated) DEVICE — SYR 10CC LUER LOCK

## (undated) DEVICE — UROVIEW 2600/2800

## (undated) DEVICE — SEE MEDLINE ITEM 152622

## (undated) DEVICE — SPONGE GAUZE 16PLY 4X4

## (undated) DEVICE — GLOVE SURGICAL LATEX SZ 7

## (undated) DEVICE — SUT 3/0 36IN CHROMIC GUT C

## (undated) DEVICE — MANIFOLD 4 PORT

## (undated) DEVICE — TRAY CATH FOL SIL URIMTR 16FR

## (undated) DEVICE — SEE MEDLINE ITEM 157181

## (undated) DEVICE — SOL NS 1000CC

## (undated) DEVICE — SUT CHROMIC 3-0 SH 27IN GUT

## (undated) DEVICE — SEE MEDLINE ITEM 154981

## (undated) DEVICE — SEE MEDLINE ITEM 157117

## (undated) DEVICE — SUT VICRYL PLUS 2-0 CT1 18

## (undated) DEVICE — JELLY SURGILUBE LUBE PKT 3GM

## (undated) DEVICE — SET IRRIGATION WARMING NORMAL

## (undated) DEVICE — SUT VICRYL 2 0 CT 2

## (undated) DEVICE — SUT VICRYL PLUS 0 CT1 18IN

## (undated) DEVICE — PACK DRAPE PERI/GYN TIBURON

## (undated) DEVICE — SUT CHROMIC GUT 2-0 CT-1 27IN

## (undated) DEVICE — SUT VICRYL PLUS 0 CT1 36IN

## (undated) DEVICE — SOL IRR NACL .9% 3000ML

## (undated) DEVICE — SUT VICRYL 0 CT-2 27 DYE

## (undated) DEVICE — GOWN SMARTGOWN LVL4 X-LONG XL

## (undated) DEVICE — SUT VICRYL 3-0 27 SH

## (undated) DEVICE — SEE MEDLINE ITEM 157185

## (undated) DEVICE — NDL SPINAL SPINOCAN 22GX3.5

## (undated) DEVICE — GLOVE SURG BIOGEL LATEX SZ 7.5

## (undated) DEVICE — SYR ONLY LUER LOCK 20CC

## (undated) DEVICE — TUBING MEDI-VAC 20FT .25IN

## (undated) DEVICE — NDL INJETAK ADJ TIP 70CM

## (undated) DEVICE — SUT VICRYL 2-0 CT-2 VCP269H

## (undated) DEVICE — SUT 0 54IN COATED VICRYL U

## (undated) DEVICE — SEE MEDLINE ITEM 157027

## (undated) DEVICE — GAUZE SPONGE 4X4 12PLY

## (undated) DEVICE — SYR B-D DISP CONTROL 10CC100/C

## (undated) DEVICE — SET IRR URLGY 2LINE UNIV SPIKE

## (undated) DEVICE — SPONGE LAP 18X18 PREWASHED

## (undated) DEVICE — TOWEL OR DISP STRL BLUE 4/PK

## (undated) DEVICE — SOL WATER STRL IRR 1000ML

## (undated) DEVICE — SEE MEDLINE ITEM 152487

## (undated) DEVICE — CONTAINER SPECIMEN OR STER 4OZ

## (undated) DEVICE — CONTAINER SPECIMEN STRL 4OZ

## (undated) DEVICE — COVER LIGHT HANDLE 80/CA